# Patient Record
Sex: MALE | Employment: OTHER | URBAN - METROPOLITAN AREA
[De-identification: names, ages, dates, MRNs, and addresses within clinical notes are randomized per-mention and may not be internally consistent; named-entity substitution may affect disease eponyms.]

---

## 2017-01-19 ENCOUNTER — OFFICE VISIT (OUTPATIENT)
Dept: FAMILY MEDICINE | Facility: CLINIC | Age: 37
End: 2017-01-19
Payer: COMMERCIAL

## 2017-01-19 VITALS
SYSTOLIC BLOOD PRESSURE: 117 MMHG | HEIGHT: 70 IN | DIASTOLIC BLOOD PRESSURE: 72 MMHG | BODY MASS INDEX: 24.37 KG/M2 | HEART RATE: 69 BPM | WEIGHT: 170.2 LBS | TEMPERATURE: 98.3 F

## 2017-01-19 DIAGNOSIS — F19.20 CHEMICAL DEPENDENCY (H): ICD-10-CM

## 2017-01-19 DIAGNOSIS — S39.012A LOW BACK STRAIN, INITIAL ENCOUNTER: Primary | ICD-10-CM

## 2017-01-19 PROCEDURE — 99214 OFFICE O/P EST MOD 30 MIN: CPT | Performed by: FAMILY MEDICINE

## 2017-01-19 RX ORDER — ESCITALOPRAM OXALATE 20 MG/1
20 TABLET ORAL DAILY
COMMUNITY
Start: 2017-01-06 | End: 2017-09-18

## 2017-01-19 RX ORDER — METAXALONE 800 MG/1
800 TABLET ORAL 3 TIMES DAILY PRN
Qty: 45 TABLET | Refills: 1 | Status: SHIPPED | OUTPATIENT
Start: 2017-01-19 | End: 2017-02-20

## 2017-01-19 ASSESSMENT — ANXIETY QUESTIONNAIRES
3. WORRYING TOO MUCH ABOUT DIFFERENT THINGS: NOT AT ALL
6. BECOMING EASILY ANNOYED OR IRRITABLE: SEVERAL DAYS
7. FEELING AFRAID AS IF SOMETHING AWFUL MIGHT HAPPEN: NOT AT ALL
GAD7 TOTAL SCORE: 3
5. BEING SO RESTLESS THAT IT IS HARD TO SIT STILL: NOT AT ALL
2. NOT BEING ABLE TO STOP OR CONTROL WORRYING: NOT AT ALL
IF YOU CHECKED OFF ANY PROBLEMS ON THIS QUESTIONNAIRE, HOW DIFFICULT HAVE THESE PROBLEMS MADE IT FOR YOU TO DO YOUR WORK, TAKE CARE OF THINGS AT HOME, OR GET ALONG WITH OTHER PEOPLE: NOT DIFFICULT AT ALL
1. FEELING NERVOUS, ANXIOUS, OR ON EDGE: SEVERAL DAYS

## 2017-01-19 ASSESSMENT — PAIN SCALES - GENERAL: PAINLEVEL: MODERATE PAIN (4)

## 2017-01-19 ASSESSMENT — PATIENT HEALTH QUESTIONNAIRE - PHQ9: 5. POOR APPETITE OR OVEREATING: SEVERAL DAYS

## 2017-01-19 NOTE — PATIENT INSTRUCTIONS
Thank you for choosing [facility].  You may be receiving a survey in the mail from [facility] regarding your visit today.  Please take a few minutes to complete and return the survey to let us know how we are doing.      Our Clinic hours are:  Mondays    7:20 am - 7 pm      Tues -  Fri  7:20 am - 5 pm    Clinic Phone: [phone]    The clinic lab opens at 7:30 am Mon - Fri and appointments are required.    [facility]. [phone]  Monday-Thursday 8 am - 7pm  Tues/Wed/Fri 8 am - 5:30 pm

## 2017-01-19 NOTE — PROGRESS NOTES
SUBJECTIVE:                                                    Timmy Fitzpatrick is a 36 year old male who presents to clinic today for the following health issues:      Joint Pain     Onset: 1/13/17     Description:   Location: low back pain3  Character: Sharp and Dull ache    Intensity: moderate    Progression of Symptoms: worse, same    Accompanying Signs & Symptoms:  Other symptoms: none   History:   Previous similar pain: YES-       Precipitating factors:   Trauma or overuse: no     Alleviating factors:  Improved by: heat, acetaminophen and ibuprofen, Aleve       Therapies Tried and outcome: ibuprofen, acetaminophen, Aleve    ADDITIONAL HPI: 36 year old male here for above issue.  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    ROS: 10 point review of systems negative except as per HPI.    PAST MEDICAL HISTORY:  History reviewed. No pertinent past medical history.     ACTIVE MEDICAL PROBLEMS:  Patient Active Problem List   Diagnosis     Cervicalgia     Nonallopathic lesion of thoracic region     Nonallopathic lesion of lumbar region     CARDIOVASCULAR SCREENING; LDL GOAL LESS THAN 160     Major depression in partial remission (H)     Migraine headache     Anxiety attack     Alcoholic, sober since 2006     Chemical dependency (H)        FAMILY HISTORY:  Family History   Problem Relation Age of Onset     CANCER Paternal Grandfather      Depression Mother      Anxiety Disorder Mother      Depression Father      Dementia Maternal Grandfather      Substance Abuse Paternal Grandmother        SOCIAL HISTORY:  Social History     Social History     Marital Status:      Spouse Name: N/A     Number of Children: N/A     Years of Education: N/A     Occupational History     Not on file.     Social History Main Topics     Smoking status: Former Smoker     Types: Dip, chew, snus or snuff     Smokeless tobacco: Former User     Types: Chew     Quit date: 11/16/2014     Alcohol Use: No      Comment: sober since April 2016  "    Drug Use: Yes      Comment: sober since December 2015 marijuana     Sexual Activity:     Partners: Female     Birth Control/ Protection: Pill     Other Topics Concern     Parent/Sibling W/ Cabg, Mi Or Angioplasty Before 65f 55m? No     Social History Narrative    Dairy/d 1-2 servings/d.     Caffeine 3-4 servings/d    Exercise 4-6 x week    Sunscreen used - Yes    Seatbelts used - Yes    Working smoke/CO detectors in the home - Yes    Guns stored in the home - No    Self Breast Exams - NOT APPLICABLE    Self Testicular Exam - No    Eye Exam up to date - No    Dental Exam up to date - Yes    Pap Smear up to date - NOT APPLICABLE    Mammogram up to date - NOT APPLICABLE    PSA up to date - NOT APPLICABLE    Dexa Scan up to date - NOT APPLICABLE    Flex Sig / Colonoscopy up to date - NOT APPLICABLE    Immunizations up to date - Yes    Abuse: Current or Past(Physical, Sexual or Emotional)- No    Do you feel safe in your environment - Yes               MEDICATIONS:  Current Outpatient Prescriptions   Medication Sig Dispense Refill     CLONIDINE HCL PO Take 0.1 mg by mouth At Bedtime       gabapentin (NEURONTIN) 300 MG capsule Take 1 capsule (300 mg) by mouth 3 times daily 90 capsule 2     Multiple Vitamins-Minerals (MENS MULTIPLE VITAMIN/LYCOPENE PO) Take 1 tablet by mouth daily.       BuPROPion HCl (WELLBUTRIN PO) Take 150 mg by mouth daily        GLUCOSAMINE HCL None Entered       escitalopram (LEXAPRO) 20 MG tablet Take 20 mg by mouth daily       LEXAPRO 10 MG PO TABS ONE DAILY 30 Tab 1       ALLERGIES:     Allergies   Allergen Reactions     Nkda [No Known Drug Allergies]        Problem list, Medication list, Allergies, and Medical/Social/Surgical histories reviewed in [facility] and updated as appropriate.    OBJECTIVE:                                                    VITALS: /72 mmHg  Pulse 69  Temp(Src) 98.3  F (36.8  C) (Tympanic)  Ht 5' 10\" (1.778 m)  Wt 170 lb 3.2 oz (77.202 kg)  BMI 24.42 kg/m2 Body " mass index is 24.42 kg/(m^2).  GENERAL: Pleasant, well appearing male.  MUSCULOSKELETAL:  No midline vertebral tenderness to palpation. Has bilateral paravertebral tenderness and tightness. Straight leg raise is negative for radicular symptoms. Strength is 5/5 and DTR 2+ and symmetric throughout lower extremities.     ASSESSMENT/PLAN:                                                    1. Low back strain, initial encounter  Discussed likely muscular in etiology. Add skelaxin, heat and PHYSICAL THERAPY. Avoid narcotics due to not indicated and he has a history of chemical dependency.  - metaxalone (SKELAXIN) 800 MG tablet; Take 1 tablet (800 mg) by mouth 3 times daily as needed for moderate pain  Dispense: 45 tablet; Refill: 1  - PHYSICAL THERAPY REFERRAL    2. Chemical dependency (H)  Has been sober since last treatment program. Wants to avoid narcotics or other habit forming medications.

## 2017-01-19 NOTE — NURSING NOTE
"Chief Complaint   Patient presents with     Back Pain     low back pain.  no known injury       Initial /72 mmHg  Pulse 69  Temp(Src) 98.3  F (36.8  C) (Tympanic)  Ht 5' 10\" (1.778 m)  Wt 170 lb 3.2 oz (77.202 kg)  BMI 24.42 kg/m2 Estimated body mass index is 24.42 kg/(m^2) as calculated from the following:    Height as of this encounter: 5' 10\" (1.778 m).    Weight as of this encounter: 170 lb 3.2 oz (77.202 kg).  BP completed using cuff size: regular    "

## 2017-01-19 NOTE — MR AVS SNAPSHOT
After Visit Summary   1/19/2017    Timmy Fitzpatrick    MRN: [id number]           Patient Information     Date Of Birth          1980        Visit Information        Provider Department      1/19/2017 12:40 PM Shira Thomas MD [facility]        Today's Diagnoses     Low back strain, initial encounter    -  1       Care Instructions          Thank you for choosing [facility].  You may be receiving a survey in the mail from [facility] regarding your visit today.  Please take a few minutes to complete and return the survey to let us know how we are doing.      Our Clinic hours are:  Mondays    7:20 am - 7 pm      Tues -  Fri  7:20 am - 5 pm    Clinic Phone: [phone]    The clinic lab opens at 7:30 am Mon - Fri and appointments are required.    [facility]  Ph. [phone]  Monday-Thursday 8 am - 7pm  Tues/Wed/Fri 8 am - 5:30 pm               Follow-ups after your visit        Additional Services     PHYSICAL THERAPY REFERRAL       *This therapy referral will be filtered to a centralized scheduling office at [facility] and the patient will receive a call to schedule an appointment at a [facility] location most convenient for them. *     [facility] provides Physical Therapy evaluation and treatment and many specialty services across the [facility] system.  If requesting a specialty program, please choose from the list below.    If you have not heard from the scheduling office within 2 business days, please call [phone] for all locations, with the exception of [place], please call [phone].  Treatment: Evaluation & Treatment  Special Instructions/Modalities:   Special Programs: None    Please be aware that coverage of these services is subject to the terms and limitations of your health insurance plan.  Call member services at your health plan with any benefit or coverage questions.      **Note  "to Provider:  If you are referring outside of [facility] for the therapy appointment, please list the name of the location in the  special instructions  above, print the referral and give to the patient to schedule the appointment.                  Who to contact     If you have questions or need follow up information about today's clinic visit or your schedule please contact [facility] directly at [phone].  Normal or non-critical lab and imaging results will be communicated to you by [employer], letter or phone within 4 business days after the clinic has received the results. If you do not hear from us within 7 days, please contact the clinic through [employer] or phone. If you have a critical or abnormal lab result, we will notify you by phone as soon as possible.  Submit refill requests through [employer] or call your pharmacy and they will forward the refill request to us. Please allow 3 business days for your refill to be completed.          Additional Information About Your Visit        [employer] Information     [employer] gives you secure access to your electronic health record. If you see a primary care provider, you can also send messages to your care team and make appointments. If you have questions, please call your primary care clinic.  If you do not have a primary care provider, please call [phone] and they will assist you.        Care EveryWhere ID     This is your Care EveryWhere ID. This could be used by other organizations to access your [facility] medical records  [id number]        Your Vitals Were     Pulse Temperature Height BMI (Body Mass Index)          69 98.3  F (36.8  C) (Tympanic) 5' 10\" (1.778 m) 24.42 kg/m2         Blood Pressure from Last 3 Encounters:   01/19/17 117/72   09/12/16 122/70   08/18/16 118/80    Weight from Last 3 Encounters:   01/19/17 170 lb 3.2 oz (77.202 kg)   09/29/16 166 lb (75.297 kg)   09/12/16 170 lb 11.2 oz (77.429 kg)              We Performed the " Following     PHYSICAL THERAPY REFERRAL          Today's Medication Changes          These changes are accurate as of: 1/19/17  1:21 PM.  If you have any questions, ask your nurse or doctor.               Start taking these medicines.        Dose/Directions    metaxalone 800 MG tablet   Commonly known as:  SKELAXIN   Used for:  Low back strain, initial encounter   Started by:  Shira Thomas MD        Dose:  800 mg   Take 1 tablet (800 mg) by mouth 3 times daily as needed for moderate pain   Quantity:  45 tablet   Refills:  1            Where to get your medicines      These medications were sent to [facility] [address]  [address] [place] [id number]     Phone:  [phone]    - metaxalone 800 MG tablet             Primary Care Provider Office Phone # Fax #    R Refugio Loza [phone]       [facility] [address] [place] [id number]        Thank you!     Thank you for choosing [facility]  for your care. Our goal is always to provide you with excellent care. Hearing back from our patients is one way we can continue to improve our services. Please take a few minutes to complete the written survey that you may receive in the mail after your visit with us. Thank you!             Your Updated Medication List - Protect others around you: Learn how to safely use, store and throw away your medicines at www.disposemymeds.org.          This list is accurate as of: 1/19/17  1:21 PM.  Always use your most recent med list.                   Brand Name Dispense Instructions for use    CLONIDINE HCL PO      Take 0.1 mg by mouth At Bedtime       gabapentin 300 MG capsule    NEURONTIN    90 capsule    Take 1 capsule (300 mg) by mouth 3 times daily       GLUCOSAMINE HCL      None Entered       * LEXAPRO 10 MG tablet   Generic drug:  escitalopram     30 Tab    ONE DAILY       * escitalopram  20 MG tablet    LEXAPRO     Take 20 mg by mouth daily       MENS MULTIPLE VITAMIN/LYCOPENE PO      Take 1 tablet by mouth daily.       metaxalone 800 MG tablet    SKELAXIN    45 tablet    Take 1 tablet (800 mg) by mouth 3 times daily as needed for moderate pain       WELLBUTRIN PO      Take 150 mg by mouth daily       * Notice:  This list has 2 medication(s) that are the same as other medications prescribed for you. Read the directions carefully, and ask your doctor or other care provider to review them with you.

## 2017-01-20 ASSESSMENT — PATIENT HEALTH QUESTIONNAIRE - PHQ9: SUM OF ALL RESPONSES TO PHQ QUESTIONS 1-9: 5

## 2017-01-20 ASSESSMENT — ANXIETY QUESTIONNAIRES: GAD7 TOTAL SCORE: 3

## 2017-09-18 ENCOUNTER — OFFICE VISIT (OUTPATIENT)
Dept: FAMILY MEDICINE | Facility: CLINIC | Age: 37
End: 2017-09-18
Payer: COMMERCIAL

## 2017-09-18 VITALS
HEART RATE: 78 BPM | TEMPERATURE: 98.3 F | WEIGHT: 173 LBS | BODY MASS INDEX: 24.82 KG/M2 | SYSTOLIC BLOOD PRESSURE: 127 MMHG | DIASTOLIC BLOOD PRESSURE: 85 MMHG | OXYGEN SATURATION: 97 %

## 2017-09-18 DIAGNOSIS — F32.4 MAJOR DEPRESSIVE DISORDER WITH SINGLE EPISODE, IN PARTIAL REMISSION (H): ICD-10-CM

## 2017-09-18 DIAGNOSIS — G25.81 RESTLESS LEGS SYNDROME (RLS): ICD-10-CM

## 2017-09-18 DIAGNOSIS — F41.9 ANXIETY: ICD-10-CM

## 2017-09-18 DIAGNOSIS — J01.90 ACUTE SINUSITIS WITH SYMPTOMS GREATER THAN 10 DAYS: Primary | ICD-10-CM

## 2017-09-18 PROCEDURE — 99214 OFFICE O/P EST MOD 30 MIN: CPT | Performed by: FAMILY MEDICINE

## 2017-09-18 RX ORDER — GABAPENTIN 300 MG/1
CAPSULE ORAL
Qty: 270 CAPSULE | Refills: 3 | Status: SHIPPED | OUTPATIENT
Start: 2017-09-18 | End: 2017-11-28

## 2017-09-18 RX ORDER — ESCITALOPRAM OXALATE 20 MG/1
20 TABLET ORAL DAILY
Qty: 90 TABLET | Refills: 3 | Status: SHIPPED | OUTPATIENT
Start: 2017-09-18 | End: 2018-11-02

## 2017-09-18 ASSESSMENT — PAIN SCALES - GENERAL: PAINLEVEL: MODERATE PAIN (4)

## 2017-09-18 NOTE — MR AVS SNAPSHOT
After Visit Summary   9/18/2017    Timmy Fitzpatrick    MRN: [id number]           Patient Information     Date Of Birth          1980        Visit Information        Provider Department      9/18/2017 4:00 PM CLAUDIO Loza MD [facility]        Today's Diagnoses     Acute sinusitis with symptoms greater than 10 days    -  1    Major depressive disorder with single episode, in partial remission (H)        Anxiety        Restless legs syndrome (RLS)           Follow-ups after your visit        Who to contact     If you have questions or need follow up information about today's clinic visit or your schedule please contact [facility] directly at [phone].  Normal or non-critical lab and imaging results will be communicated to you by MyChart, letter or phone within 4 business days after the clinic has received the results. If you do not hear from us within 7 days, please contact the clinic through [employer] or phone. If you have a critical or abnormal lab result, we will notify you by phone as soon as possible.  Submit refill requests through [employer] or call your pharmacy and they will forward the refill request to us. Please allow 3 business days for your refill to be completed.          Additional Information About Your Visit        MyChart Information     [employer] gives you secure access to your electronic health record. If you see a primary care provider, you can also send messages to your care team and make appointments. If you have questions, please call your primary care clinic.  If you do not have a primary care provider, please call [phone] and they will assist you.        Care EveryWhere ID     This is your Care EveryWhere ID. This could be used by other organizations to access your [facility] medical records  [id number]        Your Vitals Were     Pulse Temperature Pulse Oximetry BMI (Body Mass Index)          78 98.3  F (36.8  C) (Tympanic) 97%  24.82 kg/m2         Blood Pressure from Last 3 Encounters:   09/18/17 127/85   01/19/17 117/72   09/12/16 122/70    Weight from Last 3 Encounters:   09/18/17 173 lb (78.5 kg)   01/19/17 170 lb 3.2 oz (77.2 kg)   09/29/16 166 lb (75.3 kg)              Today, you had the following     No orders found for display         Today's Medication Changes          These changes are accurate as of: 9/18/17  4:27 PM.  If you have any questions, ask your nurse or doctor.               Start taking these medicines.        Dose/Directions    amoxicillin-clavulanate 875-125 MG per tablet   Commonly known as:  AUGMENTIN   Used for:  Acute sinusitis with symptoms greater than 10 days   Started by:  CLAUDIO Loza MD        Dose:  1 tablet   Take 1 tablet by mouth 2 times daily   Quantity:  20 tablet   Refills:  0         These medicines have changed or have updated prescriptions.        Dose/Directions    gabapentin 300 MG capsule   Commonly known as:  NEURONTIN   This may have changed:    - how much to take  - how to take this  - when to take this  - additional instructions   Used for:  Restless legs syndrome (RLS), Anxiety   Changed by:  CLAUDIO Loza MD        One in the late afternoon and 2 at bedtime   Quantity:  270 capsule   Refills:  3            Where to get your medicines      [place] [facility] [address]  [address] [place] [id number]     Phone:  [phone]     amoxicillin-clavulanate 875-125 MG per tablet    escitalopram 20 MG tablet    gabapentin 300 MG capsule                Primary Care Provider Office Phone # Fax #    CLAUDIO Loza [phone] [address] [facility] [address]        Equal Access to Services     ANGIE RIOS AH: Kiara Johnson, mindy mejia, sherita kawilliam murphy, alex mayer. Gabby [facility] [phone].    ATENCIÓN: Priscilla alas  español, tiene a waters disposición servicios gratuitos de asistencia lingüística. Alexia cash [phone].    We comply with applicable federal civil rights laws and [place] laws. We do not discriminate on the basis of race, color, national origin, age, disability sex, sexual orientation or gender identity.            Thank you!     Thank you for choosing [facility]  for your care. Our goal is always to provide you with excellent care. Hearing back from our patients is one way we can continue to improve our services. Please take a few minutes to complete the written survey that you may receive in the mail after your visit with us. Thank you!             Your Updated Medication List - Protect others around you: Learn how to safely use, store and throw away your medicines at www.disposemymeds.org.          This list is accurate as of: 9/18/17  4:27 PM.  Always use your most recent med list.                   Brand Name Dispense Instructions for use Diagnosis    amoxicillin-clavulanate 875-125 MG per tablet    AUGMENTIN    20 tablet    Take 1 tablet by mouth 2 times daily    Acute sinusitis with symptoms greater than 10 days       escitalopram 20 MG tablet    LEXAPRO    90 tablet    Take 1 tablet (20 mg) by mouth daily    Major depressive disorder with single episode, in partial remission (H), Anxiety       gabapentin 300 MG capsule    NEURONTIN    270 capsule    One in the late afternoon and 2 at bedtime    Restless legs syndrome (RLS), Anxiety       GLUCOSAMINE HCL      None Entered        MENS MULTIPLE VITAMIN/LYCOPENE PO      Take 1 tablet by mouth daily.        metaxalone 800 MG tablet    SKELAXIN    45 tablet    Take 1 tablet (800 mg) by mouth 3 times daily as needed for moderate pain    Low back strain, initial encounter

## 2017-09-18 NOTE — PROGRESS NOTES
SUBJECTIVE:   Timmy Fitzpatrick is a 37 year old male who presents to clinic today for the following health issues:      Acute Illness   Acute illness concerns: Sinus pain and pressure, headache, drainage  Onset: 2 weeks    Fever: no    Chills/Sweats: YES    Headache (location?): YES    Sinus Pressure:YES    Conjunctivitis:  no    Ear Pain: no    Rhinorrhea: YES    Congestion: YES    Sore Throat: no     Cough: YES-productive of green sputum    Wheeze: no    Decreased Appetite: YES    Nausea: no    Vomiting: no    Diarrhea:  no    Dysuria/Freq.: no    Fatigue/Achiness: YES    Sick/Strep Exposure: no     Therapies Tried and outcome:  Dayquil, ny-quil, sudafed        PROBLEMS TO ADD ON...  Depression and Anxiety Follow-Up    He has been followed by a psychiatrist in the [facility] for a number of years, but she moved [place] a new clinic [place] [facility]. He has been on the same dose of his medication for several years and has been stable, and was wondering if I would be willing to write the refills for him to save him having to drive into the [facility]  PHQ-9 SCORE 8/18/2016 9/12/2016 1/19/2017   Total Score - - -   Total Score 8 10 5     JUAREZ-7 SCORE 8/18/2016 9/12/2016 1/19/2017   Total Score - - -   Total Score 10 8 3       PHQ-9  English  PHQ-9   Any Language  GAD7        Problem list and histories reviewed & adjusted, as indicated.  Additional history: none        Reviewed and updated as needed this visit by clinical staffTobacco  Allergies  Med Hx  Surg Hx  Fam Hx  Soc Hx      Reviewed and updated as needed this visit by Provider               ROS:  Constitutional, HEENT, cardiovascular, pulmonary, gi and gu systems are negative, except as otherwise noted.          OBJECTIVE:                                                    /85 (BP Location: Right arm, Patient Position: Chair, Cuff Size: Adult Regular)  Pulse 78  Temp 98.3  F (36.8  C) (Tympanic)  Wt 173 lb (78.5 kg)  SpO2 97%  BMI 24.82  kg/m2    GENERAL: healthy, alert and no distress  EYES: Eyes grossly normal to inspection, extraocular movements - intact, and PERRL  HENT: ear canals- normal; TMs- normal; Nose- normal; Mouth- no ulcers, no lesions; tender over the maxillary sinuses bilaterally  NECK: no tenderness, no adenopathy, no asymmetry, no masses, no stiffness; thyroid- normal to palpation  RESP: lungs clear to auscultation - no rales, no rhonchi, no wheezes  CV: regular rates and rhythm, normal S1 S2, no S3 or S4 and no murmur, no click or rub -  MS: extremities- no gross deformities noted, no edema  PSYCH: Alert and oriented times 3; coherent speech, normal rate and volume, able to articulate logical thoughts, able to abstract reason, no tangential thoughts, no hallucinations or delusions. Affect is normal.         ASSESSMENT/PLAN:                                                    ASSESSMENT:  1. Acute sinusitis with symptoms greater than 10 days    2. Major depressive disorder with single episode, in partial remission (H)    3. Anxiety    4. Restless legs syndrome (RLS)        PLAN:  Orders Placed This Encounter     amoxicillin-clavulanate (AUGMENTIN) 875-125 MG per tablet; Discussed how to take the medication(s), expected outcomes, potential side effects.      escitalopram (LEXAPRO) 20 MG tablet     gabapentin (NEURONTIN) 300 MG capsule     Push fluids and apply hot packs to the facial area. I told him I would be willing to prescribe his psychotropic meds as long as things are stable. If he needs complicated adjustments will need to refer him back to a psychiatrist      CLAUDIO Loza  [facility]

## 2017-09-18 NOTE — NURSING NOTE
"Chief Complaint   Patient presents with     Sinus Problem     sinus drainage, pressure and pain, congestion x 2 weeks       Initial /85 (BP Location: Right arm, Patient Position: Chair, Cuff Size: Adult Regular)  Pulse 78  Temp 98.3  F (36.8  C) (Tympanic)  Wt 173 lb (78.5 kg)  SpO2 97%  BMI 24.82 kg/m2 Estimated body mass index is 24.82 kg/(m^2) as calculated from the following:    Height as of 1/19/17: 5' 10\" (1.778 m).    Weight as of this encounter: 173 lb (78.5 kg).  Medication Reconciliation: complete   Rhona Krishna CMA       "

## 2017-09-26 ENCOUNTER — OFFICE VISIT (OUTPATIENT)
Dept: FAMILY MEDICINE | Facility: CLINIC | Age: 37
End: 2017-09-26
Payer: COMMERCIAL

## 2017-09-26 VITALS
BODY MASS INDEX: 23.96 KG/M2 | SYSTOLIC BLOOD PRESSURE: 125 MMHG | WEIGHT: 167 LBS | HEART RATE: 70 BPM | DIASTOLIC BLOOD PRESSURE: 78 MMHG | TEMPERATURE: 98.4 F

## 2017-09-26 DIAGNOSIS — M79.10 MYALGIA: Primary | ICD-10-CM

## 2017-09-26 DIAGNOSIS — N52.9 ERECTILE DYSFUNCTION, UNSPECIFIED ERECTILE DYSFUNCTION TYPE: ICD-10-CM

## 2017-09-26 DIAGNOSIS — S46.001A ROTATOR CUFF INJURY, RIGHT, INITIAL ENCOUNTER: ICD-10-CM

## 2017-09-26 DIAGNOSIS — R68.89 TEMPERATURE INTOLERANCE: ICD-10-CM

## 2017-09-26 PROCEDURE — 84443 ASSAY THYROID STIM HORMONE: CPT | Performed by: FAMILY MEDICINE

## 2017-09-26 PROCEDURE — 99214 OFFICE O/P EST MOD 30 MIN: CPT | Performed by: FAMILY MEDICINE

## 2017-09-26 PROCEDURE — 36415 COLL VENOUS BLD VENIPUNCTURE: CPT | Performed by: FAMILY MEDICINE

## 2017-09-26 RX ORDER — SILDENAFIL 100 MG/1
100 TABLET, FILM COATED ORAL DAILY PRN
Qty: 12 TABLET | Refills: 11 | Status: SHIPPED | OUTPATIENT
Start: 2017-09-26 | End: 2018-12-07

## 2017-09-26 ASSESSMENT — PAIN SCALES - GENERAL: PAINLEVEL: NO PAIN (0)

## 2017-09-26 NOTE — PROGRESS NOTES
SUBJECTIVE:   Timmy Fitzpatrick is a 37 year old male who presents to clinic today for the following health issues:      Pt would like to have thyroid checked, pts having excessive sweating,having on and off tense muscles, pain in right shoulder and wrist.        Problem list and histories reviewed & adjusted, as indicated.  Additional history:         Reviewed and updated as needed this visit by clinical staff  Tobacco  Allergies  Med Hx  Surg Hx  Fam Hx  Soc Hx      Reviewed and updated as needed this visit by Provider      SUBJECTIVE:  Timmy Fitzpatrick is a 37 year old male who scheduled an appointment to discuss the following issues:     Myalgia  Temperature intolerance  Rotator cuff injury, right, initial encounter  Erectile dysfunction, unspecified erectile dysfunction type    Medical, social, surgical, and family histories reviewed.    ROS:  General: No change in weight, sleep or appetite.  Normal energy.  No fever or chills, no excessive fatigue or daytime drowsiness  Eyes: Negative for vision changes or eye problems  ENT: No problems with ears, nose or throat.  No difficulty swallowing.  Resp: No coughing, wheezing or shortness of breath, denies apnea  CV: No chest pains or palpitations  GI: No nausea, vomiting,  heartburn, abdominal pain, diarrhea, constipation or change in bowel habits  : No urinary frequency or dysuria, bladder or kidney problems  Musculoskeletal: POSITIVE  for:, Right shoulder pain with certain motions but no loss of motion.  Neurologic: No headaches, numbness, tingling, weakness, problems with balance or coordination  Psychiatric: No problems with anxiety, depression or mental health    OBJECTIVE:  /78 (BP Location: Right arm, Patient Position: Left side, Cuff Size: Adult Large)  Pulse 70  Temp 98.4  F (36.9  C) (Tympanic)  Wt 167 lb (75.8 kg)  BMI 23.96 kg/m2  EXAM:  GENERAL APPEARANCE: healthy, alert and no distress  EYES: PERRLA, EOMI, sclerae clear, fundi  benign with sharp discs  ENT: TMs clear and nose and throat clear  NECK: supple without adenopathy and thyroid normal to palpation  RESP: lungs clear to auscultation - no rales, rhonchi or wheezes  ABDOMEN: bowel sounds positive, soft, non tender, no masses or hepatosplenomegaly  EXT: Right shoulder is normal as far as range of motion but there is reproduction of his pain with resisted external rotation.    ASSESSMENT/PLAN:  (M79.1) Myalgia  (primary encounter diagnosis)  Comment:   Plan: TSH with free T4 reflex            (R68.89) Temperature intolerance  Comment:   Plan: TSH with free T4 reflex            (S46.001A) Rotator cuff injury, right, initial encounter  Comment: Probable strain  Plan: Avoid further strain and monitor for signs of decreased range of motion for which he should return    (N52.9) Erectile dysfunction, unspecified erectile dysfunction type  Comment: He would like to try treatment though he doesn't have severe dysfunction.  Plan: sildenafil (VIAGRA) 100 MG tablet

## 2017-09-26 NOTE — NURSING NOTE
"Chief Complaint   Patient presents with     RECHECK     pt would like to have thyroid checked, pts having excessive sweating, tense muscles, pain in right shoulder and wrist       Initial /78 (BP Location: Right arm, Patient Position: Left side, Cuff Size: Adult Large)  Pulse 70  Temp 98.4  F (36.9  C) (Tympanic)  Wt 167 lb (75.8 kg)  BMI 23.96 kg/m2 Estimated body mass index is 23.96 kg/(m^2) as calculated from the following:    Height as of 1/19/17: 5' 10\" (1.778 m).    Weight as of this encounter: 167 lb (75.8 kg).  Medication Reconciliation: complete   Rhona Krishna CMA       "

## 2017-09-26 NOTE — MR AVS SNAPSHOT
After Visit Summary   9/26/2017    Timmy Fitzpatrick    MRN: [id number]           Patient Information     Date Of Birth          1980        Visit Information        Provider Department      9/26/2017 4:20 PM Nathan Julien MD [facility]        Today's Diagnoses     Myalgia    -  1    Temperature intolerance        Rotator cuff injury, right, initial encounter        Erectile dysfunction, unspecified erectile dysfunction type           Follow-ups after your visit        Who to contact     If you have questions or need follow up information about today's clinic visit or your schedule please contact [facility] directly at [phone].  Normal or non-critical lab and imaging results will be communicated to you by [employer], letter or phone within 4 business days after the clinic has received the results. If you do not hear from us within 7 days, please contact the clinic through [employer] or phone. If you have a critical or abnormal lab result, we will notify you by phone as soon as possible.  Submit refill requests through [employer] or call your pharmacy and they will forward the refill request to us. Please allow 3 business days for your refill to be completed.          Additional Information About Your Visit        MyChart Information     [employer] gives you secure access to your electronic health record. If you see a primary care provider, you can also send messages to your care team and make appointments. If you have questions, please call your primary care clinic.  If you do not have a primary care provider, please call [phone] and they will assist you.        Care EveryWhere ID     This is your Care EveryWhere ID. This could be used by other organizations to access your [place] medical records  [id number]        Your Vitals Were     Pulse Temperature BMI (Body Mass Index)             70 98.4  F (36.9  C) (Tympanic) 23.96 kg/m2          Blood Pressure  from Last 3 Encounters:   09/26/17 125/78   09/18/17 127/85   01/19/17 117/72    Weight from Last 3 Encounters:   09/26/17 167 lb (75.8 kg)   09/18/17 173 lb (78.5 kg)   01/19/17 170 lb 3.2 oz (77.2 kg)              We Performed the Following     TSH with free T4 reflex          Today's Medication Changes          These changes are accurate as of: 9/26/17  4:47 PM.  If you have any questions, ask your nurse or doctor.               Start taking these medicines.        Dose/Directions    sildenafil 100 MG tablet   Commonly known as:  VIAGRA   Used for:  Erectile dysfunction, unspecified erectile dysfunction type   Started by:  Nathan Julien MD        Dose:  100 mg   Take 1 tablet (100 mg) by mouth daily as needed 30 min to 4 hrs before sex. Do not use with nitroglycerin, terazosin or doxazosin.   Quantity:  12 tablet   Refills:  11            Where to get your medicines      These medications were sent to [facility] [address]  [address] [place] [address]     Phone:  [phone]     sildenafil 100 MG tablet                Primary Care Provider Office Phone # Fax #    R Refugio Loza [phone] [address] [facility] [address]        Equal Access to Services     ANGIE RIOS AH: Hadlor cabrera hadasho Soshiv, waaxda luqadaha, qaybta kaalmada katherine, alex mayer. So [facility] [phone].    ATENCIÓN: Si habla español, tiene a waters disposición servicios gratuitos de asistencia lingüística. Alexia al [phone].    We comply with applicable federal civil rights laws and [place] laws. We do not discriminate on the basis of race, color, national origin, age, disability sex, sexual orientation or gender identity.            Thank you!     Thank you for choosing [facility]  for your care. Our goal is always to provide you with excellent care. Hearing back from our  patients is one way we can continue to improve our services. Please take a few minutes to complete the written survey that you may receive in the mail after your visit with us. Thank you!             Your Updated Medication List - Protect others around you: Learn how to safely use, store and throw away your medicines at www.disposemymeds.org.          This list is accurate as of: 9/26/17  4:47 PM.  Always use your most recent med list.                   Brand Name Dispense Instructions for use Diagnosis    amoxicillin-clavulanate 875-125 MG per tablet    AUGMENTIN    20 tablet    Take 1 tablet by mouth 2 times daily    Acute sinusitis with symptoms greater than 10 days       escitalopram 20 MG tablet    LEXAPRO    90 tablet    Take 1 tablet (20 mg) by mouth daily    Major depressive disorder with single episode, in partial remission (H), Anxiety       gabapentin 300 MG capsule    NEURONTIN    270 capsule    One in the late afternoon and 2 at bedtime    Restless legs syndrome (RLS), Anxiety       GLUCOSAMINE HCL      None Entered        MENS MULTIPLE VITAMIN/LYCOPENE PO      Take 1 tablet by mouth daily.        metaxalone 800 MG tablet    SKELAXIN    45 tablet    Take 1 tablet (800 mg) by mouth 3 times daily as needed for moderate pain    Low back strain, initial encounter       sildenafil 100 MG tablet    VIAGRA    12 tablet    Take 1 tablet (100 mg) by mouth daily as needed 30 min to 4 hrs before sex. Do not use with nitroglycerin, terazosin or doxazosin.    Erectile dysfunction, unspecified erectile dysfunction type

## 2017-09-27 LAB — TSH SERPL DL<=0.005 MIU/L-ACNC: 1.78 MU/L (ref 0.4–4)

## 2017-11-28 ENCOUNTER — OFFICE VISIT (OUTPATIENT)
Dept: FAMILY MEDICINE | Facility: CLINIC | Age: 37
End: 2017-11-28
Payer: COMMERCIAL

## 2017-11-28 VITALS
BODY MASS INDEX: 24.05 KG/M2 | HEART RATE: 80 BPM | SYSTOLIC BLOOD PRESSURE: 130 MMHG | WEIGHT: 168 LBS | HEIGHT: 70 IN | DIASTOLIC BLOOD PRESSURE: 78 MMHG

## 2017-11-28 DIAGNOSIS — G25.81 RESTLESS LEGS SYNDROME (RLS): ICD-10-CM

## 2017-11-28 DIAGNOSIS — F40.240 CLAUSTROPHOBIA: ICD-10-CM

## 2017-11-28 DIAGNOSIS — F41.9 ANXIETY: ICD-10-CM

## 2017-11-28 DIAGNOSIS — M25.511 ACUTE PAIN OF RIGHT SHOULDER: Primary | ICD-10-CM

## 2017-11-28 PROCEDURE — 99214 OFFICE O/P EST MOD 30 MIN: CPT | Performed by: FAMILY MEDICINE

## 2017-11-28 RX ORDER — LORAZEPAM 1 MG/1
TABLET ORAL
Qty: 1 TABLET | Refills: 0 | Status: SHIPPED | OUTPATIENT
Start: 2017-11-28 | End: 2018-04-06

## 2017-11-28 RX ORDER — GABAPENTIN 300 MG/1
CAPSULE ORAL
Qty: 360 CAPSULE | Refills: 3 | Status: SHIPPED | OUTPATIENT
Start: 2017-11-28 | End: 2018-08-13

## 2017-11-28 ASSESSMENT — PATIENT HEALTH QUESTIONNAIRE - PHQ9
SUM OF ALL RESPONSES TO PHQ QUESTIONS 1-9: 13
5. POOR APPETITE OR OVEREATING: NEARLY EVERY DAY

## 2017-11-28 ASSESSMENT — ANXIETY QUESTIONNAIRES
1. FEELING NERVOUS, ANXIOUS, OR ON EDGE: MORE THAN HALF THE DAYS
5. BEING SO RESTLESS THAT IT IS HARD TO SIT STILL: NOT AT ALL
2. NOT BEING ABLE TO STOP OR CONTROL WORRYING: MORE THAN HALF THE DAYS
GAD7 TOTAL SCORE: 10
3. WORRYING TOO MUCH ABOUT DIFFERENT THINGS: MORE THAN HALF THE DAYS
7. FEELING AFRAID AS IF SOMETHING AWFUL MIGHT HAPPEN: NOT AT ALL
IF YOU CHECKED OFF ANY PROBLEMS ON THIS QUESTIONNAIRE, HOW DIFFICULT HAVE THESE PROBLEMS MADE IT FOR YOU TO DO YOUR WORK, TAKE CARE OF THINGS AT HOME, OR GET ALONG WITH OTHER PEOPLE: SOMEWHAT DIFFICULT
6. BECOMING EASILY ANNOYED OR IRRITABLE: SEVERAL DAYS

## 2017-11-28 NOTE — MR AVS SNAPSHOT
After Visit Summary   11/28/2017    Timmy Fitzpatrick    MRN: [id number]           Patient Information     Date Of Birth          1980        Visit Information        Provider Department      11/28/2017 4:20 PM CLAUDIO Loza MD [facility]        Today's Diagnoses     Acute pain of right shoulder    -  1    Claustrophobia        Restless legs syndrome (RLS)        Anxiety           Follow-ups after your visit        Future tests that were ordered for you today     Open Future Orders        Priority Expected Expires Ordered    MR Shoulder Right w/o Contrast Routine  11/28/2018 11/28/2017            Who to contact     If you have questions or need follow up information about today's clinic visit or your schedule please contact [facility] directly at [phone].  Normal or non-critical lab and imaging results will be communicated to you by MyChart, letter or phone within 4 business days after the clinic has received the results. If you do not hear from us within 7 days, please contact the clinic through [employer] or phone. If you have a critical or abnormal lab result, we will notify you by phone as soon as possible.  Submit refill requests through [employer] or call your pharmacy and they will forward the refill request to us. Please allow 3 business days for your refill to be completed.          Additional Information About Your Visit        MyChart Information     [employer] gives you secure access to your electronic health record. If you see a primary care provider, you can also send messages to your care team and make appointments. If you have questions, please call your primary care clinic.  If you do not have a primary care provider, please call [phone] and they will assist you.        Care EveryWhere ID     This is your Care EveryWhere ID. This could be used by other organizations to access your [place] medical records  [id number]        Your Vitals  "Were     Pulse Height BMI (Body Mass Index)             80 5' 10\" (1.778 m) 24.11 kg/m2          Blood Pressure from Last 3 Encounters:   11/28/17 130/78   09/26/17 125/78   09/18/17 127/85    Weight from Last 3 Encounters:   11/28/17 168 lb (76.2 kg)   09/26/17 167 lb (75.8 kg)   09/18/17 173 lb (78.5 kg)                 Today's Medication Changes          These changes are accurate as of: 11/28/17  6:01 PM.  If you have any questions, ask your nurse or doctor.               Start taking these medicines.        Dose/Directions    LORazepam 1 MG tablet   Commonly known as:  ATIVAN   Used for:  Claustrophobia   Started by:  CLAUDIO Loza MD        Take 30 minutes prior toMRI.  Do not operate a vehicle after taking this medication   Quantity:  1 tablet   Refills:  0         These medicines have changed or have updated prescriptions.        Dose/Directions    gabapentin 300 MG capsule   Commonly known as:  NEURONTIN   This may have changed:  additional instructions   Used for:  Restless legs syndrome (RLS), Anxiety   Changed by:  CLAUDIO Loza MD        One capsule 4 times a day   Quantity:  360 capsule   Refills:  3            Where to get your medicines      These medications [place] [facility] [address] [address] [address]     Phone:  [phone]     gabapentin 300 MG capsule         Some of these will need a paper prescription and others can be bought over the counter.  Ask your nurse if you have questions.     Bring a paper prescription for each of these medications     LORazepam 1 MG tablet                Primary Care Provider Office Phone # Fax #    CLAUDIO Loza [phone] [address] [facility] [address]        Equal Access to Services     ANGIE RIOS AH: Kiara Johnson, wajuan cda luqadaha, qaybta kaalmada katherine, alex mayer. So [facility] " [phone].    ATENCIÓN: Si evette timmons, tiene a waters disposición servicios gratuitos de asistencia lingüística. Alexia cash [phone].    We comply with applicable federal civil rights laws and [place] laws. We do not discriminate on the basis of race, color, national origin, age, disability, sex, sexual orientation, or gender identity.            Thank you!     Thank you for choosing [facility]  for your care. Our goal is always to provide you with excellent care. Hearing back from our patients is one way we can continue to improve our services. Please take a few minutes to complete the written survey that you may receive in the mail after your visit with us. Thank you!             Your Updated Medication List - Protect others around you: Learn how to safely use, store and throw away your medicines at www.disposemymeds.org.          This list is accurate as of: 11/28/17  6:01 PM.  Always use your most recent med list.                   Brand Name Dispense Instructions for use Diagnosis    amoxicillin-clavulanate 875-125 MG per tablet    AUGMENTIN    20 tablet    Take 1 tablet by mouth 2 times daily    Acute sinusitis with symptoms greater than 10 days       escitalopram 20 MG tablet    LEXAPRO    90 tablet    Take 1 tablet (20 mg) by mouth daily    Major depressive disorder with single episode, in partial remission (H), Anxiety       gabapentin 300 MG capsule    NEURONTIN    360 capsule    One capsule 4 times a day    Restless legs syndrome (RLS), Anxiety       GLUCOSAMINE HCL      None Entered        LORazepam 1 MG tablet    ATIVAN    1 tablet    Take 30 minutes prior toMRI.  Do not operate a vehicle after taking this medication    Claustrophobia       MENS MULTIPLE VITAMIN/LYCOPENE PO      Take 1 tablet by mouth daily.        metaxalone 800 MG tablet    SKELAXIN    45 tablet    Take 1 tablet (800 mg) by mouth 3 times daily as needed for moderate pain    Low back strain, initial encounter        sildenafil 100 MG tablet    VIAGRA    12 tablet    Take 1 tablet (100 mg) by mouth daily as needed 30 min to 4 hrs before sex. Do not use with nitroglycerin, terazosin or doxazosin.    Erectile dysfunction, unspecified erectile dysfunction type

## 2017-11-28 NOTE — NURSING NOTE
"Chief Complaint   Patient presents with     Shoulder Pain     right shoulder pain X ongoing pt. was seen in clinic 9/26/2017 with Dr. Julien     Patient Request     a lot of joint pain X 3-4 weeks comes and goes. no known injury.      Depression     talk about medication     Anxiety       Initial /78 (BP Location: Right arm, Patient Position: Chair, Cuff Size: Adult Regular)  Pulse 80  Ht 5' 10\" (1.778 m)  Wt 168 lb (76.2 kg)  BMI 24.11 kg/m2 Estimated body mass index is 24.11 kg/(m^2) as calculated from the following:    Height as of this encounter: 5' 10\" (1.778 m).    Weight as of this encounter: 168 lb (76.2 kg).  Medication Reconciliation: complete     Mayra Han, CMA      "

## 2017-11-28 NOTE — PROGRESS NOTES
Subjective:  Timmy Fitzpatrick is a 37 year old male   Chief Complaint   Patient presents with     Shoulder Pain     right shoulder pain X ongoing pt. was seen in clinic 9/26/2017 with Dr. Julien     Patient Request     a lot of joint pain X 3-4 weeks comes and goes. no known injury.      Depression     talk about medication     Anxiety     He has been dealing with some right shoulder pain for about 2 months and it does not seem to be getting better. His job as a  at times is very physical with heavy lifting and chopping wood and carrying heavy objects. For the most part he can do these activities but will occasionally get a sharp pain in the shoulder if it's in a certain position. He also notes difficulty if he is lying on his right side at night where he will get pain in that shoulder and need to shift to a different position. He has not noticed loss of strength in the shoulder.    He has taken glucosamine for a number of years for diffuse joint pain and recently realized that when he switched manufacturers he had cut his dose in half. The joint pain seemed to flareup and he is just wondering if he could go back to the previous dose of glucosamine to see if that would help.    He uses gabapentin which seems to help both with his depression and anxiety, and wondered if he could increase the dose slightly to 1200 mg per day. When this was first started by psychiatrist a few years ago he was up as high as 1800 mg a day, and then had slowly tapered down. Recently his symptoms seem a little worse and so he was wondering about the increase    PHQ-9 ([employer]) 1/19/2017   No Interest In Doing Things    Feeling Depressed    Trouble Sleeping    Tired / No Energy    No appetite or Over-Eating    Feeling Bad about Self    Trouble Concentrating    Moving Slow or Restless    Suicidal Thoughts    Total Score    1.  Little interest or pleasure in doing things 1   2.  Feeling down, depressed, or hopeless 1   3.  Trouble  falling or staying asleep, or sleeping too much 2   4.  Feeling tired or having little energy 0   5.  Poor appetite or overeating 1   6.  Feeling bad about yourself 0   7.  Trouble concentrating 0   8.  Moving slowly or restless 0   9.  Suicidal or self-harm thoughts 0   PHQ-9 Total Score 5   Difficulty at work, home, or with people Not difficult at all     PHQ-9 ([employer]) 11/28/2017   No Interest In Doing Things    Feeling Depressed    Trouble Sleeping    Tired / No Energy    No appetite or Over-Eating    Feeling Bad about Self    Trouble Concentrating    Moving Slow or Restless    Suicidal Thoughts    Total Score    1.  Little interest or pleasure in doing things 2   2.  Feeling down, depressed, or hopeless 2   3.  Trouble falling or staying asleep, or sleeping too much 3   4.  Feeling tired or having little energy 2   5.  Poor appetite or overeating 1   6.  Feeling bad about yourself 1   7.  Trouble concentrating 2   8.  Moving slowly or restless 0   9.  Suicidal or self-harm thoughts 0   PHQ-9 Total Score 13   Difficulty at work, home, or with people Somewhat difficult     JURAEZ-7   [employer], 2002; Used with Permission) 11/28/2017   Over the last 2 weeks, how often have you been bothered by feeling nervous, anxious or on edge?    Over the last 2 weeks, how often have you been bothered by not being able to stop or control worrying?    Over the last 2 weeks, how often have you been bothered by worrying too much about different things?    Over the last 2 weeks, how often have you been bothered by trouble relaxing?    Over the last 2 weeks, how often have you been bothered by being so restless that it is hard to sit still?    Over the last 2 weeks, how often have you been bothered by becoming easily annoyed or irritable?    Over the last 2 weeks, how often have you been bothered by feeling afraid as if something awful might happen?    JUAREZ-7 Total Score =     1. Feeling nervous, anxious, or on edge 2   2. Not being  able to stop or control worrying 2   3. Worrying too much about different things 2   4. Trouble relaxing 3   5. Being so restless that it is hard to sit still 0   6. Becoming easily annoyed or irritable 1   7. Feeling afraid, as if something awful might happen 0   JUAREZ-7 Total Score 10   If you checked any problems, how difficult have they made it for you to do your work, take care of things at home, or get along with other people? Somewhat difficult       Encounter Diagnoses   Name Primary?     Acute pain of right shoulder Yes     Claustrophobia      Restless legs syndrome (RLS)      Anxiety        ROS:other than noted above, general, HEENT, respiratory, cardiac, gastrointestinal systems are negative    Medical, surgical, social, and family histories, medications and allergies reviewed and updated.    Objective:  Exam:    GENERAL APPEARANCE ADULT: Alert, no acute distress  EYES: PERRL, EOM normal, conjunctiva and lids normal  MS: shoulder exam: appearance normal, no localized tenderness; pain with extreme external rotation; pain with extreme flexion, and a mild pain with abduction at 100  but then he is able to continue on up to a full 180 degrees without pain;    Examination of his joints shows no inflammation swelling or synovial thickening  Neuro: Alert, cranial nerves II through XII intact, motor strength and tone normal in all extremities  PSYCH: mentation appears normal., affect and mood normal      ASSESSMENT:  1. Acute pain of right shoulder    2. Claustrophobia    3. Restless legs syndrome (RLS)    4. Anxiety      His shoulder exam does not fit into any clear-cut clinical diagnosis. Because it has persisted for 2 months I think it would be reasonable to do an MRI and find out if there is a structural lesion    PLAN:  Orders Placed This Encounter     MR Shoulder Right w/o Contrast     LORazepam (ATIVAN) 1 MG tablet to take prior to the MRI for claustrophobia      gabapentin (NEURONTIN) 300 MG capsule      We'll go ahead and increase the dose of the gabapentin to 300 mg 4 times a day    We'll contact you with the results of the MRI.

## 2017-11-29 ASSESSMENT — ANXIETY QUESTIONNAIRES: GAD7 TOTAL SCORE: 10

## 2017-12-19 ENCOUNTER — MYC MEDICAL ADVICE (OUTPATIENT)
Dept: FAMILY MEDICINE | Facility: CLINIC | Age: 37
End: 2017-12-19

## 2017-12-19 DIAGNOSIS — M79.641 PAIN OF RIGHT HAND: Primary | ICD-10-CM

## 2017-12-20 NOTE — TELEPHONE ENCOUNTER
Patient was seen 11-28-17 for Rt shoulder pain.  MRI was ordered.  He is requesting his Rt hand be added as well as it is bothering him - please see mychart.    MRI Rt hand w/o contrast ready.    Routing to provider.  Su CARRIZALES RN

## 2018-01-15 ENCOUNTER — HOSPITAL ENCOUNTER (OUTPATIENT)
Dept: MRI IMAGING | Facility: CLINIC | Age: 38
End: 2018-01-15
Attending: FAMILY MEDICINE
Payer: COMMERCIAL

## 2018-01-15 ENCOUNTER — TELEPHONE (OUTPATIENT)
Dept: FAMILY MEDICINE | Facility: CLINIC | Age: 38
End: 2018-01-15

## 2018-01-15 ENCOUNTER — HOSPITAL ENCOUNTER (OUTPATIENT)
Dept: MRI IMAGING | Facility: CLINIC | Age: 38
Discharge: HOME | End: 2018-01-15
Attending: FAMILY MEDICINE | Admitting: FAMILY MEDICINE
Payer: COMMERCIAL

## 2018-01-15 DIAGNOSIS — M79.641 PAIN OF RIGHT HAND: ICD-10-CM

## 2018-01-15 DIAGNOSIS — M25.511 ACUTE PAIN OF RIGHT SHOULDER: ICD-10-CM

## 2018-01-15 PROCEDURE — 73221 MRI JOINT UPR EXTREM W/O DYE: CPT | Mod: XS,RT

## 2018-01-15 PROCEDURE — 73221 MRI JOINT UPR EXTREM W/O DYE: CPT | Mod: RT

## 2018-01-17 DIAGNOSIS — M25.511 RIGHT SHOULDER PAIN: Primary | ICD-10-CM

## 2018-01-17 DIAGNOSIS — M25.531 RIGHT WRIST PAIN: ICD-10-CM

## 2018-02-05 DIAGNOSIS — S39.012A LOW BACK STRAIN, INITIAL ENCOUNTER: ICD-10-CM

## 2018-02-05 NOTE — TELEPHONE ENCOUNTER
Requested Prescriptions   Pending Prescriptions Disp Refills     metaxalone (SKELAXIN) 800 MG tablet [Pharmacy Med Name: METAXALONE  Last Written Prescription Date:  02/20/17  Last Fill Quantity: 45,  # refills: 1   Last Office Visit with [facility] provider:  11/28/17   Future Office Visit:      800MG TAB] 45 tablet 1     Sig: TAKE ONE TABLET (800 MG) BY MOUTH THREE TIMES DAILY AS NEEDED FOR MODERATE PAIN    There is no refill protocol information for this order

## 2018-02-06 RX ORDER — METAXALONE 800 MG/1
TABLET ORAL
Qty: 45 TABLET | Refills: 1 | Status: SHIPPED | OUTPATIENT
Start: 2018-02-06 | End: 2018-07-13

## 2018-02-06 NOTE — TELEPHONE ENCOUNTER
Routing refill request to provider for review/approval because:  Drug not on the [facility] refill protocol     Vida Pineda RN

## 2018-04-06 ENCOUNTER — OFFICE VISIT (OUTPATIENT)
Dept: FAMILY MEDICINE | Facility: CLINIC | Age: 38
End: 2018-04-06
Payer: COMMERCIAL

## 2018-04-06 VITALS
WEIGHT: 157.2 LBS | HEIGHT: 70 IN | HEART RATE: 80 BPM | DIASTOLIC BLOOD PRESSURE: 72 MMHG | TEMPERATURE: 98.2 F | BODY MASS INDEX: 22.5 KG/M2 | RESPIRATION RATE: 16 BRPM | SYSTOLIC BLOOD PRESSURE: 115 MMHG

## 2018-04-06 DIAGNOSIS — G47.00 PERSISTENT INSOMNIA: Primary | ICD-10-CM

## 2018-04-06 DIAGNOSIS — F32.4 MAJOR DEPRESSIVE DISORDER WITH SINGLE EPISODE, IN PARTIAL REMISSION (H): ICD-10-CM

## 2018-04-06 PROCEDURE — 99214 OFFICE O/P EST MOD 30 MIN: CPT | Performed by: FAMILY MEDICINE

## 2018-04-06 RX ORDER — MIRTAZAPINE 30 MG/1
30 TABLET, FILM COATED ORAL AT BEDTIME
Qty: 30 TABLET | Refills: 3 | Status: SHIPPED | OUTPATIENT
Start: 2018-04-06 | End: 2018-10-24

## 2018-04-06 ASSESSMENT — ANXIETY QUESTIONNAIRES
IF YOU CHECKED OFF ANY PROBLEMS ON THIS QUESTIONNAIRE, HOW DIFFICULT HAVE THESE PROBLEMS MADE IT FOR YOU TO DO YOUR WORK, TAKE CARE OF THINGS AT HOME, OR GET ALONG WITH OTHER PEOPLE: SOMEWHAT DIFFICULT
5. BEING SO RESTLESS THAT IT IS HARD TO SIT STILL: SEVERAL DAYS
6. BECOMING EASILY ANNOYED OR IRRITABLE: NEARLY EVERY DAY
2. NOT BEING ABLE TO STOP OR CONTROL WORRYING: MORE THAN HALF THE DAYS
1. FEELING NERVOUS, ANXIOUS, OR ON EDGE: MORE THAN HALF THE DAYS
7. FEELING AFRAID AS IF SOMETHING AWFUL MIGHT HAPPEN: NOT AT ALL
3. WORRYING TOO MUCH ABOUT DIFFERENT THINGS: MORE THAN HALF THE DAYS
GAD7 TOTAL SCORE: 13

## 2018-04-06 ASSESSMENT — PATIENT HEALTH QUESTIONNAIRE - PHQ9: 5. POOR APPETITE OR OVEREATING: NEARLY EVERY DAY

## 2018-04-06 NOTE — NURSING NOTE
"Chief Complaint   Patient presents with     Sleep Problem     sleep concerns, left thigh numbness X 3 months. no known injury.      Depression     Anxiety       Initial /72  Pulse 80  Temp 98.2  F (36.8  C) (Tympanic)  Resp 16  Ht 5' 10\" (1.778 m)  Wt 157 lb 3.2 oz (71.3 kg)  BMI 22.56 kg/m2 Estimated body mass index is 22.56 kg/(m^2) as calculated from the following:    Height as of this encounter: 5' 10\" (1.778 m).    Weight as of this encounter: 157 lb 3.2 oz (71.3 kg).  Medication Reconciliation: complete     Mayra Han, CMA      "

## 2018-04-06 NOTE — PATIENT INSTRUCTIONS
Continue the gabapentin at same dose.We are adding Mirtazapine 30 mg to sleep at night.    Check with your wife about apneic episodes.     Call [facility] and ask about the cost of overnight oximetry

## 2018-04-06 NOTE — MR AVS SNAPSHOT
After Visit Summary   4/6/2018    Timmy Fitzpatrick    MRN: [id number]           Patient Information     Date Of Birth          1980        Visit Information        Provider Department      4/6/2018 10:40 AM CLAUDIO Loza MD [facility]        Today's Diagnoses     Persistent insomnia    -  1      Care Instructions    Continue the gabapentin at same dose.We are adding Mirtazapine 30 mg to sleep at night.    Check with your wife about apneic episodes.     Call [facility] and ask about the cost of overnight oximetry          Follow-ups after your visit        Who to contact     If you have questions or need follow up information about today's clinic visit or your schedule please contact [facility] directly at [phone].  Normal or non-critical lab and imaging results will be communicated to you by SumUphart, letter or phone within 4 business days after the clinic has received the results. If you do not hear from us within 7 days, please contact the clinic through Liquiverset or phone. If you have a critical or abnormal lab result, we will notify you by phone as soon as possible.  Submit refill requests through Bug Music or call your pharmacy and they will forward the refill request to us. Please allow 3 business days for your refill to be completed.          Additional Information About Your Visit        MyChart Information     [employer] gives you secure access to your electronic health record. If you see a primary care provider, you can also send messages to your care team and make appointments. If you have questions, please call your primary care clinic.  If you do not have a primary care provider, please call [phone] and they will assist you.        Care EveryWhere ID     This is your Care EveryWhere ID. This could be used by other organizations to access your [place] medical records  [id number]        Your Vitals Were     Pulse Temperature Respirations Height  "BMI (Body Mass Index)       80 98.2  F (36.8  C) (Tympanic) 16 5' 10\" (1.778 m) 22.56 kg/m2        Blood Pressure from Last 3 Encounters:   04/06/18 115/72   11/28/17 130/78   09/26/17 125/78    Weight from Last 3 Encounters:   04/06/18 157 lb 3.2 oz (71.3 kg)   11/28/17 168 lb (76.2 kg)   09/26/17 167 lb (75.8 kg)              Today, you had the following     No orders found for display         Today's Medication Changes          These changes are accurate as of 4/6/18 11:19 AM.  If you have any questions, ask your nurse or doctor.               Start taking these medicines.        Dose/Directions    mirtazapine 30 MG tablet   Commonly known as:  REMERON   Used for:  Persistent insomnia   Started by:  CLAUDIO Loza MD        Dose:  30 mg   Take 1 tablet (30 mg) by mouth At Bedtime   Quantity:  30 tablet   Refills:  3            Where to get your medicines      These medications were sent to [facility] [address]  [id number] [place] [id number]     Phone:  [phone]     mirtazapine 30 MG tablet                Primary Care Provider Office Phone # Fax #    CLAUDIO Loza [phone] [address] [facility] [address]        Equal Access to Services     [facility]: Hadii margarita cabrera hadjonio Soshiv, waaxda luqadaha, qaybta kaalmada adeegyada, alex mayer. So [facility] [phone].    ATENCIÓN: Si habla español, tiene a waters disposición servicios gratuitos de asistencia lingüística. Llame al [phone].    We comply with applicable federal civil rights laws and [place] laws. We do not discriminate on the basis of race, color, national origin, age, disability, sex, sexual orientation, or gender identity.            Thank you!     Thank you for choosing [facility]  for your care. Our goal is always to provide you with excellent care. Hearing back from our patients is one " way we can continue to improve our services. Please take a few minutes to complete the written survey that you may receive in the mail after your visit with us. Thank you!             Your Updated Medication List - Protect others around you: Learn how to safely use, store and throw away your medicines at www.disposemymeds.org.          This list is accurate as of 4/6/18 11:19 AM.  Always use your most recent med list.                   Brand Name Dispense Instructions for use Diagnosis    escitalopram 20 MG tablet    LEXAPRO    90 tablet    Take 1 tablet (20 mg) by mouth daily    Major depressive disorder with single episode, in partial remission (H), Anxiety       gabapentin 300 MG capsule    NEURONTIN    360 capsule    One capsule 4 times a day    Restless legs syndrome (RLS), Anxiety       GLUCOSAMINE HCL      None Entered        MENS MULTIPLE VITAMIN/LYCOPENE PO      Take 1 tablet by mouth daily.        metaxalone 800 MG tablet    SKELAXIN    45 tablet    TAKE ONE TABLET (800 MG) BY MOUTH THREE TIMES DAILY AS NEEDED FOR MODERATE PAIN    Low back strain, initial encounter       mirtazapine 30 MG tablet    REMERON    30 tablet    Take 1 tablet (30 mg) by mouth At Bedtime    Persistent insomnia       sildenafil 100 MG tablet    VIAGRA    12 tablet    Take 1 tablet (100 mg) by mouth daily as needed 30 min to 4 hrs before sex. Do not use with nitroglycerin, terazosin or doxazosin.    Erectile dysfunction, unspecified erectile dysfunction type

## 2018-04-06 NOTE — PROGRESS NOTES
Subjective:  Timmy Fitzpatrick is a 37 year old male   Chief Complaint   Patient presents with     Sleep Problem     sleep concerns, left thigh numbness X 3 months. no known injury.      Depression     Anxiety     For some time he has struggled with both falling asleep and staying asleep.  It seems that this is gotten worse in the last 3 months.  In the past he has used trazodone for insomnia but always had trouble with daytime sedation even with very small doses.  His wife also has recommended that he see a sleep specialist because his frequent wakening would disturb her sleep.  He does snore significantly but when I asked if she has actually observed him having apneic spells he was not so sure.  He is concerned about pursuing a full sleep evaluation because the insurance has a high deductible and they would have to pay for it out of pocket at this time of year.  Current symptoms include:  PHQ-9 ([employer]) 11/28/2017   1.  Little interest or pleasure in doing things 2   2.  Feeling down, depressed, or hopeless 2   3.  Trouble falling or staying asleep, or sleeping too much 3   4.  Feeling tired or having little energy 2   5.  Poor appetite or overeating 1   6.  Feeling bad about yourself 1   7.  Trouble concentrating 2   8.  Moving slowly or restless 0   9.  Suicidal or self-harm thoughts 0   PHQ-9 Total Score 13   Difficulty at work, home, or with people Somewhat difficult     PHQ-9 ([employer]) 4/6/2018   1.  Little interest or pleasure in doing things 2   2.  Feeling down, depressed, or hopeless 2   3.  Trouble falling or staying asleep, or sleeping too much 3   4.  Feeling tired or having little energy 1   5.  Poor appetite or overeating 3   6.  Feeling bad about yourself 1   7.  Trouble concentrating 3   8.  Moving slowly or restless 1   9.  Suicidal or self-harm thoughts 1   PHQ-9 Total Score 17   Difficulty at work, home, or with people Somewhat difficult     He has also noticed some numbness in his left  anterior thigh.  It does not really cause any pain or loss of strength but was somewhat concerned about it because it persists      Encounter Diagnoses   Name Primary?     Persistent insomnia Yes     Major depressive disorder with single episode, in partial remission (H)        ROS:other than noted above, general, HEENT, respiratory, cardiac, gastrointestinal systems are negative    Medical, surgical, social, and family histories, medications and allergies reviewed and updated.    Objective:  Exam:    GENERAL APPEARANCE ADULT: Alert, no acute distress  EYES: PERRL, EOM normal, conjunctiva and lids normal  MS: Right leg appears normal with no atrophy  SKIN: no suspicious lesions or rashes  NEURO: Subjective numbness in the anterior left thigh but no loss of strength  PSYCH: mentation appears normal., anxious, moderately flat affect      ASSESSMENT:  1. Persistent insomnia    2. Major depressive disorder with single episode, in partial remission (H)        PLAN:  Orders Placed This Encounter     mirtazapine (REMERON) 30 MG tablet   Discussed how to take the medication(s), expected outcomes, potential side effects.  We discussed that one option for evaluating the possible sleep apnea is to do an overnight oximetry test in the home.  He will double check with his wife and look into the cost of the accident    Patient Instructions   Continue the gabapentin at same dose.We are adding Mirtazapine 30 mg to sleep at night.    Check with your wife about apneic episodes.     Call [facility] and ask about the cost of overnight oximetry

## 2018-04-07 ASSESSMENT — ANXIETY QUESTIONNAIRES: GAD7 TOTAL SCORE: 13

## 2018-04-07 ASSESSMENT — PATIENT HEALTH QUESTIONNAIRE - PHQ9: SUM OF ALL RESPONSES TO PHQ QUESTIONS 1-9: 17

## 2018-06-04 ENCOUNTER — OFFICE VISIT (OUTPATIENT)
Dept: FAMILY MEDICINE | Facility: CLINIC | Age: 38
End: 2018-06-04
Payer: COMMERCIAL

## 2018-06-04 VITALS
DIASTOLIC BLOOD PRESSURE: 74 MMHG | HEIGHT: 70 IN | HEART RATE: 88 BPM | TEMPERATURE: 98.8 F | BODY MASS INDEX: 23.13 KG/M2 | SYSTOLIC BLOOD PRESSURE: 122 MMHG | WEIGHT: 161.6 LBS

## 2018-06-04 DIAGNOSIS — J06.9 VIRAL URI: ICD-10-CM

## 2018-06-04 DIAGNOSIS — R07.0 THROAT PAIN: ICD-10-CM

## 2018-06-04 DIAGNOSIS — S39.012A LUMBOSACRAL STRAIN, INITIAL ENCOUNTER: Primary | ICD-10-CM

## 2018-06-04 LAB
DEPRECATED S PYO AG THROAT QL EIA: NORMAL
SPECIMEN SOURCE: NORMAL

## 2018-06-04 PROCEDURE — 87081 CULTURE SCREEN ONLY: CPT | Performed by: FAMILY MEDICINE

## 2018-06-04 PROCEDURE — 99214 OFFICE O/P EST MOD 30 MIN: CPT | Performed by: FAMILY MEDICINE

## 2018-06-04 PROCEDURE — 87880 STREP A ASSAY W/OPTIC: CPT | Performed by: FAMILY MEDICINE

## 2018-06-04 NOTE — PROGRESS NOTES
"  SUBJECTIVE:   Timmy Fitzpatrick is a 37 year old male who presents to clinic today for the following health issues:      Back Pain       Duration: 2 weeks         Specific cause: none    Description:   Location of pain: low back middle , just above the sacrum  Character of pain: dull ache  Pain radiation:none  New numbness or weakness in legs, not attributed to pain:  no     Intensity: moderate     History:   Pain interferes with job: No  History of back problems: yes   Any previous MRI or X-rays: None  Sees a specialist for back pain:  No  Therapies tried without relief: ibuprofen, aleve, wears a back brace           ENT Symptoms             Symptoms: cc Present Absent Comment   Fever/Chills  x  Chills    Fatigue  x     Muscle Aches   x    Eye Irritation   x    Sneezing   x    Nasal Duncan/Drg  x  Drainage    Sinus Pressure/Pain  x     Loss of smell   x    Dental pain   x    Sore Throat x x   voice is hoarse   Swollen Glands   x    Ear Pain/Fullness  x  Both ears    Cough  x     Wheeze   x    Chest Pain   x    Shortness of breath   x    Rash   x    Other         Symptom duration: One week    Symptom severity:  mod   Treatments tried:  none   Contacts:  wife and kid has strep 2 weeks ago          Problem list and histories reviewed & adjusted, as indicated.  Additional history: none        Reviewed and updated as needed this visit by clinical staff  Allergies       Reviewed and updated as needed this visit by Provider                 ROS:  Constitutional, HEENT, cardiovascular, pulmonary, gi and gu systems are negative, except as otherwise noted.        OBJECTIVE:                                                    /74  Pulse 88  Temp 98.8  F (37.1  C) (Tympanic)  Ht 5' 10\" (1.778 m)  Wt 161 lb 9.6 oz (73.3 kg)  BMI 23.19 kg/m2    GENERAL: healthy, alert and no distress  EYES: Eyes grossly normal to inspection, extraocular movements - intact, and PERRL  HENT: ear canals- normal; TMs- normal; Nose- " normal; Mouth- no ulcers, no lesions  NECK: no tenderness, no adenopathy, no asymmetry, no masses, no stiffness; thyroid- normal to palpation  RESP: lungs clear to auscultation - no rales, no rhonchi, no wheezes  CV: regular rates and rhythm, normal S1 S2, no S3 or S4 and no murmur, no click or rub -  ABDOMEN: soft, no tenderness, no  hepatosplenomegaly, no masses, normal bowel sounds  MS: Back exam: Localized tenderness in the midline of the lumbar spine just above the sacrum; no SI joint tenderness and no sciatic notch tenderness; range of motion 75% of normal in all directions; straight leg raising is negative bilaterally  NEURO: strength and tone- normal, sensory exam- grossly normal, mentation- intact, speech- normal, reflexes- symmetric    Diagnostic test results:  Strep screen - Negative     ASSESSMENT/PLAN:                                                    ASSESSMENT:  1. Lumbosacral strain, initial encounter    2. Viral URI    3. Throat pain        PLAN:  Continue symptomatic treatment for the URI and it should resolve spontaneously.      Patient Instructions   Alternate ice and heat, avoid heavy lifting, especially if you are bending or twisting.           Low Back Pain        What is low back pain?   Low back pain is pain and stiffness in the lower back. It is one of the most common reasons people miss work.   How does it occur?   Your lower back is called your lumbar spine. It is made up of 5 bones called lumbar vertebrae. In between the vertebrae are shock absorbers called disks. Back pain can occur from an injury to the vertebrae or when a disk bulges or herniates.   Low back pain is usually caused when a ligament or muscle holding a vertebra in its proper position is strained. Vertebrae are bones that make up the spinal column through which the spinal cord passes. When these muscles or ligaments become weak or strained, the spine loses its stability, resulting in pain.   Low back pain can occur if  your job involves lifting and carrying heavy objects, or if you spend a lot of time sitting or standing in one position or bending over. It can be caused by a fall or by unusually strenuous exercise. It can be brought on by the tension and stress that cause headaches in some people. It can even be brought on by violent sneezing or coughing.   People who are overweight may have low back pain because of the added stress on their back.   Back pain may occur when the muscles, joints, bones, and connective tissues of the back become inflamed as a result of an infection or an immune system problem. Arthritic disorders as well as some congenital and degenerative conditions may cause back pain.   Back pain accompanied by loss of bladder or bowel control, trouble moving your legs, or numbness or tingling in your arms or legs requires immediate medical treatment.   What are the symptoms?   Symptoms include:   pain in the back or legs   stiffness, spasm, or limited motion   The pain may be constant or may happen only in certain positions. It may get worse when you cough, sneeze, bend, twist, or strain during a bowel movement. The pain may be in only one spot or may spread to other areas, most commonly down the buttocks and into the back of the thigh.   A low back strain typically does not produce pain past the knee into the calf or foot. Tingling or numbness in the calf or foot may indicate a herniated disk or pinched nerve.   Be sure to see your healthcare provider if:   You have weakness in your leg, especially if you cannot lift your foot, because this may be a sign of nerve damage.   You have new bowel or bladder problems as well as back pain, which may be a sign of severe injury to your spinal cord.   You have pain that gets worse despite treatment.   How is it diagnosed?   Your healthcare provider will review your medical history and examine you. You may have X-rays, an MRI, CT scan, or a bone scan.   How is it treated?    To treat this condition:   Put an ice pack, gel pack, or package of frozen vegetables, wrapped in a cloth on the area every 3 to 4 hours, for up to 20 minutes at a time for the first 2 or 3 days.   Use a heating pad or hot water bottle. Don't let the heating pad get too hot, and don't fall asleep with it. You could get a burn.   Rest in bed on a firm mattress. Often it helps to lie on your back with your knees raised on a pillow. However, some people prefer to lie on their side with their knees bent. It's best to try to stay active, so try not to rest in bed longer than 1 to 2 days.   Take muscle relaxants as recommended by your healthcare provider.   Take an anti-inflammatory such as ibuprofen, or other medicine as directed by your provider. Nonsteroidal anti-inflammatory medicines (NSAIDs) may cause stomach bleeding and other problems. These risks increase with age. Read the label and take as directed. Unless recommended by your healthcare provider, do not take for more than 10 days.   Get a back massage by a trained person.   Wear a belt or corset to support your back.   Do the exercises recommended by your provider. Your provider may also prescribe physical therapy.   Talk with a counselor, if your back pain is related to tension caused by emotional problems.   When the pain is gone, ask your healthcare provider about starting an exercise program such as the following:   Exercise moderately every day, using stretching and warm-up exercises suggested by your provider or physical therapist.   Exercise vigorously for about 30 minutes 3 times a week by walking, swimming, using a stationary bicycle, or doing low-impact aerobics.   Exercising regularly will not only help your back, it will also help keep you healthier overall.   How long will the effects last?   The effects of back pain last as long as the cause exists or until your body recovers from the strain, usually a day or two but sometimes weeks.   How can I  take care of myself?   In addition to the treatment described above, keep in mind these suggestions:   Practice good posture. Stand with your head up, shoulders straight, chest forward, weight balanced evenly on both feet, and pelvis tucked in.   Lose weight if you are overweight   Keep your core muscles strong. These are your abdominal and back muscles.   Sleep without a pillow under your head.   Pain is the best way to  the pace you should set in increasing your activity and exercise. Minor discomfort, stiffness, soreness, and mild aches need not interfere with activity. However, limit your activities temporarily if:   Your symptoms return.   The pain increases when you are more active.   The pain increases within 24 hours after a new or higher level of activity.   When can I return to my normal activities?   Everyone recovers from an injury at a different rate. Return to your activities depends on how soon your back recovers, not by how many days or weeks it has been since your injury has occurred. In general, the longer you have symptoms before you start treatment, the longer it will take to get better. The goal is to return to your normal activities as soon as is safely possible. If you return too soon you may worsen your injury.   It is important that you have fully recovered from your low back pain before you return to any strenuous activity. You must be able to have the same range of motion that you had before your injury. You must be able to walk and twist without pain.   What can I do to help prevent low back pain?   You can reduce the strain on your back by doing the following:   Don't push with your arms when you move a heavy object. Turn around and push backwards so the strain is taken by your legs.   Whenever you sit, sit in a straight-backed chair and hold your spine against the back of the chair.   Bend your knees and hips and keep your back straight when you lift a heavy object.   Avoid lifting  heavy objects higher than your waist.   Hold packages you carry close to your body, with your arms bent.   Use a footrest for one foot when you stand or sit in one spot for a long time. This keeps your back straight.   Bend your knees when you bend over.   Sit close to the pedals when you drive and use your seat belt and a hard backrest or pillow.   Lie on your side with your knees bent when you sleep or rest. It may help to put a pillow between your knees.   Put a pillow under your knees when you sleep on your back.   Raise the foot of the bed 8 inches to discourage sleeping on your stomach unless you have other problems that require that you keep your head elevated.   To rest your back, hold each of these positions for 5?minutes or longer:   Lie on your back, bend your knees, and put pillows under your knees.   Lie on your back on the floor with a pillow under your neck. Bend your knees to a 90-degree angle, and put your lower legs and feet on a chair.   Lie on your back, bend your knees, and bring one knee up to your chest and hold it there. Repeat with the other knee, then bring both knees to your chest. When holding your knee to your chest, grab your thigh rather than your lower leg to avoid over flexing your knee.     Published by [employer].  This content is reviewed periodically and is subject to change as new health information becomes available. The information is intended to inform and educate and is not a replacement for medical evaluation, advice, diagnosis or treatment by a healthcare professional.   Developed by Tabby Nath RN, [place], and [facility].   ? 2010 [facility] and/or its affiliates. All Rights Reserved.           Low Back Pain Exercise      Standing hamstring stretch: Put the heel of one leg on a stool about 15 inches high. Keep your leg straight. Lean forward, bending at the hips until you feel a mild stretch in the back of your thigh. Make sure you do not roll your shoulders or bend  at the waist when doing this. You want to stretch your leg, not your lower back. Hold the stretch for 15 to 30 seconds. Repeat with each leg 3 times.   Cat and camel: Get down on your hands and knees. Let your stomach sag, allowing your back to curve downward. Hold this position for 5 seconds. Then arch your back and hold for 5 seconds. Do 3 sets of 10.   Quadruped arm and leg raise: Get down on your hands and knees. Pull in your belly button and tighten your abdominal muscles to stiffen your spine. While keeping your abdominals tight, raise one arm and the opposite leg away from you. Hold this position for 5 seconds. Lower your arm and leg slowly and change sides. Do this 10 times on each side.   Pelvic tilt: Lie on your back with your knees bent and your feet flat on the floor. Tighten your abdominal muscles and push your lower back into the floor. Hold this position for 5 seconds, then relax. Do 3 sets of 10.   Partial curl: Lie on your back with your knees bent and your feet flat on the floor. Tighten your stomach muscles. Tuck your chin to your chest. With your hands stretched out in front of you, curl your upper body forward until your shoulders clear the floor. Hold this position for 3 seconds. Don't hold your breath. It helps to breathe out as you lift your shoulders up. Relax back to the floor. Repeat 10 times. Build to 3 sets of 10. To challenge yourself, clasp your hands behind your head and keep your elbows out to the side.   Gluteal stretch: Lie on your back with both knees bent. Rest the ankle of one leg over the knee of your other leg. Grasp the thigh of the bottom leg and pull toward your chest. You will feel a stretch along the buttocks and possibly along the outside of your hip. Hold the stretch for 15 to 30 seconds. Repeat 3 times with each leg.   Extension exercise:   0. Lie face down on the floor for 5 minutes. If this hurts too much, lie face down with a pillow under your stomach. This should  relieve your leg or back pain. When you can lie on your stomach for 5 minutes without a pillow, you can continue with Part B of this exercise.   0. After lying on your stomach for 5 minutes, prop yourself up on your elbows for another 5 minutes. If you can do this without having more leg or buttock pain, you can start doing part C of this exercise.   0. Lie on your stomach with your hands under your shoulders. Then press down on your hands and extend your elbows while keeping your hips flat on the floor. Hold for 1 second and lower yourself to the floor. Do 3 to 5 sets of 10 repetitions. Rest for 1 minute between sets. You should have no pain in your legs when you do this, but it is normal to feel some pain in your lower back.   Do this exercise several times a day.   Side plank: Lie on your side with your legs, hips, and shoulders in a straight line. Prop yourself up onto your forearm so your elbow is directly under your shoulder. Lift your hips off the floor and balance on your forearm and the outside of your foot. Try to hold this position for 15 seconds, then slowly lower your hip to the ground. Switch sides and repeat. Work up to holding for 1 minute or longer. This exercise can be made easier by starting with your knees and hips flexed toward your chest.   Published by [employer].  This content is reviewed periodically and is subject to change as new health information becomes available. The information is intended to inform and educate and is not a replacement for medical evaluation, advice, diagnosis or treatment by a healthcare professional.   Written by Piedad Luque, MS, PT, and Tabby Mercedes PT, [facility], [facility], for [facility]   ? 2010 [facility] and/or its affiliates. All Rights Reserved.         Copyright   [employer] 2011          e4    R Refugio Post  [facility] [place]

## 2018-06-04 NOTE — MR AVS SNAPSHOT
After Visit Summary   6/4/2018    Timmy Fitzpatrick    MRN: [id number]           Patient Information     Date Of Birth          1980        Visit Information        Provider Department      6/4/2018 1:40 PM CLAUDIO Loza MD [facility]        Today's Diagnoses     Throat pain    -  1    Lumbosacral strain, initial encounter          Care Instructions    Alternate ice and heat, avoid heavy lifting, especially if you are bending or twisting.           Low Back Pain        What is low back pain?   Low back pain is pain and stiffness in the lower back. It is one of the most common reasons people miss work.   How does it occur?   Your lower back is called your lumbar spine. It is made up of 5 bones called lumbar vertebrae. In between the vertebrae are shock absorbers called disks. Back pain can occur from an injury to the vertebrae or when a disk bulges or herniates.   Low back pain is usually caused when a ligament or muscle holding a vertebra in its proper position is strained. Vertebrae are bones that make up the spinal column through which the spinal cord passes. When these muscles or ligaments become weak or strained, the spine loses its stability, resulting in pain.   Low back pain can occur if your job involves lifting and carrying heavy objects, or if you spend a lot of time sitting or standing in one position or bending over. It can be caused by a fall or by unusually strenuous exercise. It can be brought on by the tension and stress that cause headaches in some people. It can even be brought on by violent sneezing or coughing.   People who are overweight may have low back pain because of the added stress on their back.   Back pain may occur when the muscles, joints, bones, and connective tissues of the back become inflamed as a result of an infection or an immune system problem. Arthritic disorders as well as some congenital and degenerative conditions may cause back pain.    Back pain accompanied by loss of bladder or bowel control, trouble moving your legs, or numbness or tingling in your arms or legs requires immediate medical treatment.   What are the symptoms?   Symptoms include:   pain in the back or legs   stiffness, spasm, or limited motion   The pain may be constant or may happen only in certain positions. It may get worse when you cough, sneeze, bend, twist, or strain during a bowel movement. The pain may be in only one spot or may spread to other areas, most commonly down the buttocks and into the back of the thigh.   A low back strain typically does not produce pain past the knee into the calf or foot. Tingling or numbness in the calf or foot may indicate a herniated disk or pinched nerve.   Be sure to see your healthcare provider if:   You have weakness in your leg, especially if you cannot lift your foot, because this may be a sign of nerve damage.   You have new bowel or bladder problems as well as back pain, which may be a sign of severe injury to your spinal cord.   You have pain that gets worse despite treatment.   How is it diagnosed?   Your healthcare provider will review your medical history and examine you. You may have X-rays, an MRI, CT scan, or a bone scan.   How is it treated?   To treat this condition:   Put an ice pack, gel pack, or package of frozen vegetables, wrapped in a cloth on the area every 3 to 4 hours, for up to 20 minutes at a time for the first 2 or 3 days.   Use a heating pad or hot water bottle. Don't let the heating pad get too hot, and don't fall asleep with it. You could get a burn.   Rest in bed on a firm mattress. Often it helps to lie on your back with your knees raised on a pillow. However, some people prefer to lie on their side with their knees bent. It's best to try to stay active, so try not to rest in bed longer than 1 to 2 days.   Take muscle relaxants as recommended by your healthcare provider.   Take an anti-inflammatory such as  ibuprofen, or other medicine as directed by your provider. Nonsteroidal anti-inflammatory medicines (NSAIDs) may cause stomach bleeding and other problems. These risks increase with age. Read the label and take as directed. Unless recommended by your healthcare provider, do not take for more than 10 days.   Get a back massage by a trained person.   Wear a belt or corset to support your back.   Do the exercises recommended by your provider. Your provider may also prescribe physical therapy.   Talk with a counselor, if your back pain is related to tension caused by emotional problems.   When the pain is gone, ask your healthcare provider about starting an exercise program such as the following:   Exercise moderately every day, using stretching and warm-up exercises suggested by your provider or physical therapist.   Exercise vigorously for about 30 minutes 3 times a week by walking, swimming, using a stationary bicycle, or doing low-impact aerobics.   Exercising regularly will not only help your back, it will also help keep you healthier overall.   How long will the effects last?   The effects of back pain last as long as the cause exists or until your body recovers from the strain, usually a day or two but sometimes weeks.   How can I take care of myself?   In addition to the treatment described above, keep in mind these suggestions:   Practice good posture. Stand with your head up, shoulders straight, chest forward, weight balanced evenly on both feet, and pelvis tucked in.   Lose weight if you are overweight   Keep your core muscles strong. These are your abdominal and back muscles.   Sleep without a pillow under your head.   Pain is the best way to  the pace you should set in increasing your activity and exercise. Minor discomfort, stiffness, soreness, and mild aches need not interfere with activity. However, limit your activities temporarily if:   Your symptoms return.   The pain increases when you are more  active.   The pain increases within 24 hours after a new or higher level of activity.   When can I return to my normal activities?   Everyone recovers from an injury at a different rate. Return to your activities depends on how soon your back recovers, not by how many days or weeks it has been since your injury has occurred. In general, the longer you have symptoms before you start treatment, the longer it will take to get better. The goal is to return to your normal activities as soon as is safely possible. If you return too soon you may worsen your injury.   It is important that you have fully recovered from your low back pain before you return to any strenuous activity. You must be able to have the same range of motion that you had before your injury. You must be able to walk and twist without pain.   What can I do to help prevent low back pain?   You can reduce the strain on your back by doing the following:   Don't push with your arms when you move a heavy object. Turn around and push backwards so the strain is taken by your legs.   Whenever you sit, sit in a straight-backed chair and hold your spine against the back of the chair.   Bend your knees and hips and keep your back straight when you lift a heavy object.   Avoid lifting heavy objects higher than your waist.   Hold packages you carry close to your body, with your arms bent.   Use a footrest for one foot when you stand or sit in one spot for a long time. This keeps your back straight.   Bend your knees when you bend over.   Sit close to the pedals when you drive and use your seat belt and a hard backrest or pillow.   Lie on your side with your knees bent when you sleep or rest. It may help to put a pillow between your knees.   Put a pillow under your knees when you sleep on your back.   Raise the foot of the bed 8 inches to discourage sleeping on your stomach unless you have other problems that require that you keep your head elevated.   To rest your  back, hold each of these positions for 5?minutes or longer:   Lie on your back, bend your knees, and put pillows under your knees.   Lie on your back on the floor with a pillow under your neck. Bend your knees to a 90-degree angle, and put your lower legs and feet on a chair.   Lie on your back, bend your knees, and bring one knee up to your chest and hold it there. Repeat with the other knee, then bring both knees to your chest. When holding your knee to your chest, grab your thigh rather than your lower leg to avoid over flexing your knee.     Published by [employer].  This content is reviewed periodically and is subject to change as new health information becomes available. The information is intended to inform and educate and is not a replacement for medical evaluation, advice, diagnosis or treatment by a healthcare professional.   Developed by Tabby Nath RN, [place], and [facility].   ? 2010 [facility] and/or its affiliates. All Rights Reserved.           Low Back Pain Exercise      Standing hamstring stretch: Put the heel of one leg on a stool about 15 inches high. Keep your leg straight. Lean forward, bending at the hips until you feel a mild stretch in the back of your thigh. Make sure you do not roll your shoulders or bend at the waist when doing this. You want to stretch your leg, not your lower back. Hold the stretch for 15 to 30 seconds. Repeat with each leg 3 times.   Cat and camel: Get down on your hands and knees. Let your stomach sag, allowing your back to curve downward. Hold this position for 5 seconds. Then arch your back and hold for 5 seconds. Do 3 sets of 10.   Quadruped arm and leg raise: Get down on your hands and knees. Pull in your belly button and tighten your abdominal muscles to stiffen your spine. While keeping your abdominals tight, raise one arm and the opposite leg away from you. Hold this position for 5 seconds. Lower your arm and leg slowly and change sides. Do this 10 times  on each side.   Pelvic tilt: Lie on your back with your knees bent and your feet flat on the floor. Tighten your abdominal muscles and push your lower back into the floor. Hold this position for 5 seconds, then relax. Do 3 sets of 10.   Partial curl: Lie on your back with your knees bent and your feet flat on the floor. Tighten your stomach muscles. Tuck your chin to your chest. With your hands stretched out in front of you, curl your upper body forward until your shoulders clear the floor. Hold this position for 3 seconds. Don't hold your breath. It helps to breathe out as you lift your shoulders up. Relax back to the floor. Repeat 10 times. Build to 3 sets of 10. To challenge yourself, clasp your hands behind your head and keep your elbows out to the side.   Gluteal stretch: Lie on your back with both knees bent. Rest the ankle of one leg over the knee of your other leg. Grasp the thigh of the bottom leg and pull toward your chest. You will feel a stretch along the buttocks and possibly along the outside of your hip. Hold the stretch for 15 to 30 seconds. Repeat 3 times with each leg.   Extension exercise:   0. Lie face down on the floor for 5 minutes. If this hurts too much, lie face down with a pillow under your stomach. This should relieve your leg or back pain. When you can lie on your stomach for 5 minutes without a pillow, you can continue with Part B of this exercise.   0. After lying on your stomach for 5 minutes, prop yourself up on your elbows for another 5 minutes. If you can do this without having more leg or buttock pain, you can start doing part C of this exercise.   0. Lie on your stomach with your hands under your shoulders. Then press down on your hands and extend your elbows while keeping your hips flat on the floor. Hold for 1 second and lower yourself to the floor. Do 3 to 5 sets of 10 repetitions. Rest for 1 minute between sets. You should have no pain in your legs when you do this, but it is  normal to feel some pain in your lower back.   Do this exercise several times a day.   Side plank: Lie on your side with your legs, hips, and shoulders in a straight line. Prop yourself up onto your forearm so your elbow is directly under your shoulder. Lift your hips off the floor and balance on your forearm and the outside of your foot. Try to hold this position for 15 seconds, then slowly lower your hip to the ground. Switch sides and repeat. Work up to holding for 1 minute or longer. This exercise can be made easier by starting with your knees and hips flexed toward your chest.   Published by [employer].  This content is reviewed periodically and is subject to change as new health information becomes available. The information is intended to inform and educate and is not a replacement for medical evaluation, advice, diagnosis or treatment by a healthcare professional.   Written by Piedad Luque MS, PT, and Tabby Mercedes PT, [facility], [facility], for [facility]   ? 2010 [facility] and/or its affiliates. All Rights Reserved.         Copyright   [employer] 2011                  Follow-ups after your visit        Who to contact     If you have questions or need follow up information about today's clinic visit or your schedule please contact [facility] directly at [phone].  Normal or non-critical lab and imaging results will be communicated to you by MyChart, letter or phone within 4 business days after the clinic has received the results. If you do not hear from us within 7 days, please contact the clinic through MyChart or phone. If you have a critical or abnormal lab result, we will notify you by phone as soon as possible.  Submit refill requests through [employer] or call your pharmacy and they will forward the refill request to us. Please allow 3 business days for your refill to be completed.          Additional Information About Your Visit        MyChart Information     [employer]  "gives you secure access to your electronic health record. If you see a primary care provider, you can also send messages to your care team and make appointments. If you have questions, please call your primary care clinic.  If you do not have a primary care provider, please call [phone] and they will assist you.        Care EveryWhere ID     This is your Care EveryWhere ID. This could be used by other organizations to access your [facility] medical records  [id number]        Your Vitals Were     Pulse Temperature Height BMI (Body Mass Index)          88 98.8  F (37.1  C) (Tympanic) 1.778 m (5' 10\") 23.19 kg/m2         Blood Pressure from Last 3 Encounters:   06/04/18 122/74   04/06/18 115/72   11/28/17 130/78    Weight from Last 3 Encounters:   06/04/18 73.3 kg (161 lb 9.6 oz)   04/06/18 71.3 kg (157 lb 3.2 oz)   11/28/17 76.2 kg (168 lb)              We Performed the Following     Strep, Rapid Screen        Primary Care Provider Office Phone # Fax #    R Refugio Loza [phone] [address] [facility] [address]        Equal Access to Services     ANGEI RIOS AH: Hadii margarita cabrera hadasho Soomaali, waaxda luqadaha, qaybta kaalmada adeegyada, alex mayer. So [facility] [phone].    ATENCIÓN: Si habla español, tiene a waters disposición servicios gratuitos de asistencia lingüística. Llame al [phone].    We comply with applicable federal civil rights laws and [place] laws. We do not discriminate on the basis of race, color, national origin, age, disability, sex, sexual orientation, or gender identity.            Thank you!     Thank you for choosing [facility]  for your care. Our goal is always to provide you with excellent care. Hearing back from our patients is one way we can continue to improve our services. Please take a few minutes to complete the written survey that you may receive in the mail after your visit with us. Thank you!   "           Your Updated Medication List - Protect others around you: Learn how to safely use, store and throw away your medicines at www.disposemymeds.org.          This list is accurate as of 6/4/18  2:19 PM.  Always use your most recent med list.                   Brand Name Dispense Instructions for use Diagnosis    escitalopram 20 MG tablet    LEXAPRO    90 tablet    Take 1 tablet (20 mg) by mouth daily    Major depressive disorder with single episode, in partial remission (H), Anxiety       gabapentin 300 MG capsule    NEURONTIN    360 capsule    One capsule 4 times a day    Restless legs syndrome (RLS), Anxiety       GLUCOSAMINE HCL      None Entered        MENS MULTIPLE VITAMIN/LYCOPENE PO      Take 1 tablet by mouth daily.        metaxalone 800 MG tablet    SKELAXIN    45 tablet    TAKE ONE TABLET (800 MG) BY MOUTH THREE TIMES DAILY AS NEEDED FOR MODERATE PAIN    Low back strain, initial encounter       mirtazapine 30 MG tablet    REMERON    30 tablet    Take 1 tablet (30 mg) by mouth At Bedtime    Persistent insomnia       sildenafil 100 MG tablet    VIAGRA    12 tablet    Take 1 tablet (100 mg) by mouth daily as needed 30 min to 4 hrs before sex. Do not use with nitroglycerin, terazosin or doxazosin.    Erectile dysfunction, unspecified erectile dysfunction type

## 2018-06-04 NOTE — PATIENT INSTRUCTIONS
Alternate ice and heat, avoid heavy lifting, especially if you are bending or twisting.           Low Back Pain        What is low back pain?   Low back pain is pain and stiffness in the lower back. It is one of the most common reasons people miss work.   How does it occur?   Your lower back is called your lumbar spine. It is made up of 5 bones called lumbar vertebrae. In between the vertebrae are shock absorbers called disks. Back pain can occur from an injury to the vertebrae or when a disk bulges or herniates.   Low back pain is usually caused when a ligament or muscle holding a vertebra in its proper position is strained. Vertebrae are bones that make up the spinal column through which the spinal cord passes. When these muscles or ligaments become weak or strained, the spine loses its stability, resulting in pain.   Low back pain can occur if your job involves lifting and carrying heavy objects, or if you spend a lot of time sitting or standing in one position or bending over. It can be caused by a fall or by unusually strenuous exercise. It can be brought on by the tension and stress that cause headaches in some people. It can even be brought on by violent sneezing or coughing.   People who are overweight may have low back pain because of the added stress on their back.   Back pain may occur when the muscles, joints, bones, and connective tissues of the back become inflamed as a result of an infection or an immune system problem. Arthritic disorders as well as some congenital and degenerative conditions may cause back pain.   Back pain accompanied by loss of bladder or bowel control, trouble moving your legs, or numbness or tingling in your arms or legs requires immediate medical treatment.   What are the symptoms?   Symptoms include:   pain in the back or legs   stiffness, spasm, or limited motion   The pain may be constant or may happen only in certain positions. It may get worse when you cough, sneeze, bend,  twist, or strain during a bowel movement. The pain may be in only one spot or may spread to other areas, most commonly down the buttocks and into the back of the thigh.   A low back strain typically does not produce pain past the knee into the calf or foot. Tingling or numbness in the calf or foot may indicate a herniated disk or pinched nerve.   Be sure to see your healthcare provider if:   You have weakness in your leg, especially if you cannot lift your foot, because this may be a sign of nerve damage.   You have new bowel or bladder problems as well as back pain, which may be a sign of severe injury to your spinal cord.   You have pain that gets worse despite treatment.   How is it diagnosed?   Your healthcare provider will review your medical history and examine you. You may have X-rays, an MRI, CT scan, or a bone scan.   How is it treated?   To treat this condition:   Put an ice pack, gel pack, or package of frozen vegetables, wrapped in a cloth on the area every 3 to 4 hours, for up to 20 minutes at a time for the first 2 or 3 days.   Use a heating pad or hot water bottle. Don't let the heating pad get too hot, and don't fall asleep with it. You could get a burn.   Rest in bed on a firm mattress. Often it helps to lie on your back with your knees raised on a pillow. However, some people prefer to lie on their side with their knees bent. It's best to try to stay active, so try not to rest in bed longer than 1 to 2 days.   Take muscle relaxants as recommended by your healthcare provider.   Take an anti-inflammatory such as ibuprofen, or other medicine as directed by your provider. Nonsteroidal anti-inflammatory medicines (NSAIDs) may cause stomach bleeding and other problems. These risks increase with age. Read the label and take as directed. Unless recommended by your healthcare provider, do not take for more than 10 days.   Get a back massage by a trained person.   Wear a belt or corset to support your back.    Do the exercises recommended by your provider. Your provider may also prescribe physical therapy.   Talk with a counselor, if your back pain is related to tension caused by emotional problems.   When the pain is gone, ask your healthcare provider about starting an exercise program such as the following:   Exercise moderately every day, using stretching and warm-up exercises suggested by your provider or physical therapist.   Exercise vigorously for about 30 minutes 3 times a week by walking, swimming, using a stationary bicycle, or doing low-impact aerobics.   Exercising regularly will not only help your back, it will also help keep you healthier overall.   How long will the effects last?   The effects of back pain last as long as the cause exists or until your body recovers from the strain, usually a day or two but sometimes weeks.   How can I take care of myself?   In addition to the treatment described above, keep in mind these suggestions:   Practice good posture. Stand with your head up, shoulders straight, chest forward, weight balanced evenly on both feet, and pelvis tucked in.   Lose weight if you are overweight   Keep your core muscles strong. These are your abdominal and back muscles.   Sleep without a pillow under your head.   Pain is the best way to  the pace you should set in increasing your activity and exercise. Minor discomfort, stiffness, soreness, and mild aches need not interfere with activity. However, limit your activities temporarily if:   Your symptoms return.   The pain increases when you are more active.   The pain increases within 24 hours after a new or higher level of activity.   When can I return to my normal activities?   Everyone recovers from an injury at a different rate. Return to your activities depends on how soon your back recovers, not by how many days or weeks it has been since your injury has occurred. In general, the longer you have symptoms before you start treatment,  the longer it will take to get better. The goal is to return to your normal activities as soon as is safely possible. If you return too soon you may worsen your injury.   It is important that you have fully recovered from your low back pain before you return to any strenuous activity. You must be able to have the same range of motion that you had before your injury. You must be able to walk and twist without pain.   What can I do to help prevent low back pain?   You can reduce the strain on your back by doing the following:   Don't push with your arms when you move a heavy object. Turn around and push backwards so the strain is taken by your legs.   Whenever you sit, sit in a straight-backed chair and hold your spine against the back of the chair.   Bend your knees and hips and keep your back straight when you lift a heavy object.   Avoid lifting heavy objects higher than your waist.   Hold packages you carry close to your body, with your arms bent.   Use a footrest for one foot when you stand or sit in one spot for a long time. This keeps your back straight.   Bend your knees when you bend over.   Sit close to the pedals when you drive and use your seat belt and a hard backrest or pillow.   Lie on your side with your knees bent when you sleep or rest. It may help to put a pillow between your knees.   Put a pillow under your knees when you sleep on your back.   Raise the foot of the bed 8 inches to discourage sleeping on your stomach unless you have other problems that require that you keep your head elevated.   To rest your back, hold each of these positions for 5?minutes or longer:   Lie on your back, bend your knees, and put pillows under your knees.   Lie on your back on the floor with a pillow under your neck. Bend your knees to a 90-degree angle, and put your lower legs and feet on a chair.   Lie on your back, bend your knees, and bring one knee up to your chest and hold it there. Repeat with the other knee,  then bring both knees to your chest. When holding your knee to your chest, grab your thigh rather than your lower leg to avoid over flexing your knee.     Published by [facility].  This content is reviewed periodically and is subject to change as new health information becomes available. The information is intended to inform and educate and is not a replacement for medical evaluation, advice, diagnosis or treatment by a healthcare professional.   Developed by Tabby Nath RN, [place], and [facility].   ? 2010 [facility] and/or its affiliates. All Rights Reserved.           Low Back Pain Exercise      Standing hamstring stretch: Put the heel of one leg on a stool about 15 inches high. Keep your leg straight. Lean forward, bending at the hips until you feel a mild stretch in the back of your thigh. Make sure you do not roll your shoulders or bend at the waist when doing this. You want to stretch your leg, not your lower back. Hold the stretch for 15 to 30 seconds. Repeat with each leg 3 times.   Cat and camel: Get down on your hands and knees. Let your stomach sag, allowing your back to curve downward. Hold this position for 5 seconds. Then arch your back and hold for 5 seconds. Do 3 sets of 10.   Quadruped arm and leg raise: Get down on your hands and knees. Pull in your belly button and tighten your abdominal muscles to stiffen your spine. While keeping your abdominals tight, raise one arm and the opposite leg away from you. Hold this position for 5 seconds. Lower your arm and leg slowly and change sides. Do this 10 times on each side.   Pelvic tilt: Lie on your back with your knees bent and your feet flat on the floor. Tighten your abdominal muscles and push your lower back into the floor. Hold this position for 5 seconds, then relax. Do 3 sets of 10.   Partial curl: Lie on your back with your knees bent and your feet flat on the floor. Tighten your stomach muscles. Tuck your chin to your chest. With your hands  stretched out in front of you, curl your upper body forward until your shoulders clear the floor. Hold this position for 3 seconds. Don't hold your breath. It helps to breathe out as you lift your shoulders up. Relax back to the floor. Repeat 10 times. Build to 3 sets of 10. To challenge yourself, clasp your hands behind your head and keep your elbows out to the side.   Gluteal stretch: Lie on your back with both knees bent. Rest the ankle of one leg over the knee of your other leg. Grasp the thigh of the bottom leg and pull toward your chest. You will feel a stretch along the buttocks and possibly along the outside of your hip. Hold the stretch for 15 to 30 seconds. Repeat 3 times with each leg.   Extension exercise:   0. Lie face down on the floor for 5 minutes. If this hurts too much, lie face down with a pillow under your stomach. This should relieve your leg or back pain. When you can lie on your stomach for 5 minutes without a pillow, you can continue with Part B of this exercise.   0. After lying on your stomach for 5 minutes, prop yourself up on your elbows for another 5 minutes. If you can do this without having more leg or buttock pain, you can start doing part C of this exercise.   0. Lie on your stomach with your hands under your shoulders. Then press down on your hands and extend your elbows while keeping your hips flat on the floor. Hold for 1 second and lower yourself to the floor. Do 3 to 5 sets of 10 repetitions. Rest for 1 minute between sets. You should have no pain in your legs when you do this, but it is normal to feel some pain in your lower back.   Do this exercise several times a day.   Side plank: Lie on your side with your legs, hips, and shoulders in a straight line. Prop yourself up onto your forearm so your elbow is directly under your shoulder. Lift your hips off the floor and balance on your forearm and the outside of your foot. Try to hold this position for 15 seconds, then slowly  lower your hip to the ground. Switch sides and repeat. Work up to holding for 1 minute or longer. This exercise can be made easier by starting with your knees and hips flexed toward your chest.   Published by [employer].  This content is reviewed periodically and is subject to change as new health information becomes available. The information is intended to inform and educate and is not a replacement for medical evaluation, advice, diagnosis or treatment by a healthcare professional.   Written by Piedad Luque, MS, PT, and Tabby Mercedes PT, [facility], hospitals, for [facility]   ? 2010 [facility] and/or its affiliates. All Rights Reserved.         Copyright   [employer] 2011

## 2018-06-04 NOTE — LETTER
June 5, 2018      Timmy Fitzpatrick  [address] [place] [address]        Dear Timmy,       The results of your 24 hour throat culture were negative. Please contact your clinic if you have any questions or concerns.      Sincerely,        CLAUDIO Loza MD

## 2018-06-05 LAB
BACTERIA SPEC CULT: NORMAL
SPECIMEN SOURCE: NORMAL

## 2018-06-18 ENCOUNTER — OFFICE VISIT (OUTPATIENT)
Dept: FAMILY MEDICINE | Facility: CLINIC | Age: 38
End: 2018-06-18
Payer: COMMERCIAL

## 2018-06-18 VITALS
WEIGHT: 154 LBS | SYSTOLIC BLOOD PRESSURE: 110 MMHG | HEART RATE: 73 BPM | DIASTOLIC BLOOD PRESSURE: 76 MMHG | RESPIRATION RATE: 18 BRPM | HEIGHT: 70 IN | BODY MASS INDEX: 22.05 KG/M2

## 2018-06-18 DIAGNOSIS — L81.9 HYPERPIGMENTATION: ICD-10-CM

## 2018-06-18 DIAGNOSIS — S39.012D BACK STRAIN, SUBSEQUENT ENCOUNTER: Primary | ICD-10-CM

## 2018-06-18 DIAGNOSIS — F32.4 MAJOR DEPRESSIVE DISORDER WITH SINGLE EPISODE, IN PARTIAL REMISSION (H): ICD-10-CM

## 2018-06-18 PROCEDURE — 99213 OFFICE O/P EST LOW 20 MIN: CPT | Performed by: FAMILY MEDICINE

## 2018-06-18 NOTE — MR AVS SNAPSHOT
After Visit Summary   6/18/2018    Timmy Fitzpatrick    MRN: [id number]           Patient Information     Date Of Birth          1980        Visit Information        Provider Department      6/18/2018 8:40 AM Nathan Julien MD [facility] [place]        Today's Diagnoses     Back strain, subsequent encounter    -  1    Major depressive disorder with single episode, in partial remission (H)        Hyperpigmentation          Care Instructions          Thank you for choosing [facility].  You may be receiving a survey in the mail from [facility] regarding your visit today.  Please take a few minutes to complete and return the survey to let us know how we are doing.      Our Clinic hours are:  Mondays    7:20 am - 7 pm  Tues -  Fri  7:20 am - 5 pm    Clinic Phone: [phone]    The clinic lab opens at 7:30 am Mon - Fri and appointments are required.    [facility]  Ph. [phone]  Monday  8 am - 7pm  Tues - Fri 8 am - 5:30 pm                         Follow-ups after your visit        Who to contact     If you have questions or need follow up information about today's clinic visit or your schedule please contact [facility] directly at [phone].  Normal or non-critical lab and imaging results will be communicated to you by MyChart, letter or phone within 4 business days after the clinic has received the results. If you do not hear from us within 7 days, please contact the clinic through [employer] or phone. If you have a critical or abnormal lab result, we will notify you by phone as soon as possible.  Submit refill requests through [employer] or call your pharmacy and they will forward the refill request to us. Please allow 3 business days for your refill to be completed.          Additional Information About Your Visit        [employer] Information     [employer] gives you secure access to your electronic health record. If you see a primary care  "provider, you can also send messages to your care team and make appointments. If you have questions, please call your primary care clinic.  If you do not have a primary care provider, please call [phone] and they will assist you.        Care EveryWhere ID     This is your Care EveryWhere ID. This could be used by other organizations to access your [facility] medical records  [id number]        Your Vitals Were     Pulse Respirations Height BMI (Body Mass Index)          73 18 5' 10\" (1.778 m) 22.1 kg/m2         Blood Pressure from Last 3 Encounters:   06/18/18 110/76   06/04/18 122/74   04/06/18 115/72    Weight from Last 3 Encounters:   06/18/18 154 lb (69.9 kg)   06/04/18 161 lb 9.6 oz (73.3 kg)   04/06/18 157 lb 3.2 oz (71.3 kg)              We Performed the Following     DEPRESSION ACTION PLAN (DAP)        Primary Care Provider Office Phone # Fax #    R Refugio Loza [phone] [address] [facility] [address]        Equal Access to Services     [facility]: Hadii margarita cabrera hadasho Soshiv, waaxda luqadaha, qaybta kaalmada ademarcialyajuan manuel, alex bird . So [facility] [phone].    ATENCIÓN: Si habla español, tiene a waters disposición servicios gratuitos de asistencia lingüística. [facility] [phone].    We comply with applicable federal civil rights laws and [place] laws. We do not discriminate on the basis of race, color, national origin, age, disability, sex, sexual orientation, or gender identity.            Thank you!     Thank you for choosing [facility]  for your care. Our goal is always to provide you with excellent care. Hearing back from our patients is one way we can continue to improve our services. Please take a few minutes to complete the written survey that you may receive in the mail after your visit with us. Thank you!             Your Updated Medication List - Protect others around you: Learn how to safely use, store and " throw away your medicines at www.disposemymeds.org.          This list is accurate as of 6/18/18 11:18 AM.  Always use your most recent med list.                   Brand Name Dispense Instructions for use Diagnosis    escitalopram 20 MG tablet    LEXAPRO    90 tablet    Take 1 tablet (20 mg) by mouth daily    Major depressive disorder with single episode, in partial remission (H), Anxiety       gabapentin 300 MG capsule    NEURONTIN    360 capsule    One capsule 4 times a day    Restless legs syndrome (RLS), Anxiety       GLUCOSAMINE HCL      None Entered        MENS MULTIPLE VITAMIN/LYCOPENE PO      Take 1 tablet by mouth daily.        metaxalone 800 MG tablet    SKELAXIN    45 tablet    TAKE ONE TABLET (800 MG) BY MOUTH THREE TIMES DAILY AS NEEDED FOR MODERATE PAIN    Low back strain, initial encounter       mirtazapine 30 MG tablet    REMERON    30 tablet    Take 1 tablet (30 mg) by mouth At Bedtime    Persistent insomnia       sildenafil 100 MG tablet    VIAGRA    12 tablet    Take 1 tablet (100 mg) by mouth daily as needed 30 min to 4 hrs before sex. Do not use with nitroglycerin, terazosin or doxazosin.    Erectile dysfunction, unspecified erectile dysfunction type

## 2018-06-18 NOTE — PATIENT INSTRUCTIONS
Thank you for choosing [facility].  You may be receiving a survey in the mail from [facility] regarding your visit today.  Please take a few minutes to complete and return the survey to let us know how we are doing.      Our Clinic hours are:  Mondays    7:20 am - 7 pm  Tues - Fri  7:20 am - 5 pm    Clinic Phone: [phone]    The clinic lab opens at 7:30 am Mon - Fri and appointments are required.    [facility]. [phone]  Monday  8 am - 7pm  Tues - Fri 8 am - 5:30 pm

## 2018-06-18 NOTE — PROGRESS NOTES
SUBJECTIVE:   Timmy Fitzpatrick is a 37 year old male who presents to clinic today for the following health issues:      Depression and Anxiety Follow-Up    Status since last visit: Worsened     Other associated symptoms:None    Complicating factors:     Significant life event: Yes- family     Current substance abuse: None    PHQ-9 11/28/2017 4/6/2018 6/18/2018   Total Score 13 17 15   Q9: Suicide Ideation Not at all Several days Several days   F/U: Thoughts of suicide or self-harm - - Yes   F/U: Self harm-plan - - No   F/U: Self-harm action - - No   F/U: Safety concerns - - No   Some encounter information is confidential and restricted. Go to Review Flowsheets activity to see all data.     JUAREZ-7 SCORE 1/19/2017 11/28/2017 4/6/2018   Total Score - - -   Total Score 3 10 13   Some encounter information is confidential and restricted. Go to Review Flowsheets activity to see all data.       PHQ-9  English  PHQ-9   Any Language  JUAREZ-7  Suicide Assessment Five-step Evaluation and Treatment (SAFE-T)    Amount of exercise or physical activity: 4-5 days/week for an average of 45-60 minutes    Problems taking medications regularly: missing a dose or 2 because he will forget     Medication side effects: none    Diet: regular (no restrictions)      Back Pain       Duration: on and off x 2-3 years worse x 2 weeks         Specific cause: none    Description:   Location of pain: low back right  Character of pain: dull ache  Pain radiation:none  New numbness or weakness in legs, not attributed to pain:  no     Intensity: mild    History:   Pain interferes with job: No  History of back problems: recurrent self limited episodes of low back pain in the past  Any previous MRI or X-rays: None  Sees a specialist for back pain:  No  Therapies tried without relief: acetaminophen (Tylenol), NSAIDs, rest and stretch    Alleviating factors:   Improved by:  Not much       Precipitating factors:  Worsened by: Nothing    Functional and  "Psychosocial Screen ([facility] ST[facility]T Back):      Not performed today              Problem list and histories reviewed & adjusted, as indicated.  Additional history:         Reviewed and updated as needed this visit by clinical staff  Allergies  Meds       Reviewed and updated as needed this visit by Provider      Further history obtained, clarified or corrected by physician:  He has had some low back pain that seems to be persisting despite intermittent use of ibuprofen.  In the last few days his wife noted what she thought was bruising on his back so he wants that checked out.  Also he is reporting that his depression persists though it waxes and wanes he thinks he is currently on the upswing.  He is seeing a therapist weekly.    OBJECTIVE:  /76  Pulse 73  Resp 18  Ht 5' 10\" (1.778 m)  Wt 154 lb (69.9 kg)  BMI 22.1 kg/m2  LUNGS: clear to auscultation, normal breath sounds  CV: RRR without murmur  ABD: BS+, soft, nontender, no masses, no hepatosplenomegaly  SKIN: There is a patch on the back that is on the right paraspinous musculature of the upper lumbar area where there is some hyperpigmentation in an irregular pattern extending in an area about 3\" x 2\".  There is no tenderness or swelling in this area.    ASSESSMENT:     Back strain, subsequent encounter  Major depressive disorder with single episode, in partial remission (H)  Hyperpigmentation    PLAN:  I recommend he use ibuprofen 600 mg 3 times daily for -5 days straight to see if he can relieve the back discomfort  I am not sure of the etiology of the hyperpigmentation so I am and have him watch that and if there is no change over the next month then consider dermatology or certainly if it appears to worsen then call and would set up dermatology appointment  We discussed depression and continuing treatment as he is.    Orders Placed This Encounter     DEPRESSION ACTION PLAN (DAP)         "

## 2018-06-18 NOTE — LETTER
My Depression Action Plan  Name: Timmy Fitzpatrick   Date of Birth 1980  Date: 6/18/2018    My doctor: CLAUDIO Loza   My clinic: [facility]  [id number]  [facility] [id number] [phone]          GREEN    ZONE   Good Control    What it looks like:     Things are going generally well. You have normal up s and down s. You may even feel depressed from time to time, but bad moods usually last less than a day.   What you need to do:  1. Continue to care for yourself (see self care plan)  2. Check your depression survival kit and update it as needed  3. Follow your physician s recommendations including any medication.  4. Do not stop taking medication unless you consult with your physician first.           YELLOW         ZONE Getting Worse    What it looks like:     Depression is starting to interfere with your life.     It may be hard to get out of bed; you may be starting to isolate yourself from others.    Symptoms of depression are starting to last most all day and this has happened for several days.     You may have suicidal thoughts but they are not constant.   What you need to do:     1. Call your care team, your response to treatment will improve if you keep your care team informed of your progress. Yellow periods are signs an adjustment may need to be made.     2. Continue your self-care, even if you have to fake it!    3. Talk to someone in your support network    4. Open up your depression survival kit           RED    ZONE Medical Alert - Get Help    What it looks like:     Depression is seriously interfering with your life.     You may experience these or other symptoms: You can t get out of bed most days, can t work or engage in other necessary activities, you have trouble taking care of basic hygiene, or basic responsibilities, thoughts of suicide or death that will not go away, self-injurious behavior.     What you need to do:  1. Call your care team and  request a same-day appointment. If they are not available (weekends or after hours) call your local crisis line, emergency room or 911.            Depression Self Care Plan / Survival Kit    Self-Care for Depression  Here s the deal. Your body and mind are really not as separate as most people think.  What you do and think affects how you feel and how you feel influences what you do and think. This means if you do things that people who feel good do, it will help you feel better.  Sometimes this is all it takes.  There is also a place for medication and therapy depending on how severe your depression is, so be sure to consult with your medical provider and/ or Behavioral Health Consultant if your symptoms are worsening or not improving.     In order to better manage my stress, I will:    Exercise  Get some form of exercise, every day. This will help reduce pain and release endorphins, the  feel good  chemicals in your brain. This is almost as good as taking antidepressants!  This is not the same as joining a gym and then never going! (they count on that by the way ) It can be as simple as just going for a walk or doing some gardening, anything that will get you moving.      Hygiene   Maintain good hygiene (Get out of bed in the morning, Make your bed, Brush your teeth, Take a shower, and Get dressed like you were going to work, even if you are unemployed).  If your clothes don't fit try to get ones that do.    Diet  I will strive to eat foods that are good for me, drink plenty of water, and avoid excessive sugar, caffeine, alcohol, and other mood-altering substances.  Some foods that are helpful in depression are: complex carbohydrates, B vitamins, flaxseed, fish or fish oil, fresh fruits and vegetables.    Psychotherapy  I agree to participate in Individual Therapy (if recommended).    Medication  If prescribed medications, I agree to take them.  Missing doses can result in serious side effects.  I understand that  drinking alcohol, or other illicit drug use, may cause potential side effects.  I will not stop my medication abruptly without first discussing it with my provider.    Staying Connected With Others  I will stay in touch with my friends, family members, and my primary care provider/team.    Use your imagination  Be creative.  We all have a creative side; it doesn t matter if it s oil painting, sand castles, or mud pies! This will also kick up the endorphins.    Witness Beauty  (AKA stop and smell the roses) Take a look outside, even in mid-winter. Notice colors, textures. Watch the squirrels and birds.     Service to others  Be of service to others.  There is always someone else in need.  By helping others we can  get out of ourselves  and remember the really important things.  This also provides opportunities for practicing all the other parts of the program.    Humor  Laugh and be silly!  Adjust your TV habits for less news and crime-drama and more comedy.    Control your stress  Try breathing deep, massage therapy, biofeedback, and meditation. Find time to relax each day.     My support system    Clinic Contact:  Phone number:    Contact 1:  Phone number:    Contact 2:  Phone number:    Pentecostal/:  Phone number:    Therapist:  Phone number:    Local crisis center:    Phone number:    Other community support:  Phone number:

## 2018-06-19 ASSESSMENT — PATIENT HEALTH QUESTIONNAIRE - PHQ9: SUM OF ALL RESPONSES TO PHQ QUESTIONS 1-9: 15

## 2018-07-13 ENCOUNTER — RADIANT APPOINTMENT (OUTPATIENT)
Dept: GENERAL RADIOLOGY | Facility: CLINIC | Age: 38
End: 2018-07-13
Attending: NURSE PRACTITIONER
Payer: COMMERCIAL

## 2018-07-13 ENCOUNTER — OFFICE VISIT (OUTPATIENT)
Dept: FAMILY MEDICINE | Facility: CLINIC | Age: 38
End: 2018-07-13
Payer: COMMERCIAL

## 2018-07-13 VITALS
WEIGHT: 159 LBS | RESPIRATION RATE: 16 BRPM | DIASTOLIC BLOOD PRESSURE: 72 MMHG | HEART RATE: 74 BPM | BODY MASS INDEX: 22.76 KG/M2 | OXYGEN SATURATION: 99 % | TEMPERATURE: 98.4 F | SYSTOLIC BLOOD PRESSURE: 130 MMHG | HEIGHT: 70 IN

## 2018-07-13 DIAGNOSIS — M54.5 CHRONIC LOW BACK PAIN, UNSPECIFIED BACK PAIN LATERALITY, WITH SCIATICA PRESENCE UNSPECIFIED: ICD-10-CM

## 2018-07-13 DIAGNOSIS — G89.29 CHRONIC LOW BACK PAIN, UNSPECIFIED BACK PAIN LATERALITY, WITH SCIATICA PRESENCE UNSPECIFIED: ICD-10-CM

## 2018-07-13 DIAGNOSIS — F33.1 MAJOR DEPRESSIVE DISORDER, RECURRENT EPISODE, MODERATE (H): Primary | ICD-10-CM

## 2018-07-13 DIAGNOSIS — G47.9 SLEEP DISTURBANCES: ICD-10-CM

## 2018-07-13 DIAGNOSIS — F41.9 ANXIETY: ICD-10-CM

## 2018-07-13 DIAGNOSIS — R61 NIGHT SWEATS: ICD-10-CM

## 2018-07-13 DIAGNOSIS — F19.20 CHEMICAL DEPENDENCY (H): ICD-10-CM

## 2018-07-13 DIAGNOSIS — R63.4 LOSS OF WEIGHT: ICD-10-CM

## 2018-07-13 LAB
ALBUMIN SERPL-MCNC: 4.4 G/DL (ref 3.4–5)
ALP SERPL-CCNC: 56 U/L (ref 40–150)
ALT SERPL W P-5'-P-CCNC: 32 U/L (ref 0–70)
ANION GAP SERPL CALCULATED.3IONS-SCNC: 3 MMOL/L (ref 3–14)
AST SERPL W P-5'-P-CCNC: 20 U/L (ref 0–45)
BASOPHILS # BLD AUTO: 0 10E9/L (ref 0–0.2)
BASOPHILS NFR BLD AUTO: 0.3 %
BILIRUB SERPL-MCNC: 0.3 MG/DL (ref 0.2–1.3)
BUN SERPL-MCNC: 10 MG/DL (ref 7–30)
CALCIUM SERPL-MCNC: 9.8 MG/DL (ref 8.5–10.1)
CHLORIDE SERPL-SCNC: 103 MMOL/L (ref 94–109)
CO2 SERPL-SCNC: 32 MMOL/L (ref 20–32)
CREAT SERPL-MCNC: 0.83 MG/DL (ref 0.66–1.25)
DIFFERENTIAL METHOD BLD: NORMAL
EOSINOPHIL # BLD AUTO: 0.2 10E9/L (ref 0–0.7)
EOSINOPHIL NFR BLD AUTO: 3.5 %
ERYTHROCYTE [DISTWIDTH] IN BLOOD BY AUTOMATED COUNT: 12.9 % (ref 10–15)
GFR SERPL CREATININE-BSD FRML MDRD: >90 ML/MIN/1.7M2
GLUCOSE SERPL-MCNC: 87 MG/DL (ref 70–99)
HCT VFR BLD AUTO: 41.6 % (ref 40–53)
HGB BLD-MCNC: 13.9 G/DL (ref 13.3–17.7)
LYMPHOCYTES # BLD AUTO: 2.3 10E9/L (ref 0.8–5.3)
LYMPHOCYTES NFR BLD AUTO: 35.1 %
MCH RBC QN AUTO: 28.7 PG (ref 26.5–33)
MCHC RBC AUTO-ENTMCNC: 33.4 G/DL (ref 31.5–36.5)
MCV RBC AUTO: 86 FL (ref 78–100)
MONOCYTES # BLD AUTO: 0.6 10E9/L (ref 0–1.3)
MONOCYTES NFR BLD AUTO: 9.5 %
NEUTROPHILS # BLD AUTO: 3.4 10E9/L (ref 1.6–8.3)
NEUTROPHILS NFR BLD AUTO: 51.6 %
PLATELET # BLD AUTO: 270 10E9/L (ref 150–450)
POTASSIUM SERPL-SCNC: 4.1 MMOL/L (ref 3.4–5.3)
PROT SERPL-MCNC: 7.8 G/DL (ref 6.8–8.8)
RBC # BLD AUTO: 4.84 10E12/L (ref 4.4–5.9)
SODIUM SERPL-SCNC: 138 MMOL/L (ref 133–144)
TSH SERPL DL<=0.005 MIU/L-ACNC: 1.38 MU/L (ref 0.4–4)
WBC # BLD AUTO: 6.6 10E9/L (ref 4–11)

## 2018-07-13 PROCEDURE — 72100 X-RAY EXAM L-S SPINE 2/3 VWS: CPT | Mod: FY

## 2018-07-13 PROCEDURE — 80053 COMPREHEN METABOLIC PANEL: CPT | Performed by: NURSE PRACTITIONER

## 2018-07-13 PROCEDURE — 36415 COLL VENOUS BLD VENIPUNCTURE: CPT | Performed by: NURSE PRACTITIONER

## 2018-07-13 PROCEDURE — 84443 ASSAY THYROID STIM HORMONE: CPT | Performed by: NURSE PRACTITIONER

## 2018-07-13 PROCEDURE — 86618 LYME DISEASE ANTIBODY: CPT | Performed by: NURSE PRACTITIONER

## 2018-07-13 PROCEDURE — 99214 OFFICE O/P EST MOD 30 MIN: CPT | Performed by: NURSE PRACTITIONER

## 2018-07-13 PROCEDURE — 87389 HIV-1 AG W/HIV-1&-2 AB AG IA: CPT | Performed by: NURSE PRACTITIONER

## 2018-07-13 PROCEDURE — 85025 COMPLETE CBC W/AUTO DIFF WBC: CPT | Performed by: NURSE PRACTITIONER

## 2018-07-13 ASSESSMENT — ANXIETY QUESTIONNAIRES
5. BEING SO RESTLESS THAT IT IS HARD TO SIT STILL: NOT AT ALL
7. FEELING AFRAID AS IF SOMETHING AWFUL MIGHT HAPPEN: NOT AT ALL
3. WORRYING TOO MUCH ABOUT DIFFERENT THINGS: MORE THAN HALF THE DAYS
1. FEELING NERVOUS, ANXIOUS, OR ON EDGE: MORE THAN HALF THE DAYS
GAD7 TOTAL SCORE: 10
6. BECOMING EASILY ANNOYED OR IRRITABLE: MORE THAN HALF THE DAYS
2. NOT BEING ABLE TO STOP OR CONTROL WORRYING: MORE THAN HALF THE DAYS

## 2018-07-13 ASSESSMENT — PATIENT HEALTH QUESTIONNAIRE - PHQ9: 5. POOR APPETITE OR OVEREATING: MORE THAN HALF THE DAYS

## 2018-07-13 NOTE — PROGRESS NOTES
".  SUBJECTIVE:   Timmy Fitzpatrick is a 37 year old male who presents to clinic today for the following health issues:      Depression and Anxiety Follow-Up    Status since last visit: Worsened wife is with and states he has been suicidal    Other associated symptoms:not sleeping and back pain    Complicating factors:     Significant life event: Yes-  Wife is in school and  and house work is falling on him since Sept      Current substance abuse: Marijuana     PHQ-9 4/6/2018 6/18/2018 7/13/2018   Total Score 17 15 13   Q9: Suicide Ideation Several days Several days Several days   F/U: Thoughts of suicide or self-harm - Yes -   F/U: Self harm-plan - No -   F/U: Self-harm action - No -   F/U: Safety concerns - No -   Some encounter information is confidential and restricted. Go to Review Flowsheets activity to see all data.     JUAREZ-7 SCORE 11/28/2017 4/6/2018 7/13/2018   Total Score - - -   Total Score 10 13 10   Some encounter information is confidential and restricted. Go to Review [employer] activity to see all data.     In the past two weeks have you had thoughts of suicide or self-harm?  No.  He is a bit vague about this and wife states she feels he has been suicidal lately. He states he doesn't want to die but doesn't necessarily want to go on living either. He does state he would use a firearm to make it \"quick and painless\" He denies any thoughts at present.  Do you have concerns about your personal safety or the safety of others?   No  PHQ-9  English  PHQ-9   Any Language  JUAREZ-7  Suicide Assessment Five-step Evaluation and Treatment (SAFE-T)    Amount of exercise or physical activity:  for his job    Problems taking medications regularly: self increasing doses.    Medication side effects: none    Diet: not eating,              Problem list and histories reviewed & adjusted, as indicated.  Additional history: wife is with throughout exam today. She voices a lot of concerns regarding her " . She has a list.  Main issue or concern is his worsening depression. He states he's had depression for years. He is presently seeing counseling at [facility] in [place] but over the past maybe six months his depression has greatly worsened. He cannot sleep. He states it's really getting him down. He denies suicidal thoughts but states he doesn't necessarily want to live anymore. He works as a  in [place]. He has children, his wife is pursuing a degree as CRNA. He has night sweats, losing some weight, doesn't feel like eating. He states he's talked about this before but doesn't feel people are listening.   He admits to drinking some alcohol, he had been completely sober in the past but cannot own up to that. He's been through treatment at [facility]. He admits to smoking marijuana nightly.  He's also had low back pain on and off for years. He has a history of cervical neck disc disease. He states his left leg can get numb at times. He states he couldn't do PT at this time due to time issues.    Patient Active Problem List   Diagnosis     Cervicalgia     Nonallopathic lesion of thoracic region     Nonallopathic lesion of lumbar region     CARDIOVASCULAR SCREENING; LDL GOAL LESS THAN 160     Major depression in partial remission (H)     Migraine headache     Alcoholic, sober since 2006     Chemical dependency (H)     Anxiety     Tear of medial meniscus of knee     Past Surgical History:   Procedure Laterality Date     HERNIA REPAIR       SURGICAL HISTORY OF -       deviated septum       Social History   Substance Use Topics     Smoking status: Former Smoker     Types: Dip, chew, snus or snuff     Smokeless tobacco: Current User     Types: Chew     Last attempt to quit: 11/16/2014      Comment: vaping as well     Alcohol use No      Comment: sober since April 2016     Family History   Problem Relation Age of Onset     Cancer Paternal Grandfather      Depression Mother      Anxiety Disorder Mother       "Depression Father      Dementia Maternal Grandfather      Substance Abuse Paternal Grandmother          Current Outpatient Prescriptions   Medication Sig Dispense Refill     escitalopram (LEXAPRO) 20 MG tablet Take 1 tablet (20 mg) by mouth daily (Patient taking differently: Take 30 mg by mouth daily ) 90 tablet 3     gabapentin (NEURONTIN) 300 MG capsule One capsule 4 times a day (Patient taking differently: 6-8 tabs daily) 360 capsule 3     GLUCOSAMINE HCL None Entered       Multiple Vitamins-Minerals (MENS MULTIPLE VITAMIN/LYCOPENE PO) Take 1 tablet by mouth daily.       sildenafil (VIAGRA) 100 MG tablet Take 1 tablet (100 mg) by mouth daily as needed 30 min to 4 hrs before sex. Do not use with nitroglycerin, terazosin or doxazosin. 12 tablet 11     mirtazapine (REMERON) 30 MG tablet Take 1 tablet (30 mg) by mouth At Bedtime (Patient not taking: Reported on 6/18/2018) 30 tablet 3     Allergies   Allergen Reactions     Nkda [No Known Drug Allergies]      Recent Labs   Lab Test  09/26/17   1642  08/15/10   1150   CR   --   0.90   GFRESTIMATED   --   >90   GFRESTBLACK   --   >90   POTASSIUM   --   4.3   TSH  1.78   --       BP Readings from Last 3 Encounters:   07/13/18 130/72   06/18/18 110/76   06/04/18 122/74    Wt Readings from Last 3 Encounters:   07/13/18 159 lb (72.1 kg)   06/18/18 154 lb (69.9 kg)   06/04/18 161 lb 9.6 oz (73.3 kg)                    Reviewed and updated as needed this visit by clinical staff  Tobacco  Allergies  Meds  Problems  Med Hx  Surg Hx  Fam Hx  Soc Hx        Reviewed and updated as needed this visit by Provider  Allergies  Meds  Problems          ROS: 10 point ROS neg other than the symptoms noted above in the HPI.    OBJECTIVE:     /72 (BP Location: Right arm, Patient Position: Chair, Cuff Size: Adult Regular)  Pulse 74  Temp 98.4  F (36.9  C) (Oral)  Resp 16  Ht 5' 10\" (1.778 m)  Wt 159 lb (72.1 kg)  SpO2 99%  BMI 22.81 kg/m2  Body mass index is 22.81 " kg/(m^2).  GENERAL: healthy, alert and no distress  HENT: ear canals and TM's normal, pharynx without erythema  NECK: no adenopathy, no asymmetry  RESP: lungs clear to auscultation - no rales, rhonchi or wheezes  CV: regular rate and rhythm, normal S1 S2, no S3 or S4, no murmur  ABDOMEN: soft, nontender, no hepatosplenomegaly, no masses and bowel sounds normal  MS: no gross musculoskeletal defects noted  PSYCH: very pleasant, good eye contact, open and discussing things appropriately, no fidgeting or other inappropriate thoughts or actions identified    Diagnostic Test Results:  Results for orders placed or performed in visit on 07/13/18 (from the past 24 hour(s))   CBC with platelets differential   Result Value Ref Range    WBC 6.6 4.0 - 11.0 10e9/L    RBC Count 4.84 4.4 - 5.9 10e12/L    Hemoglobin 13.9 13.3 - 17.7 g/dL    Hematocrit 41.6 40.0 - 53.0 %    MCV 86 78 - 100 fl    MCH 28.7 26.5 - 33.0 pg    MCHC 33.4 31.5 - 36.5 g/dL    RDW 12.9 10.0 - 15.0 %    Platelet Count 270 150 - 450 10e9/L    Diff Method Automated Method     % Neutrophils 51.6 %    % Lymphocytes 35.1 %    % Monocytes 9.5 %    % Eosinophils 3.5 %    % Basophils 0.3 %    Absolute Neutrophil 3.4 1.6 - 8.3 10e9/L    Absolute Lymphocytes 2.3 0.8 - 5.3 10e9/L    Absolute Monocytes 0.6 0.0 - 1.3 10e9/L    Absolute Eosinophils 0.2 0.0 - 0.7 10e9/L    Absolute Basophils 0.0 0.0 - 0.2 10e9/L       ASSESSMENT/PLAN:             1. Major depressive disorder, recurrent episode, moderate (H)    - TSH with free T4 reflex  He will continue with counseling and appointment was made for him to see Arianna. Reviewed 911 if suicidal thoughts or ideas.  Wife was with throughout and will continue to monitor closely. Will continue with lexapro at 30 mg until sleep study and labs are done.    2. Chemical dependency (H)    He appears to be under control at present.    3. Sleep disturbances    - SLEEP EVALUATION & MANAGEMENT REFERRAL - [facility] -  [facility]  [phone] (Age 2 and up); Future    4. Loss of weight    - CBC with platelets differential  - Lyme Disease Rebeca with reflex to WB Serum  - TSH with free T4 reflex  - Comprehensive metabolic panel (BMP + Alb, Alk Phos, ALT, AST, Total. Bili, TP)    5. Anxiety    - TSH with free T4 reflex  - Comprehensive metabolic panel (BMP + Alb, Alk Phos, ALT, AST, Total. Bili, TP)    6. Chronic low back pain, unspecified back pain laterality, with sciatica presence unspecified    - XR Lumbar Spine 2/3 Views; Future  Will start with x ray of lumbar spine and if evidence of multilevel DDD will send on to back specialist. He is declining PT at this time.    7. Night sweats    - CBC with platelets differential  - Lyme Disease Rebeca with reflex to WB Serum  - HIV Antigen Antibody Combo  - TSH with free T4 reflex  - Comprehensive metabolic panel (BMP + Alb, Alk Phos, ALT, AST, Total. Bili, TP)    See Patient Instructions  Patient Instructions   Will be notified of pending labs and x ray.  Call to schedule with sleep medicine.  Continue same medications for now.  Follow up with Arianna next week.        Thank you for choosing [facility].  You may be receiving a survey in the mail from [employer] regarding your visit today.  Please take a few minutes to complete and return the survey to let us know how we are doing.      Our Clinic hours are:  Mondays    7:20 am - 7 pm  Tues -  Fri  7:20 am - 5 pm    Clinic Phone: [phone]    The clinic lab opens at 7:30 am Mon - Fri and appointments are required.    [facility] [place]  Ph. [phone]  Monday  8 am - 7pm  Tues - Fri 8 am - 5:30 pm             URSULA No CNP  [facility]

## 2018-07-13 NOTE — PATIENT INSTRUCTIONS
Will be notified of pending labs and x ray.  Call to schedule with sleep medicine.  Continue same medications for now.  Follow up with Arianna next week.        Thank you for choosing [facility].  You may be receiving a survey in the mail from [employer] regarding your visit today.  Please take a few minutes to complete and return the survey to let us know how we are doing.      Our Clinic hours are:  Mondays    7:20 am - 7 pm  Tues -  Fri  7:20 am - 5 pm    Clinic Phone: [phone]    The clinic lab opens at 7:30 am Mon - Fri and appointments are required.    [facility] [place]  Ph. [phone]  Monday  8 am - 7pm  Tues - Fri 8 am - 5:30 pm

## 2018-07-13 NOTE — MR AVS SNAPSHOT
After Visit Summary   7/13/2018    Timmy Fitzpatrick    MRN: [id number]           Patient Information     Date Of Birth          1980        Visit Information        Provider Department      7/13/2018 1:00 PM Vida Miranda APRN CNP [facility]        Today's Diagnoses     Major depressive disorder, recurrent episode, moderate (H)    -  1    Chemical dependency (H)        Sleep disturbances        Loss of weight        Anxiety        Chronic low back pain, unspecified back pain laterality, with sciatica presence unspecified        Night sweats          Care Instructions    Will be notified of pending labs and x ray.  Call to schedule with sleep medicine.  Continue same medications for now.  Follow up with Arianna next week.        Thank you for choosing [facility]  You may be receiving a survey in the mail from [employer] regarding your visit today.  Please take a few minutes to complete and return the survey to let us know how we are doing.      Our Clinic hours are:  Mondays    7:20 am - 7 pm  Tues -  Fri  7:20 am - 5 pm    Clinic Phone: [phone]    The clinic lab opens at 7:30 am Mon - Fri and appointments are required.    [facility]. [phone]  Monday  8 am - 7pm  Tues - Fri 8 am - 5:30 pm                 Follow-ups after your visit        Additional Services     SLEEP EVALUATION & MANAGEMENT REFERRAL - ADULT -[facility] - [facility]  [phone] (Age 2 and up)       Please be aware that coverage of these services is subject to the terms and limitations of your health insurance plan.  Call member services at your health plan with any benefit or coverage questions.      Please bring the following to your appointment:    >>   List of current medications   >>   This referral request   >>   Any documents/labs given to you for this referral                      Follow-up notes from your care team     Return if symptoms worsen  or fail to improve.      Your next 10 appointments already scheduled     Jul 25, 2018  3:00 PM CDT   New Visit with Gloria Jensen LICSW [facility] ([facility])    [address] [facility] [id number] [phone]              Future tests that were ordered for you today     Open Future Orders        Priority Expected Expires Ordered    SLEEP EVALUATION & MANAGEMENT REFERRAL - ADULT -[facility] [facility]  [phone] (Age 2 and up) Routine  7/13/2019 7/13/2018            Who to contact     If you have questions or need follow up information about today's clinic visit or your schedule please contact [facility] directly at [phone].  Normal or non-critical lab and imaging results will be communicated to you by MyChart, letter or phone within 4 business days after the clinic has received the results. If you do not hear from us within 7 days, please contact the clinic through MyChart or phone. If you have a critical or abnormal lab result, we will notify you by phone as soon as possible.  Submit refill requests through [employer] or call your pharmacy and they will forward the refill request to us. Please allow 3 business days for your refill to be completed.          Additional Information About Your Visit        [facility] Information     [employer] gives you secure access to your electronic health record. If you see a primary care provider, you can also send messages to your care team and make appointments. If you have questions, please call your primary care clinic.  If you do not have a primary care provider, please call [phone] and they will assist you.        Care EveryWhere ID     This is your Care EveryWhere ID. This could be used by other organizations to access your [place] medical records  [id number]        Your Vitals Were     Pulse Temperature Respirations Height Pulse Oximetry BMI (Body Mass Index)    74 98.4  F  "(36.9  C) (Oral) 16 5' 10\" (1.778 m) 99% 22.81 kg/m2       Blood Pressure from Last 3 Encounters:   07/13/18 130/72   06/18/18 110/76   06/04/18 122/74    Weight from Last 3 Encounters:   07/13/18 159 lb (72.1 kg)   06/18/18 154 lb (69.9 kg)   06/04/18 161 lb 9.6 oz (73.3 kg)              We Performed the Following     CBC with platelets differential     Comprehensive metabolic panel (BMP + Alb, Alk Phos, ALT, AST, Total. Bili, TP)     HIV Antigen Antibody Combo     Lyme Disease Rebeca with reflex to WB Serum     TSH with free T4 reflex          Today's Medication Changes          These changes are accurate as of 7/13/18  2:13 PM.  If you have any questions, ask your nurse or doctor.               These medicines have changed or have updated prescriptions.        Dose/Directions    escitalopram 20 MG tablet   Commonly known as:  LEXAPRO   This may have changed:  how much to take   Used for:  Major depressive disorder with single episode, in partial remission (H), Anxiety        Dose:  20 mg   Take 1 tablet (20 mg) by mouth daily   Quantity:  90 tablet   Refills:  3       gabapentin 300 MG capsule   Commonly known as:  NEURONTIN   This may have changed:  additional instructions   Used for:  Restless legs syndrome (RLS), Anxiety        One capsule 4 times a day   Quantity:  360 capsule   Refills:  3                Primary Care Provider Office Phone # Fax #    R Refugio Loza [phone] [address] [address] [facility] [address]        Equal Access to Services     ANGIE RIOS AH: Hadii margarita cabrera hadasho Soomaali, waaxda luqadaha, qaybta kaalmada adeegyada, alex bird . So [facility] [phone].    ATENCIÓN: Si habla español, tiene a waters disposición servicios gratuitos de asistencia lingüística. Llame al [phone].    We comply with applicable federal civil rights laws and [place] laws. We do not discriminate on the basis of race, color, national origin, age, disability, sex, " sexual orientation, or gender identity.            Thank you!     Thank you for choosing [facility]  for your care. Our goal is always to provide you with excellent care. Hearing back from our patients is one way we can continue to improve our services. Please take a few minutes to complete the written survey that you may receive in the mail after your visit with us. Thank you!             Your Updated Medication List - Protect others around you: Learn how to safely use, store and throw away your medicines at www.disposemymeds.org.          This list is accurate as of 7/13/18  2:13 PM.  Always use your most recent med list.                   Brand Name Dispense Instructions for use Diagnosis    escitalopram 20 MG tablet    LEXAPRO    90 tablet    Take 1 tablet (20 mg) by mouth daily    Major depressive disorder with single episode, in partial remission (H), Anxiety       gabapentin 300 MG capsule    NEURONTIN    360 capsule    One capsule 4 times a day    Restless legs syndrome (RLS), Anxiety       GLUCOSAMINE HCL      None Entered        MENS MULTIPLE VITAMIN/LYCOPENE PO      Take 1 tablet by mouth daily.        mirtazapine 30 MG tablet    REMERON    30 tablet    Take 1 tablet (30 mg) by mouth At Bedtime    Persistent insomnia       sildenafil 100 MG tablet    VIAGRA    12 tablet    Take 1 tablet (100 mg) by mouth daily as needed 30 min to 4 hrs before sex. Do not use with nitroglycerin, terazosin or doxazosin.    Erectile dysfunction, unspecified erectile dysfunction type

## 2018-07-14 ASSESSMENT — ANXIETY QUESTIONNAIRES: GAD7 TOTAL SCORE: 10

## 2018-07-14 ASSESSMENT — PATIENT HEALTH QUESTIONNAIRE - PHQ9: SUM OF ALL RESPONSES TO PHQ QUESTIONS 1-9: 13

## 2018-07-16 ENCOUNTER — TELEPHONE (OUTPATIENT)
Dept: FAMILY MEDICINE | Facility: CLINIC | Age: 38
End: 2018-07-16

## 2018-07-16 DIAGNOSIS — M54.5 LOW BACK PAIN, UNSPECIFIED BACK PAIN LATERALITY, UNSPECIFIED CHRONICITY, WITH SCIATICA PRESENCE UNSPECIFIED: Primary | ICD-10-CM

## 2018-07-16 LAB — B BURGDOR IGG+IGM SER QL: 0.04 (ref 0–0.89)

## 2018-07-17 LAB — HIV 1+2 AB+HIV1 P24 AG SERPL QL IA: NONREACTIVE

## 2018-07-25 ENCOUNTER — OFFICE VISIT (OUTPATIENT)
Dept: BEHAVIORAL HEALTH | Facility: CLINIC | Age: 38
End: 2018-07-25
Payer: COMMERCIAL

## 2018-07-25 DIAGNOSIS — F33.1 MAJOR DEPRESSIVE DISORDER, RECURRENT EPISODE, MODERATE (H): Primary | ICD-10-CM

## 2018-07-25 ASSESSMENT — PATIENT HEALTH QUESTIONNAIRE - PHQ9: 5. POOR APPETITE OR OVEREATING: NEARLY EVERY DAY

## 2018-07-25 ASSESSMENT — ANXIETY QUESTIONNAIRES
2. NOT BEING ABLE TO STOP OR CONTROL WORRYING: SEVERAL DAYS
3. WORRYING TOO MUCH ABOUT DIFFERENT THINGS: SEVERAL DAYS
IF YOU CHECKED OFF ANY PROBLEMS ON THIS QUESTIONNAIRE, HOW DIFFICULT HAVE THESE PROBLEMS MADE IT FOR YOU TO DO YOUR WORK, TAKE CARE OF THINGS AT HOME, OR GET ALONG WITH OTHER PEOPLE: VERY DIFFICULT
5. BEING SO RESTLESS THAT IT IS HARD TO SIT STILL: MORE THAN HALF THE DAYS
GAD7 TOTAL SCORE: 13
1. FEELING NERVOUS, ANXIOUS, OR ON EDGE: NEARLY EVERY DAY
6. BECOMING EASILY ANNOYED OR IRRITABLE: NEARLY EVERY DAY
7. FEELING AFRAID AS IF SOMETHING AWFUL MIGHT HAPPEN: NOT AT ALL

## 2018-07-25 NOTE — PROGRESS NOTES
Patient had DA scheduled with this patient due to increased depression and suicidal ideation. He has a therapist he sees at [facility] in [place], [place]. He has seen her wkly since Dec/Jan 2018.  He will continue to see her.  Discussed seeing this writer as needed when therapist is unavailable. Patient was agreeable to this. He also reported suicidal ideation has decreased.  Will continue to assess as needed. Haresh)Camila,LILY,[facility]

## 2018-07-25 NOTE — MR AVS SNAPSHOT
After Visit Summary   7/25/2018    Timmy Fitzpatrick    MRN: [id number]           Patient Information     Date Of Birth          1980        Visit Information        Provider Department      7/25/2018 3:00 PM Gloria Jensen LICSW [facility]        Today's Diagnoses     Major depressive disorder, recurrent episode, moderate (H)    -  1       Follow-ups after your visit        Your next 10 appointments already scheduled     Jul 31, 2018  3:30 PM CDT   New Sleep Patient with DOMINIC Leija   [facility] [place] [place]    [address]  [facility] [address]   [phone]              Who to contact     If you have questions or need follow up information about today's clinic visit or your schedule please contact [facility] directly at [phone].  Normal or non-critical lab and imaging results will be communicated to you by MyChart, letter or phone within 4 business days after the clinic has received the results. If you do not hear from us within 7 days, please contact the clinic through Radhart or phone. If you have a critical or abnormal lab result, we will notify you by phone as soon as possible.  Submit refill requests through Plum or call your pharmacy and they will forward the refill request to us. Please allow 3 business days for your refill to be completed.          Additional Information About Your Visit        MyChart Information     Plum gives you secure access to your electronic health record. If you see a primary care provider, you can also send messages to your care team and make appointments. If you have questions, please call your primary care clinic.  If you do not have a primary care provider, please call [phone] and they will assist you.        Care EveryWhere ID     This is your Care EveryWhere ID. This could be used by other organizations to access your [facility]  records  [id number]         Blood Pressure from Last 3 Encounters:   07/13/18 130/72   06/18/18 110/76   06/04/18 122/74    Weight from Last 3 Encounters:   07/13/18 159 lb (72.1 kg)   06/18/18 154 lb (69.9 kg)   06/04/18 161 lb 9.6 oz (73.3 kg)              Today, you had the following     No orders found for display         Today's Medication Changes          These changes are accurate as of 7/25/18  3:33 PM.  If you have any questions, ask your nurse or doctor.               These medicines have changed or have updated prescriptions.        Dose/Directions    escitalopram 20 MG tablet   Commonly known as:  LEXAPRO   This may have changed:  how much to take   Used for:  Major depressive disorder with single episode, in partial remission (H), Anxiety        Dose:  20 mg   Take 1 tablet (20 mg) by mouth daily   Quantity:  90 tablet   Refills:  3       gabapentin 300 MG capsule   Commonly known as:  NEURONTIN   This may have changed:  additional instructions   Used for:  Restless legs syndrome (RLS), Anxiety        One capsule 4 times a day   Quantity:  360 capsule   Refills:  3                Primary Care Provider Office Phone # Fax #    R Refugio Loza [phone] [address] [address]        Equal Access to Services     ANGIE RIOS AH: Kiara brittono Soshiv, waaxda luqadaha, qaybta kaalmada adeegyada, alex mayer. So [facility] [phone].    ATENCIÓN: Si habla español, tiene a waters disposición servicios gratuitos de asistencia lingüística. Llame al [phone].    We comply with applicable federal civil rights laws and [place] laws. We do not discriminate on the basis of race, color, national origin, age, disability, sex, sexual orientation, or gender identity.            Thank you!     Thank you for choosing [facility]  for your care. Our goal is always to provide you with excellent care. Hearing back from our patients  is one way we can continue to improve our services. Please take a few minutes to complete the written survey that you may receive in the mail after your visit with us. Thank you!             Your Updated Medication List - Protect others around you: Learn how to safely use, store and throw away your medicines at www.disposemymeds.org.          This list is accurate as of 7/25/18  3:33 PM.  Always use your most recent med list.                   Brand Name Dispense Instructions for use Diagnosis    escitalopram 20 MG tablet    LEXAPRO    90 tablet    Take 1 tablet (20 mg) by mouth daily    Major depressive disorder with single episode, in partial remission (H), Anxiety       gabapentin 300 MG capsule    NEURONTIN    360 capsule    One capsule 4 times a day    Restless legs syndrome (RLS), Anxiety       GLUCOSAMINE HCL      None Entered        MENS MULTIPLE VITAMIN/LYCOPENE PO      Take 1 tablet by mouth daily.        mirtazapine 30 MG tablet    REMERON    30 tablet    Take 1 tablet (30 mg) by mouth At Bedtime    Persistent insomnia       sildenafil 100 MG tablet    VIAGRA    12 tablet    Take 1 tablet (100 mg) by mouth daily as needed 30 min to 4 hrs before sex. Do not use with nitroglycerin, terazosin or doxazosin.    Erectile dysfunction, unspecified erectile dysfunction type

## 2018-07-26 ASSESSMENT — PATIENT HEALTH QUESTIONNAIRE - PHQ9: SUM OF ALL RESPONSES TO PHQ QUESTIONS 1-9: 15

## 2018-07-26 ASSESSMENT — ANXIETY QUESTIONNAIRES: GAD7 TOTAL SCORE: 13

## 2018-07-30 PROBLEM — F41.9 ANXIETY: Chronic | Status: ACTIVE | Noted: 2017-09-18

## 2018-07-31 ENCOUNTER — OFFICE VISIT (OUTPATIENT)
Dept: SLEEP MEDICINE | Facility: CLINIC | Age: 38
End: 2018-07-31
Payer: COMMERCIAL

## 2018-07-31 VITALS
SYSTOLIC BLOOD PRESSURE: 116 MMHG | HEART RATE: 80 BPM | WEIGHT: 157.8 LBS | DIASTOLIC BLOOD PRESSURE: 72 MMHG | HEIGHT: 70 IN | RESPIRATION RATE: 16 BRPM | OXYGEN SATURATION: 98 % | BODY MASS INDEX: 22.59 KG/M2

## 2018-07-31 DIAGNOSIS — R06.89 DYSPNEA AND RESPIRATORY ABNORMALITY: Primary | ICD-10-CM

## 2018-07-31 DIAGNOSIS — Z72.820 LACK OF ADEQUATE SLEEP: ICD-10-CM

## 2018-07-31 DIAGNOSIS — G47.30 SLEEP APNEA, UNSPECIFIED TYPE: ICD-10-CM

## 2018-07-31 DIAGNOSIS — R53.83 MALAISE AND FATIGUE: ICD-10-CM

## 2018-07-31 DIAGNOSIS — G47.9 SLEEP DISTURBANCES: ICD-10-CM

## 2018-07-31 DIAGNOSIS — G47.00 INSOMNIA, UNSPECIFIED TYPE: ICD-10-CM

## 2018-07-31 DIAGNOSIS — R06.00 DYSPNEA AND RESPIRATORY ABNORMALITY: Primary | ICD-10-CM

## 2018-07-31 DIAGNOSIS — G25.81 RESTLESS LEGS SYNDROME (RLS): ICD-10-CM

## 2018-07-31 DIAGNOSIS — R53.81 MALAISE AND FATIGUE: ICD-10-CM

## 2018-07-31 LAB — FERRITIN SERPL-MCNC: 68 NG/ML (ref 26–388)

## 2018-07-31 PROCEDURE — 82728 ASSAY OF FERRITIN: CPT | Performed by: PHYSICIAN ASSISTANT

## 2018-07-31 PROCEDURE — 99204 OFFICE O/P NEW MOD 45 MIN: CPT | Performed by: PHYSICIAN ASSISTANT

## 2018-07-31 PROCEDURE — 36415 COLL VENOUS BLD VENIPUNCTURE: CPT | Performed by: PHYSICIAN ASSISTANT

## 2018-07-31 RX ORDER — ZOLPIDEM TARTRATE 5 MG/1
TABLET ORAL
Qty: 1 TABLET | Refills: 0 | Status: SHIPPED | OUTPATIENT
Start: 2018-07-31 | End: 2018-10-24

## 2018-07-31 NOTE — PATIENT INSTRUCTIONS

## 2018-07-31 NOTE — NURSING NOTE
"No chief complaint on file.      Initial /72  Pulse 80  Resp 16  Ht 1.778 m (5' 10\")  Wt 71.6 kg (157 lb 12.8 oz)  SpO2 98%  BMI 22.64 kg/m2 Estimated body mass index is 22.64 kg/(m^2) as calculated from the following:    Height as of this encounter: 1.778 m (5' 10\").    Weight as of this encounter: 71.6 kg (157 lb 12.8 oz).    Medication Reconciliation: complete    Neck circumference: 14.5 inches / 37 centimeters.    .Becca Moise MA    "

## 2018-07-31 NOTE — MR AVS SNAPSHOT
After Visit Summary   7/31/2018    Timmy Fitzpatrick    MRN: [id number]           Patient Information     Date Of Birth          1980        Visit Information        Provider Department      7/31/2018 3:30 PM Lindsay Jimenez PA [facility]        Today's Diagnoses     Dyspnea and respiratory abnormality    -  1    Sleep disturbances        Insomnia, unspecified type        Lack of adequate sleep        Restless legs syndrome (RLS)        Sleep apnea, unspecified type        Malaise and fatigue          Care Instructions      Your BMI is Body mass index is 22.64 kg/(m^2).  Weight management is a personal decision.  If you are interested in exploring weight loss strategies, the following discussion covers the approaches that may be successful. Body mass index (BMI) is one way to tell whether you are at a healthy weight, overweight, or obese. It measures your weight in relation to your height.  A BMI of 18.5 to 24.9 is in the healthy range. A person with a BMI of 25 to 29.9 is considered overweight, and someone with a BMI of 30 or greater is considered obese. More than two-thirds of American adults are considered overweight or obese.  Being overweight or obese increases the risk for further weight gain. Excess weight may lead to heart disease and diabetes.  Creating and following plans for healthy eating and physical activity may help you improve your health.  Weight control is part of healthy lifestyle and includes exercise, emotional health, and healthy eating habits. Careful eating habits lifelong are the mainstay of weight control. Though there are significant health benefits from weight loss, long-term weight loss with diet alone may be very difficult to achieve- studies show long-term success with dietary management in less than 10% of people. Attaining a healthy weight may be especially difficult to achieve in those with severe obesity. In some cases, medications, devices  and surgical management might be considered.  What can you do?  If you are overweight or obese and are interested in methods for weight loss, you should discuss this with your provider.     Consider reducing daily calorie intake by 500 calories.     Keep a food journal.     Avoiding skipping meals, consider cutting portions instead.    Diet combined with exercise helps maintain muscle while optimizing fat loss. Strength training is particularly important for building and maintaining muscle mass. Exercise helps reduce stress, increase energy, and improves fitness. Increasing exercise without diet control, however, may not burn enough calories to loose weight.       Start walking three days a week 10-20 minutes at a time    Work towards walking thirty minutes five days a week     Eventually, increase the speed of your walking for 1-2 minutes at time    In addition, we recommend that you review healthy lifestyles and methods for weight loss available through the National Institutes of Health patient information sites:  http://win.niddk.nih.gov/publications/index.htm    And look into health and wellness programs that may be available through your health insurance provider, employer, local community center, or darryl club.                  Follow-ups after your visit        Additional Services     SLEEP PSYCHOLOGY REFERRAL       Referral Urgency: Next available    Dr. Ian Foster is at the following clinics on the following days:  [facility] - [address] [place] [address]        Thursday and Friday (PLEASE CALL [phone] to schedule an appointment).  [facility] [address] [place]       Wednesdays   (PLEASE CALL [phone] to schedule an appointment).     Please be aware that coverage of these services is subject to the terms and limitations of your health insurance plan. Call member services at your health plan with any benefit or coverage questions.     Please bring the  "following to your appointment:  >> List of current medications   >> This referral request   >> Any documents/labs given to you for this referral                  Future tests that were ordered for you today     Open Future Orders        Priority Expected Expires Ordered    Comprehensive Sleep Study Routine  1/27/2019 7/31/2018            Who to contact     If you have questions or need follow up information about today's clinic visit or your schedule please contact [facility] directly at [phone].  Normal or non-critical lab and imaging results will be communicated to you by Betablehart, letter or phone within 4 business days after the clinic has received the results. If you do not hear from us within 7 days, please contact the clinic through 360Guanxit or phone. If you have a critical or abnormal lab result, we will notify you by phone as soon as possible.  Submit refill requests through [employer] BioCode or call your pharmacy and they will forward the refill request to us. Please allow 3 business days for your refill to be completed.          Additional Information About Your Visit        Applied BioCode Information     Applied BioCode gives you secure access to your electronic health record. If you see a primary care provider, you can also send messages to your care team and make appointments. If you have questions, please call your primary care clinic.  If you do not have a primary care provider, please call [phone] and they will assist you.        Care EveryWhere ID     This is your Care EveryWhere ID. This could be used by other organizations to access your [facility] medical records  [id number]        Your Vitals Were     Pulse Respirations Height Pulse Oximetry BMI (Body Mass Index)       80 16 1.778 m (5' 10\") 98% 22.64 kg/m2        Blood Pressure from Last 3 Encounters:   07/31/18 116/72   07/13/18 130/72   06/18/18 110/76    Weight from Last 3 Encounters:   07/31/18 71.6 kg (157 lb 12.8 oz)   07/13/18 72.1 kg " (159 lb)   06/18/18 69.9 kg (154 lb)              We Performed the Following     SLEEP EVALUATION & MANAGEMENT REFERRAL - [facility]  [phone] (Age 2 and up)     SLEEP PSYCHOLOGY REFERRAL          Today's Medication Changes          These changes are accurate as of 7/31/18  4:18 PM.  If you have any questions, ask your nurse or doctor.               Start taking these medicines.        Dose/Directions    zolpidem 5 MG tablet   Commonly known as:  AMBIEN   Started by:  Lindsay Jimenez PA        Take tablet by mouth 15 minutes prior to sleep, for Sleep Study   Quantity:  1 tablet   Refills:  0         These medicines have changed or have updated prescriptions.        Dose/Directions    escitalopram 20 MG tablet   Commonly known as:  LEXAPRO   This may have changed:  how much to take   Used for:  Major depressive disorder with single episode, in partial remission (H), Anxiety        Dose:  20 mg   Take 1 tablet (20 mg) by mouth daily   Quantity:  90 tablet   Refills:  3       gabapentin 300 MG capsule   Commonly known as:  NEURONTIN   This may have changed:  additional instructions   Used for:  Restless legs syndrome (RLS), Anxiety        One capsule 4 times a day   Quantity:  360 capsule   Refills:  3            Where to get your medicines      Some of these will need a paper prescription and others can be bought over the counter.  Ask your nurse if you have questions.     Bring a paper prescription for each of these medications     zolpidem 5 MG tablet                Primary Care Provider Office Phone # Fax #    R Refugio Loza [phone] [address] [facility] [address]        Equal Access to Services     ANGIE RIOS AH: Kiara rahman Soshiv, wajuan cda luqdyllan, qaybta kaalmada katherine, alex mayer. So [facility] [phone].    ATENCIÓN: Si habla español, tiene a waters disposición servicios gratuitos de asistencia  lingüística. Alexia al [phone].    We comply with applicable federal civil rights laws and [place] laws. We do not discriminate on the basis of race, color, national origin, age, disability, sex, sexual orientation, or gender identity.            Thank you!     Thank you for choosing [facility]  for your care. Our goal is always to provide you with excellent care. Hearing back from our patients is one way we can continue to improve our services. Please take a few minutes to complete the written survey that you may receive in the mail after your visit with us. Thank you!             Your Updated Medication List - Protect others around you: Learn how to safely use, store and throw away your medicines at www.disposemymeds.org.          This list is accurate as of 7/31/18  4:18 PM.  Always use your most recent med list.                   Brand Name Dispense Instructions for use Diagnosis    escitalopram 20 MG tablet    LEXAPRO    90 tablet    Take 1 tablet (20 mg) by mouth daily    Major depressive disorder with single episode, in partial remission (H), Anxiety       gabapentin 300 MG capsule    NEURONTIN    360 capsule    One capsule 4 times a day    Restless legs syndrome (RLS), Anxiety       GLUCOSAMINE HCL      None Entered        MENS MULTIPLE VITAMIN/LYCOPENE PO      Take 1 tablet by mouth daily.        mirtazapine 30 MG tablet    REMERON    30 tablet    Take 1 tablet (30 mg) by mouth At Bedtime    Persistent insomnia       sildenafil 100 MG tablet    VIAGRA    12 tablet    Take 1 tablet (100 mg) by mouth daily as needed 30 min to 4 hrs before sex. Do not use with nitroglycerin, terazosin or doxazosin.    Erectile dysfunction, unspecified erectile dysfunction type       zolpidem 5 MG tablet    AMBIEN    1 tablet    Take tablet by mouth 15 minutes prior to sleep, for Sleep Study

## 2018-07-31 NOTE — PROGRESS NOTES
"  Sleep Consultation:    Date on this visit: 7/31/2018    Timmy Fitzpatrick is sent by Vida Miranda for a sleep consultation.    Primary Physician: CLAUDIO Loza     Chief Complaint   Patient presents with     Consult     daytime fatigue, troubles falling and staying asleep.     HPI: Timmy Fitzpatrick is a 37 year old male with history of depression, anxiety, migraines and chemical dependency with nightly marijuana use. Timmy presents in clinic today with lifelong history of difficulty falling asleep and staying asleep stating \"I have never been a good sleeper\". Sleep initiation and maintenance difficulty has worsened in the last 8-9 months. Worsening of his sleep issues has coincided with changes is family life with wife in school and duties of caring for 2 children falling on him.  He has tried Remeron, Melatonin and Trazodone and all ineffective.       He also reports increased sleep latency due to \"tight, tense\" legs that he has to move to get comfortable. Movement of the legs gives him temporary relief.     Timmy goes to sleep between 10 and 11:30 PM. One night a week it takes him 5 minutes to fall asleep. 6/7 nights, it takes him 3-4 hours to fall asleep. He usually leaves the bedroom once he cannot fall asleep in 30 minutes and does chores, reads and watches television.  He wakes up 3 times a night for 5 minutes before falling back to sleep.  Timmy wakes up to uncertain reasons and external stimuli(movement of wife).  On weekends, Timmy goes to sleep between 10 and 11:30 PM.  He wakes up between 5:00 AM and 12:00 PM without an alarm. He falls asleep 3-4 hours.  Patient gets an average of \"3\" hours of sleep per night.     Patient does not use electronics in bed, watch TV in bed and read in bed.     Timmy does not do shift work.      Timmy does snore some nights and snoring is soft to loud. Patient does have a regular bed partner. There is report of snoring.  He does have occasional " witnessed apneas. They occasionally sleep separately.  Patient sleeps on his back and side. He denies no morning headaches and morning confusion. Timmy denies any bruxism, sleep walking, sleep talking, dream enactment, sleep paralysis, cataplexy and hypnogogic/hypnopompic hallucinations.    Timmy denies difficulty breathing through his nose, claustrophobia and reflux at night.      Timmy has gained 0-5 pounds in the last year.  Patient describes themself as a morning person.  He would prefer to go to sleep at 10:00 PM and wake up at 5:00 AM.  Patient's [place] Sleepiness score 2/24 inconsistent with excessive daytime sleepiness.      Timmy naps 1 times per month for 20 minutes, feels refreshed after naps. He takes no inadvertant naps.  He denies falling asleep while driving. Patient was counseled on the importance of driving while alert, to pull over if drowsy, or nap before getting into the vehicle if sleepy.  He uses 12 cups/day of coffee. Last caffeine intake is usually before 3 PM.    Allergies:    Allergies   Allergen Reactions     Nkda [No Known Drug Allergies]        Medications:    Current Outpatient Prescriptions   Medication Sig Dispense Refill     escitalopram (LEXAPRO) 20 MG tablet Take 1 tablet (20 mg) by mouth daily (Patient taking differently: Take 30 mg by mouth daily ) 90 tablet 3     gabapentin (NEURONTIN) 300 MG capsule One capsule 4 times a day (Patient taking differently: 6-8 tabs daily) 360 capsule 3     GLUCOSAMINE HCL None Entered       Multiple Vitamins-Minerals (MENS MULTIPLE VITAMIN/LYCOPENE PO) Take 1 tablet by mouth daily.       mirtazapine (REMERON) 30 MG tablet Take 1 tablet (30 mg) by mouth At Bedtime (Patient not taking: Reported on 6/18/2018) 30 tablet 3     sildenafil (VIAGRA) 100 MG tablet Take 1 tablet (100 mg) by mouth daily as needed 30 min to 4 hrs before sex. Do not use with nitroglycerin, terazosin or doxazosin. (Patient not taking: Reported on 7/31/2018) 12 tablet  11       Problem List:  Patient Active Problem List    Diagnosis Date Noted     Anxiety 09/18/2017     Priority: Medium     Tear of medial meniscus of knee 09/08/2016     Priority: Medium     Chemical dependency (H) 11/25/2015     Priority: Medium     Alcoholic, sober since 2006 09/13/2012     Priority: Medium     Major depression in partial remission (H) 06/30/2011     Priority: Medium     Migraine headache 06/30/2011     Priority: Medium     (Problem list name updated by automated process. Provider to review and confirm.)       CARDIOVASCULAR SCREENING; LDL GOAL LESS THAN 160 10/31/2010     Priority: Medium     Cervicalgia 03/24/2010     Priority: Medium     Nonallopathic lesion of thoracic region 03/24/2010     Priority: Medium     Problem list name updated by automated process. Provider to review       Nonallopathic lesion of lumbar region 03/24/2010     Priority: Medium     Problem list name updated by automated process. Provider to review          Past Medical/Surgical History:  Past Medical History:   Diagnosis Date     Anxiety      Cervicalgia      Chemical dependency (H)      Depression      Migraine headache      Past Surgical History:   Procedure Laterality Date     HERNIA REPAIR       SURGICAL HISTORY OF -       deviated septum       Social History:  Social History     Social History     Marital status:      Spouse name: N/A     Number of children: N/A     Years of education: N/A     Occupational History     Not on file.     Social History Main Topics     Smoking status: Former Smoker     Types: Dip, chew, snus or snuff     Smokeless tobacco: Current User     Types: Chew     Last attempt to quit: 11/16/2014      Comment: vaping as well     Alcohol use No      Comment: sober since April 2016     Drug use: Yes     Special: Marijuana     Sexual activity: Yes     Partners: Female     Birth control/ protection: Pill     Other Topics Concern     Parent/Sibling W/ Cabg, Mi Or Angioplasty Before 65f 55m?  No     Social History Narrative    Dairy/d 1-2 servings/d.     Caffeine 3-4 servings/d    Exercise 4-6 x week    Sunscreen used - Yes    Seatbelts used - Yes    Working smoke/CO detectors in the home - Yes    Guns stored in the home - No    Self Breast Exams - NOT APPLICABLE    Self Testicular Exam - No    Eye Exam up to date - No    Dental Exam up to date - Yes    Pap Smear up to date - NOT APPLICABLE    Mammogram up to date - NOT APPLICABLE    PSA up to date - NOT APPLICABLE    Dexa Scan up to date - NOT APPLICABLE    Flex Sig / Colonoscopy up to date - NOT APPLICABLE    Immunizations up to date - Yes    Abuse: Current or Past(Physical, Sexual or Emotional)- No    Do you feel safe in your environment - Yes               Family History:  Family History   Problem Relation Age of Onset     Cancer Paternal Grandfather      Depression Mother      Anxiety Disorder Mother      Depression Father      Dementia Maternal Grandfather      Substance Abuse Paternal Grandmother        Review of Systems:  CONSTITUTIONAL: NEGATIVE for weight gain/loss, fever, chills, sweats or night sweats, drug allergies.  EYES: NEGATIVE for changes in vision, blind spots, double vision.  ENT: NEGATIVE for ear pain, sore throat, sinus pain, post-nasal drip, runny nose, bloody nose  CARDIAC: NEGATIVE for fast heartbeats or fluttering in chest, chest pain or pressure, breathlessness when lying flat, swollen legs or swollen feet.  NEUROLOGIC: NEGATIVE headaches, weakness or numbness in the arms or legs.  DERMATOLOGIC: NEGATIVE for rashes, new moles or change in mole(s)  PULMONARY: NEGATIVE SOB at rest, SOB with activity, dry cough, productive cough, coughing up blood, wheezing or whistling when breathing.    GASTROINTESTINAL: NEGATIVE for nausea or vomitting, loose or watery stools, fat or grease in stools, constipation, abdominal pain, bowel movements black in color or blood noted.  GENITOURINARY: NEGATIVE for pain during urination, blood in urine,  "urinating more frequently than usual.  MUSCULOSKELETAL: NEGATIVE for muscle pain, bone or joint pain, swollen joints.  ENDOCRINE: NEGATIVE for increased thirst or urination, diabetes.  LYMPHATIC: NEGATIVE for swollen lymph nodes, lumps or bumps in the breasts or nipple discharge.    Physical Examination:  Vitals: /72  Pulse 80  Resp 16  Ht 1.778 m (5' 10\")  Wt 71.6 kg (157 lb 12.8 oz)  SpO2 98%  BMI 22.64 kg/m2  BMI= Body mass index is 22.64 kg/(m^2).    Neck Cir (cm): 37 cm    Hermon Total Score 7/31/2018   Total score - Hermon 2            GENERAL APPEARANCE: alert and no distress  EYES: Eyes grossly normal to inspection, PERRL and conjunctivae and sclerae normal  HENT: ear canals and TM's normal, nose and mouth without ulcers or lesions, oropharynx crowded and tongue base enlarged  NECK: no asymmetry, masses, or scars  RESP: lungs clear to auscultation - no rales, rhonchi or wheezes  CV: regular rates and rhythm and normal S1 S2, no S3 or S4  MS: extremities normal- no gross deformities noted  NEURO: Normal strength and tone, mentation intact and speech normal  PSYCH: mentation appears normal  Mallampati Class: III.  Tonsillar Stage:     Impression/Plan:  1. Insomnia, sleep onset and sleep maintenance, likely due to a variety of factors including anxiety, depression, inadequate sleep hygiene, RLS, marijuana, though untreated obstructive sleep apnea may be implicated in sleep maintenance difficulties. Can't rule out paradoxical insomnia with an average 3 hours of sleep per night and ESS of only 2/24. Today, we reviewed the pros and cons of pharmacological versus CBT-I for treatment of chronic insomnia. Patient is interested in CBT-I and referral placed. He will complete a sleep diary x2 weeks prior to his appointment.     2. Restless legs syndrome (RLS), we will check his ferritin and treat with supplemental iron if it is low. We may consider increasing his gabapentin(he is on for anxiety) dose at " bedtime or add dopamine agonist if ferritin is normal.     3.Patient has features and risk factors for possible obstructive sleep apnea including: snoring, witnessed apnea, non-refreshing sleep, daytime fatigue, difficulty maintaining sleep, crowded oropharynx and family history of CARMEN. The STOP-BANG score is 4/8.  Recommend polysomnogram (using 4% desaturation/Medicare/2012 AASM 1B scoring rules) to evaluate for obstructive sleep apnea. Ambien if needed. Patient is a poor candidate for Home Sleep Testing due to not high probability of CARMEN, possible PLMD and insomnia.    Literature provided regarding sleep apnea, restless leg syndrome, sleep hygiene and insomnia.      He will follow up with me in approximately two weeks after his sleep study has been competed to review the results and discuss plan of care.       Polysomnography reviewed.  Obstructive sleep apnea reviewed.  Complications of untreated sleep apnea were reviewed.    Lindsay Jimenez PA-C    CC: Vida Miranda

## 2018-08-01 NOTE — PROGRESS NOTES
Ferritin(how much iron your body is storing) is within normal limits. Will discuss treatment of RLS on your next visit.

## 2018-08-13 ENCOUNTER — OFFICE VISIT (OUTPATIENT)
Dept: FAMILY MEDICINE | Facility: CLINIC | Age: 38
End: 2018-08-13
Payer: COMMERCIAL

## 2018-08-13 ENCOUNTER — TELEPHONE (OUTPATIENT)
Dept: FAMILY MEDICINE | Facility: CLINIC | Age: 38
End: 2018-08-13

## 2018-08-13 VITALS
OXYGEN SATURATION: 99 % | BODY MASS INDEX: 22.48 KG/M2 | SYSTOLIC BLOOD PRESSURE: 118 MMHG | HEART RATE: 77 BPM | DIASTOLIC BLOOD PRESSURE: 82 MMHG | TEMPERATURE: 97.6 F | HEIGHT: 70 IN | RESPIRATION RATE: 16 BRPM | WEIGHT: 157 LBS

## 2018-08-13 DIAGNOSIS — G47.00 INSOMNIA, UNSPECIFIED TYPE: ICD-10-CM

## 2018-08-13 DIAGNOSIS — G25.81 RESTLESS LEGS SYNDROME (RLS): ICD-10-CM

## 2018-08-13 DIAGNOSIS — F33.9 EPISODE OF RECURRENT MAJOR DEPRESSIVE DISORDER, UNSPECIFIED DEPRESSION EPISODE SEVERITY (H): Primary | ICD-10-CM

## 2018-08-13 DIAGNOSIS — H61.21 IMPACTED CERUMEN OF RIGHT EAR: ICD-10-CM

## 2018-08-13 DIAGNOSIS — F41.9 ANXIETY: ICD-10-CM

## 2018-08-13 DIAGNOSIS — F10.20 ALCOHOLIC (H): ICD-10-CM

## 2018-08-13 PROCEDURE — 99214 OFFICE O/P EST MOD 30 MIN: CPT | Performed by: NURSE PRACTITIONER

## 2018-08-13 ASSESSMENT — ANXIETY QUESTIONNAIRES
2. NOT BEING ABLE TO STOP OR CONTROL WORRYING: SEVERAL DAYS
5. BEING SO RESTLESS THAT IT IS HARD TO SIT STILL: NEARLY EVERY DAY
6. BECOMING EASILY ANNOYED OR IRRITABLE: NEARLY EVERY DAY
3. WORRYING TOO MUCH ABOUT DIFFERENT THINGS: SEVERAL DAYS
1. FEELING NERVOUS, ANXIOUS, OR ON EDGE: NEARLY EVERY DAY
GAD7 TOTAL SCORE: 15
7. FEELING AFRAID AS IF SOMETHING AWFUL MIGHT HAPPEN: SEVERAL DAYS

## 2018-08-13 ASSESSMENT — PATIENT HEALTH QUESTIONNAIRE - PHQ9: 5. POOR APPETITE OR OVEREATING: NEARLY EVERY DAY

## 2018-08-13 NOTE — MR AVS SNAPSHOT
After Visit Summary   8/13/2018    Timmy Fitzpatrick    MRN: [id number]           Patient Information     Date Of Birth          1980        Visit Information        Provider Department      8/13/2018 3:40 PM Vida Miranda APRN CNP [facility]        Today's Diagnoses     Episode of recurrent major depressive disorder, unspecified depression episode severity (H)    -  1    Impacted cerumen of right ear        Alcoholic, sober since 2006        Insomnia, unspecified type          Care Instructions    Continue with counseling.  Referral placed for psychiatry. Continue with current medications for now.  Do sleep study and follow up with sleep medicine.  Ear wax removed from right ear.    Follow up if symptoms persist or worsen and as needed.        Thank you for choosing [facility].  You may be receiving a survey in the mail from [employer] regarding your visit today.  Please take a few minutes to complete and return the survey to let us know how we are doing.      Our Clinic hours are:  Mondays    7:20 am - 7 pm  Tues -  Fri  7:20 am - 5 pm    Clinic Phone: [phone]    The clinic lab opens at 7:30 am Mon - Fri and appointments are required.    [facility]  Ph. [phone]  Monday  8 am - 7pm  Tues - Fri 8 am - 5:30 pm                   Follow-ups after your visit        Additional Services     MENTAL HEALTH REFERRAL  - Adult; Psychiatry and Medication Management; Psychiatry; [facility]: Collaborative Care Psychiatry Service [phone].  Medication management & future refills will be returned to G PCP upon completion of evaluation; We john...       All scheduling is subject to the client's specific insurance plan & benefits, provider/location availability, and provider clinical specialities.  Please arrive 15 minutes early for your first appointment and bring your completed paperwork.    Please be aware that coverage of these services is subject  "to the terms and limitations of your health insurance plan.  Call member services at your health plan with any benefit or coverage questions.                            Your next 10 appointments already scheduled     Aug 15, 2018  8:00 PM CDT   PSG Split with SLEEP LAB, BED THREE   [facility] ([facility])    [id number]  [facility] [address]   [phone]            Aug 22, 2018  1:00 PM CDT   Return Sleep Patient with DOMINIC Leija   [facility] ([facility])    [id number]  [facility] [address]   [phone]              Who to contact     If you have questions or need follow up information about today's clinic visit or your schedule please contact [facility] directly at [phone].  Normal or non-critical lab and imaging results will be communicated to you by MyChart, letter or phone within 4 business days after the clinic has received the results. If you do not hear from us within 7 days, please contact the clinic through MyChart or phone. If you have a critical or abnormal lab result, we will notify you by phone as soon as possible.  Submit refill requests through [employer] or call your pharmacy and they will forward the refill request to us. Please allow 3 business days for your refill to be completed.          Additional Information About Your Visit        Care EveryWhere ID     This is your Care EveryWhere ID. This could be used by other organizations to access your [facility] medical records  [id number]        Your Vitals Were     Pulse Temperature Respirations Height Pulse Oximetry BMI (Body Mass Index)    77 97.6  F (36.4  C) (Oral) 16 5' 10\" (1.778 m) 99% 22.53 kg/m2       Blood Pressure from Last 3 Encounters:   08/13/18 118/82   07/31/18 116/72   07/13/18 130/72    Weight from Last 3 Encounters:   08/13/18 157 lb (71.2 kg)   07/31/18 157 lb 12.8 oz (71.6 kg)   07/13/18 159 lb " (72.1 kg)              We Performed the Following     MENTAL HEALTH REFERRAL  - Adult; Psychiatry and Medication Management; Psychiatry; [facility]: [facility] Psychiatry Service [phone].  Medication management & future refills will be returned to G PCP upon completion of evaluation; We john...     REMOVE IMPACTED FELIPA          Today's Medication Changes          These changes are accurate as of 8/13/18  4:14 PM.  If you have any questions, ask your nurse or doctor.               These medicines have changed or have updated prescriptions.        Dose/Directions    escitalopram 20 MG tablet   Commonly known as:  LEXAPRO   This may have changed:  how much to take   Used for:  Major depressive disorder with single episode, in partial remission (H), Anxiety        Dose:  20 mg   Take 1 tablet (20 mg) by mouth daily   Quantity:  90 tablet   Refills:  3       gabapentin 300 MG capsule   Commonly known as:  NEURONTIN   This may have changed:  additional instructions   Used for:  Restless legs syndrome (RLS), Anxiety        One capsule 4 times a day   Quantity:  360 capsule   Refills:  3                Primary Care Provider Office Phone # Fax #    R Refugio Loza [phone] [address] [id number]        Equal Access to Services     [facility] AH: Hadii margarita cabrera hadasho Soshiv, waaxda luqadaha, qaybta kaalmada adebette, alex bird . So [facility] [phone].    ATENCIÓN: Si habla español, tiene a waters disposición servicios gratuitos de asistencia lingüística. Llame al [phone].    We comply with applicable federal civil rights laws and [place] laws. We do not discriminate on the basis of race, color, national origin, age, disability, sex, sexual orientation, or gender identity.            Thank you!     Thank you for choosing [facility]  for your care. Our goal is always to provide you with excellent care. Hearing back  from our patients is one way we can continue to improve our services. Please take a few minutes to complete the written survey that you may receive in the mail after your visit with us. Thank you!             Your Updated Medication List - Protect others around you: Learn how to safely use, store and throw away your medicines at www.disposemymeds.org.          This list is accurate as of 8/13/18  4:14 PM.  Always use your most recent med list.                   Brand Name Dispense Instructions for use Diagnosis    escitalopram 20 MG tablet    LEXAPRO    90 tablet    Take 1 tablet (20 mg) by mouth daily    Major depressive disorder with single episode, in partial remission (H), Anxiety       gabapentin 300 MG capsule    NEURONTIN    360 capsule    One capsule 4 times a day    Restless legs syndrome (RLS), Anxiety       GLUCOSAMINE HCL      None Entered        MENS MULTIPLE VITAMIN/LYCOPENE PO      Take 1 tablet by mouth daily.        mirtazapine 30 MG tablet    REMERON    30 tablet    Take 1 tablet (30 mg) by mouth At Bedtime    Persistent insomnia       sildenafil 100 MG tablet    VIAGRA    12 tablet    Take 1 tablet (100 mg) by mouth daily as needed 30 min to 4 hrs before sex. Do not use with nitroglycerin, terazosin or doxazosin.    Erectile dysfunction, unspecified erectile dysfunction type       zolpidem 5 MG tablet    AMBIEN    1 tablet    Take tablet by mouth 15 minutes prior to sleep, for Sleep Study

## 2018-08-13 NOTE — PATIENT INSTRUCTIONS
Continue with counseling.  Referral placed for psychiatry. Continue with current medications for now.  Do sleep study and follow up with sleep medicine.  Ear wax removed from right ear.    Follow up if symptoms persist or worsen and as needed.        Thank you for choosing [facility].  You may be receiving a survey in the mail from [employer] regarding your visit today.  Please take a few minutes to complete and return the survey to let us know how we are doing.      Our Clinic hours are:  Mondays    7:20 am - 7 pm  Tues -  Fri  7:20 am - 5 pm    Clinic Phone: [phone]    The clinic lab opens at 7:30 am Mon - Fri and appointments are required.    [facility]. [phone]  Monday  8 am - 7pm  Tues - Fri 8 am - 5:30 pm

## 2018-08-13 NOTE — PROGRESS NOTES
"  SUBJECTIVE:   Timmy Fitzpatrick is a 37 year old male who presents to clinic today for the following health issues:      Depression Followup    Status since last visit: Worsened     See PHQ-9 for current symptoms.  Other associated symptoms: None    Complicating factors:   Significant life event:  Yes-  Same as before   Current substance abuse:  Marijuana   Anxiety or Panic symptoms:  Yes-      PHQ-9 7/13/2018 7/25/2018 8/13/2018   Total Score 13 15 17   Q9: Suicide Ideation Several days Several days Several days   F/U: Thoughts of suicide or self-harm - - -   F/U: Self harm-plan - - -   F/U: Self-harm action - - -   F/U: Safety concerns - - -   Some encounter information is confidential and restricted. Go to Review Flowsheets activity to see all data.     In the past two weeks have you had thoughts of suicide or self-harm?  No.    Do you have concerns about your personal safety or the safety of others?   No  PHQ-9  English  PHQ-9   Any Language  Suicide Assessment Five-step Evaluation and Treatment (SAFE-T)    Amount of exercise or physical activity:  for a park    Problems taking medications regularly: No    Medication side effects: none    Diet: not eating well        Needs his ears cleaned out.    Problem list and histories reviewed & adjusted, as indicated.  Additional history: in with continuing concerns regarding depression. He offers no improvement since starting Lexapro. He still struggles.  His wife is here and voices many concerns regarding his mental health. He continues with counseling at [facility] in [place]. He continues to state insomnia is a major concern with him. He has seen sleep medicine and is going to have sleep study this Wednesday night.  He has been on gabapentin and he reports he was prescribed that for his mood and sleep since he was in his 20's. He gets \"awful\" when trying to wean down on that.  Denies suicidal thoughts or plans today.      Patient Active Problem List "   Diagnosis     Cervicalgia     Nonallopathic lesion of thoracic region     Nonallopathic lesion of lumbar region     CARDIOVASCULAR SCREENING; LDL GOAL LESS THAN 160     Major depression in partial remission (H)     Migraine headache     Alcoholic, sober since 2006     Chemical dependency (H)     Anxiety     Tear of medial meniscus of knee     Past Surgical History:   Procedure Laterality Date     HERNIA REPAIR       SURGICAL HISTORY OF -       deviated septum       Social History   Substance Use Topics     Smoking status: Former Smoker     Types: Dip, chew, snus or snuff     Smokeless tobacco: Current User     Types: Chew     Last attempt to quit: 11/16/2014      Comment: vaping as well     Alcohol use No      Comment: sober since April 2016     Family History   Problem Relation Age of Onset     Cancer Paternal Grandfather      Depression Mother      Anxiety Disorder Mother      Depression Father      Dementia Maternal Grandfather      Substance Abuse Paternal Grandmother          Current Outpatient Prescriptions   Medication Sig Dispense Refill     escitalopram (LEXAPRO) 20 MG tablet Take 1 tablet (20 mg) by mouth daily (Patient taking differently: Take 30 mg by mouth daily ) 90 tablet 3     gabapentin (NEURONTIN) 300 MG capsule One capsule 4 times a day (Patient taking differently: 6-8 tabs daily  Average 600 per day in the last 2 weeks (8/13/2018)) 360 capsule 3     GLUCOSAMINE HCL None Entered       Multiple Vitamins-Minerals (MENS MULTIPLE VITAMIN/LYCOPENE PO) Take 1 tablet by mouth daily.       mirtazapine (REMERON) 30 MG tablet Take 1 tablet (30 mg) by mouth At Bedtime (Patient not taking: Reported on 6/18/2018) 30 tablet 3     sildenafil (VIAGRA) 100 MG tablet Take 1 tablet (100 mg) by mouth daily as needed 30 min to 4 hrs before sex. Do not use with nitroglycerin, terazosin or doxazosin. (Patient not taking: Reported on 7/31/2018) 12 tablet 11     zolpidem (AMBIEN) 5 MG tablet Take tablet by mouth 15  "minutes prior to sleep, for Sleep Study (Patient not taking: Reported on 8/13/2018) 1 tablet 0     Allergies   Allergen Reactions     Nkda [No Known Drug Allergies]      Recent Labs   Lab Test  07/13/18   1422  09/26/17   1642  08/15/10   1150   ALT  32   --    --    CR  0.83   --   0.90   GFRESTIMATED  >90   --   >90   GFRESTBLACK  >90   --   >90   POTASSIUM  4.1   --   4.3   TSH  1.38  1.78   --       BP Readings from Last 3 Encounters:   08/13/18 118/82   07/31/18 116/72   07/13/18 130/72    Wt Readings from Last 3 Encounters:   08/13/18 157 lb (71.2 kg)   07/31/18 157 lb 12.8 oz (71.6 kg)   07/13/18 159 lb (72.1 kg)                    Reviewed and updated as needed this visit by clinical staff  Tobacco  Allergies  Meds  Problems  Med Hx  Surg Hx  Fam Hx  Soc Hx        Reviewed and updated as needed this visit by Provider  Allergies  Meds  Problems          ROS: 10 point ROS neg other than the symptoms noted above in the HPI.    OBJECTIVE:     /82 (BP Location: Right arm, Patient Position: Chair, Cuff Size: Adult Regular)  Pulse 77  Temp 97.6  F (36.4  C) (Oral)  Resp 16  Ht 5' 10\" (1.778 m)  Wt 157 lb (71.2 kg)  SpO2 99%  BMI 22.53 kg/m2  Body mass index is 22.53 kg/(m^2).  GENERAL: healthy, alert and no distress  HENT: ear canals right with cerumen and successfully cleared with lavage only and TM's normal, pharynx without erythema  NECK: no adenopathy, no asymmetry  RESP: lungs clear to auscultation - no rales, rhonchi or wheezes  CV: regular rate and rhythm, normal S1 S2, no S3 or S4, no murmur  ABDOMEN: soft, nontender, no hepatosplenomegaly, no masses and bowel sounds normal  MS: no gross musculoskeletal defects noted  PSYCH: good eye contact, pleasant and talkative, flat affect    Diagnostic Test Results:  No results found for this or any previous visit (from the past 24 hour(s)).    ASSESSMENT/PLAN:             1. Episode of recurrent major depressive disorder, unspecified depression " episode severity (H)    - MENTAL HEALTH REFERRAL  - Adult; Psychiatry and Medication Management; Psychiatry; [facility]: [facility] [phone].  Medication management & future refills will be returned to [facility] PCP upon completion of evaluation; We john...  Continues to struggle, referral placed for psychiatry. He will continue same medications for now.    2. Impacted cerumen of right ear    - REMOVE IMPACTED CERUMEN    3. Alcoholic, sober since 2006    - MENTAL HEALTH REFERRAL  - Adult; Psychiatry and Medication Management; Psychiatry; [facility]: [facility] [phone].  Medication management & future refills will be returned to [facility] PCP upon completion of evaluation; We john...    4. Insomnia, unspecified type    - MENTAL HEALTH REFERRAL  - Adult; Psychiatry and Medication Management; Psychiatry; [facility]: [facility] [phone].  Medication management & future refills will be returned to [facility] PCP upon completion of evaluation; We john...    See Patient Instructions  Patient Instructions   Continue with counseling.  Referral placed for psychiatry. Continue with current medications for now.  Do sleep study and follow up with sleep medicine.  Ear wax removed from right ear.    Follow up if symptoms persist or worsen and as needed.        Thank you for choosing [facility].  You may be receiving a survey in the mail from [employer] regarding your visit today.  Please take a few minutes to complete and return the survey to let us know how we are doing.      Our Clinic hours are:  Mondays    7:20 am - 7 pm  Tues -  Fri  7:20 am - 5 pm    Clinic Phone: [phone]    The clinic lab opens at 7:30 am Mon - Fri and appointments are required.    [facility]  Ph. [phone]  Monday  8 am - 7pm  Tues - Fri 8 am - 5:30 pm               URSULA No CNP  [facility]

## 2018-08-14 RX ORDER — GABAPENTIN 300 MG/1
CAPSULE ORAL
Qty: 360 CAPSULE | Refills: 2 | Status: SHIPPED | OUTPATIENT
Start: 2018-08-14 | End: 2018-08-16

## 2018-08-14 ASSESSMENT — PATIENT HEALTH QUESTIONNAIRE - PHQ9: SUM OF ALL RESPONSES TO PHQ QUESTIONS 1-9: 17

## 2018-08-14 ASSESSMENT — ANXIETY QUESTIONNAIRES: GAD7 TOTAL SCORE: 15

## 2018-08-14 NOTE — TELEPHONE ENCOUNTER
Routing refill request to provider for review/approval because:  Drug not on the [facility] refill protocol     Thank you  Sabrina NICHOLE RN

## 2018-08-15 ENCOUNTER — THERAPY VISIT (OUTPATIENT)
Dept: SLEEP MEDICINE | Facility: CLINIC | Age: 38
End: 2018-08-15
Payer: COMMERCIAL

## 2018-08-15 DIAGNOSIS — R53.83 MALAISE AND FATIGUE: ICD-10-CM

## 2018-08-15 DIAGNOSIS — G47.9 SLEEP DISTURBANCES: ICD-10-CM

## 2018-08-15 DIAGNOSIS — G47.30 SLEEP APNEA, UNSPECIFIED TYPE: ICD-10-CM

## 2018-08-15 DIAGNOSIS — Z72.820 LACK OF ADEQUATE SLEEP: ICD-10-CM

## 2018-08-15 DIAGNOSIS — R06.00 DYSPNEA AND RESPIRATORY ABNORMALITY: ICD-10-CM

## 2018-08-15 DIAGNOSIS — G47.00 INSOMNIA, UNSPECIFIED TYPE: ICD-10-CM

## 2018-08-15 DIAGNOSIS — R06.89 DYSPNEA AND RESPIRATORY ABNORMALITY: ICD-10-CM

## 2018-08-15 DIAGNOSIS — R53.81 MALAISE AND FATIGUE: ICD-10-CM

## 2018-08-15 PROCEDURE — 95810 POLYSOM 6/> YRS 4/> PARAM: CPT | Performed by: FAMILY MEDICINE

## 2018-08-15 NOTE — MR AVS SNAPSHOT
After Visit Summary   8/15/2018    Timmy Fitzpatrick    MRN: [id number]           Patient Information     Date Of Birth          1980        Visit Information        Provider Department      8/15/2018 8:00 PM SLEEP LAB, BED THREE [facility]        Today's Diagnoses     Sleep disturbances        Insomnia, unspecified type        Lack of adequate sleep        Sleep apnea, unspecified type        Dyspnea and respiratory abnormality        Malaise and fatigue           Follow-ups after your visit        Your next 10 appointments already scheduled     Aug 22, 2018  1:00 PM CDT   Return Sleep Patient with DOMINIC Leija   [facility] ([facility])    [address]  [facility] [address]   [phone]              Who to contact     If you have questions or need follow up information about today's clinic visit or your schedule please contact [facility][note 3] directly at [phone].  Normal or non-critical lab and imaging results will be communicated to you by MyChart, letter or phone within 4 business days after the clinic has received the results. If you do not hear from us within 7 days, please contact the clinic through MyChart or phone. If you have a critical or abnormal lab result, we will notify you by phone as soon as possible.  Submit refill requests through [employer] or call your pharmacy and they will forward the refill request to us. Please allow 3 business days for your refill to be completed.          Additional Information About Your Visit        Care EveryWhere ID     This is your Care EveryWhere ID. This could be used by other organizations to access your [facility] medical records  [id number]         Blood Pressure from Last 3 Encounters:   08/13/18 118/82   07/31/18 116/72   07/13/18 130/72    Weight from Last 3 Encounters:   08/13/18 71.2 kg (157 lb)   07/31/18 71.6 kg (157 lb 12.8 oz)   07/13/18 72.1  kg (159 lb)              We Performed the Following     Comprehensive Sleep Study          Today's Medication Changes          These changes are accurate as of 8/15/18 11:59 PM.  If you have any questions, ask your nurse or doctor.               These medicines have changed or have updated prescriptions.        Dose/Directions    escitalopram 20 MG tablet   Commonly known as:  LEXAPRO   This may have changed:  how much to take   Used for:  Major depressive disorder with single episode, in partial remission (H), Anxiety        Dose:  20 mg   Take 1 tablet (20 mg) by mouth daily   Quantity:  90 tablet   Refills:  3                Primary Care Provider Office Phone # Fax #    R Refugio Loza [phone] [address] [facility] [address]        Equal Access to Services     ANGIE RIOS : Kiara Johnson, wausha mejia, qaybthony kaalmajuan manuel murphy, alex mayer. So [facility] [phone].    ATENCIÓN: Si habla español, tiene a waters disposición servicios gratuitos de asistencia lingüística. Llame al [phone].    We comply with applicable federal civil rights laws and [place] laws. We do not discriminate on the basis of race, color, national origin, age, disability, sex, sexual orientation, or gender identity.            Thank you!     Thank you for choosing [facility]  for your care. Our goal is always to provide you with excellent care. Hearing back from our patients is one way we can continue to improve our services. Please take a few minutes to complete the written survey that you may receive in the mail after your visit with us. Thank you!             Your Updated Medication List - Protect others around you: Learn how to safely use, store and throw away your medicines at www.disposemymeds.org.          This list is accurate as of 8/15/18 11:59 PM.  Always use your most recent med list.                   Brand Name Dispense  Instructions for use Diagnosis    escitalopram 20 MG tablet    LEXAPRO    90 tablet    Take 1 tablet (20 mg) by mouth daily    Major depressive disorder with single episode, in partial remission (H), Anxiety       gabapentin 300 MG capsule    NEURONTIN    360 capsule    TAKE ONE CAPSULE BY MOUTH 4 TIMES DAILY    Restless legs syndrome (RLS), Anxiety       GLUCOSAMINE HCL      None Entered        MENS MULTIPLE VITAMIN/LYCOPENE PO      Take 1 tablet by mouth daily.        mirtazapine 30 MG tablet    REMERON    30 tablet    Take 1 tablet (30 mg) by mouth At Bedtime    Persistent insomnia       sildenafil 100 MG tablet    VIAGRA    12 tablet    Take 1 tablet (100 mg) by mouth daily as needed 30 min to 4 hrs before sex. Do not use with nitroglycerin, terazosin or doxazosin.    Erectile dysfunction, unspecified erectile dysfunction type       zolpidem 5 MG tablet    AMBIEN    1 tablet    Take tablet by mouth 15 minutes prior to sleep, for Sleep Study

## 2018-08-16 ENCOUNTER — TELEPHONE (OUTPATIENT)
Dept: FAMILY MEDICINE | Facility: CLINIC | Age: 38
End: 2018-08-16

## 2018-08-16 RX ORDER — GABAPENTIN 300 MG/1
CAPSULE ORAL
Qty: 540 CAPSULE | Refills: 2 | Status: SHIPPED | OUTPATIENT
Start: 2018-08-16 | End: 2018-11-28 | Stop reason: ALTCHOICE

## 2018-08-16 NOTE — TELEPHONE ENCOUNTER
Called [facility] and spoke with Mena.  Pt had just filled the same directions (4x/day), same strength (300mg caps) on 6/26/18 for a three month supply, so he should have enough to last until the end of Sept.    This Rx with the same directions has been filled on 2/7/18,  4/17/18 and 6/26/18     Veronica TUTTLE RN

## 2018-08-16 NOTE — TELEPHONE ENCOUNTER
Patients wife is calling and stating [facility] has the script for the Gabapentin, but it is too soon to fill. Not able to fill, until Sept. Can we approve and early fill, or enough to get him thru.   Please call Ruby at [phone].  Luz Marina Russo  [facility] [place] [facility]

## 2018-08-16 NOTE — TELEPHONE ENCOUNTER
Pt notified of new prescription for gabapentin sent to [facility].  Wife reported to [facility]:  Issue is that pt has been taking 4-6 capsules daily, that is why pt has run out.  Spouse says she is aware that he takes too many (more than what Dr. Loza had instructed/ordered), and this is part of why he needs to get in to see psych.    Veronica TUTTLE RN

## 2018-08-20 LAB — SLPCOMP: NORMAL

## 2018-09-05 ENCOUNTER — OFFICE VISIT (OUTPATIENT)
Dept: SLEEP MEDICINE | Facility: CLINIC | Age: 38
End: 2018-09-05
Payer: COMMERCIAL

## 2018-09-05 VITALS
HEART RATE: 80 BPM | WEIGHT: 157 LBS | HEIGHT: 70 IN | DIASTOLIC BLOOD PRESSURE: 68 MMHG | OXYGEN SATURATION: 96 % | BODY MASS INDEX: 22.48 KG/M2 | SYSTOLIC BLOOD PRESSURE: 126 MMHG

## 2018-09-05 DIAGNOSIS — R06.83 SNORING: ICD-10-CM

## 2018-09-05 DIAGNOSIS — G47.00 INSOMNIA, UNSPECIFIED TYPE: Primary | ICD-10-CM

## 2018-09-05 PROCEDURE — 99214 OFFICE O/P EST MOD 30 MIN: CPT | Performed by: PHYSICIAN ASSISTANT

## 2018-09-05 NOTE — MR AVS SNAPSHOT
After Visit Summary   9/5/2018    Timmy Fitzpatrick    MRN: [id number]           Patient Information     Date Of Birth          1980        Visit Information        Provider Department      9/5/2018 2:30 PM Lindsay Jimenez PA [facility]        Today's Diagnoses     Insomnia, unspecified type    -  1    Snoring          Care Instructions      Your BMI is Body mass index is 22.53 kg/(m^2).  Weight management is a personal decision.  If you are interested in exploring weight loss strategies, the following discussion covers the approaches that may be successful. Body mass index (BMI) is one way to tell whether you are at a healthy weight, overweight, or obese. It measures your weight in relation to your height.  A BMI of 18.5 to 24.9 is in the healthy range. A person with a BMI of 25 to 29.9 is considered overweight, and someone with a BMI of 30 or greater is considered obese. More than two-thirds of American adults are considered overweight or obese.  Being overweight or obese increases the risk for further weight gain. Excess weight may lead to heart disease and diabetes.  Creating and following plans for healthy eating and physical activity may help you improve your health.  Weight control is part of healthy lifestyle and includes exercise, emotional health, and healthy eating habits. Careful eating habits lifelong are the mainstay of weight control. Though there are significant health benefits from weight loss, long-term weight loss with diet alone may be very difficult to achieve- studies show long-term success with dietary management in less than 10% of people. Attaining a healthy weight may be especially difficult to achieve in those with severe obesity. In some cases, medications, devices and surgical management might be considered.  What can you do?  If you are overweight or obese and are interested in methods for weight loss, you should discuss this with your provider.      Consider reducing daily calorie intake by 500 calories.     Keep a food journal.     Avoiding skipping meals, consider cutting portions instead.    Diet combined with exercise helps maintain muscle while optimizing fat loss. Strength training is particularly important for building and maintaining muscle mass. Exercise helps reduce stress, increase energy, and improves fitness. Increasing exercise without diet control, however, may not burn enough calories to loose weight.       Start walking three days a week 10-20 minutes at a time    Work towards walking thirty minutes five days a week     Eventually, increase the speed of your walking for 1-2 minutes at time    In addition, we recommend that you review healthy lifestyles and methods for weight loss available through the National Institutes of Health patient information sites:  http://win.niddk.nih.gov/publications/index.htm    And look into health and wellness programs that may be available through your health insurance provider, employer, local community center, or darryl club.             Your Body mass index is 22.53 kg/(m^2).  Weight management is a personal decision.  If you are interested in exploring weight loss strategies, the following discussion covers the approaches that may be successful. Body mass index (BMI) is one way to tell whether you are at a healthy weight, overweight, or obese. It measures your weight in relation to your height.  A BMI of 18.5 to 24.9 is in the healthy range. A person with a BMI of 25 to 29.9 is considered overweight, and someone with a BMI of 30 or greater is considered obese. More than two-thirds of American adults are considered overweight or obese.  Being overweight or obese increases the risk for further weight gain. Excess weight may lead to heart disease and diabetes.  Creating and following plans for healthy eating and physical activity may help you improve your health.  Weight control is part of healthy lifestyle  and includes exercise, emotional health, and healthy eating habits. Careful eating habits lifelong are the mainstay of weight control. Though there are significant health benefits from weight loss, long-term weight loss with diet alone may be very difficult to achieve- studies show long-term success with dietary management in less than 10% of people. Attaining a healthy weight may be especially difficult to achieve in those with severe obesity. In some cases, medications, devices and surgical management might be considered.  What can you do?  If you are overweight or obese and are interested in methods for weight loss, you should discuss this with your provider.     Consider reducing daily calorie intake by 500 calories.     Keep a food journal.     Avoiding skipping meals, consider cutting portions instead.    Diet combined with exercise helps maintain muscle while optimizing fat loss. Strength training is particularly important for building and maintaining muscle mass. Exercise helps reduce stress, increase energy, and improves fitness. Increasing exercise without diet control, however, may not burn enough calories to loose weight.       Start walking three days a week 10-20 minutes at a time    Work towards walking thirty minutes five days a week     Eventually, increase the speed of your walking for 1-2 minutes at time    In addition, we recommend that you review healthy lifestyles and methods for weight loss available through the National Institutes of Health patient information sites:  http://win.niddk.nih.gov/publications/index.htm    And look into health and wellness programs that may be available through your health insurance provider, employer, local community center, or darryl club.                  Follow-ups after your visit        Who to contact     If you have questions or need follow up information about today's clinic visit or your schedule please contact [facility] directly at  "[phone].  Normal or non-critical lab and imaging results will be communicated to you by MyChart, letter or phone within 4 business days after the clinic has received the results. If you do not hear from us within 7 days, please contact the clinic through MyChart or phone. If you have a critical or abnormal lab result, we will notify you by phone as soon as possible.  Submit refill requests through [employer] or call your pharmacy and they will forward the refill request to us. Please allow 3 business days for your refill to be completed.          Additional Information About Your Visit        Care EveryWhere ID     This is your Care EveryWhere ID. This could be used by other organizations to access your [place] medical records  [id number]        Your Vitals Were     Pulse Height Pulse Oximetry BMI (Body Mass Index)          80 1.778 m (5' 10\") 96% 22.53 kg/m2         Blood Pressure from Last 3 Encounters:   09/05/18 126/68   08/13/18 118/82   07/31/18 116/72    Weight from Last 3 Encounters:   09/05/18 71.2 kg (157 lb)   08/13/18 71.2 kg (157 lb)   07/31/18 71.6 kg (157 lb 12.8 oz)              Today, you had the following     No orders found for display         Today's Medication Changes          These changes are accurate as of 9/5/18 11:59 PM.  If you have any questions, ask your nurse or doctor.               These medicines have changed or have updated prescriptions.        Dose/Directions    escitalopram 20 MG tablet   Commonly known as:  LEXAPRO   This may have changed:  how much to take   Used for:  Major depressive disorder with single episode, in partial remission (H), Anxiety        Dose:  20 mg   Take 1 tablet (20 mg) by mouth daily   Quantity:  90 tablet   Refills:  3                Primary Care Provider Office Phone # Fax #    R Refugio Loza [phone] [address] [facility] [address]        Equal Access to Services     ANGIE RIOS AH: Kiara Johnson, " waaxda luqadaha, qaybta kaalmada katherine, alex joaazelda ah. So [facility] [phone].    ATENCIÓN: Si evette timmons, tiene a waters disposición servicios gratuitos de asistencia lingüística. Alexia al [phone].    We comply with applicable federal civil rights laws and [place] laws. We do not discriminate on the basis of race, color, national origin, age, disability, sex, sexual orientation, or gender identity.            Thank you!     Thank you for choosing [facility]  for your care. Our goal is always to provide you with excellent care. Hearing back from our patients is one way we can continue to improve our services. Please take a few minutes to complete the written survey that you may receive in the mail after your visit with us. Thank you!             Your Updated Medication List - Protect others around you: Learn how to safely use, store and throw away your medicines at www.disposemymeds.org.          This list is accurate as of 9/5/18 11:59 PM.  Always use your most recent med list.                   Brand Name Dispense Instructions for use Diagnosis    escitalopram 20 MG tablet    LEXAPRO    90 tablet    Take 1 tablet (20 mg) by mouth daily    Major depressive disorder with single episode, in partial remission (H), Anxiety       gabapentin 300 MG capsule    NEURONTIN    540 capsule    1- 2 capsules up to 4 times per day ( max 6 per day)    Restless legs syndrome (RLS), Anxiety       GLUCOSAMINE HCL      None Entered        MENS MULTIPLE VITAMIN/LYCOPENE PO      Take 1 tablet by mouth daily.        mirtazapine 30 MG tablet    REMERON    30 tablet    Take 1 tablet (30 mg) by mouth At Bedtime    Persistent insomnia       sildenafil 100 MG tablet    VIAGRA    12 tablet    Take 1 tablet (100 mg) by mouth daily as needed 30 min to 4 hrs before sex. Do not use with nitroglycerin, terazosin or doxazosin.    Erectile dysfunction, unspecified erectile dysfunction type        zolpidem 5 MG tablet    AMBIEN    1 tablet    Take tablet by mouth 15 minutes prior to sleep, for Sleep Study

## 2018-09-05 NOTE — PATIENT INSTRUCTIONS

## 2018-09-05 NOTE — PROGRESS NOTES
"    Chief Complaint   Patient presents with     Study Results       Timmy Fitzpatrick is a 38 year old male who returns to [facility] for review of sleep testing results. He presented with snoring, witnessed apnea, non-refreshing sleep, daytime fatigue, difficulty maintaining sleep, crowded oropharynx and family history of CARMEN.    Estimated body mass index is 22.53 kg/(m^2) as calculated from the following:    Height as of this encounter: 1.778 m (5' 10\").    Weight as of this encounter: 71.2 kg (157 lb).  Total score - [place]: 2 (2018  3:00 PM)  STOP-BAN/8    Polysomnography - Test date 8/15/2018  Architecture: Mildly decreased sleep latency but with very prolonged time to sustained sleep, quite delayed REM latency, total sleep time of 266 minutes with sleep efficiency of 61.1%. No supine REM observed.   The total recording time of the polysomnogram was 435.6 minutes. The total sleep time was 266.0 minutes. Sleep latency was mildly decreased at 10.9 minutes with the use of a sleep aid (Zolpidem 5mg). REM latency was 267.5 minutes. Arousal index was mildly increased at 21.9 arousals per hour. Sleep efficiency was decreased at 61.1%. Wake after sleep onset was 158.0 minutes. The patient spent 15.6% of total sleep time in Stage N1, 59.0% in Stage N2, 11.7% in Stage N3, and 13.7% in REM. Time in REM supine was - minutes.   Respiration: No significant sleep disordered breathing observed (AHI 1.4, RDI 2.9) without sleep-associated hypoxemia. Potential that severity under-reported given no supine REM observed.     Events ? The polysomnogram revealed a presence of - obstructive, - central, and 1 mixed apneas resulting in an apnea index of 0.2 events per hour. There were 5 obstructive hypopneas and - central hypopneas resulting in an obstructive hypopnea index of 1.1 and central hypopnea index of - events per hour. The combined apnea/hypopnea index was 1.4 events per hour (central " apnea/hypopnea index was - events per hour). The REM AHI was 1.6 events per hour. The supine AHI was 2.9 events per hour. The RERA index was 1.6 events per hour. The RDI was 2.9 events per hour.     Snoring - was reported as rare.     Respiratory rate and pattern - was notable for normal respiratory rate and pattern.     Sustained Sleep Associated Hypoventilation - Transcutaneous carbon dioxide monitoring was not used, however significant hypoventilation was not suggested by oximetry.     Sleep Associated Hypoxemia - (Greater than 5 minutes O2 sat at or below 88%) was not present. Baseline oxygen saturation was 95.7%. Lowest oxygen saturation was 87.5%. Time spent less than or equal to 88% was 0 minutes. Time spent less than or equal to 89% was 0 minutes.     Movement Activity: Nothing of note     Periodic Limb Activity - There were - PLMs during the entire study. The PLM index was - movements per hour. The PLM Arousal Index was - per hour.     REM EMG Activity - Excessive transient/sustained muscle activity was not present.     Nocturnal Behavior - Abnormal sleep related behaviors were not noted during/arising out of NREM / REM sleep.     Bruxism - None apparent.     Cardiac Summary: Appears NSR   The average pulse rate was 61.3 bpm. The minimum pulse rate was 45.9 bpm while the maximum pulse rate was 95.2 bpm. Arrhythmias were not noted.     Results were reviewed in detail today with Timmy and a copy given to him for his records.    Reviewed by team: Tobacco  Allergies       Reviewed by provider:          Problem List:  Patient Active Problem List    Diagnosis Date Noted     Anxiety 09/18/2017     Priority: Medium     Tear of medial meniscus of knee 09/08/2016     Priority: Medium     Chemical dependency (H) 11/25/2015     Priority: Medium     Alcoholic, sober since 2006 09/13/2012     Priority: Medium     Major depression in partial remission (H) 06/30/2011     Priority: Medium     Migraine headache  "06/30/2011     Priority: Medium     (Problem list name updated by automated process. Provider to review and confirm.)       CARDIOVASCULAR SCREENING; LDL GOAL LESS THAN 160 10/31/2010     Priority: Medium     Cervicalgia 03/24/2010     Priority: Medium     Nonallopathic lesion of thoracic region 03/24/2010     Priority: Medium     Problem list name updated by automated process. Provider to review       Nonallopathic lesion of lumbar region 03/24/2010     Priority: Medium     Problem list name updated by automated process. Provider to review          /68  Pulse 80  Ht 1.778 m (5' 10\")  Wt 71.2 kg (157 lb)  SpO2 96%  BMI 22.53 kg/m2    Impression/Plan:    The patient's sleep disordered breathing did not reach a level of clinical significance with AHI of 1.6 and RDI 2.9 events per hour. Supine REM sleep was not seen so disease burden might be underestimated. Otherwise, this was a good study that included time spent in REM sleep.     Chronic insomnia- last time when I saw the patient I recommended him to schedule an appointment with Dr. Foster for CBT-I, the patient will do so now.     Twenty-five minutes spent with patient, all of which were spent face-to-face counseling, consulting, coordinating plan of care regarding CARMEN and insomnia.      Lindsay Jimenez PA-C    CC:  CLAUDIO Loza,   "

## 2018-10-24 ENCOUNTER — OFFICE VISIT (OUTPATIENT)
Dept: PSYCHIATRY | Facility: CLINIC | Age: 38
End: 2018-10-24
Attending: NURSE PRACTITIONER
Payer: COMMERCIAL

## 2018-10-24 VITALS
HEART RATE: 71 BPM | WEIGHT: 168 LBS | DIASTOLIC BLOOD PRESSURE: 70 MMHG | RESPIRATION RATE: 16 BRPM | HEIGHT: 70 IN | OXYGEN SATURATION: 99 % | SYSTOLIC BLOOD PRESSURE: 110 MMHG | TEMPERATURE: 98.5 F | BODY MASS INDEX: 24.05 KG/M2

## 2018-10-24 DIAGNOSIS — G47.00 PERSISTENT INSOMNIA: Primary | ICD-10-CM

## 2018-10-24 DIAGNOSIS — F33.1 MODERATE EPISODE OF RECURRENT MAJOR DEPRESSIVE DISORDER (H): ICD-10-CM

## 2018-10-24 PROCEDURE — 90792 PSYCH DIAG EVAL W/MED SRVCS: CPT | Performed by: NURSE PRACTITIONER

## 2018-10-24 RX ORDER — ESZOPICLONE 2 MG/1
2 TABLET, FILM COATED ORAL
Qty: 30 TABLET | Refills: 0 | Status: SHIPPED | OUTPATIENT
Start: 2018-10-24 | End: 2019-02-06

## 2018-10-24 ASSESSMENT — ANXIETY QUESTIONNAIRES
GAD7 TOTAL SCORE: 15
7. FEELING AFRAID AS IF SOMETHING AWFUL MIGHT HAPPEN: NOT AT ALL
3. WORRYING TOO MUCH ABOUT DIFFERENT THINGS: SEVERAL DAYS
6. BECOMING EASILY ANNOYED OR IRRITABLE: NEARLY EVERY DAY
2. NOT BEING ABLE TO STOP OR CONTROL WORRYING: MORE THAN HALF THE DAYS
5. BEING SO RESTLESS THAT IT IS HARD TO SIT STILL: NEARLY EVERY DAY
1. FEELING NERVOUS, ANXIOUS, OR ON EDGE: NEARLY EVERY DAY
IF YOU CHECKED OFF ANY PROBLEMS ON THIS QUESTIONNAIRE, HOW DIFFICULT HAVE THESE PROBLEMS MADE IT FOR YOU TO DO YOUR WORK, TAKE CARE OF THINGS AT HOME, OR GET ALONG WITH OTHER PEOPLE: SOMEWHAT DIFFICULT

## 2018-10-24 ASSESSMENT — PATIENT HEALTH QUESTIONNAIRE - PHQ9: 5. POOR APPETITE OR OVEREATING: NEARLY EVERY DAY

## 2018-10-24 NOTE — MR AVS SNAPSHOT
"              After Visit Summary   10/24/2018    Timmy Fitzpatrick    MRN: [id number]           Patient Information     Date Of Birth          1980        Visit Information        Provider Department      10/24/2018 10:45 AM Yury Dueñas CNP [facility]        Today's Diagnoses     Persistent insomnia    -  1    Moderate episode of recurrent major depressive disorder (H)           Follow-ups after your visit        Your next 10 appointments already scheduled     Nov 28, 2018  2:15 PM CST   Return Visit with Yury Dueñas CNP   [facility]    [address] [place] [id number] [phone]              Who to contact     If you have questions or need follow up information about today's clinic visit or your schedule please contact [facility] directly at [phone].  Normal or non-critical lab and imaging results will be communicated to you by MyChart, letter or phone within 4 business days after the clinic has received the results. If you do not hear from us within 7 days, please contact the clinic through MyChart or phone. If you have a critical or abnormal lab result, we will notify you by phone as soon as possible.  Submit refill requests through [employer] or call your pharmacy and they will forward the refill request to us. Please allow 3 business days for your refill to be completed.          Additional Information About Your Visit        Care EveryWhere ID     This is your Care EveryWhere ID. This could be used by other organizations to access your [place] medical records  [id number]        Your Vitals Were     Pulse Temperature Respirations Height Pulse Oximetry BMI (Body Mass Index)    71 98.5  F (36.9  C) (Oral) 16 5' 10\" (1.778 m) 99% 24.11 kg/m2       Blood Pressure from Last 3 Encounters:   10/24/18 110/70   09/05/18 126/68   08/13/18 118/82    Weight from Last 3 " Encounters:   10/24/18 168 lb (76.2 kg)   09/05/18 157 lb (71.2 kg)   08/13/18 157 lb (71.2 kg)              Today, you had the following     No orders found for display         Today's Medication Changes          These changes are accurate as of 10/24/18  4:56 PM.  If you have any questions, ask your nurse or doctor.               Start taking these medicines.        Dose/Directions    eszopiclone 2 MG tablet   Commonly known as:  LUNESTA   Used for:  Persistent insomnia   Started by:  Yury Dueñas CNP        Dose:  2 mg   Take 1 tablet (2 mg) by mouth nightly as needed for sleep   Quantity:  30 tablet   Refills:  0         These medicines have changed or have updated prescriptions.        Dose/Directions    escitalopram 20 MG tablet   Commonly known as:  LEXAPRO   This may have changed:  how much to take   Used for:  Major depressive disorder with single episode, in partial remission (H), Anxiety        Dose:  20 mg   Take 1 tablet (20 mg) by mouth daily   Quantity:  90 tablet   Refills:  3            Where to get your medicines      Some of these will need a paper prescription and others can be bought over the counter.  Ask your nurse if you have questions.     Bring a paper prescription for each of these medications     eszopiclone 2 MG tablet                Primary Care Provider Office Phone # Fax #    R Refugio Loza [phone] [address] [place] [id number]        Equal Access to Services     JOVANA RIOS AH: Hadlor rahman Soshiv, waaxda luqadaha, qaybta kaalmada adebette, alex mayer. So [facility] [phone].    ATENCIÓN: Si habla español, tiene a waters disposición servicios gratuitos de asistencia lingüística. Alexia al [phone].    We comply with applicable federal civil rights laws and [place] laws. We do not discriminate on the basis of race, color, national origin, age, disability, sex, sexual orientation, or gender  identity.            Thank you!     Thank you for choosing [facility]  for your care. Our goal is always to provide you with excellent care. Hearing back from our patients is one way we can continue to improve our services. Please take a few minutes to complete the written survey that you may receive in the mail after your visit with us. Thank you!             Your Updated Medication List - Protect others around you: Learn how to safely use, store and throw away your medicines at www.disposemymeds.org.          This list is accurate as of 10/24/18  4:56 PM.  Always use your most recent med list.                   Brand Name Dispense Instructions for use Diagnosis    escitalopram 20 MG tablet    LEXAPRO    90 tablet    Take 1 tablet (20 mg) by mouth daily    Major depressive disorder with single episode, in partial remission (H), Anxiety       eszopiclone 2 MG tablet    LUNESTA    30 tablet    Take 1 tablet (2 mg) by mouth nightly as needed for sleep    Persistent insomnia       gabapentin 300 MG capsule    NEURONTIN    540 capsule    1- 2 capsules up to 4 times per day ( max 6 per day)    Restless legs syndrome (RLS), Anxiety       GLUCOSAMINE HCL      None Entered        MENS MULTIPLE VITAMIN/LYCOPENE PO      Take 1 tablet by mouth daily.        sildenafil 100 MG tablet    VIAGRA    12 tablet    Take 1 tablet (100 mg) by mouth daily as needed 30 min to 4 hrs before sex. Do not use with nitroglycerin, terazosin or doxazosin.    Erectile dysfunction, unspecified erectile dysfunction type

## 2018-10-24 NOTE — PROGRESS NOTES
"                                                         Outpatient Psychiatric Evaluation- Standard  Adult    Name:  Timmy Fitzpatrick  : 1980    Source of Referral:  Primary Care Provider: Vida Miranda   Last visit: 2018  Current Psychotherapist: Guera Carlos   Last visit: Mid- to late summer    Identifying Data:  Patient is a 38 year old,   White American male  who presents for initial visit with me.  Patient is currently employed full time. Patient attended the session alone. Consent to communicate signed for Cristina Fitzpatrick patient's Spouse/Partner. Consent for treatment signed and included in electronic medical record. Discussed limits of confidentiality today. My Practice Policy was reviewed and signed.     Patient prefers to be called: \"Ventura\"    Chief Complaint:    Patient reports: \"In the last year my mental health has deteriorated pretty badly.\"      HPI:    Patient endorsing long-standing depression and anxiety since mid to late ; when started dating current wife in late  and it was then he started seeking psychiatric treatment. Patient states he largely worked with same psychiatrist in [place] from then up until  when he and wife move up to [facility].     Patient states he first took Zoloft then Celexa in late s; couple months each; didn't seem to have an effect. Patient then switched to Lexapro, which he's been taking without interruption for past 10+ years; largely taking at 20 mg daily; states he's previously found it helpful for mood/anxiety but gradually proving inadequate in past 1-2 years; increased to 30 mg daily 3 months ago; no improvement; not notable side effects.    Patient additionally prescribed Wellbutrin from early  to Spring 2018; \"always took with Lexapro\"; didn't know what if any effect it had and self-discontinued as \"one less medication to take\".    Patient also taking Gabapentin since late s; historically finds it helps with " "anxiety, restlessness and sleep; is prescribed 300 mg caps; usually takes 600 mg BID, but sometimes an additional 300 mg in evening; tends to run out of caps about 2/3'rds the way through each month, as using more than prescribed, but denies any impairment/abuse.    Patient prescribed Remeron in Spring 2018; tapered to 30 mg nightly and stopped after 2 months as didn't seem to help with sleep.    Patient recalls past Buspar use at some point; nothing remarkable.    Per sleep aids, patient has used Trazodone at \"all dosages\" and \"just made me groggy\". Has used melatonin with no remarkable effect. Patient was given Ambien from mother a few times, and by sleep medicine specialist for sleep study and \"slept great\". Patient adds that he's taken an Ativan once for a MRI.    Patient states depression and anxiety worse past 1-2 years; last felt euthymic and sleeping normally 2 years ago before he moved from [place]. Patient endorsing low mood, dismotivation, low energy, irritability, guilt, difficulty concentrating; also endorsing low appetite (1 meal a day, no weight loss); endorsing recurrent passive suicidal ideation that he \"would be better off dead\"; denies any intent, plan, or preparation but \"know how I'd do it\" \"with a gun\"; states he owns a firearm; denies feeling unsafe with it; is locked up; cites children as main protective factor. Patient with infrequent engagement in superficial cutting to relief stress; was more prominent in youth. Patient denies any past suicide attempts.    As per the chief complaint of poor sleep, \"always an issue\", but has felt to worsen in past 1 year. Patient reports it taking 20-60 minutes to fall asleep; will get up after 20 minutes if can't fall asleep; when does fall asleep will wake up in 2-4 hours in sweat-soaked sheets; will go to bathroom then return to bed and usually fall right asleep for additional couple of hours; will rise at 4:30 am consistently for children/work. Best " "case scenario, will get 6 hours sleep total, though average is 4 hours. Patient can't recall last recalled dream. Patient states he's fatigued, \"super irritable\" through day; near never napping.    Patient had sleep study at [place] in 09/2018; no apnea or other disturbances observed; sleep efficiency at 61%. It was recommended he pursue CBT-I, but hasn't scheduled as not sure how to factor in time.    Patient with 7-8 chemical dependency treatments, all primarily for alcohol, the first being inpatient in [place] in early 20's. Patient had abused amphetamines in early 20's, but quit use after friend's overdose. Patient last in treatment at [facility] in 04/2016; at time was drinking 6-8 beers nightly; \"would typically just fall asleep\" which was partly his intent. Patient states last drink 1 year ago (\"wife remembers the date\"). Patient not in AA or other relapse prevention or support group. Patient states he uses cannabis nightly; \"toying with edibles\"; feels this may be improving his sleep past couple of weeks. Denies other substance use.      Psychiatric Review of Symptoms:  Depression: Interest: Decrease  Depressed Mood  Sleep: Decrease   Energy: Decrease  Appetite: Decrease   Guilt: Increase  Concentration: Decrease  Suicide: Yes  Worthless: Increase   Irritability: Increase   Ruminations: Increase    PHQ-9 scores:   PHQ-9 SCORE 7/13/2018 7/25/2018 8/13/2018   Total Score - - -   Total Score 13 15 17   Some encounter information is confidential and restricted. Go to Review Flowsheets activity to see all data.     Ree:  No symptoms   MDQ Score: Negative Screen  Anxiety: Feeling nervous, anxious, or on edge  Trouble relaxing  Restlessness  Easily annoyed or irritable    JUAREZ-7 scores:    JUAREZ-7 SCORE 7/13/2018 7/25/2018 8/13/2018   Total Score - - -   Total Score 10 13 15   Some encounter information is confidential and restricted. Go to Review Flowsheets activity to see all data.     Panic:  No symptoms "   Agoraphobia:  No   PTSD:  No symptoms   OCD:  No symptoms   Psychosis: No symptoms   ADD / ADHD: No symptoms  Gambling or shoplifting: No   Eating Disorder:  No symptoms  Sleep:   Trouble falling asleep  Trouble staying asleep     Psychiatric History:   Hospitalizations: None  History of Commitment? No   Past Treatment: medication(s) from physician / PCP and psychiatry  Suicide Attempts: No   Self-injurious Behavior: Cutting or Carving of Skin  Electroconvulsive Therapy (ECT) or Transcranial Magnetic Stimulation (TMS): No   Genetic Testing: No     Substance Use History:  See above  Tobacco use: Yes Chewing Tobacco and E-cigarettes  Ready to quit?  No  Nicotine Replacement Therapy tried: uknown     Past Medical History:  Past Medical History:   Diagnosis Date     Anxiety      Cervicalgia      Chemical dependency (H)      Depression      Migraine headache       Surgery:   Past Surgical History:   Procedure Laterality Date     HERNIA REPAIR       SURGICAL HISTORY OF -       deviated septum     Allergies:     Allergies   Allergen Reactions     Nkda [No Known Drug Allergies]      Primary Care Provider: CLAUDIO Loza MD  Seizures or Head Injury: No    Current Medications:    Current Outpatient Prescriptions:      escitalopram (LEXAPRO) 20 MG tablet, Take 1 tablet (20 mg) by mouth daily (Patient taking differently: Take 30 mg by mouth daily ), Disp: 90 tablet, Rfl: 3     gabapentin (NEURONTIN) 300 MG capsule, 1- 2 capsules up to 4 times per day ( max 6 per day), Disp: 540 capsule, Rfl: 2     GLUCOSAMINE HCL, None Entered, Disp: , Rfl:      mirtazapine (REMERON) 30 MG tablet, Take 1 tablet (30 mg) by mouth At Bedtime (Patient not taking: Reported on 6/18/2018), Disp: 30 tablet, Rfl: 3     Multiple Vitamins-Minerals (MENS MULTIPLE VITAMIN/LYCOPENE PO), Take 1 tablet by mouth daily., Disp: , Rfl:      sildenafil (VIAGRA) 100 MG tablet, Take 1 tablet (100 mg) by mouth daily as needed 30 min to 4 hrs before sex. Do not use  with nitroglycerin, terazosin or doxazosin. (Patient not taking: Reported on 7/31/2018), Disp: 12 tablet, Rfl: 11     zolpidem (AMBIEN) 5 MG tablet, Take tablet by mouth 15 minutes prior to sleep, for Sleep Study (Patient not taking: Reported on 8/13/2018), Disp: 1 tablet, Rfl: 0    The [place] Prescription Monitoring Program has been reviewed and there are no concerns about diversionary activity for controlled substances at this time.    Vital Signs:  Vitals: There were no vitals taken for this visit.    Labs:  Most recent laboratory results reviewed and the pertinent results include: normal CMP, CBC and TSH of 07/2018    Review of Systems:  10 systems (general, cardiovascular, respiratory, eyes, ENT, endocrine, GI, , M/S, neurological) were reviewed. Most pertinent finding(s) is/are fatigue, chronic lower back pain . The remaining systems are all unremarkable.    Family History:   Patient reported family history includes:   Family History   Problem Relation Age of Onset     Cancer Paternal Grandfather      Depression Mother      Anxiety Disorder Mother      Depression Father      Dementia Maternal Grandfather      Substance Abuse Paternal Grandmother      Chem dependency on parternal line  No known bipolar disorder  No death by suicidide    Social History:   [place] [address] [place] [address] [facility]  Works as ; started 2012; is head  for [employer]; likes work; maybe in past 2 months not as much passion/interest    Son is 6 years old; healthy  3 year old daughter; healthy   in 2007; wife is going to  [employer]  Usually get along well  With wife in school, he's had to step up with parenting duties.    Legal History:  No: Patient denies any legal history      Mental Status Examination:     Appearance:  awake, alert and adequately groomed  Attitude:  cooperative   Eye Contact:  good  Gait and Station: Normal  Psychomotor Behavior:  intact station, gait and muscle tone  Oriented  to:  time, person, and place  Attention Span and Concentration:  Normal  Speech:  clear, coherent  Mood:  depressed  Affect:  appropriate and in normal range  Associations:  no loose associations  Thought Process:  logical, linear and goal oriented  Thought Content:  Appropriate to Interview  Recent and Remote Memory:  intact Not formally assessed. No amnesia.  Fund of Knowledge: appropriate  Insight:  good  Judgment:  intact  Impulse Control:  intact    Suicide Risk Assessment:  Today Timmy Ftizpatrick reports recurrent passive suicidal ideation. In addition, there are notable risk factors for self-harm, including access to firearm, anxiety, suicidal ideation and withdrawing. However, risk is mitigated by commitment to family, sobriety, absence of past attempts, ability to volunteer a safety plan, history of seeking help when needed and future oriented. Therefore, based on all available evidence including the factors cited above, Timmy Fitzpatrick does not appear to be at imminent risk for self-harm, does not meet criteria for a 72-hr hold, and therefore remains appropriate for ongoing outpatient level of care.  A thorough assessment of risk factors related to suicide and self-harm have been reviewed and are noted above. The patient convincingly denies acute suicidality on several occasions. Local community safety resources reviewed and printed for patient to use if needed. There was no deceit detected, and the patient presented in a manner that was believable.     DSM5  Diagnosis:  296.32 (F33.1) Major Depressive Disorder, Recurrent Episode, Moderate _  300.00 (F41.9) Unspecified Anxiety Disorder  780.52 (G47.00) Insomnia Disorder   With non-sleep disorder mental comorbidity  Persistent      Medical Comorbidities Include:   Patient Active Problem List    Diagnosis Date Noted     Anxiety 09/18/2017     Priority: Medium     Tear of medial meniscus of knee 09/08/2016     Priority: Medium     Chemical dependency  (H) 11/25/2015     Priority: Medium     Alcoholic, sober since 2006 09/13/2012     Priority: Medium     Major depression in partial remission (H) 06/30/2011     Priority: Medium     Migraine headache 06/30/2011     Priority: Medium     (Problem list name updated by automated process. Provider to review and confirm.)       CARDIOVASCULAR SCREENING; LDL GOAL LESS THAN 160 10/31/2010     Priority: Medium     Cervicalgia 03/24/2010     Priority: Medium     Nonallopathic lesion of thoracic region 03/24/2010     Priority: Medium     Problem list name updated by automated process. Provider to review       Nonallopathic lesion of lumbar region 03/24/2010     Priority: Medium     Problem list name updated by automated process. Provider to review         A 12-item WHODAS 2.0 assessment was completed by the patient today and recorded in EPIC.    WHODAS 2.0 Total Score 7/25/2018   Total Score 23       The Patient Activation Measure (KLAUDIA) score was completed and recorded in [facility]. This assesses patient knowledge, skill, and confidence for self-management.   KLAUDIA Score (Last Two) 7/1/2011 7/25/2018   KLAUDIA Raw Score 47 27   Activation Score 77.5 47.4   KLAUDIA Level 4 2          Impression:  Timmy Fitzpatrick is a 37 y/o male with a long-standing history of depression, anxiety and chemical dependence. By his report he was previously doing well on Lexapro, so writer does not see reason for immediate change to his primary psychiatric medication. We will firstly address intractable insomnia with short-term course of hypnotic and monitor for changes to mood. Should significant depression persist, we discussed possible switch to Cymbalta. It was advised the patient maintain some consistency to Gabapentin dosing, though writer does not see particular pattern of misuse or abuse at this time. CBT or Insomnia strong encouraged, and given psychoeducation on treatment principles and time commitment.    Medication side effects and alternatives  reviewed. Health promotion activities recommended and reviewed today. All questions addressed. Education and counseling completed regarding risks and benefits of medications and psychotherapy options. Collaborative Care Psychiatry Service model reviewed today. Recommend therapy for additional support.     Treatment Plan:    Continue Lexapro 30 mg daily    Written for Lunesta 2 mg nightly PRN sleep    My continue Gabapentin up to 1500 mg total daily dosage, in split doses    Continue other medical directions per primary care provider.    Follow-up on CBT-Insomnia referral    Continue all other medications as reviewed per electronic medical record today.     Safety plan reviewed. To the Emergency Department as needed or call after hours crisis line at [phone] or [phone]. [place] Crisis Text Line: Text [place] to 590615  or  Suicide LifeLine Chat: [employer]Part[employer].org/chat/    Schedule an appointment with me in 5 weeks or sooner as needed.  Call [facility] at [phone] to schedule.    Follow up with primary care provider as planned or for acute medical concerns.    Call the psychiatric nurse line with medication questions or concerns at [phone].    MyColorScreen may be used to communicate with your provider, but this is not intended to be used for emergencies.    Community Resources:    National Suicide Prevention Lifeline: 958.739.5106 (TTY: 288.477.9785). Call anytime for help.  (www.suicidepreventionlifeline.org)  National [place] on Mental Illness (www.audra.org): 132.502.3174 or 002-763-8613.   Mental Health Association (www.mentalhealth.org): 438.892.5797 or 774-767-1693.  [place] Crisis Text Line: Text [place] to 344748  Suicide LifeLine Chat: Cloud[facility]ner.org/chat    Administrative Billing:   Time spent with patient was 60 minutes and greater than 50% of time or 40 minutes was spent in counseling and coordination of care regarding above diagnoses and treatment  plan.    Patient Status:  Patient will continue to be seen for ongoing consultation and stabilization.    Signed:   Yury Dueñas CNP  Psychiatry

## 2018-10-25 ASSESSMENT — ANXIETY QUESTIONNAIRES: GAD7 TOTAL SCORE: 15

## 2018-10-25 ASSESSMENT — PATIENT HEALTH QUESTIONNAIRE - PHQ9: SUM OF ALL RESPONSES TO PHQ QUESTIONS 1-9: 21

## 2018-11-02 ENCOUNTER — TRANSFERRED RECORDS (OUTPATIENT)
Dept: HEALTH INFORMATION MANAGEMENT | Facility: CLINIC | Age: 38
End: 2018-11-02

## 2018-11-02 DIAGNOSIS — F32.4 MAJOR DEPRESSIVE DISORDER WITH SINGLE EPISODE, IN PARTIAL REMISSION (H): ICD-10-CM

## 2018-11-02 DIAGNOSIS — F41.9 ANXIETY: ICD-10-CM

## 2018-11-02 RX ORDER — ESCITALOPRAM OXALATE 20 MG/1
30 TABLET ORAL DAILY
Qty: 45 TABLET | Refills: 5 | Status: SHIPPED | OUTPATIENT
Start: 2018-11-02 | End: 2019-03-12 | Stop reason: DRUGHIGH

## 2018-11-02 NOTE — TELEPHONE ENCOUNTER
"Requested Prescriptions   Pending Prescriptions Disp Refills     escitalopram (LEXAPRO) 20 MG tablet  Last Written Prescription Date:  9/18/2017  Last Fill Quantity: 90,  # refills: 3   Last office visit: 8/13/2018 with provider:  Ruth   Future Office Visit:   Next 5 appointments (look out 90 days)     Nov 28, 2018  2:15 PM CST   [facility] C[facility]    [address] [place] [address]   [phone]                  90 tablet 3     Sig: Take 1 tablet (20 mg) by mouth daily    SSRIs Protocol Failed    11/2/2018  1:13 PM  JUAREZ-7 SCORE 7/25/2018 8/13/2018 10/24/2018   Total Score - - -   Total Score 13 15 15   Some encounter information is confidential and restricted. Go to Review Flowsheets activity to see all data.            Failed - PHQ-9 score less than 5 in past 6 months    Please review last PHQ-9 score.   PHQ-9 SCORE 7/25/2018 8/13/2018 10/24/2018   Total Score - - -   Total Score 15 17 21   Some encounter information is confidential and restricted. Go to Review Flowsheets activity to see all data.            Passed - Patient is age 18 or older       Passed - Recent (6 mo) or future (30 days) visit within the authorizing provider's specialty    Patient had office visit in the last 6 months or has a visit in the next 30 days with authorizing provider or within the authorizing provider's specialty.  See \"Patient Info\" tab in inbasket, or \"Choose Columns\" in Meds & Orders section of the refill encounter.              "

## 2018-11-02 NOTE — TELEPHONE ENCOUNTER
Routing refill request to provider for review/approval because:  PHQ >5. This RN cannot fill per [facility]  RN protocol.     Patient is being seen by Psychiatry as well.      Era DÍAZ RN

## 2018-11-09 ENCOUNTER — HOSPITAL ENCOUNTER (OUTPATIENT)
Dept: MRI IMAGING | Facility: CLINIC | Age: 38
Discharge: HOME | End: 2018-11-09
Attending: NURSE PRACTITIONER | Admitting: NURSE PRACTITIONER
Payer: COMMERCIAL

## 2018-11-09 DIAGNOSIS — M54.10 RADICULOPATHY: ICD-10-CM

## 2018-11-09 DIAGNOSIS — M54.50 ACUTE LOW BACK PAIN: ICD-10-CM

## 2018-11-09 PROCEDURE — 72148 MRI LUMBAR SPINE W/O DYE: CPT

## 2018-11-12 ENCOUNTER — TRANSFERRED RECORDS (OUTPATIENT)
Dept: HEALTH INFORMATION MANAGEMENT | Facility: CLINIC | Age: 38
End: 2018-11-12

## 2018-11-12 ENCOUNTER — TELEPHONE (OUTPATIENT)
Dept: PALLIATIVE MEDICINE | Facility: CLINIC | Age: 38
End: 2018-11-12

## 2018-11-12 NOTE — TELEPHONE ENCOUNTER
Received 11- at 4:13 PM    Incoming fax from Kala [facility] L4-5 interlaminar LIDIA for acute low back pain and radiculopathy.  Routing to scheduling coordinators.    Glenys Hatfield  Patient Representative  [facility] [place]

## 2018-11-13 NOTE — TELEPHONE ENCOUNTER
Pre-screening Questions for Radiology Injections:    Injection to be done at which interventional clinic site? [facility]    Instruct patient to arrive as directed prior to the scheduled appointment time:    [place] AND [place]: 30 minutes before      Procedure ordered by Kala Suresh - TCO    Procedure ordered? Lumbar Epidural Steroid Injection    What insurance would patient like us to bill for this procedure? [employer]      Worker's comp or MVA (motor vehicle accident) -Any injection DO NOT SCHEDULE and route to Tammi Kyree.      [employer] - For SI joint injections, DO NOT SCHEDULE and route Tammi Adorno. [employer] NO PA REQUIRED EFFECTIVE 11/1/2017      [employer]- MBB's must be scheduled at LEAST two weeks apart      [employer] - Any injection besides hip/shoulder/knee joint DO NOT SCHEDULE and route to Tammi Kyree. She will obtain PA and call pt back to schedule procedure or notify pt of denial.        [employer]-Route to Tammi for review    Any chance of pregnancy? NO   If YES, do NOT schedule and route to RN pool    Is an  needed? No     Patient has a drive home? (mandatory) YES: INFORMED    Is patient taking any blood thinners (plavix, coumadin, jantoven, warfarin, heparin, pradaxa or dabigatran )? No   If hold needed, do NOT schedule, route to RN pool     Is patient taking any aspirin products (includes Excedrin and Fiorinal)? No     If more than 325mg/day do NOT schedule; route to RN pool     For CERVICAL procedures, hold all aspirin products for 6 days.     Tell pt that if aspirin product is not held for 6 days, the procedure WILL BE cancelled.      Does the patient have a bleeding or clotting disorder? No     If YES, okay to schedule AND route to RN nurse pool    For any patients with platelet count <100, must be forwarded to provider    Is patient diabetic?  No  If YES, have them bring their glucometer.    Does patient have an active infection  or treated for one within the past week? No     Is patient currently taking any antibiotics?  No     For patients on chronic, preventative, or prophylactic antibiotics, procedures may be scheduled.     For patients on antibiotics for active or recent infection:    Marisa Fernández Burton, Snitzer-antibiotic course must have been completed for 4 days    Is patient currently taking any steroid medications? (i.e. Prednisone, Medrol)  No     For patients on steroid medications:    Marisa Fernández Burton, Snitzer-steroid course must have been completed for 4 days    Reviewed with patient:  If you are started on any steroids or antibiotics between now and your appointment, you must contact us because the procedure may need to be cancelled.  Yes    Is patient actively being treated for cancer or immunocompromised? No  If YES, do NOT schedule and route to RN pool     Are you able to get on and off an exam table with minimal or no assistance? Yes  If NO, do NOT schedule and route to RN pool    Are you able to roll over and lay on your stomach with minimal or no assistance? Yes  If NO, do NOT schedule and route to RN pool     Any allergies to contrast dye, iodine, shellfish, or numbing and steroid medications? No  If YES, route to RN pool AND add allergy information to appointment notes    Allergies: Cats; Nkda [no known drug allergies]; and Seasonal allergies      Has the patient had a flu shot or any other vaccinations within 7 days before or after the procedure.  No     Does patient have an MRI/CT?  YES: MRI  (SI joint, hip injections, lumbar sympathetic blocks, and stellate ganglion blocks do not require an MRI)    Was the MRI done w/in the last 3 years?  Yes    Was MRI done at [place]? Yes      If not, where was it done? N/A       If MRI was not done at [place], [facility] or [facility] do NOT schedule and route to nursing.  If pt has an imaging disc, the injection may be scheduled but pt has to bring  disc to appt. If they show up w/out disc the injection cannot be done    Reminders (please tell patient if applicable):       Instructed pt to arrive 30 minutes early for IV start if this is for a cervical procedure, ALL sympathetic (stellate ganglion, hypogastric, or lumbar sympathetic block) and all sedation procedures (RFA, spinal cord stimulation trials).  Not Applicable   -IVs are not routinely placed for Dr. Craig cervical cases   -Dr. Soria: IVs for cervical ESIs and cervical TBDs (not CMBBs/facet inj)      If NPO for sedation, informed patient that it is okay to take medications with sips of water (except if they are to hold blood thinners).  Not Applicable   *DO take blood pressure medication if it is prescribed*      If this is for a cervical LIDIA, informed patient that aspirin needs to be held for 6 days.   Not Applicable      For all patients not having spinal cord stimulator (SCS) trials or radiofrequency ablations (RFAs), informed patient:    IV sedation is not provided for this procedure.  If you feel that an oral anti-anxiety medication is needed, you can discuss this further with your referring provider or primary care provider.  The Pain Clinic provider will discuss specifics of what the procedure includes at your appointment.  Most procedures last 10-20 minutes.  We use numbing medications to help with any discomfort during the procedure.  Not Applicable      Do not schedule procedures requiring IV placement in the first appointment of the day or first appointment after lunch. Do NOT schedule at 0745, 0815 or 1245. N/A      For patients 85 or older we recommend having an adult stay w/ them for the remainder of the day.   N/A    Does the patient have any questions?  NO  Lena Macias  [facility]

## 2018-11-13 NOTE — TELEPHONE ENCOUNTER
Received 11- at 11:09 AM    [facility] faxed the same order. Lumber LIDIA has already been scheduled for 12-7-2018 at 9:45 at [facility] with Dr. Alisia Harley.    Glenys Hadley  Patient Representative  [facility]

## 2018-11-24 ENCOUNTER — HOSPITAL ENCOUNTER (EMERGENCY)
Facility: CLINIC | Age: 38
Discharge: HOME | End: 2018-11-24
Attending: PHYSICIAN ASSISTANT | Admitting: PHYSICIAN ASSISTANT
Payer: COMMERCIAL

## 2018-11-24 VITALS — HEART RATE: 86 BPM | RESPIRATION RATE: 20 BRPM | OXYGEN SATURATION: 97 % | TEMPERATURE: 99.1 F

## 2018-11-24 DIAGNOSIS — L03.113 CELLULITIS OF RIGHT UPPER EXTREMITY: ICD-10-CM

## 2018-11-24 DIAGNOSIS — L02.413 ABSCESS OF RIGHT ARM: ICD-10-CM

## 2018-11-24 PROCEDURE — 99213 OFFICE O/P EST LOW 20 MIN: CPT | Mod: 25 | Performed by: PHYSICIAN ASSISTANT

## 2018-11-24 PROCEDURE — G0463 HOSPITAL OUTPT CLINIC VISIT: HCPCS | Mod: 25 | Performed by: PHYSICIAN ASSISTANT

## 2018-11-24 PROCEDURE — 10060 I&D ABSCESS SIMPLE/SINGLE: CPT | Mod: Z6 | Performed by: PHYSICIAN ASSISTANT

## 2018-11-24 PROCEDURE — 10060 I&D ABSCESS SIMPLE/SINGLE: CPT | Performed by: PHYSICIAN ASSISTANT

## 2018-11-24 RX ORDER — SULFAMETHOXAZOLE/TRIMETHOPRIM 800-160 MG
1 TABLET ORAL 2 TIMES DAILY
Qty: 14 TABLET | Refills: 0 | Status: SHIPPED | OUTPATIENT
Start: 2018-11-24 | End: 2019-02-06

## 2018-11-24 RX ORDER — LIDOCAINE HYDROCHLORIDE AND EPINEPHRINE 10; 10 MG/ML; UG/ML
1 INJECTION, SOLUTION INFILTRATION; PERINEURAL ONCE
Status: DISCONTINUED | OUTPATIENT
Start: 2018-11-24 | End: 2018-11-24 | Stop reason: HOSPADM

## 2018-11-24 NOTE — ED AVS SNAPSHOT
[facility] Emergency Department    [address] [address]    Phone:  [phone]    Fax:  [phone]                                       Timmy Fitzpatrick   MRN: [id number]    Department:  [facility] Emergency Department   Date of Visit:  11/24/2018           After Visit Summary Signature Page     I have received my discharge instructions, and my questions have been answered. I have discussed any challenges I see with this plan with the nurse or doctor.    ..........................................................................................................................................  Patient/Patient Representative Signature      ..........................................................................................................................................  Patient Representative Print Name and Relationship to Patient    ..................................................               ................................................  Date                                   Time    ..........................................................................................................................................  Reviewed by Signature/Title    ...................................................              ..............................................  Date                                               Time          22EPIC Rev 08/18

## 2018-11-24 NOTE — ED PROVIDER NOTES
History     Chief Complaint   Patient presents with     Insect Bite     HPI  Timmy Fitzpatrick is a 38 year old male who presents for concerns of a possible insect bite. Patient reports about 3 days ago he noticed a little boil on his right dorsal forearm.  Since then it has progressively gotten more swollen and now it is tender surrounding the boil and his skin looks red.  He has a history of infections requiring incision and drainage in the past.  He has not been running fevers and denies any other symptoms.  Can move the wrist and hand fine.    Problem List:    Patient Active Problem List    Diagnosis Date Noted     Anxiety 09/18/2017     Priority: Medium     Tear of medial meniscus of knee 09/08/2016     Priority: Medium     Chemical dependency (H) 11/25/2015     Priority: Medium     Alcoholic, sober since 2006 09/13/2012     Priority: Medium     Major depression in partial remission (H) 06/30/2011     Priority: Medium     Migraine headache 06/30/2011     Priority: Medium     (Problem list name updated by automated process. Provider to review and confirm.)       CARDIOVASCULAR SCREENING; LDL GOAL LESS THAN 160 10/31/2010     Priority: Medium     Cervicalgia 03/24/2010     Priority: Medium     Nonallopathic lesion of thoracic region 03/24/2010     Priority: Medium     Problem list name updated by automated process. Provider to review       Nonallopathic lesion of lumbar region 03/24/2010     Priority: Medium     Problem list name updated by automated process. Provider to review          Past Medical History:    Past Medical History:   Diagnosis Date     Anxiety      Cervicalgia      Chemical dependency (H)      Depression      Migraine headache        Past Surgical History:    Past Surgical History:   Procedure Laterality Date     HERNIA REPAIR       SURGICAL HISTORY OF -       deviated septum       Family History:    Family History   Problem Relation Age of Onset     Cancer Paternal Grandfather       Depression Mother      Anxiety Disorder Mother      Depression Father      Dementia Maternal Grandfather      Substance Abuse Paternal Grandmother        Social History:  Marital Status:   [2]  Social History   Substance Use Topics     Smoking status: Former Smoker     Types: Dip, chew, snus or snuff     Smokeless tobacco: Current User     Types: Chew     Last attempt to quit: 11/16/2014      Comment: vaping as well     Alcohol use No      Comment: sober since April 2016        Medications:      sulfamethoxazole-trimethoprim (BACTRIM DS) 800-160 MG per tablet   escitalopram (LEXAPRO) 20 MG tablet   eszopiclone (LUNESTA) 2 MG tablet   gabapentin (NEURONTIN) 300 MG capsule   GLUCOSAMINE HCL   Multiple Vitamins-Minerals (MENS MULTIPLE VITAMIN/LYCOPENE PO)   sildenafil (VIAGRA) 100 MG tablet         Review of Systems   All other systems reviewed and are negative.      Physical Exam   Pulse: 86  Temp: 99.1  F (37.3  C)  Resp: 20  SpO2: 97 %      Physical Exam   Constitutional: He is oriented to person, place, and time. He appears well-developed and well-nourished. No distress.   HENT:   Head: Normocephalic and atraumatic.   Eyes: Conjunctivae are normal. No scleral icterus.   Neck: Neck supple.   Cardiovascular: Intact distal pulses.    Pulmonary/Chest: Effort normal. No respiratory distress.   Musculoskeletal:   Right forearm: 1.5 cm area of swelling, fluctuance, with central green colored scab overlying.  No drainage noted however.  Surrounding erythema and warmth with mild induration across dorsal aspect of forearm, not circumferential.  Normal range of motion of wrist and hand.  Normal radial pulse.   Neurological: He is alert and oriented to person, place, and time.   Skin: Skin is warm and dry. He is not diaphoretic.   Psychiatric: He has a normal mood and affect.   Nursing note and vitals reviewed.      ED Course     ED Course     Incision + drainage  Date/Time: 11/24/2018 3:21 PM  Performed by: SOBIA  ROXANNE COURTNEY  Authorized by: ROXANNE RAMSAY     Consent:     Consent obtained:  Verbal    Consent given by:  Patient  Location:     Type:  Abscess    Location:  Upper extremity    Upper extremity location:  Arm    Arm location:  R lower arm  Anesthesia (see MAR for exact dosages):     Anesthesia method:  Local infiltration    Local anesthetic:  Lidocaine 1% w/o epi  Procedure type:     Complexity:  Simple  Procedure details:     Incision types:  Single straight    Incision depth:  Dermal    Scalpel blade:  11    Wound management:  Probed and deloculated    Drainage:  Purulent and bloody    Drainage amount:  Moderate    Wound treatment:  Wound left open    Packing materials:  1/4 in gauze  Post-procedure details:     Patient tolerance of procedure:  Tolerated well, no immediate complications        No results found for this or any previous visit (from the past 24 hour(s)).    Medications   lidocaine 1% with EPINEPHrine 1:100,000 injection 1 mL (not administered)       Assessments & Plan (with Medical Decision Making)  Timmy Fitzpatrick is a 38 year old male who presents to the ED for concerns of swelling and redness to the left forearm.  Denies any other symptoms, denies injury.  History of infections requiring I&D.  Patient afebrile on arrival.  Noted to have 1.5 area of erythema with fluctuance to left dorsal forearm with surrounding erythema, warmth, induration, and tenderness consistent with abscess and cellulitis.  No evidence of neurovascular complications or tendon involvement. Abscess was incised and drained as detailed above, patient tolerated this well.  Prescribed Bactrim for his concurrent cellulitic infection.  I discussed wound cares and indications of when to follow-up or return to the emergency department.  Patient and wife expressed understanding and he was discharged home in suitable condition.     I have reviewed the nursing notes.    I have reviewed the findings, diagnosis, plan and need  for follow up with the patient.    New Prescriptions    SULFAMETHOXAZOLE-TRIMETHOPRIM (BACTRIM DS) 800-160 MG PER TABLET    Take 1 tablet by mouth 2 times daily for 7 days       Final diagnoses:   Abscess of right arm   Cellulitis of right upper extremity     Note: Chart documentation done in part with Dragon Voice Recognition software. Although reviewed after completion, some word and grammatical errors may remain.      11/24/2018   [facility] EMERGENCY DEPARTMENT     Christie Benz PA-C  11/24/18 1535

## 2018-11-24 NOTE — ED TRIAGE NOTES
Patient presents today with insect bite right hand . Symptoms started three days ago . Arrived to urgent care ambulatory.

## 2018-11-24 NOTE — ED AVS SNAPSHOT
[facility] Emergency Department    [address] [address]    Phone:  [phone]    Fax:  [phone]                                       Timmy Fitzpatrick   MRN: [id number]    Department:  [facility] Emergency Department   Date of Visit:  11/24/2018           Patient Information     Date Of Birth          1980        Your diagnoses for this visit were:     Abscess of right arm     Cellulitis of right upper extremity        You were seen by Christie Benz PA-C.      Follow-up Information     Follow up with [facility] Emergency Department.    Specialty:  EMERGENCY MEDICINE    Why:  If symptoms worsen    Contact information:    [address] [place] [address] [phone]    Additional information:    The medical center is located at   [address] [place] [address]. (between [address]   [address] in [place], [place] miles north   of [place]).        Follow up with CLAUDIO Loza MD.    Specialty:  Family Practice    Why:  As needed for persistent symptoms    Contact information:    [address] [place] [address]  [phone]          Discharge Instructions         Abscess (Incision & Drainage)  An abscess is sometimes called a boil. It happens when bacteria get trapped under the skin and start to grow. Pus forms inside the abscess as the body responds to the bacteria. An abscess can happen with an insect bite, ingrown hair, blocked oil gland, pimple, cyst, or puncture wound.  Your healthcare provider has drained the pus from your abscess. If the abscess pocket was large, your healthcare provider may have put in gauze packing. Your provider will need to remove it on your next visit. He or she may also replace it at that time. You may not need antibiotics to treat a simple abscess, unless the infection is spreading into the skin around the wound (cellulitis).  The wound will take about 1 to 2 weeks to heal, depending on the size of the abscess.  Healthy tissue will grow from the bottom and sides of the opening until it seals over.  Home care  These tips can help your wound heal:    The wound may drain for the first 2 days. Cover the wound with a clean dry dressing. Change the dressing if it becomes soaked with blood or pus.    If a gauze packing was placed inside the abscess pocket, you may be told to remove it yourself. You may do this in the shower. Once the packing is removed, you should wash the area in the shower, or clean the area as directed by your provider. Continue to do this until the skin opening has closed. Make sure you wash your hands after changing the packing or cleaning the wound.    If you were prescribed antibiotics, take them as directed until they are all gone.    You may use acetaminophen or ibuprofen to control pain, unless another pain medicine was prescribed. If you have liver disease or ever had a stomach ulcer, talk with your doctor before using these medicines.  Follow-up care  Follow up with your healthcare provider, or as advised. If a gauze packing was put in your wound, it should be removed in 1 to 2 days. Check your wound every day for any signs that the infection is getting worse. The signs are listed below.  When to seek medical advice  Call your healthcare provider right away if any of these occur:    Increasing redness or swelling    Red streaks in the skin leading away from the wound    Increasing local pain or swelling    Continued pus draining from the wound 2 days after treatment    Fever of 100.4 F (38 C) or higher, or as directed by your healthcare provider    Boil returns when you are at home  Date Last Reviewed: 9/1/2016 2000-2018 [employer]. [address] [place] [address]. All rights reserved. This information is not intended as a substitute for professional medical care. Always follow your healthcare professional's instructions.          Discharge Instructions for Cellulitis  You  have been diagnosed with cellulitis. This is an infection in the deepest layer of the skin. In some cases, the infection also affects the muscle. Cellulitis is caused by bacteria. The bacteria can enter the body through broken skin. This can happen with a cut, scratch, animal bite, or an insect bite that has been scratched. You may have been treated in the hospital with antibiotics and fluids. You will likely be given a prescription for antibiotics to take at home. This sheet will help you take care of yourself at home.  Home care  When you are home:    Take the prescribed antibiotic medicine you are given as directed until it is gone. Take it even if you feel better. It treats the infection and stops it from returning. Not taking all the medicine can make future infections hard to treat.    Keep the infected area clean.    When possible, raise the infected area above the level of your heart. This helps keep swelling down.    Talk with your healthcare provider if you are in pain. Ask what kind of over-the-counter medicine you can take for pain.    Apply clean bandages as advised.    Take your temperature once a day for a week.    Wash your hands often to prevent spreading the infection.  In the future, wash your hands before and after you touch cuts, scratches, or bandages. This will help prevent infection.   When to call your healthcare provider  Call your healthcare provider immediately if you have any of the following:    Difficulty or pain when moving the joints above or below the infected area    Discharge or pus draining from the area    Fever of 100.4 F (38 C) or higher, or as directed by your healthcare provider    Pain that gets worse in or around the infected     Redness that gets worse in or around the infected area, particularly if the area of redness expands to a wider area    Shaking chills    Swelling of the infected area    Vomiting   Date Last Reviewed: 8/1/2016 2000-2018 [employer],  [employer]. [address] [place] [address]. All rights reserved. This information is not intended as a substitute for professional medical care. Always follow your healthcare professional's instructions.          Your next 10 appointments already scheduled     Nov 28, 2018  2:15 PM CST   Return Visit with Yury Dueñas CNP   [facility] ([facility]    [address]  [address]  [place] [address]   [phone]            Dec 07, 2018  9:45 AM CST   Radiology Injections with Aquiles Harley MD   [facility] [place] ([facility] [place])    [address]  [address]  [facility] [address] [phone]              24 Hour Appointment Hotline       To make an appointment at any [facility], call [phone]). If you don't have a family doctor or clinic, we will help you find one. [facility] are conveniently located to serve the needs of you and your family.             Review of your medicines      START taking        Dose / Directions Last dose taken    sulfamethoxazole-trimethoprim 800-160 MG per tablet   Commonly known as:  BACTRIM DS   Dose:  1 tablet   Quantity:  14 tablet        Take 1 tablet by mouth 2 times daily for 7 days   Refills:  0          Our records show that you are taking the medicines listed below. If these are incorrect, please call your family doctor or clinic.        Dose / Directions Last dose taken    escitalopram 20 MG tablet   Commonly known as:  LEXAPRO   Dose:  30 mg   Quantity:  45 tablet        Take 1.5 tablets (30 mg) by mouth daily   Refills:  5        eszopiclone 2 MG tablet   Commonly known as:  LUNESTA   Dose:  2 mg   Quantity:  30 tablet        Take 1 tablet (2 mg) by mouth nightly as needed for sleep   Refills:  0        gabapentin 300 MG capsule   Commonly known as:  NEURONTIN   Quantity:  540 capsule        1- 2 capsules up to 4 times  per day ( max 6 per day)   Refills:  2        GLUCOSAMINE HCL        None Entered   Refills:  0        MENS MULTIPLE VITAMIN/LYCOPENE PO   Dose:  1 tablet        Take 1 tablet by mouth daily.   Refills:  0        sildenafil 100 MG tablet   Commonly known as:  VIAGRA   Dose:  100 mg   Quantity:  12 tablet        Take 1 tablet (100 mg) by mouth daily as needed 30 min to 4 hrs before sex. Do not use with nitroglycerin, terazosin or doxazosin.   Refills:  11                Prescriptions were sent or printed at these locations (1 Prescription)                   [facility] [place], [place] - [address]   [address] [address]    Telephone:  [phone]   Fax:  [phone]   Hours:                  E-Prescribed (1 of 1)         sulfamethoxazole-trimethoprim (BACTRIM DS) 800-160 MG per tablet                Orders Needing Specimen Collection     None      Pending Results     No orders found from 11/22/2018 to 11/25/2018.            Pending Culture Results     No orders found from 11/22/2018 to 11/25/2018.            Pending Results Instructions     If you had any lab results that were not finalized at the time of your Discharge, you can call the ED Lab Result RN at [phone]. You will be contacted by this team for any positive Lab results or changes in treatment. The nurses are available 7 days a week from 10A to 6:30P.  You can leave a message 24 hours per day and they will return your call.        Test Results From Your Hospital Stay               Thank you for choosing [facility]       Thank you for choosing [facility] for your care. Our goal is always to provide you with excellent care. Hearing back from our patients is one way we can continue to improve our services. Please take a few minutes to complete the written survey that you may receive in the mail after you visit with us. Thank you!        [employer] Information     [employer] gives you secure access to your electronic health  record. If you see a primary care provider, you can also send messages to your care team and make appointments. If you have questions, please call your primary care clinic.  If you do not have a primary care provider, please call [phone] and they will assist you.        Care EveryWhere ID     This is your Care EveryWhere ID. This could be used by other organizations to access your [place] medical records  [id number]        Equal Access to Services     ANGIE RIOS : Kiara Johnson, mindy mejia, sherita murphy, alex bird . So [facility] [phone].    ATENCIÓN: Si habla español, tiene a waters disposición servicios gratuitos de asistencia lingüística. Llame al [phone].    We comply with applicable federal civil rights laws and [place] laws. We do not discriminate on the basis of race, color, national origin, age, disability, sex, sexual orientation, or gender identity.            After Visit Summary       This is your record. Keep this with you and show to your community pharmacist(s) and doctor(s) at your next visit.

## 2018-11-24 NOTE — DISCHARGE INSTRUCTIONS
Abscess (Incision & Drainage)  An abscess is sometimes called a boil. It happens when bacteria get trapped under the skin and start to grow. Pus forms inside the abscess as the body responds to the bacteria. An abscess can happen with an insect bite, ingrown hair, blocked oil gland, pimple, cyst, or puncture wound.  Your healthcare provider has drained the pus from your abscess. If the abscess pocket was large, your healthcare provider may have put in gauze packing. Your provider will need to remove it on your next visit. He or she may also replace it at that time. You may not need antibiotics to treat a simple abscess, unless the infection is spreading into the skin around the wound (cellulitis).  The wound will take about 1 to 2 weeks to heal, depending on the size of the abscess. Healthy tissue will grow from the bottom and sides of the opening until it seals over.  Home care  These tips can help your wound heal:    The wound may drain for the first 2 days. Cover the wound with a clean dry dressing. Change the dressing if it becomes soaked with blood or pus.    If a gauze packing was placed inside the abscess pocket, you may be told to remove it yourself. You may do this in the shower. Once the packing is removed, you should wash the area in the shower, or clean the area as directed by your provider. Continue to do this until the skin opening has closed. Make sure you wash your hands after changing the packing or cleaning the wound.    If you were prescribed antibiotics, take them as directed until they are all gone.    You may use acetaminophen or ibuprofen to control pain, unless another pain medicine was prescribed. If you have liver disease or ever had a stomach ulcer, talk with your doctor before using these medicines.  Follow-up care  Follow up with your healthcare provider, or as advised. If a gauze packing was put in your wound, it should be removed in 1 to 2 days. Check your wound every day for any  signs that the infection is getting worse. The signs are listed below.  When to seek medical advice  Call your healthcare provider right away if any of these occur:    Increasing redness or swelling    Red streaks in the skin leading away from the wound    Increasing local pain or swelling    Continued pus draining from the wound 2 days after treatment    Fever of 100.4 F (38 C) or higher, or as directed by your healthcare provider    Boil returns when you are at home  Date Last Reviewed: 9/1/2016 2000-2018 [employer]. [address] [place] [address]. All rights reserved. This information is not intended as a substitute for professional medical care. Always follow your healthcare professional's instructions.          Discharge Instructions for Cellulitis  You have been diagnosed with cellulitis. This is an infection in the deepest layer of the skin. In some cases, the infection also affects the muscle. Cellulitis is caused by bacteria. The bacteria can enter the body through broken skin. This can happen with a cut, scratch, animal bite, or an insect bite that has been scratched. You may have been treated in the hospital with antibiotics and fluids. You will likely be given a prescription for antibiotics to take at home. This sheet will help you take care of yourself at home.  Home care  When you are home:    Take the prescribed antibiotic medicine you are given as directed until it is gone. Take it even if you feel better. It treats the infection and stops it from returning. Not taking all the medicine can make future infections hard to treat.    Keep the infected area clean.    When possible, raise the infected area above the level of your heart. This helps keep swelling down.    Talk with your healthcare provider if you are in pain. Ask what kind of over-the-counter medicine you can take for pain.    Apply clean bandages as advised.    Take your temperature once a day for a  week.    Wash your hands often to prevent spreading the infection.  In the future, wash your hands before and after you touch cuts, scratches, or bandages. This will help prevent infection.   When to call your healthcare provider  Call your healthcare provider immediately if you have any of the following:    Difficulty or pain when moving the joints above or below the infected area    Discharge or pus draining from the area    Fever of 100.4 F (38 C) or higher, or as directed by your healthcare provider    Pain that gets worse in or around the infected     Redness that gets worse in or around the infected area, particularly if the area of redness expands to a wider area    Shaking chills    Swelling of the infected area    Vomiting   Date Last Reviewed: 8/1/2016 2000-2018 [employer]. [address] [place] [address], [place] [address]. All rights reserved. This information is not intended as a substitute for professional medical care. Always follow your healthcare professional's instructions.

## 2018-11-28 ENCOUNTER — OFFICE VISIT (OUTPATIENT)
Dept: PSYCHIATRY | Facility: CLINIC | Age: 38
End: 2018-11-28
Payer: COMMERCIAL

## 2018-11-28 VITALS
OXYGEN SATURATION: 96 % | TEMPERATURE: 98.1 F | BODY MASS INDEX: 24.2 KG/M2 | HEIGHT: 70 IN | SYSTOLIC BLOOD PRESSURE: 120 MMHG | WEIGHT: 169 LBS | DIASTOLIC BLOOD PRESSURE: 70 MMHG | RESPIRATION RATE: 18 BRPM | HEART RATE: 62 BPM

## 2018-11-28 DIAGNOSIS — F31.81 BIPOLAR II DISORDER (H): Primary | ICD-10-CM

## 2018-11-28 PROCEDURE — 99214 OFFICE O/P EST MOD 30 MIN: CPT | Performed by: NURSE PRACTITIONER

## 2018-11-28 RX ORDER — LAMOTRIGINE 25 MG/1
TABLET ORAL
Qty: 60 TABLET | Refills: 0 | Status: SHIPPED | OUTPATIENT
Start: 2018-11-28 | End: 2018-12-26

## 2018-11-28 ASSESSMENT — ANXIETY QUESTIONNAIRES
5. BEING SO RESTLESS THAT IT IS HARD TO SIT STILL: MORE THAN HALF THE DAYS
GAD7 TOTAL SCORE: 13
GAD7 TOTAL SCORE: 13
2. NOT BEING ABLE TO STOP OR CONTROL WORRYING: SEVERAL DAYS
6. BECOMING EASILY ANNOYED OR IRRITABLE: NEARLY EVERY DAY
7. FEELING AFRAID AS IF SOMETHING AWFUL MIGHT HAPPEN: NOT AT ALL
7. FEELING AFRAID AS IF SOMETHING AWFUL MIGHT HAPPEN: NOT AT ALL
1. FEELING NERVOUS, ANXIOUS, OR ON EDGE: NEARLY EVERY DAY
GAD7 TOTAL SCORE: 13
4. TROUBLE RELAXING: NEARLY EVERY DAY
3. WORRYING TOO MUCH ABOUT DIFFERENT THINGS: SEVERAL DAYS

## 2018-11-28 ASSESSMENT — PATIENT HEALTH QUESTIONNAIRE - PHQ9
SUM OF ALL RESPONSES TO PHQ QUESTIONS 1-9: 19
SUM OF ALL RESPONSES TO PHQ QUESTIONS 1-9: 19
10. IF YOU CHECKED OFF ANY PROBLEMS, HOW DIFFICULT HAVE THESE PROBLEMS MADE IT FOR YOU TO DO YOUR WORK, TAKE CARE OF THINGS AT HOME, OR GET ALONG WITH OTHER PEOPLE: VERY DIFFICULT

## 2018-11-28 NOTE — MR AVS SNAPSHOT
After Visit Summary   11/28/2018    Timmy Fitzpatrick    MRN: [id number]           Patient Information     Date Of Birth          1980        Visit Information        Provider Department      11/28/2018 2:15 PM Yury Dueñas [facility]        Care Instructions    Treatment Plan:    Decrease Gabapentin before starting Lamictal: 1600 mg on day 1, then 900 mg on day 2, then 600 mg on day 3 then stop. Let 2 days go by before starting Lamictal    Start Lamicta: 25 mg (1 pill) daily for 2 weeks, then increase to 50 mg (2 pills) daily thereafter    Continue Lexapro 30 mg daily    May continue Lunesta 2 mg nightly PRN sleep    Continue other medical directions per primary care provider.    As agreed, take self to [facility] or local police station if feeling unself      Continue all other medications as reviewed per electronic medical record today.     Safety plan reviewed. To the Emergency Department as needed or call after hours crisis line at [phone] or [phone]. [place] Crisis Text [place]: Text [place] to 832854  or  Suicide LifeLine Chat: suicidepreventionlifeline.org/chat/    Schedule an appointment with me in 4 weeks or sooner as needed.     Follow up with primary care provider as planned or for acute medical concerns.    Call the psychiatric nurse line with medication questions or concerns at [phone].    [employer]art may be used to communicate with your provider, but this is not intended to be used for emergencies.     Community Resources:    National Suicide Prevention Lifeline: 939.176.3396 (TTY: 747.751.9329). Call anytime for help.  (www.suicidepreventionlifeline.org)  National [place] on Mental Illness (www.audra.org): 405.512.2801 or 197-011-2206.   Mental Health Association (www.mentalhealth.org): 803.388.7978 or 826-798-3468.  [place] Crisis [place]: Text [place] to 930020          Follow-ups after your visit        Your next 10  "appointments already scheduled     Dec 07, 2018  9:45 AM CST   Radiology Injections with Aquiles Harley MD   [facility] ([facility])    [address]  [address]  [facility] [address] [phone]              Who to contact     If you have questions or need follow up information about today's clinic visit or your schedule please contact [facility] directly at [phone].  Normal or non-critical lab and imaging results will be communicated to you by Diet TVhart, letter or phone within 4 business days after the clinic has received the results. If you do not hear from us within 7 days, please contact the clinic through Diet TVhart or phone. If you have a critical or abnormal lab result, we will notify you by phone as soon as possible.  Submit refill requests through [employer] or call your pharmacy and they will forward the refill request to us. Please allow 3 business days for your refill to be completed.          Additional Information About Your Visit        Diet TVhart Information     [employer] gives you secure access to your electronic health record. If you see a primary care provider, you can also send messages to your care team and make appointments. If you have questions, please call your primary care clinic.  If you do not have a primary care provider, please call [phone] and they will assist you.        Care EveryWhere ID     This is your Care EveryWhere ID. This could be used by other organizations to access your [facility] medical records  [id number]        Your Vitals Were     Pulse Temperature Respirations Height Pulse Oximetry BMI (Body Mass Index)    62 98.1  F (36.7  C) (Oral) 18 5' 10\" (1.778 m) 96% 24.25 kg/m2       Blood Pressure from Last 3 Encounters:   11/28/18 120/70   10/24/18 110/70   09/05/18 126/68    Weight from Last 3 Encounters:   11/28/18 169 lb (76.7 kg)   10/24/18 168 lb (76.2 kg)   09/05/18 157 lb " (71.2 kg)              Today, you had the following     No orders found for display       Primary Care Provider Office Phone # Fax #    R Refugio Loza [phone] [address] [facility] [address]        Equal Access to Services     MESHAJOVANA BLANCA : Hadii margarita ku hadjonio Soomaali, waaxda luqadaha, qaybta kaalmada adeegyada, waxishaan idiin hayleonn ademarcial rick marge mayer. So [facility] [phone].    ATENCIÓN: Si habla español, tiene a waters disposición servicios gratuitos de asistencia lingüística. Llame al [phone].    We comply with applicable federal civil rights laws and [place] laws. We do not discriminate on the basis of race, color, national origin, age, disability, sex, sexual orientation, or gender identity.            Thank you!     Thank you for choosing [facility]  for your care. Our goal is always to provide you with excellent care. Hearing back from our patients is one way we can continue to improve our services. Please take a few minutes to complete the written survey that you may receive in the mail after your visit with us. Thank you!             Your Updated Medication List - Protect others around you: Learn how to safely use, store and throw away your medicines at www.disposemymeds.org.          This list is accurate as of 11/28/18  3:05 PM.  Always use your most recent med list.                   Brand Name Dispense Instructions for use Diagnosis    escitalopram 20 MG tablet    LEXAPRO    45 tablet    Take 1.5 tablets (30 mg) by mouth daily    Major depressive disorder with single episode, in partial remission (H), Anxiety       eszopiclone 2 MG tablet    LUNESTA    30 tablet    Take 1 tablet (2 mg) by mouth nightly as needed for sleep    Persistent insomnia       gabapentin 300 MG capsule    NEURONTIN    540 capsule    1- 2 capsules up to 4 times per day ( max 6 per day)    Restless legs syndrome (RLS), Anxiety       GLUCOSAMINE HCL      None Entered         MENS MULTIPLE VITAMIN/LYCOPENE PO      Take 1 tablet by mouth daily.        sildenafil 100 MG tablet    VIAGRA    12 tablet    Take 1 tablet (100 mg) by mouth daily as needed 30 min to 4 hrs before sex. Do not use with nitroglycerin, terazosin or doxazosin.    Erectile dysfunction, unspecified erectile dysfunction type       sulfamethoxazole-trimethoprim 800-160 MG tablet    BACTRIM DS    14 tablet    Take 1 tablet by mouth 2 times daily for 7 days

## 2018-11-28 NOTE — PATIENT INSTRUCTIONS
Treatment Plan:    Decrease Gabapentin before starting Lamictal: 1600 mg on day 1, then 900 mg on day 2, then 600 mg on day 3 then stop. Let 2 days go by before starting Lamictal    Start Lamicta: 25 mg (1 pill) daily for 2 weeks, then increase to 50 mg (2 pills) daily thereafter    Continue Lexapro 30 mg daily    May continue Lunesta 2 mg nightly PRN sleep    Continue other medical directions per primary care provider.    As agreed, take self to [facility] or local police station if feeling unself      Continue all other medications as reviewed per electronic medical record today.     Safety plan reviewed. To the Emergency Department as needed or call after hours crisis line at [phone] or [phone]. [place] Crisis Text [place]: Text [place] to 238999  or  Suicide LifeLine Chat: suicidepreventionlifeline.org/chat/    Schedule an appointment with me in 4 weeks or sooner as needed.     Follow up with primary care provider as planned or for acute medical concerns.    Call the psychiatric nurse line with medication questions or concerns at [phone].    [employer] may be used to communicate with your provider, but this is not intended to be used for emergencies.     Community Resources:    National Suicide Prevention Lifeline: 338.414.1029 (TTY: 567.936.9029). Call anytime for help.  (www.suicidepreventionlifeline.org)  National [place] on Mental Illness (www.audra.org): 368.329.4925 or 369-991-8197.   Mental Health Association (www.mentalhealth.org): 162.566.8143 or 204-834-9855.  [place] Crisis Text [place]: Text [place] to 062571

## 2018-11-28 NOTE — PROGRESS NOTES
"    Outpatient Psychiatric Progress Note    Name: Timmy Fitzpatrick   : 1980                    Primary Care Provider: CLAUDIO Loza MD - last visit 2018  Therapist: None       Taking Medication as prescribed: NO    Side Effects:  Maybe a little bit of extra sleepiness from the [place]    Medication Helping Symptoms:  Don't know    Answers for HPI/ROS submitted by the patient on 2018   If you checked off any problems, how difficult have these problems made it for you to do your work, take care of things at home, or get along with other people?: Very difficult  PHQ9 TOTAL SCORE: 19  JUAREZ 7 TOTAL SCORE: 13  PHQ-9 scores:  PHQ-9 SCORE 2018 2018 10/24/2018   PHQ-9 Total Score - - -   PHQ-9 Total Score 15 17 21   Some encounter information is confidential and restricted. Go to Review Flowsheets activity to see all data.       JUAREZ-7 scores:  JUAREZ-7 SCORE 2018 2018 10/24/2018   Total Score - - -   Total Score 13 15 15   Some encounter information is confidential and restricted. Go to Review Flowsheets activity to see all data.       Patient Identification:  Patient is a 38 year old year old,   White American male  who presents for return visit with me.  Patient is currently employed full time. Patient attended the session alone. Patient prefers to be called: \"Ventura\".    Interim History:  I last saw Timmy Fitzpatrick for outpatient psychiatry Consultation on 10/24/18.     During that appointment, we agreed to keep Lexapro status quo and focus on addressing sleep with Lunesta, to see if mood should improve with course of better rest. He was also permitted to use slightly more Gabapentin to attend to anxiety/irritability.    Current medications include:   Current Outpatient Prescriptions   Medication Sig     escitalopram (LEXAPRO) 20 MG tablet Take 1.5 tablets (30 mg) by mouth daily     eszopiclone (LUNESTA) 2 MG tablet Take 1 tablet (2 mg) by mouth nightly as needed for sleep     " "gabapentin (NEURONTIN) 300 MG capsule 1- 2 capsules up to 4 times per day ( max 6 per day)     GLUCOSAMINE HCL None Entered     Multiple Vitamins-Minerals (MENS MULTIPLE VITAMIN/LYCOPENE PO) Take 1 tablet by mouth daily.     sildenafil (VIAGRA) 100 MG tablet Take 1 tablet (100 mg) by mouth daily as needed 30 min to 4 hrs before sex. Do not use with nitroglycerin, terazosin or doxazosin.     sulfamethoxazole-trimethoprim (BACTRIM DS) 800-160 MG per tablet Take 1 tablet by mouth 2 times daily for 7 days     No current facility-administered medications for this visit.        The [place] Prescription Monitoring Program has been reviewed and there are no concerns about diversionary activity for controlled substances at this time.      I was able to review most recent Primary Care Provider, specialty provider, and therapy visit notes that I have access to.     Today, patient reports same if not worsening mood and functioning. He has his own notes as well as those prepared by wife. States his mood \"much more up and down\". When not irritatble/depressed, will feel \"extra exuberant\" at home; doesn't feel natural to him. Will otherwise have easy irritability and impulses to self-harm (though has so far been able to resist); still eating minimally one meal a day; feels \"no emotion\" when depressed and tends to isolate. He's having continue suicidal ideation; having fantasies about shooting self or suspension going out on car causing him to crash when driving to work. Guns continue locked up at home, but receptive to talking to wife to have them secured outside home with family friends.    Patient states he's still sleeping poorly, in part from poor habits. When kids are in bed, will stay up late to midnight \"just to have some peace and quiet\", but then has to get up 5 am to get kids ready for day. He's taken Lunesta 2 mg a dozen nights when he makes effort to get to bed early; states it's improved sleep quality on those " nights, but doesn't necessary put mood in better place next day.    Patient states he and wife have had conversation about possible bipolarity.    Patient continues with Lexapro 30 mg daily. He states he's taken further liberties with Gabapentin dosing, up to 2400 mg daily. Patient agreeable to trial of Lamictal.      Past Medical History:   Diagnosis Date     Anxiety      Cervicalgia      Chemical dependency (H)      Depression      Migraine headache       has a past medical history of Anxiety; Cervicalgia; Chemical dependency (H); Depression; and Migraine headache.    Social history updates:  Has missed a few days of work secondary to mood.    Substance use updates:  Nicotine vaping  No ETOH  Has cannabis edible cookies most nights of week      Vital Signs:   There were no vitals taken for this visit.    Labs:  No new labs.    Review of Systems:  10 systems (general, cardiovascular, respiratory, eyes, ENT, endocrine, GI, , M/S, neurological) were reviewed. Most pertinent finding(s) is/are: infected boil/bite on right arm. The remaining systems are all unremarkable.    Mental Status Examination:     Appearance:  awake, alert and adequately groomed  Attitude:  cooperative   Eye Contact:  good  Gait and Station: Normal  Psychomotor Behavior:  intact station, gait and muscle tone  Oriented to:  time, person, and place  Attention Span and Concentration:  Normal  Speech:  clear, coherent  Mood:  depressed  Affect:  appropriate and in normal range  Associations:  no loose associations  Thought Process:  logical, linear and goal oriented  Thought Content:  Appropriate to Interview  Recent and Remote Memory:  intact Not formally assessed. No amnesia.  Fund of Knowledge: appropriate  Insight:  good  Judgment:  intact  Impulse Control:  intact     Suicide Risk Assessment:  Today Timmy PADGETT Joeemma reports continued recurrent suicidal ideation, with associated fantasies of shooting self with gun or car failing him. In  addition, there are notable risk factors for self-harm, including access to firearm, anxiety, suicidal ideation and withdrawing. However, risk is mitigated by commitment to family, sobriety, absence of past attempts, ability to volunteer a safety plan, history of seeking help when needed and future oriented. Therefore, based on all available evidence including the factors cited above, Timmy Ftizpatrick does not appear to be at imminent risk for self-harm, does not meet criteria for a 72-hr hold, and therefore remains appropriate for ongoing outpatient level of care.  A thorough assessment of risk factors related to suicide and self-harm have been reviewed and are noted above. The patient convincingly denies acute suicidality on several occasions. Local community safety resources reviewed and printed for patient to use if needed. There was no deceit detected, and the patient presented in a manner that was believable.     DSM5 Diagnosis:  296.89 Bipolar II Disorder With mixed features  300.00 (F41.9) Unspecified Anxiety Disorder    Medical comorbidities include:   Patient Active Problem List    Diagnosis Date Noted     Anxiety 09/18/2017     Priority: Medium     Tear of medial meniscus of knee 09/08/2016     Priority: Medium     Chemical dependency (H) 11/25/2015     Priority: Medium     Alcoholic, sober since 2006 09/13/2012     Priority: Medium     Major depression in partial remission (H) 06/30/2011     Priority: Medium     Migraine headache 06/30/2011     Priority: Medium     (Problem list name updated by automated process. Provider to review and confirm.)       CARDIOVASCULAR SCREENING; LDL GOAL LESS THAN 160 10/31/2010     Priority: Medium     Cervicalgia 03/24/2010     Priority: Medium     Nonallopathic lesion of thoracic region 03/24/2010     Priority: Medium     Problem list name updated by automated process. Provider to review       Nonallopathic lesion of lumbar region 03/24/2010     Priority: Medium      Problem list name updated by automated process. Provider to review           Assessment:  Timmy Fitzpatrick reports quality of agitated depression that leads writer to believe his diagnosis better characterized within bipolar spectrum. We discuss this by speakerphone with his wife, Cristnia, who is in agreement. We together also discuss voluntary hospitalization (agreed as option, but not needed to pursue just yet) and safety planning (patient to report to wife, local  or [facility] if feeling unsafe; and preventively removing firearms from home). We agree to trial of Lamictal, though he'll need to gradually reduce Gabapentin to mitigate adverse/allergic reaction. He may otherwise continue Lexapro 30 mg daily. He may use Lunesta 2 mg as needed for sleep, and otherwise prioritize his sleep. He continues to defer on counseling/therapy at this time.    Medication side effects and alternatives were reviewed. Health promotion activities recommended and reviewed today. All questions addressed. Education and counseling completed regarding risks and benefits of medications and psychotherapy options.    Treatment Plan:    Decrease Gabapentin before starting Lamictal: 1600 mg on day 1, then 900 mg on day 2, then 600 mg on day 3 then stop. Let 2 days go by before starting Lamictal    Start Lamicta: 25 mg (1 pill) daily for 2 weeks, then increase to 50 mg (2 pills) daily thereafter    Continue Lexapro 30 mg daily    May continue Lunesta 2 mg nightly PRN sleep    Continue other medical directions per primary care provider.    As agreed, take self to [facility] or local police station if feeling unself      Continue all other medications as reviewed per electronic medical record today.     Safety plan reviewed. To the Emergency Department as needed or call after hours crisis line at [phone] or [phone]. [place] Crisis Text [place]: Text [place] to 814782  or  Suicide LifeLine Chat:  suicidepreventionlifeline.org/chat/    Schedule an appointment with me in 4 weeks or sooner as needed.     Follow up with primary care provider as planned or for acute medical concerns.    Call the psychiatric nurse line with medication questions or concerns at [phone].    Comp[employer] Technologieshart may be used to communicate with your provider, but this is not intended to be used for emergencies.     Community Resources:    National Suicide Prevention Lifeline: 268.997.8994 (TTY: 416.748.5841). Call anytime for help.  (www.suicidepreventionlifeline.org)  National [place] on Mental Illness (www.audra.org): 918.235.3352 or 396-518-1695.   Mental Health Association (www.mentalhealth.org): 882.802.4165 or 689-006-8404.  [place] Crisis Text [place]: Text [place] to 664900    Administrative Billing:   Time spent with patient was 30 minutes and greater than 50% of time or 20 minutes was spent in counseling and coordination of care regarding above diagnoses and treatment plan.    Patient Status:  Patient will continue to be seen for ongoing consultation and stabilization.    Signed:   Yury Dueñas, JUANCHO   Psychiatry

## 2018-11-29 ASSESSMENT — PATIENT HEALTH QUESTIONNAIRE - PHQ9: SUM OF ALL RESPONSES TO PHQ QUESTIONS 1-9: 19

## 2018-11-29 ASSESSMENT — ANXIETY QUESTIONNAIRES: GAD7 TOTAL SCORE: 13

## 2018-12-07 ENCOUNTER — MYC MEDICAL ADVICE (OUTPATIENT)
Dept: PSYCHIATRY | Facility: CLINIC | Age: 38
End: 2018-12-07

## 2018-12-07 ENCOUNTER — OFFICE VISIT (OUTPATIENT)
Dept: FAMILY MEDICINE | Facility: CLINIC | Age: 38
End: 2018-12-07
Payer: COMMERCIAL

## 2018-12-07 VITALS
RESPIRATION RATE: 12 BRPM | DIASTOLIC BLOOD PRESSURE: 80 MMHG | HEIGHT: 70 IN | HEART RATE: 68 BPM | BODY MASS INDEX: 23.62 KG/M2 | TEMPERATURE: 98.1 F | OXYGEN SATURATION: 100 % | SYSTOLIC BLOOD PRESSURE: 135 MMHG | WEIGHT: 165 LBS

## 2018-12-07 DIAGNOSIS — J01.10 ACUTE NON-RECURRENT FRONTAL SINUSITIS: Primary | ICD-10-CM

## 2018-12-07 DIAGNOSIS — N52.9 ERECTILE DYSFUNCTION, UNSPECIFIED ERECTILE DYSFUNCTION TYPE: ICD-10-CM

## 2018-12-07 DIAGNOSIS — R07.0 THROAT PAIN: ICD-10-CM

## 2018-12-07 LAB
DEPRECATED S PYO AG THROAT QL EIA: NORMAL
SPECIMEN SOURCE: NORMAL

## 2018-12-07 PROCEDURE — 87081 CULTURE SCREEN ONLY: CPT | Performed by: NURSE PRACTITIONER

## 2018-12-07 PROCEDURE — 99214 OFFICE O/P EST MOD 30 MIN: CPT | Performed by: NURSE PRACTITIONER

## 2018-12-07 PROCEDURE — 87880 STREP A ASSAY W/OPTIC: CPT | Performed by: NURSE PRACTITIONER

## 2018-12-07 RX ORDER — SILDENAFIL CITRATE 20 MG/1
TABLET ORAL
Qty: 60 TABLET | Refills: 5 | Status: SHIPPED | OUTPATIENT
Start: 2018-12-07 | End: 2018-12-07

## 2018-12-07 RX ORDER — SILDENAFIL CITRATE 20 MG/1
TABLET ORAL
Qty: 60 TABLET | Refills: 5 | Status: SHIPPED | OUTPATIENT
Start: 2018-12-07 | End: 2020-03-24

## 2018-12-07 ASSESSMENT — PAIN SCALES - GENERAL: PAINLEVEL: MODERATE PAIN (5)

## 2018-12-07 NOTE — PATIENT INSTRUCTIONS
Complete full course of antibiotics even if you start to feel better        Acute Bacterial Rhinosinusitis (ABRS)    Acute bacterial rhinosinusitis (ABRS) is an infection of your nasal cavity and sinuses. It s caused by bacteria. Acute means that you ve had symptoms for less than 4 weeks, but possibly up to 12 weeks.  Understanding your sinuses  The nasal cavity is the large air-filled space behind your nose. The sinuses are a group of spaces formed by the bones of your face. They connect with your nasal cavity. ABRS causes the tissue lining these spaces to become inflamed. Mucus may not drain normally. This leads to facial pain and other symptoms.  What causes ABRS?  ABRS most often follows an upper respiratory infection caused by a virus. Bacteria then infect the lining of your nasal cavity and sinuses. But you can also get ABRS if you have:    Nasal allergies    Long-term nasal swelling and congestion not caused by allergies    Blockage in the nose  Symptoms of ABRS  The symptoms of ABRS may be different for each person and include:    Nasal congestion or blockage    Pain or pressure in the face    Thick, colored drainage from the nose  Other symptoms may include:    Runny nose    Fluid draining from the nose down the throat (postnasal drip)    Headache    Cough    Pain    Fever  Diagnosing ABRS  ABRS may be diagnosed if you ve had an upper respiratory infection like a cold and cough for 10 or more days without improvement or with worsening symptoms. Your healthcare provider will ask about your symptoms and your medical history. The provider will check your vital signs, including your temperature. You ll have a physical exam. The healthcare provider will check your ears, nose, and throat. You likely won t need any tests. If ABRS comes back, you may have a culture or other tests.  Treatment for ABRS  Treatment may include:    Antibiotic medicine. This is for symptoms that last for at least 10 to 14 days.    Nasal  corticosteroid medicine. Drops or spray used in the nose can lessen swelling and congestion.    Over-the-counter pain medicine. This is to lessen sinus pain and pressure.    Nasal decongestant medicine. Spray or drops may help to lessen congestion. Do not use them for more than a few days.    Salt wash (saline irrigation). This can help to loosen mucus.  Possible complications of ABRS  ABRS may come back or become long-term (chronic). In rare cases, ABRS may cause complications such as:     Inflamed tissue around the brain and spinal cord (meningitis)    Inflamed tissue around the eyes (orbital cellulitis)    Inflamed bones around the sinuses (osteitis)  These problems may need to be treated in a hospital with intravenous (IV) antibiotic medicine or surgery.  When to call the healthcare provider  Call your healthcare provider if you have any of the following:    Symptoms that don t get better, or get worse    Symptoms that don t get better after 3 to 5 days on antibiotics    Trouble seeing    Swelling around your eyes    Confusion or trouble staying awake   Date Last Reviewed: 5/1/2017 2000-2018 [employer]. [address] [place] [address], [place] [address]. All rights reserved. This information is not intended as a substitute for professional medical care. Always follow your healthcare professional's instructions.

## 2018-12-07 NOTE — TELEPHONE ENCOUNTER
"Requested Prescriptions   Pending Prescriptions Disp Refills     sildenafil (REVATIO) 20 MG tablet [Pharmacy Med Name: SILDENAFIL CITRATE 20MG TABS] 60 tablet 11     Sig: TAKE 5 TABLETS BY MOUTH DAILY AS NEEDED, 30 MINUTES TO 4 HOURS BEFORE SEX. (DO NOT USE WITH NITROGLYCERIN, TERAZOSIN OR DOXAZOSIN    Erectile Dysfuction Protocol Passed    12/7/2018 10:53 AM       Passed - Absence of nitrates on medication list       Passed - Absence of Alpha Blockers on Med list       Passed - Recent (12 mo) or future (30 days) visit within the authorizing provider's specialty    Patient had office visit in the last 12 months or has a visit in the next 30 days with authorizing provider or within the authorizing provider's specialty.  See \"Patient Info\" tab in inbasket, or \"Choose Columns\" in Meds & Orders section of the refill encounter.             Passed - Patient is age 18 or older          "

## 2018-12-07 NOTE — PROGRESS NOTES
SUBJECTIVE:   Timmy Fitzpatrick is a 38 year old male who presents to clinic today for the following health issues:      Medication Followup of Sildenafil    Taking Medication as prescribed: yes    Side Effects:  None    Medication Helping Symptoms:  yes     Acute Illness   Acute illness concerns: pain pressure, right neck ear pain, tongue pain  Onset: 2 weeks    Fever: no    Chills/Sweats: YES- sweats    Headache (location?): YES    Sinus Pressure:YES    Conjunctivitis:  no    Ear Pain: YES: right    Rhinorrhea: YES    Congestion: YES    Sore Throat: no     Cough: YES-productive of green sputum    Wheeze: no    Decreased Appetite: YES    Nausea: yes    Vomiting: no    Diarrhea:  YES    Dysuria/Freq.: no    Fatigue/Achiness: YES    Sick/Strep Exposure: YES- daughter has strep, pink eye, earinfection     Therapies Tried and outcome: none        Problem list and histories reviewed & adjusted, as indicated.  Further history obtained, clarified or corrected by provider:  Right side sinus tenderness, ear.  Thick discolored nasal discharge for over a week.  Head congestion for 2-3 weeks. Daughter has strep see above.  Green sputum for about a month        Patient Active Problem List   Diagnosis     Cervicalgia     Nonallopathic lesion of thoracic region     Nonallopathic lesion of lumbar region     CARDIOVASCULAR SCREENING; LDL GOAL LESS THAN 160     Major depression in partial remission (H)     Migraine headache     Alcoholic, sober since 2006     Chemical dependency (H)     Anxiety     Tear of medial meniscus of knee     Bipolar II disorder (H)     Past Surgical History:   Procedure Laterality Date     HERNIA REPAIR       SURGICAL HISTORY OF -       deviated septum       Social History     Tobacco Use     Smoking status: Former Smoker     Types: Dip, chew, snus or snuff     Smokeless tobacco: Current User     Types: Chew     Last attempt to quit: 11/16/2014     Tobacco comment: vaping as well   Substance Use Topics  "    Alcohol use: No     Comment: sober since April 2016     Family History   Problem Relation Age of Onset     Cancer Paternal Grandfather      Depression Mother      Anxiety Disorder Mother      Depression Father      Dementia Maternal Grandfather      Substance Abuse Paternal Grandmother            Reviewed and updated as needed this visit by clinical staff  Tobacco  Allergies  Meds  Problems  Med Hx  Surg Hx  Fam Hx  Soc Hx        Reviewed and updated as needed this visit by Provider  Tobacco  Allergies  Meds  Problems  Med Hx  Surg Hx  Fam Hx         ROS:  Constitutional, HEENT, cardiovascular, pulmonary, gi and gu systems are negative, except as otherwise noted.    OBJECTIVE:     /80 (BP Location: Right arm, Patient Position: Chair, Cuff Size: Adult Regular)   Pulse 68   Temp 98.1  F (36.7  C) (Tympanic)   Resp 12   Ht 1.778 m (5' 10\")   Wt 74.8 kg (165 lb)   SpO2 100%   BMI 23.68 kg/m    Body mass index is 23.68 kg/m .  GENERAL: healthy, alert and no distress  EYES: Eyes grossly normal to inspection and conjunctivae and sclerae normal  HENT: normal cephalic/atraumatic, ear canals and TM's normal, nose and mouth without ulcers or lesions, oropharynx erythematous oral mucous membranes moist, sinuses: frontal, ethmoid tenderness on bilaterally nECK: no adenopathy, no asymmetry, masses, or scars and thyroid normal to palpation  RESP: lungs clear to auscultation - no rales, rhonchi or wheezes  CV: regular rate and rhythm, normal S1 S2, no S3 or S4, no murmur, click or rub, no peripheral edema and peripheral pulses strong  ABDOMEN: soft, nontender, no hepatosplenomegaly, no masses and bowel sounds normal  MS: no gross musculoskeletal defects noted, no edema    Diagnostic Test Results:  Results for orders placed or performed in visit on 12/07/18   Strep, Rapid Screen   Result Value Ref Range    Specimen Description Throat     Rapid Strep A Screen       NEGATIVE: No Group A streptococcal " antigen detected by immunoassay, await culture report.   Beta strep group A culture   Result Value Ref Range    Specimen Description Throat     Culture Micro No beta hemolytic Streptococcus Group A isolated        ASSESSMENT/PLAN:     ASSESSMENT:  1. Acute non-recurrent frontal sinusitis.  Treat with Augmentin   2. Throat pain.  Strep test negative.   3. Erectile dysfunction, unspecified erectile dysfunction type.  Stable.  Refill sildenafil.       PLAN:  Orders Placed This Encounter     sildenafil (REVATIO) 20 MG tablet     amoxicillin-clavulanate (AUGMENTIN) 875-125 MG tablet       Patient Instructions     Complete full course of antibiotics even if you start to feel better        Acute Bacterial Rhinosinusitis (ABRS)    Acute bacterial rhinosinusitis (ABRS) is an infection of your nasal cavity and sinuses. It s caused by bacteria. Acute means that you ve had symptoms for less than 4 weeks, but possibly up to 12 weeks.  Understanding your sinuses  The nasal cavity is the large air-filled space behind your nose. The sinuses are a group of spaces formed by the bones of your face. They connect with your nasal cavity. ABRS causes the tissue lining these spaces to become inflamed. Mucus may not drain normally. This leads to facial pain and other symptoms.  What causes ABRS?  ABRS most often follows an upper respiratory infection caused by a virus. Bacteria then infect the lining of your nasal cavity and sinuses. But you can also get ABRS if you have:    Nasal allergies    Long-term nasal swelling and congestion not caused by allergies    Blockage in the nose  Symptoms of ABRS  The symptoms of ABRS may be different for each person and include:    Nasal congestion or blockage    Pain or pressure in the face    Thick, colored drainage from the nose  Other symptoms may include:    Runny nose    Fluid draining from the nose down the throat (postnasal drip)    Headache    Cough    Pain    Fever  Diagnosing ABRS  ABRS may be  diagnosed if you ve had an upper respiratory infection like a cold and cough for 10 or more days without improvement or with worsening symptoms. Your healthcare provider will ask about your symptoms and your medical history. The provider will check your vital signs, including your temperature. You ll have a physical exam. The healthcare provider will check your ears, nose, and throat. You likely won t need any tests. If ABRS comes back, you may have a culture or other tests.  Treatment for ABRS  Treatment may include:    Antibiotic medicine. This is for symptoms that last for at least 10 to 14 days.    Nasal corticosteroid medicine. Drops or spray used in the nose can lessen swelling and congestion.    Over-the-counter pain medicine. This is to lessen sinus pain and pressure.    Nasal decongestant medicine. Spray or drops may help to lessen congestion. Do not use them for more than a few days.    Salt wash (saline irrigation). This can help to loosen mucus.  Possible complications of ABRS  ABRS may come back or become long-term (chronic). In rare cases, ABRS may cause complications such as:     Inflamed tissue around the brain and spinal cord (meningitis)    Inflamed tissue around the eyes (orbital cellulitis)    Inflamed bones around the sinuses (osteitis)  These problems may need to be treated in a hospital with intravenous (IV) antibiotic medicine or surgery.  When to call the healthcare provider  Call your healthcare provider if you have any of the following:    Symptoms that don t get better, or get worse    Symptoms that don t get better after 3 to 5 days on antibiotics    Trouble seeing    Swelling around your eyes    Confusion or trouble staying awake   Date Last Reviewed: 5/1/2017 2000-2018 [employer]. [address] [place] [address], [place] [address]. All rights reserved. This information is not intended as a substitute for professional medical care. Always follow your healthcare professional's  instructions.        Patient agrees with plan of care as outlined. Call or return to the clinic with any worsening of symptoms or no resolution. Medication side effects reviewed.      Yesy Mitchell, SHADI, APRN [facility]

## 2018-12-07 NOTE — TELEPHONE ENCOUNTER
Prescription approved per [facility] Refill Protocol.  Carmelina STRANGE RN

## 2018-12-07 NOTE — MR AVS SNAPSHOT
After Visit Summary   12/7/2018    Timmy Fitzpatrick    MRN: [id number]           Patient Information     Date Of Birth          1980        Visit Information        Provider Department      12/7/2018 11:20 AM Yesy Mitchell APRN [facility]        Today's Diagnoses     Throat pain    -  1    Persistent insomnia        Erectile dysfunction, unspecified erectile dysfunction type        Acute non-recurrent frontal sinusitis          Care Instructions    Complete full course of antibiotics even if you start to feel better        Acute Bacterial Rhinosinusitis (ABRS)    Acute bacterial rhinosinusitis (ABRS) is an infection of your nasal cavity and sinuses. It s caused by bacteria. Acute means that you ve had symptoms for less than 4 weeks, but possibly up to 12 weeks.  Understanding your sinuses  The nasal cavity is the large air-filled space behind your nose. The sinuses are a group of spaces formed by the bones of your face. They connect with your nasal cavity. ABRS causes the tissue lining these spaces to become inflamed. Mucus may not drain normally. This leads to facial pain and other symptoms.  What causes ABRS?  ABRS most often follows an upper respiratory infection caused by a virus. Bacteria then infect the lining of your nasal cavity and sinuses. But you can also get ABRS if you have:    Nasal allergies    Long-term nasal swelling and congestion not caused by allergies    Blockage in the nose  Symptoms of ABRS  The symptoms of ABRS may be different for each person and include:    Nasal congestion or blockage    Pain or pressure in the face    Thick, colored drainage from the nose  Other symptoms may include:    Runny nose    Fluid draining from the nose down the throat (postnasal drip)    Headache    Cough    Pain    Fever  Diagnosing ABRS  ABRS may be diagnosed if you ve had an upper respiratory infection like a cold and cough for 10 or more days without  improvement or with worsening symptoms. Your healthcare provider will ask about your symptoms and your medical history. The provider will check your vital signs, including your temperature. You ll have a physical exam. The healthcare provider will check your ears, nose, and throat. You likely won t need any tests. If ABRS comes back, you may have a culture or other tests.  Treatment for ABRS  Treatment may include:    Antibiotic medicine. This is for symptoms that last for at least 10 to 14 days.    Nasal corticosteroid medicine. Drops or spray used in the nose can lessen swelling and congestion.    Over-the-counter pain medicine. This is to lessen sinus pain and pressure.    Nasal decongestant medicine. Spray or drops may help to lessen congestion. Do not use them for more than a few days.    Salt wash (saline irrigation). This can help to loosen mucus.  Possible complications of ABRS  ABRS may come back or become long-term (chronic). In rare cases, ABRS may cause complications such as:     Inflamed tissue around the brain and spinal cord (meningitis)    Inflamed tissue around the eyes (orbital cellulitis)    Inflamed bones around the sinuses (osteitis)  These problems may need to be treated in a hospital with intravenous (IV) antibiotic medicine or surgery.  When to call the healthcare provider  Call your healthcare provider if you have any of the following:    Symptoms that don t get better, or get worse    Symptoms that don t get better after 3 to 5 days on antibiotics    Trouble seeing    Swelling around your eyes    Confusion or trouble staying awake   Date Last Reviewed: 5/1/2017 2000-2018 [employer]. [address] [place] [address], [place] [address]. All rights reserved. This information is not intended as a substitute for professional medical care. Always follow your healthcare professional's instructions.                Follow-ups after your visit        Follow-up notes from your care team      Return in about 3 days (around 12/10/2018), or if symptoms worsen or fail to improve.      Your next 10 appointments already scheduled     Dec 26, 2018 12:45 PM CST   [facility] Yury [facility]as C[facility]    [address]  [address]  [place] [address]   [phone]            Jan 02, 2019  9:45 AM CST   Radiology Injections with Aquiles Harley MD   [facility] ([facility])    [address]  [address]  [facility] [address]   [phone]              Future tests that were ordered for you today     Open Future Orders        Priority Expected Expires Ordered    XR Translaminar Epidural Lumbar Inj Incl Imaging Routine 12/5/2018 12/5/2019 12/5/2018            Who to contact     If you have questions or need follow up information about today's clinic visit or your schedule please contact [facility] directly at [phone].  Normal or non-critical lab and imaging results will be communicated to you by MyChart, letter or phone within 4 business days after the clinic has received the results. If you do not hear from us within 7 days, please contact the clinic through [employer] or phone. If you have a critical or abnormal lab result, we will notify you by phone as soon as possible.  Submit refill requests through [employer] or call your pharmacy and they will forward the refill request to us. Please allow 3 business days for your refill to be completed.          Additional Information About Your Visit        [employer] Information     [employer] gives you secure access to your electronic health record. If you see a primary care provider, you can also send messages to your care team and make appointments. If you have questions, please call your primary care clinic.  If you do not have a primary care provider, please call [phone] and they will assist you.    "     Care EveryWhere ID     This is your Care EveryWhere ID. This could be used by other organizations to access your [place] medical records  [id number]        Your Vitals Were     Pulse Temperature Respirations Height Pulse Oximetry BMI (Body Mass Index)    68 98.1  F (36.7  C) (Tympanic) 12 5' 10\" (1.778 m) 100% 23.68 kg/m2       Blood Pressure from Last 3 Encounters:   12/07/18 135/80   11/28/18 120/70   10/24/18 110/70    Weight from Last 3 Encounters:   12/07/18 165 lb (74.8 kg)   11/28/18 169 lb (76.7 kg)   10/24/18 168 lb (76.2 kg)              We Performed the Following     Beta strep group A culture     Strep, Rapid Screen          Today's Medication Changes          These changes are accurate as of 12/7/18 12:15 PM.  If you have any questions, ask your nurse or doctor.               Start taking these medicines.        Dose/Directions    amoxicillin-clavulanate 875-125 MG tablet   Commonly known as:  AUGMENTIN   Used for:  Acute non-recurrent frontal sinusitis   Started by:  Yesy Mitchell APRN CNP        Dose:  1 tablet   Take 1 tablet by mouth 2 times daily   Quantity:  20 tablet   Refills:  0       sildenafil 20 MG tablet   Commonly known as:  REVATIO   Used for:  Erectile dysfunction, unspecified erectile dysfunction type   Started by:  Yesy Mitchell APRN CNP        TAKE 5 TABLETS BY MOUTH DAILY AS NEEDED, 30 MINUTES TO 4 HOURS BEFORE SEX. (DO NOT USE WITH NITROGLYCERIN, TERAZOSIN OR DOXAZOSIN   Quantity:  60 tablet   Refills:  5            Where to get your medicines      These medications were sent to [facility] [place], [place] - [address]  [address] [facility] [address]     Phone:  [phone]     amoxicillin-clavulanate 875-125 MG tablet         Call your pharmacy to confirm that your medication is ready for pickup. It may take up to 24 hours for them to receive the prescription. If the prescription is not ready within 3 " business days, please contact your clinic or your provider.     We will let you know when these medications are ready. If you don't hear back within 3 business days, please contact us.     sildenafil 20 MG tablet                Primary Care Provider Office Phone # Fax #    R Refugio Loza [phone] [address] [facility] [facility] [address]        Equal Access to Services     [facility] HospitalCLAUDIO : Hadii aad ku hadasho Soomaali, waaxda luqadaha, qaybta kaalmada adeegyada, waxay idiin hayaan adeeg kharash la'aan ah. So [place] [phone].    ATENCIÓN: Si habla español, tiene a waters disposición servicios gratuitos de asistencia lingüística. Llame al [phone].    We comply with applicable federal civil rights laws and [place] laws. We do not discriminate on the basis of race, color, national origin, age, disability, sex, sexual orientation, or gender identity.            Thank you!     Thank you for choosing [facility]  for your care. Our goal is always to provide you with excellent care. Hearing back from our patients is one way we can continue to improve our services. Please take a few minutes to complete the written survey that you may receive in the mail after your visit with us. Thank you!             Your Updated Medication List - Protect others around you: Learn how to safely use, store and throw away your medicines at www.disposemymeds.org.          This list is accurate as of 12/7/18 12:15 PM.  Always use your most recent med list.                   Brand Name Dispense Instructions for use Diagnosis    amoxicillin-clavulanate 875-125 MG tablet    AUGMENTIN    20 tablet    Take 1 tablet by mouth 2 times daily    Acute non-recurrent frontal sinusitis       escitalopram 20 MG tablet    LEXAPRO    45 tablet    Take 1.5 tablets (30 mg) by mouth daily    Major depressive disorder with single episode, in partial remission (H), Anxiety       eszopiclone 2 MG tablet    LUNESTA    30  tablet    Take 1 tablet (2 mg) by mouth nightly as needed for sleep    Persistent insomnia       GLUCOSAMINE HCL      None Entered        lamoTRIgine 25 MG tablet    LaMICtal    60 tablet    2 tabs daily    Bipolar II disorder (H)       MENS MULTIPLE VITAMIN/LYCOPENE PO      Take 1 tablet by mouth daily.        sildenafil 20 MG tablet    REVATIO    60 tablet    TAKE 5 TABLETS BY MOUTH DAILY AS NEEDED, 30 MINUTES TO 4 HOURS BEFORE SEX. (DO NOT USE WITH NITROGLYCERIN, TERAZOSIN OR DOXAZOSIN    Erectile dysfunction, unspecified erectile dysfunction type

## 2018-12-08 LAB
BACTERIA SPEC CULT: NORMAL
SPECIMEN SOURCE: NORMAL

## 2018-12-10 NOTE — TELEPHONE ENCOUNTER
RE: Updates about my health   Message [id number]   From  Ventura Fitzpatrick To  Yury Dueñas CNP Sent  12/10/2018 11:24 AM   OK, sounds good.  A new fissure appears to have formed yesterday (runs opposite the other few) and is still present.  I'll keep you posted and plan for our appt. the 26th unless I hear otherwise.  Thank you again.     Ventura

## 2018-12-10 NOTE — TELEPHONE ENCOUNTER
Updates about my health   Message [id number]   From  Ventura Fitzpatrick To  Yury Dueñas, CNP Sent  12/7/2018  7:21 PM   Best possible  subject  I suppose.  I was asked to keep a sharp eye out for a rash upon starting my new medication (Lamictal).    I have not developed a rash, rather within a week (I think a bit less but can t recall for sure) I developed what I believe to be Fissured Tounge.  My next follow up with Yury is 12/26, I m wondering if he d would like to see me to discuss it at all.  He did say if I develop a rash to discontinue the med, but this isn t.  The only reason I bring it up is because I came across some research that suggests a correlation between the two.  Thank you for your time.     Ventura Fitzpatrick

## 2018-12-26 ENCOUNTER — OFFICE VISIT (OUTPATIENT)
Dept: PSYCHIATRY | Facility: CLINIC | Age: 38
End: 2018-12-26
Payer: COMMERCIAL

## 2018-12-26 VITALS
BODY MASS INDEX: 25.05 KG/M2 | HEIGHT: 70 IN | SYSTOLIC BLOOD PRESSURE: 110 MMHG | DIASTOLIC BLOOD PRESSURE: 70 MMHG | RESPIRATION RATE: 16 BRPM | HEART RATE: 66 BPM | OXYGEN SATURATION: 99 % | TEMPERATURE: 98 F | WEIGHT: 175 LBS

## 2018-12-26 DIAGNOSIS — F31.81 BIPOLAR II DISORDER (H): Primary | ICD-10-CM

## 2018-12-26 PROCEDURE — 99214 OFFICE O/P EST MOD 30 MIN: CPT | Performed by: NURSE PRACTITIONER

## 2018-12-26 RX ORDER — GABAPENTIN 300 MG/1
300 CAPSULE ORAL
COMMUNITY
End: 2018-12-26

## 2018-12-26 RX ORDER — GABAPENTIN 300 MG/1
300 CAPSULE ORAL
Qty: 180 CAPSULE | Refills: 1 | Status: SHIPPED | OUTPATIENT
Start: 2018-12-26 | End: 2019-03-12

## 2018-12-26 RX ORDER — ARIPIPRAZOLE 2 MG/1
2 TABLET ORAL DAILY
Qty: 30 TABLET | Refills: 0 | Status: SHIPPED | OUTPATIENT
Start: 2018-12-26 | End: 2019-02-06

## 2018-12-26 ASSESSMENT — ANXIETY QUESTIONNAIRES
GAD7 TOTAL SCORE: 12
7. FEELING AFRAID AS IF SOMETHING AWFUL MIGHT HAPPEN: NOT AT ALL
3. WORRYING TOO MUCH ABOUT DIFFERENT THINGS: NOT AT ALL
2. NOT BEING ABLE TO STOP OR CONTROL WORRYING: NOT AT ALL
1. FEELING NERVOUS, ANXIOUS, OR ON EDGE: NEARLY EVERY DAY
5. BEING SO RESTLESS THAT IT IS HARD TO SIT STILL: NEARLY EVERY DAY
4. TROUBLE RELAXING: NEARLY EVERY DAY
7. FEELING AFRAID AS IF SOMETHING AWFUL MIGHT HAPPEN: NOT AT ALL
GAD7 TOTAL SCORE: 12
GAD7 TOTAL SCORE: 12
6. BECOMING EASILY ANNOYED OR IRRITABLE: NEARLY EVERY DAY

## 2018-12-26 ASSESSMENT — PATIENT HEALTH QUESTIONNAIRE - PHQ9
SUM OF ALL RESPONSES TO PHQ QUESTIONS 1-9: 17
SUM OF ALL RESPONSES TO PHQ QUESTIONS 1-9: 17
10. IF YOU CHECKED OFF ANY PROBLEMS, HOW DIFFICULT HAVE THESE PROBLEMS MADE IT FOR YOU TO DO YOUR WORK, TAKE CARE OF THINGS AT HOME, OR GET ALONG WITH OTHER PEOPLE: SOMEWHAT DIFFICULT

## 2018-12-26 ASSESSMENT — MIFFLIN-ST. JEOR: SCORE: 1715.04

## 2018-12-26 NOTE — PROGRESS NOTES
"    Outpatient Psychiatric Progress Note    Name: Timmy Fitzpatrick   : 1980                    Primary Care Provider: CLAUDIO Loza MD  Therapist: none    PHQ-9 scores:  PHQ-9 SCORE 2018 10/24/2018 2018   PHQ-9 Total Score - - -   PHQ-9 Total Score MyChart - - 19 (Moderately severe depression)   PHQ-9 Total Score 17 21 19   Some encounter information is confidential and restricted. Go to Review Flowsheets activity to see all data.       JUAREZ-7 scores:  JUAREZ-7 SCORE 2018 10/24/2018 2018   Total Score - - -   Total Score - - 13 (moderate anxiety)   Total Score 15 15 13   Some encounter information is confidential and restricted. Go to Review Flowsheets activity to see all data.     Answers for HPI/ROS submitted by the patient on 2018   If you checked off any problems, how difficult have these problems made it for you to do your work, take care of things at home, or get along with other people?: Somewhat difficult  PHQ9 TOTAL SCORE: 17  JUAREZ 7 TOTAL SCORE: 12      Taking Medication as prescribed: Stopped lamictal    Side Effects:  Tongue fissues, white, painful    Medication Helping Symptoms:  About the same.     Patient Identification:  Patient is a 38 year old year old,   White American male  who presents for return visit with me.  Patient is currently employed full time. Patient attended the session alone. Patient prefers to be called: \"Ventura\".    Interim History:  I last saw Timmy Fitzpatrick for outpatient psychiatry Return Visit on 18.     During that appointment, we discussed likely bipolar spectrum disorder and initiated a trial of Lamictal.    Current medications include:   Current Outpatient Medications   Medication Sig     amoxicillin-clavulanate (AUGMENTIN) 875-125 MG tablet Take 1 tablet by mouth 2 times daily     escitalopram (LEXAPRO) 20 MG tablet Take 1.5 tablets (30 mg) by mouth daily     eszopiclone (LUNESTA) 2 MG tablet Take 1 tablet (2 mg) by mouth " "nightly as needed for sleep (Patient not taking: Reported on 11/28/2018)     GLUCOSAMINE HCL None Entered     lamoTRIgine (LAMICTAL) 25 MG tablet 2 tabs daily     Multiple Vitamins-Minerals (MENS MULTIPLE VITAMIN/LYCOPENE PO) Take 1 tablet by mouth daily.     sildenafil (REVATIO) 20 MG tablet TAKE 5 TABLETS BY MOUTH DAILY AS NEEDED, 30 MINUTES TO 4 HOURS BEFORE SEX. (DO NOT USE WITH NITROGLYCERIN, TERAZOSIN OR DOXAZOSIN     No current facility-administered medications for this visit.        The [place] Prescription Monitoring Program has been reviewed and there are no concerns about diversionary activity for controlled substances at this time.      I was able to review most recent Primary Care Provider, specialty provider, and therapy visit notes that I have access to.     Today, patient reports mood and suicidal ideation \"no worse, no better\" since last visit. Had discontinued Lamictal 1 week into use after development of redness/fissures on tongue. Was seen by a colleague of PCP and prescribed Augmentin for presumed sinusitis. His fissures have improved somewhat, but has irritation and pronounced bumps on tongue; also has white plaque/grown on tongue; adds that his anus has been itching since this all began a month ago. He is wondering if he's developed thrush secondary to his malnutrition; plans to see PCP about this. He is less worried that he had an allergic reaction to Lamictal.    Patient states mood has been more consistently low; denies any particular hypomanic flare-ups; continues to isolate. Continues to have regular SI; states guns were successfully removed from home and given to a friend for safe keeping. He denies any intent or plans with recent SI; still feels capable of reaching out if in crisis or need; would be willing to bring self to hospital if needed.    His appetite continues to be low; one meal a day. Made another batch of cannabis cookies, which he uses regularly to help stoke more of an " "appetite. He continues with Gabapentin, dosing variably from 1800 to 2400 mg per day; feels it keeps his mood more even. He continues with Lexapro 30 mg daily. He is using Lunesta 2 mg once a week for sleep; \"I don't want to make it a habit\".    Patient states a interest in seeing a therapist at this time; particularly interested in DBT; states wife had DBT in past was helpful for here. States S DBT location in [place] would be ideal, as close to work.          Past Medical History:   Diagnosis Date     Anxiety      Cervicalgia      Chemical dependency (H)      Depression      Migraine headache       has a past medical history of Anxiety, Cervicalgia, Chemical dependency (H), Depression, and Migraine headache.    Social history updates:  No significant updates    Substance use updates:  Nicotine vaping  No ETOH  Has cannabis edible cookies most nights of week      Vital Signs:   /70 (BP Location: Left arm, Patient Position: Chair, Cuff Size: Adult Large)   Pulse 66   Temp 98  F (36.7  C) (Oral)   Resp 16   Ht 1.778 m (5' 10\")   Wt 79.4 kg (175 lb)   SpO2 99%   BMI 25.11 kg/m      Labs:  Most recent laboratory results reviewed and no new labs.    Review of Systems:  10 systems (general, cardiovascular, respiratory, eyes, ENT, endocrine, GI, , M/S, neurological) were reviewed. Most pertinent finding(s) is/are: throat irritation, underweight, low appetite. The remaining systems are all unremarkable.    Mental Status Examination:     Appearance:  awake, alert and adequately groomed  Attitude:  cooperative   Eye Contact:  good  Gait and Station: Normal  Psychomotor Behavior:  intact station, gait and muscle tone  Oriented to:  time, person, and place  Attention Span and Concentration:  Normal  Speech:  clear, coherent  Mood:  depressed  Affect:  appropriate and in normal range  Associations:  no loose associations  Thought Process:  logical, linear and goal oriented  Thought Content:  Appropriate to " Interview  Recent and Remote Memory:  intact Not formally assessed. No amnesia.  Fund of Knowledge: appropriate  Insight:  good  Judgment:  intact  Impulse Control:  intact     Suicide Risk Assessment:  Today Timmy Fitzpatrick reports continued recurrent suicidal ideation, presently passive in nature. In addition, there are notable risk factors for self-harm, including access to firearm, anxiety, suicidal ideation and withdrawing. However, risk is mitigated by commitment to family, sobriety, absence of past attempts, ability to volunteer a safety plan, history of seeking help when needed and future oriented. Therefore, based on all available evidence including the factors cited above, Timmy Fitzpatrick does not appear to be at imminent risk for self-harm, does not meet criteria for a 72-hr hold, and therefore remains appropriate for ongoing outpatient level of care.  A thorough assessment of risk factors related to suicide and self-harm have been reviewed and are noted above. The patient convincingly denies acute suicidality on several occasions. Local community safety resources reviewed and printed for patient to use if needed. There was no deceit detected, and the patient presented in a manner that was believable.      DSM5 Diagnosis:  296.89 Bipolar II Disorder With mixed features  300.00 (F41.9) Unspecified Anxiety Disorder      Medical comorbidities include:   Patient Active Problem List    Diagnosis Date Noted     Bipolar II disorder (H) 11/28/2018     Priority: Medium     Anxiety 09/18/2017     Priority: Medium     Tear of medial meniscus of knee 09/08/2016     Priority: Medium     Chemical dependency (H) 11/25/2015     Priority: Medium     Alcoholic, sober since 2006 09/13/2012     Priority: Medium     Major depression in partial remission (H) 06/30/2011     Priority: Medium     Migraine headache 06/30/2011     Priority: Medium     (Problem list name updated by automated process. Provider to review and  confirm.)       CARDIOVASCULAR SCREENING; LDL GOAL LESS THAN 160 10/31/2010     Priority: Medium     Cervicalgia 03/24/2010     Priority: Medium     Nonallopathic lesion of thoracic region 03/24/2010     Priority: Medium     Problem list name updated by automated process. Provider to review       Nonallopathic lesion of lumbar region 03/24/2010     Priority: Medium     Problem list name updated by automated process. Provider to review           Assessment:  Timmy Fitzpatrick discontinued Lamictal after a week's use after correlating onset of tissue irritation in mouth. While the mucous membranes are a area of heightened concern with regard to a allergic reaction to Lamictal, I'm not certain his physical symptoms are an allergic reaction. He's long ceased use of the Lamictial, and visually I'm seeing oral candidiasis. I asked that he see his PCP about this. In the meantime, while we could re-trial Lamictal, I am wondering if he'll see more prompt response from the addition of Abilify to his Lexapro. I applaud his interest in seeing a DBT therapist, and have forwarded him information on S DBT in [facility]. He may continue Gabapentin daily, as prescribed, though I will continue to monitor that he does not over-use it. He may also continue Lunesta 2 mg as needed for sleep -- I appreciate that he is conservative with this medication.    Medication side effects and alternatives were reviewed. Health promotion activities recommended and reviewed today. All questions addressed. Education and counseling completed regarding risks and benefits of medications and psychotherapy options.    Treatment Plan:    Continue Lexapro 30 mg daily    Start Abilify 2 mg nightly    May continue Gabapentin 600 mg TID    May use Lunesta 2 mg as needed for sleep    Contact S DBT for individual and group therapy    See your PCP's office about oral growth    Continue all other medical directions per primary care provider.     Continue all  other medications as reviewed per electronic medical record today.     Safety plan reviewed. To the Emergency Department as needed or call after hours crisis line at [phone] or [phone]. [place] Crisis Text Line. Text [place] to 873270 or Suicide LifeLine Chat: suicidepreventionlifeline.org/chat/    Schedule an appointment with me in 3 weeks or sooner as needed. Call [facility] at [phone] to schedule.    Follow up with primary care provider as planned or for acute medical concerns.    Call the psychiatric nurse line with medication questions or concerns at [phone].    [employer] may be used to communicate with your provider, but this is not intended to be used for emergencies.    Administrative Billing:   Time spent with patient was 30 minutes and greater than 50% of time or 20 minutes was spent in counseling and coordination of care regarding above diagnoses and treatment plan.    Patient Status:  Patient will continue to be seen for ongoing consultation and stabilization.    Signed:   Yury Dueñas CNP   Psychiatry

## 2018-12-27 ASSESSMENT — ANXIETY QUESTIONNAIRES: GAD7 TOTAL SCORE: 12

## 2018-12-27 ASSESSMENT — PATIENT HEALTH QUESTIONNAIRE - PHQ9: SUM OF ALL RESPONSES TO PHQ QUESTIONS 1-9: 17

## 2019-02-06 ENCOUNTER — OFFICE VISIT (OUTPATIENT)
Dept: PSYCHIATRY | Facility: CLINIC | Age: 39
End: 2019-02-06
Payer: COMMERCIAL

## 2019-02-06 VITALS
SYSTOLIC BLOOD PRESSURE: 130 MMHG | OXYGEN SATURATION: 97 % | TEMPERATURE: 98.9 F | RESPIRATION RATE: 16 BRPM | HEIGHT: 70 IN | HEART RATE: 65 BPM | DIASTOLIC BLOOD PRESSURE: 80 MMHG | WEIGHT: 175 LBS | BODY MASS INDEX: 25.05 KG/M2

## 2019-02-06 DIAGNOSIS — G47.00 PERSISTENT INSOMNIA: ICD-10-CM

## 2019-02-06 DIAGNOSIS — F31.81 BIPOLAR II DISORDER (H): Primary | ICD-10-CM

## 2019-02-06 PROCEDURE — 99214 OFFICE O/P EST MOD 30 MIN: CPT | Performed by: NURSE PRACTITIONER

## 2019-02-06 RX ORDER — LITHIUM CARBONATE 300 MG/1
CAPSULE ORAL
Qty: 60 CAPSULE | Refills: 0 | Status: SHIPPED | OUTPATIENT
Start: 2019-02-06 | End: 2019-03-12 | Stop reason: DRUGHIGH

## 2019-02-06 RX ORDER — ESZOPICLONE 2 MG/1
2 TABLET, FILM COATED ORAL
Qty: 15 TABLET | Refills: 0 | Status: SHIPPED | OUTPATIENT
Start: 2019-02-06 | End: 2019-06-12

## 2019-02-06 ASSESSMENT — PATIENT HEALTH QUESTIONNAIRE - PHQ9
SUM OF ALL RESPONSES TO PHQ QUESTIONS 1-9: 18
10. IF YOU CHECKED OFF ANY PROBLEMS, HOW DIFFICULT HAVE THESE PROBLEMS MADE IT FOR YOU TO DO YOUR WORK, TAKE CARE OF THINGS AT HOME, OR GET ALONG WITH OTHER PEOPLE: VERY DIFFICULT
SUM OF ALL RESPONSES TO PHQ QUESTIONS 1-9: 18

## 2019-02-06 ASSESSMENT — ANXIETY QUESTIONNAIRES
7. FEELING AFRAID AS IF SOMETHING AWFUL MIGHT HAPPEN: NOT AT ALL
6. BECOMING EASILY ANNOYED OR IRRITABLE: NEARLY EVERY DAY
1. FEELING NERVOUS, ANXIOUS, OR ON EDGE: NEARLY EVERY DAY
GAD7 TOTAL SCORE: 15
GAD7 TOTAL SCORE: 15
4. TROUBLE RELAXING: NEARLY EVERY DAY
7. FEELING AFRAID AS IF SOMETHING AWFUL MIGHT HAPPEN: NOT AT ALL
GAD7 TOTAL SCORE: 15
2. NOT BEING ABLE TO STOP OR CONTROL WORRYING: MORE THAN HALF THE DAYS
3. WORRYING TOO MUCH ABOUT DIFFERENT THINGS: SEVERAL DAYS
5. BEING SO RESTLESS THAT IT IS HARD TO SIT STILL: NEARLY EVERY DAY

## 2019-02-06 ASSESSMENT — MIFFLIN-ST. JEOR: SCORE: 1715.04

## 2019-02-06 NOTE — LETTER
[address]  [place] [address]  Phone: [phone]  Fax: [phone]    February 6, 2019        Timmy Fitzpatrick  [address] [place] [address]          To whom it may concern:    RE: Timmy Fitzpatrick    I am writing this letter at the request of Mr. Fitzpatrick to verify I am treating him for a psychiatric condition. I have seen Mr. Fitzpatrick for medical visits on 10/24/18, 11/28/18, 12/26/18 and 2/6/19. He will continue in my care for the forseeable future.      Respectfully,            Yury Dueñas, CNP

## 2019-02-06 NOTE — PROGRESS NOTES
"    Outpatient Psychiatric Progress Note    Name: Timmy Fitzpatrick   : 1980                    Primary Care Provider: CLAUDIO Loza MD Therapist: None     PHQ-9 scores:  PHQ-9 SCORE 2018   PHQ-9 Total Score - - -   PHQ-9 Total Score MyChart 19 (Moderately severe depression) 17 (Moderately severe depression) 18 (Moderately severe depression)   PHQ-9 Total Score 19 17 18   Some encounter information is confidential and restricted. Go to Review Flowsheets activity to see all data.       JUAREZ-7 scores:  JUAREZ-7 SCORE 2018   Total Score - - -   Total Score 13 (moderate anxiety) 12 (moderate anxiety) 15 (severe anxiety)   Total Score 13 12 15   Some encounter information is confidential and restricted. Go to Review Flowsheets activity to see all data.     Answers for HPI/ROS submitted by the patient on 2019   If you checked off any problems, how difficult have these problems made it for you to do your work, take care of things at home, or get along with other people?: Very difficult  PHQ9 TOTAL SCORE: 18  JUAREZ 7 TOTAL SCORE: 15      Patient Identification:  Patient is a 38 year old year old,   White American male  who presents for return visit with me.  Patient is currently employed full time. Patient attended the session alone. Patient prefers to be called: \"[facility]\".    Interim History:  I last saw Timmy Fitzpatrick for outpatient psychiatry Return Visit on 18.     During that appointment, we reviewed his potential allergic reaction to Lamictal, which he had already discontinued. We also confirmed firearms were removed from his household and given to a family friend for safe keeping. We agreed to a trial of Abilify, to augment his Lexapro and Gabapentin, and to pursue DBT at [facility] in [facility] near work.      Current medications include:   Current Outpatient Medications   Medication Sig     escitalopram (LEXAPRO) 20 MG tablet Take 1.5 tablets (30 " "mg) by mouth daily     gabapentin (NEURONTIN) 300 MG capsule Take 1 capsule (300 mg) by mouth 6 times daily     GLUCOSAMINE HCL None Entered     Multiple Vitamins-Minerals (MENS MULTIPLE VITAMIN/LYCOPENE PO) Take 1 tablet by mouth daily.     sildenafil (REVATIO) 20 MG tablet TAKE 5 TABLETS BY MOUTH DAILY AS NEEDED, 30 MINUTES TO 4 HOURS BEFORE SEX. (DO NOT USE WITH NITROGLYCERIN, TERAZOSIN OR DOXAZOSIN     ARIPiprazole (ABILIFY) 2 MG tablet Take 1 tablet (2 mg) by mouth daily (Patient not taking: Reported on 2/6/2019)     eszopiclone (LUNESTA) 2 MG tablet Take 1 tablet (2 mg) by mouth nightly as needed for sleep (Patient not taking: Reported on 2/6/2019)     No current facility-administered medications for this visit.        The [place] Prescription Monitoring Program has been reviewed and there are no concerns about diversionary activity for controlled substances at this time.      I was able to review most recent Primary Care Provider, specialty provider, and therapy visit notes that I have access to.     Today, patient reports he trialed Abilify 2 mg qAM for 3 weeks before self-discontinuing. States he felt persistently and intolerably \"groggy and blah\" and felt his mood was no more stable or improved. Felt better initially after stopping Abilify. Now mood \"back to square one.\" States he continues to feel depressed, dismotivated, coiled with tension, irritable and not sleeping well.    States he had intensive SI last week after wife had to leave town. States he envisioned himself shooting his head with a shotgun. Denies having shotgun at home. States he cried for a long period. Had thoughts to take self to local PD office, but instead snowblowed his yard and the SI passed.    He continues with Lexapro, though decreased dosage from 30 to 20 mg daily. Was worried he had serotonin syndrome as he has been experiencing light and noise sensitivity. Decreased Lexapro, but no change in these symptom complaints. He " "states he'd take Lunesta every night as it is helpful for sleep, but is worried about addictive potential -- stopped using 2 weeks ago.    He has since stopped using cannabis edibles 2 weeks ago.    States he has not yet contacted [facility]. Looked at website and has number, but feels he hasn't had the spark/motivation to contact, which is in line with his not paying bills, etc.    Patient has missed a few days work secondary to depression. Requesting a letter to verify that he is in treatment.        Past Medical History:   Diagnosis Date     Anxiety      Cervicalgia      Chemical dependency (H)      Depression      Migraine headache       has a past medical history of Anxiety, Cervicalgia, Chemical dependency (H), Depression, and Migraine headache.    Social history updates:  No significant updates     Substance use updates:  Nicotine vaping  No ETOH; sober since 2016  Stopped cannabis edible cookies couple of weeks ago      Vital Signs:   /80 (BP Location: Left arm, Patient Position: Chair, Cuff Size: Adult Large)   Pulse 65   Temp 98.9  F (37.2  C) (Oral)   Resp 16   Ht 1.778 m (5' 10\")   Wt 79.4 kg (175 lb)   SpO2 97%   BMI 25.11 kg/m      Labs:  Most recent laboratory results reviewed and the pertinent results include:  TSH   Date Value Ref Range Status   07/13/2018 1.38 0.40 - 4.00 mU/L Final   09/26/2017 1.78 0.40 - 4.00 mU/L Final   08/19/2009 1.87 0.4 - 5.0 mU/L Final   08/31/2006 1.21 0.4 - 5.0 mU/L Final     Last Comprehensive Metabolic Panel:  Sodium   Date Value Ref Range Status   07/13/2018 138 133 - 144 mmol/L Final     Potassium   Date Value Ref Range Status   07/13/2018 4.1 3.4 - 5.3 mmol/L Final     Chloride   Date Value Ref Range Status   07/13/2018 103 94 - 109 mmol/L Final     Carbon Dioxide   Date Value Ref Range Status   07/13/2018 32 20 - 32 mmol/L Final     Anion Gap   Date Value Ref Range Status   07/13/2018 3 3 - 14 mmol/L Final     Glucose   Date Value Ref Range Status "   07/13/2018 87 70 - 99 mg/dL Final     Comment:     Non Fasting     Urea Nitrogen   Date Value Ref Range Status   07/13/2018 10 7 - 30 mg/dL Final     Creatinine   Date Value Ref Range Status   07/13/2018 0.83 0.66 - 1.25 mg/dL Final     GFR Estimate   Date Value Ref Range Status   07/13/2018 >90 >60 mL/min/1.7m2 Final     Comment:     Non  GFR Calc     Calcium   Date Value Ref Range Status   07/13/2018 9.8 8.5 - 10.1 mg/dL Final     Bilirubin Total   Date Value Ref Range Status   07/13/2018 0.3 0.2 - 1.3 mg/dL Final     Alkaline Phosphatase   Date Value Ref Range Status   07/13/2018 56 40 - 150 U/L Final     ALT   Date Value Ref Range Status   07/13/2018 32 0 - 70 U/L Final     AST   Date Value Ref Range Status   07/13/2018 20 0 - 45 U/L Final       Review of Systems:  10 systems (general, cardiovascular, respiratory, eyes, ENT, endocrine, GI, , M/S, neurological) were reviewed. Most pertinent finding(s) is/are: low appetite. The remaining systems are all unremarkable.    Mental Status Examination:     Appearance:  awake, alert and adequately groomed  Attitude:  cooperative   Eye Contact:  good  Gait and Station: Normal  Psychomotor Behavior:  intact station, gait and muscle tone  Oriented to:  time, person, and place  Attention Span and Concentration:  Normal  Speech:  clear, coherent  Mood: depressed, tense  Affect:  appropriate and in normal range  Associations:  no loose associations  Thought Process:  logical, linear and goal oriented  Thought Content:  Appropriate to Interview  Recent and Remote Memory:  intact Not formally assessed. No amnesia.  Fund of Knowledge: appropriate  Insight:  good  Judgment:  intact  Impulse Control:  intact     Suicide Risk Assessment:  Today Timmy Fitzpatrick reports continued recurrent suicidal ideation, presently passive in nature, with intensive fantasy to shoot self 1 week ago. In addition, there are notable risk factors for self-harm, anxiety, suicidal  ideation and withdrawing. However, risk is mitigated by commitment to family, sobriety, removal of gun access, absence of past attempts, ability to volunteer a safety plan, history of seeking help when needed and future oriented. Therefore, based on all available evidence including the factors cited above, Timmy Fitzpatrick does not appear to be at imminent risk for self-harm, does not meet criteria for a 72-hr hold, and therefore remains appropriate for ongoing outpatient level of care.  A thorough assessment of risk factors related to suicide and self-harm have been reviewed and are noted above. The patient convincingly denies acute suicidality on several occasions. Local community safety resources reviewed and printed for patient to use if needed. There was no deceit detected, and the patient presented in a manner that was believable.      DSM5 Diagnosis:  296.89 Bipolar II Disorder With mixed features  300.00 (F41.9) Unspecified Anxiety Disorder      Medical comorbidities include:   Patient Active Problem List    Diagnosis Date Noted     Bipolar II disorder (H) 11/28/2018     Priority: Medium     Anxiety 09/18/2017     Priority: Medium     Tear of medial meniscus of knee 09/08/2016     Priority: Medium     Chemical dependency (H) 11/25/2015     Priority: Medium     Alcoholic, sober since 2006 09/13/2012     Priority: Medium     Major depression in partial remission (H) 06/30/2011     Priority: Medium     Migraine headache 06/30/2011     Priority: Medium     (Problem list name updated by automated process. Provider to review and confirm.)       CARDIOVASCULAR SCREENING; LDL GOAL LESS THAN 160 10/31/2010     Priority: Medium     Cervicalgia 03/24/2010     Priority: Medium     Nonallopathic lesion of thoracic region 03/24/2010     Priority: Medium     Problem list name updated by automated process. Provider to review       Nonallopathic lesion of lumbar region 03/24/2010     Priority: Medium     Problem list name  updated by automated process. Provider to review           Assessment:  Timmy Fitzpatrick reports finding Abilify to be of no promising therapeutic value. We discussed either a trial of Seroquel or Lithium. In consideration of his reaction to Abilify, and his intrusive suicidality, we agree Lithium might better target his current symptoms. He is certainly an adequately healthy candidate for lithium treatment, and I review common side effects, toxicity symptoms and risks and need for regular labwork -- he is provided with an informational packet to read. In the meantime, we can continue his Lexapro 20 mg daily and Gabapentin 600 mg TID (advised that he not over-use this, as prone to in past). I agree I would not want him getting psychologically dependent on Lunesta, but for the time being it has proven quite helpful in his getting a night's rest. I have renewed just #15 tablets and he states greater comfort with this quantity.    His firearms continue to be secured with a family friend. We reviewed use of the [facility] crisis line, and plans to bring himself to PD precinct if need be.    DBT would continue to have great utility, and and I encourage him again to reach out to [facility] in [facility].    Medication side effects and alternatives were reviewed. Health promotion activities recommended and reviewed today. All questions addressed. Education and counseling completed regarding risks and benefits of medications and psychotherapy options.    Treatment Plan:    Start Lithium 300 mg nightly for 1 week, then increase to 600 mg nightly    Serum lithium, TSH, CMP to be drawn in 2 weeks. Reviewed need to time labwork 12 hours after PM lithium dosing    Continue Lexapro 20 mg daily    May continue Gabapentin 600 mg TID    May use Lunesta 2 mg as needed for sleep    Contact [facility] for individual and group therapy    Continue all other medical directions per primary care provider.     Continue all other medications as  reviewed per electronic medical record today.     Safety plan reviewed. To the Emergency Department as needed or call after hours crisis line at [phone] or [phone]. [place] Crisis Text Line. Text [place] to 034881 or Suicide LifeLine Chat: suicidepreventionlifeline.org/chat/    Schedule an appointment with me in 4 weeks or sooner as needed. Call [facility] at [phone] to schedule.    Follow up with primary care provider as planned or for acute medical concerns.    Call the psychiatric nurse line with medication questions or concerns at [phone].    Spottlyt may be used to communicate with your provider, but this is not intended to be used for emergencies.    Administrative Billing:   Time spent with patient was 30 minutes and greater than 50% of time or 20 minutes was spent in counseling and coordination of care regarding above diagnoses and treatment plan.    Patient Status:  Patient will continue to be seen for ongoing consultation and stabilization.    Signed:   Yury Dueñas CNP   Psychiatry

## 2019-02-06 NOTE — PATIENT INSTRUCTIONS
-- Continue Lexapro 20 mg daily    -- May continue Gabapentin 600 mg up to three times daily    -- Start Lithium: 300 mg (1 cap) nightly for first week, then increase to 600 mg (2 caps) nightly    -- Get labwork done at your local clinic in 2 weeks (after starting lithium). Try to aim to get blood drawn 11 to 13 hours after your last lithium dosage    -- May use Lunesta 2 mg as needed for sleep.    -- Contact [facility] to start therapy. No time like the present!    -- If in crisis, call [facility] crisis line: [phone]

## 2019-02-08 ASSESSMENT — ANXIETY QUESTIONNAIRES: GAD7 TOTAL SCORE: 15

## 2019-02-08 ASSESSMENT — PATIENT HEALTH QUESTIONNAIRE - PHQ9: SUM OF ALL RESPONSES TO PHQ QUESTIONS 1-9: 18

## 2019-02-12 ENCOUNTER — OFFICE VISIT (OUTPATIENT)
Dept: FAMILY MEDICINE | Facility: CLINIC | Age: 39
End: 2019-02-12
Payer: COMMERCIAL

## 2019-02-12 VITALS
DIASTOLIC BLOOD PRESSURE: 74 MMHG | RESPIRATION RATE: 16 BRPM | HEART RATE: 64 BPM | HEIGHT: 70 IN | TEMPERATURE: 98.7 F | BODY MASS INDEX: 24.77 KG/M2 | WEIGHT: 173 LBS | OXYGEN SATURATION: 96 % | SYSTOLIC BLOOD PRESSURE: 118 MMHG

## 2019-02-12 DIAGNOSIS — H10.9 CONJUNCTIVITIS OF BOTH EYES, UNSPECIFIED CONJUNCTIVITIS TYPE: Primary | ICD-10-CM

## 2019-02-12 DIAGNOSIS — K14.1 GEOGRAPHIC TONGUE: ICD-10-CM

## 2019-02-12 PROCEDURE — 99213 OFFICE O/P EST LOW 20 MIN: CPT | Performed by: NURSE PRACTITIONER

## 2019-02-12 RX ORDER — POLYMYXIN B SULFATE AND TRIMETHOPRIM 1; 10000 MG/ML; [USP'U]/ML
1 SOLUTION OPHTHALMIC EVERY 4 HOURS
Qty: 3 ML | Refills: 0 | Status: SHIPPED | OUTPATIENT
Start: 2019-02-12 | End: 2019-04-16

## 2019-02-12 ASSESSMENT — MIFFLIN-ST. JEOR: SCORE: 1705.97

## 2019-02-12 NOTE — PATIENT INSTRUCTIONS
Patient Education     Conjunctivitis, Nonspecific    The membrane that covers the white part of your eye (the conjunctiva) is inflamed. Inflammation happens when your body responds to an injury, allergic reaction, infection, or illness. Symptoms of inflammation in the eye may include redness, irritation, itching, swelling, or burning. These symptoms should go away within the next 24 hours. Conjunctivitis may be related to a particle that was in your eye. If so, it may wash out with your tears or irrigation treatment. Being exposed to liquid chemicals or fumes may also cause this reaction.   Home care    Apply a cold pack over the eye for 20 minutes at a time. This will reduce pain. To make a cold pack, put ice cubes in a plastic bag that seals at the top. Wrap the bag in a clean, thin towel or cloth.    Artificial tears may be prescribed to reduce irritation or redness.  These should be used 3 to 4 times a day.    You may use acetaminophen or ibuprofen to control pain, unless another medicine was prescribed. (Note: If you have chronic liver or kidney disease, or if you have ever had a stomach ulcer or gastrointestinal bleeding, talk with your healthcare provider before using these medicines.)  Follow-up care  Follow up with your healthcare provider, or as advised.  When to seek medical advice  Call your healthcare provider right away if any of these occur:    Increased eyelid swelling    Increased eye pain    Increased redness or drainage from the eye    Increased blurry vision or increased sensitivity to light    Failure of normal vision to return within 24 to 48 hours  Date Last Reviewed: 7/1/2017 2000-2018 [employer] [address] [place] [address], [place] [address]. All rights reserved. This information is not intended as a substitute for professional medical care. Always follow your healthcare professional's instructions.

## 2019-02-12 NOTE — PROGRESS NOTES
SUBJECTIVE:   Timmy Fitzpatrick is a 38 year old male who presents to clinic today for the following health issues:      Eye(s) Problem  Onset: this morning     Description:   Location: bilateral  Pain: no   Redness: YES    Accompanying Signs & Symptoms:  Discharge/mattering: YES  Swelling: no   Visual changes: YES- seemed blurry when he woke up but believes this to be due to discharge   Fever: no   Nasal Congestion: YES, mild, denies sinus pressure, pain with EOMs, dental pain or purulent rhinorrhea.  Bothered by bright lights: YES    History:   Trauma: no   Foreign body exposure: no     Precipitating factors:   Wearing contacts: no     Alleviating factors:  Improved by: none     Therapies Tried and outcome: allergy eye drops-no relief.     Mouth problem      Duration: 2 months     Description (location/character/radiation): Patient states that tongue is cracked and discolored in the back-yellowish brown    Intensity:  mild    Accompanying signs and symptoms: intermittent mild burning. Denies GERD.    History (similar episodes/previous evaluation): brought it up at  in December but was not addressed     Precipitating or alleviating factors: None    Therapies tried and outcome: uses listerine occasionally        Problem list and histories reviewed & adjusted, as indicated.  Additional history: as documented    Patient Active Problem List   Diagnosis     Cervicalgia     Nonallopathic lesion of thoracic region     Nonallopathic lesion of lumbar region     CARDIOVASCULAR SCREENING; LDL GOAL LESS THAN 160     Major depression in partial remission (H)     Migraine headache     Alcoholic, sober since 2006     Chemical dependency (H)     Anxiety     Tear of medial meniscus of knee     Bipolar II disorder (H)     Past Surgical History:   Procedure Laterality Date     HERNIA REPAIR       SURGICAL HISTORY OF -       deviated septum       Social History     Tobacco Use     Smoking status: Former Smoker     Types: Dip,  "chew, snus or snuff     Smokeless tobacco: Current User     Types: Chew     Last attempt to quit: 11/16/2014     Tobacco comment: vaping as well   Substance Use Topics     Alcohol use: No     Comment: sober since April 2016     Family History   Problem Relation Age of Onset     Cancer Paternal Grandfather      Depression Mother      Anxiety Disorder Mother      Depression Father      Dementia Maternal Grandfather      Substance Abuse Paternal Grandmother            Reviewed and updated as needed this visit by clinical staff       Reviewed and updated as needed this visit by Provider         ROS:  Constitutional, HEENT, cardiovascular, pulmonary, gi and gu systems are negative, except as otherwise noted.    OBJECTIVE:     /74 (BP Location: Right arm, Patient Position: Sitting, Cuff Size: Adult Regular)   Pulse 64   Temp 98.7  F (37.1  C) (Tympanic)   Resp 16   Ht 1.778 m (5' 10\")   Wt 78.5 kg (173 lb)   SpO2 96%   BMI 24.82 kg/m    Body mass index is 24.82 kg/m .  GENERAL: healthy, alert and no distress  EYES: PERRL, EOMI, eyelids- WNL and conjunctiva/corneas- conjunctival injection OU and no colored discharge present   HENT: normal cephalic/atraumatic, ear canals and TM's normal, nose and mouth without ulcers or lesions, oropharynx clear, oral mucous membranes moist and scattered pink patches with loss of papillae to dorsum of tongue.  NECK: no adenopathy, no asymmetry, masses, or scars and thyroid normal to palpation  RESP: lungs clear to auscultation - no rales, rhonchi or wheezes  CV: regular rates and rhythm, normal S1 S2, no S3 or S4 and no murmur, click or rub  SKIN: no suspicious lesions or rashes  NEURO: Normal strength and tone, mentation intact and speech normal    Diagnostic Test Results:  none     ASSESSMENT/PLAN:       ICD-10-CM    1. Conjunctivitis of both eyes, unspecified conjunctivitis type H10.9 trimethoprim-polymyxin b (POLYTRIM) 51451-5.1 UNIT/ML-% ophthalmic solution   2. Geographic " tongue K14.1        FUTURE APPOINTMENTS:       - Follow up in 3 days for eye symptoms that are not improving, sooner for new or worsening sx. Discussed benign nature of geographic tongue.    Patient Instructions     Patient Education     Conjunctivitis, Nonspecific    The membrane that covers the white part of your eye (the conjunctiva) is inflamed. Inflammation happens when your body responds to an injury, allergic reaction, infection, or illness. Symptoms of inflammation in the eye may include redness, irritation, itching, swelling, or burning. These symptoms should go away within the next 24 hours. Conjunctivitis may be related to a particle that was in your eye. If so, it may wash out with your tears or irrigation treatment. Being exposed to liquid chemicals or fumes may also cause this reaction.   Home care    Apply a cold pack over the eye for 20 minutes at a time. This will reduce pain. To make a cold pack, put ice cubes in a plastic bag that seals at the top. Wrap the bag in a clean, thin towel or cloth.    Artificial tears may be prescribed to reduce irritation or redness.  These should be used 3 to 4 times a day.    You may use acetaminophen or ibuprofen to control pain, unless another medicine was prescribed. (Note: If you have chronic liver or kidney disease, or if you have ever had a stomach ulcer or gastrointestinal bleeding, talk with your healthcare provider before using these medicines.)  Follow-up care  Follow up with your healthcare provider, or as advised.  When to seek medical advice  Call your healthcare provider right away if any of these occur:    Increased eyelid swelling    Increased eye pain    Increased redness or drainage from the eye    Increased blurry vision or increased sensitivity to light    Failure of normal vision to return within 24 to 48 hours  Date Last Reviewed: 7/1/2017 2000-2018 [employer] [address] [place] [address]. All rights reserved.  This information is not intended as a substitute for professional medical care. Always follow your healthcare professional's instructions.               URSULA [facility]

## 2019-02-18 ENCOUNTER — TELEPHONE (OUTPATIENT)
Dept: PSYCHIATRY | Facility: CLINIC | Age: 39
End: 2019-02-18

## 2019-02-18 ENCOUNTER — HOSPITAL ENCOUNTER (EMERGENCY)
Facility: CLINIC | Age: 39
Discharge: HOME | End: 2019-02-18
Attending: FAMILY MEDICINE | Admitting: FAMILY MEDICINE
Payer: COMMERCIAL

## 2019-02-18 ENCOUNTER — TELEPHONE (OUTPATIENT)
Dept: BEHAVIORAL HEALTH | Facility: CLINIC | Age: 39
End: 2019-02-18

## 2019-02-18 VITALS
DIASTOLIC BLOOD PRESSURE: 93 MMHG | SYSTOLIC BLOOD PRESSURE: 130 MMHG | RESPIRATION RATE: 18 BRPM | HEART RATE: 65 BPM | TEMPERATURE: 98.5 F | OXYGEN SATURATION: 99 %

## 2019-02-18 DIAGNOSIS — Z72.89 SELF-INJURIOUS BEHAVIOR: ICD-10-CM

## 2019-02-18 DIAGNOSIS — F31.12 BIPOLAR 1 DISORDER, MANIC, MODERATE (H): ICD-10-CM

## 2019-02-18 LAB
AMPHETAMINES UR QL SCN: NEGATIVE
BARBITURATES UR QL: NEGATIVE
BENZODIAZ UR QL: NEGATIVE
CANNABINOIDS UR QL SCN: POSITIVE
COCAINE UR QL: NEGATIVE
ETHANOL UR QL SCN: NEGATIVE
OPIATES UR QL SCN: NEGATIVE

## 2019-02-18 PROCEDURE — 99285 EMERGENCY DEPT VISIT HI MDM: CPT | Mod: Z6 | Performed by: FAMILY MEDICINE

## 2019-02-18 PROCEDURE — 99285 EMERGENCY DEPT VISIT HI MDM: CPT | Mod: 25 | Performed by: FAMILY MEDICINE

## 2019-02-18 PROCEDURE — 80307 DRUG TEST PRSMV CHEM ANLYZR: CPT | Performed by: FAMILY MEDICINE

## 2019-02-18 PROCEDURE — 25000132 ZZH RX MED GY IP 250 OP 250 PS 637: Performed by: FAMILY MEDICINE

## 2019-02-18 PROCEDURE — 90791 PSYCH DIAGNOSTIC EVALUATION: CPT

## 2019-02-18 PROCEDURE — 25000132 ZZH RX MED GY IP 250 OP 250 PS 637: Performed by: EMERGENCY MEDICINE

## 2019-02-18 PROCEDURE — 80320 DRUG SCREEN QUANTALCOHOLS: CPT | Performed by: FAMILY MEDICINE

## 2019-02-18 RX ORDER — GABAPENTIN 600 MG/1
600 TABLET ORAL ONCE
Status: COMPLETED | OUTPATIENT
Start: 2019-02-18 | End: 2019-02-18

## 2019-02-18 RX ORDER — NICOTINE 21 MG/24HR
1 PATCH, TRANSDERMAL 24 HOURS TRANSDERMAL ONCE
Status: COMPLETED | OUTPATIENT
Start: 2019-02-18 | End: 2019-02-18

## 2019-02-18 RX ADMIN — NICOTINE 1 PATCH: 21 PATCH, EXTENDED RELEASE TRANSDERMAL at 16:50

## 2019-02-18 RX ADMIN — GABAPENTIN 600 MG: 600 TABLET, FILM COATED ORAL at 20:01

## 2019-02-18 ASSESSMENT — ENCOUNTER SYMPTOMS
DYSPHORIC MOOD: 1
ABDOMINAL PAIN: 0
NERVOUS/ANXIOUS: 1
SHORTNESS OF BREATH: 0
FEVER: 0

## 2019-02-18 NOTE — TELEPHONE ENCOUNTER
"Wife called to inform us that they are in the [place] ED waiting area. According to wife, patient is not happy about it and \"kind of flipped out in the car\" about coming here. Wife is hoping Yury might be able to call the ED to give collateral information and communicate that he supports an inpatient stay to the ED staff/assessors. Writer told her that I am here until 4:30 if Yury is not able to call, I would be able to speak to ED staff until that time.    If wife calls back and gives contact info for ED, the ED staff should know that per my conversation with Yury earlier in the day, he is in support of [facility] being admitted for inpatient treatment.     Currently waiting for staff contact names to give to Yury in case he is able to call to offer collateral information.  "

## 2019-02-18 NOTE — TELEPHONE ENCOUNTER
"Reason for call:  Other   Patient called regarding (reason for call): call back  Additional comments: Patient's wife called, with patient in the background, stating that his \"depression symptoms are more severe and there has been a change in his cutting since the last visit\".  Pt and wife endorsed safety at this time and writer reminded of ED option.  Pt wife declined scheduling a sooner visit at this time until discussion about options with RN team.    Phone number to reach patient:  Home number on file [phone] (home) This is the patient's phone    Can we leave a detailed message on this number?  YES    "

## 2019-02-18 NOTE — TELEPHONE ENCOUNTER
S: Pt is BIB wife to [facility] ED for an evaluation of SI    B: BIB wife ro [facility]. PT's wife describing behavioral disturbances of mood swings and cutting, which pt does have a history of but is more severe than usual. OP provider stated that pt endorsed 2 years of worsening depression and continued suicidal ideation. SI to to shoot self and owns firearms. Firearms removed from residence. Diagnosis of bipolar do, started on Lamictal and was having a reaction. Started on Lithium about 2 weeks ago. Yury Adame nurse practitioner [phone] who called and can provide any mor information if needed.    A: Pt to be evaluated in [facility] ED upon arrival    R:

## 2019-02-18 NOTE — ED TRIAGE NOTES
"Pt BIB private car via spouse. Reports recent dx of bipolar in last 2-3 months, worsening depression and \"angry episodes\" per significant other. In addition, patient reports SIB via razor blade with razor blade on chest and stomach within the last couple days. Furthermore, reports wanting to die on a daily basis, but denies wanting to kill himself. SO states this has been on a daily basis.  "

## 2019-02-18 NOTE — TELEPHONE ENCOUNTER
"Writer spoke to patient and his wife.    Per wife, Ruby (who is a RN), patient has been extremely labile. Sometimes he acts like her \"\" (i.e., agreeable, logical, etc.) but when he is \"triggered by unexpected events\" (e.g., an unexpected schedule change) he becomes angry and/or sad and she becomes afraid of him or what he might do. She stated that he has been cutting to \"cope\" since Yury's last visit(she stated it was not with intent to kill himself but was done to \"cope\"). She states they have been in touch with the appropriate providers to start DBT but based on his recent behavior, she is very afraid and think he needs help (like more intensive help, like inpatient treatment) sooner. She stated that she needed to either talk to Yury or a RN in order to help convince/explain to Ventura that because of his recent behavior (e.g., mood swings, cutting) it would be advisable to go to the ED to have an evaluation/assessment completed to make sure he is in a safe place mentally and can remain that way. Her voice was shaking through the conversation demonstrating a level of stress/fear that supported her objective portion of the report.      Ventura (patient) stated that \"he feels okay right now\" and he hasn't cut \"in 3 days\" and that \"maybe it's because the medications are kicking in\". Writer explained to Ventura that everyone's (his wife, mine, and Yury's) number one goal is to keep him safe and based on his recent cutting and other things described by his wife, having an assessment at the ED would be the best thing for him to do. Writer explained that since he feels better right now, he would be able to communicate more clearly to the  what;s been going on versus if he was in crisis. Patient stated he understood and agreed with my statements and agreed to go to the ED to have an assessment/evaluation completed at the ED.    Writer spoke with wife again after finishing speaking with patient. Writer " explained that patient did agree to go with her to the ED today for the evaluation. She stated although it is farther, they would likely go to [facility] because it is connected to inpatient psych and there are likely more experienced psych providers/evaluators at that ED versus a closer option.    Writer asked wife to keep us updated on the the outcome of the ED visit and reminded her that if she ever doesn't feel safe for any reason (for herself or for his sake) she should call 911.    Routing to provider as FYI.

## 2019-02-18 NOTE — TELEPHONE ENCOUNTER
I spoke with KENDRA Lopez RN regarding calls with patient and his wife. I called and spoke with behavioral health ED intake staffperson to provide collateral. At the time of call, patient was not yet fully checked in to ED. I provided a brief synopsis of my work with the patient inclusive of bipolar diagnosis, continued safety concerns/planning, recent initiation of Lithium, and issues of not being able to more fully engage in adequate outpatient treatment. Should it appear warranted by ED evaluators, I do believe a voluntary hospital stay would be beneficial towards his safety, stabilization and treatment planning.    I have provided my personal cell # should further collateral be needed.

## 2019-02-18 NOTE — ED AVS SNAPSHOT
[facility], [place], Emergency Department  [address]  [place] [facility] [place] [place] [address]  Phone:  [phone]  Fax:  [phone]                                    Timmy Fitzpatrick   MRN: [id number]    Department:  [facility], Emergency Department   Date of Visit:  2/18/2019           After Visit Summary Signature Page    I have received my discharge instructions, and my questions have been answered. I have discussed any challenges I see with this plan with the nurse or doctor.    ..........................................................................................................................................  Patient/Patient Representative Signature      ..........................................................................................................................................  Patient Representative Print Name and Relationship to Patient    ..................................................               ................................................  Date                                   Time    ..........................................................................................................................................  Reviewed by Signature/Title    ...................................................              ..............................................  Date                                               Time          22EPIC Rev 08/18

## 2019-02-19 NOTE — DISCHARGE INSTRUCTIONS
Discharged home with plans to follow-up with outpatient DBT return to the emergency room if any increased risk of harming self or others return to the emergency room immediately if any increased suicidal thoughts.  Follow-up with primary MD for lithium level recheck as previously scheduled

## 2019-02-19 NOTE — ED PROVIDER NOTES
"  History     Chief Complaint   Patient presents with     Mental Health Problem     Dx of bipolar in last 2-3 months, worsening depression and \"angry episodes\" per significant other. In addition, patient reports SIB via razor blade with razor blade on chest and stomach within the last couple days. Furthermore, reports wanting to die on a daily basis, but denies wanting to kill himself. SO states this has been on a daily basis.     HPI  Timmy Fitzpatrick is a 38 year old male who presents the emergency room with increased symptoms of hypomania as well as self-injurious behaviors.  Patient's significant other notes that he has had some volatile behaviors over the past several weeks he has had chronic suicidal statements but denies any intent.  Patient states that he has had these thoughts but would not act on them he has not ever had a suicide attempt.  Patient has had self-injurious behaviors with a history of increased self-injurious behaviors in his 20s he has not had this for at least 10 months and then several months ago started doing some cutting on his chest and abdomen.  Patient has had various medication adjustments over the past several weeks and has been started on lithium he feels as though this is improved and he is decreased to self-injurious behaviors with only cutting once in the last 6 days.  Patient denies any ongoing suicidal thoughts and states that he does not have any intent to kill himself.  Patient was brought here to the emergency room today by his significant other who wants an evaluation.  There is some concern about his volatility.  Patient has been able to maintain his job but has missed a few days secondary to not feeling \"enough energy to do his job.\"  Patient denies any significant medical concerns at this time.  In the past patient has had statements of suicidal thoughts and the guns were removed from the home and brought to friend's house and safe he does not have access to guns at " this time.    I have reviewed the Medications, Allergies, Past Medical and Surgical History, and Social History in the Epic system.      PERSONAL MEDICAL HISTORY  Past Medical History:   Diagnosis Date     Anxiety      Cervicalgia      Chemical dependency (H)      Depression      Migraine headache      PAST SURGICAL HISTORY  Past Surgical History:   Procedure Laterality Date     HERNIA REPAIR       SURGICAL HISTORY OF -       deviated septum     FAMILY HISTORY  Family History   Problem Relation Age of Onset     Cancer Paternal Grandfather      Depression Mother      Anxiety Disorder Mother      Depression Father      Dementia Maternal Grandfather      Substance Abuse Paternal Grandmother      SOCIAL HISTORY  Social History     Tobacco Use     Smoking status: Former Smoker     Types: Dip, chew, snus or snuff     Smokeless tobacco: Current User     Types: Chew     Last attempt to quit: 11/16/2014     Tobacco comment: vaping as well   Substance Use Topics     Alcohol use: No     Comment: sober since April 2016     MEDICATIONS  Current Facility-Administered Medications   Medication     gabapentin (NEURONTIN) tablet 600 mg     Current Outpatient Medications   Medication     escitalopram (LEXAPRO) 20 MG tablet     eszopiclone (LUNESTA) 2 MG tablet     gabapentin (NEURONTIN) 300 MG capsule     GLUCOSAMINE HCL     lithium 300 MG capsule     Multiple Vitamins-Minerals (MENS MULTIPLE VITAMIN/LYCOPENE PO)     sildenafil (REVATIO) 20 MG tablet     trimethoprim-polymyxin b (POLYTRIM) 95201-5.1 UNIT/ML-% ophthalmic solution     ALLERGIES  Allergies   Allergen Reactions     Cats      Nkda [No Known Drug Allergies]      Seasonal Allergies          Review of Systems   Constitutional: Negative for fever.   Respiratory: Negative for shortness of breath.    Cardiovascular: Negative for chest pain.   Gastrointestinal: Negative for abdominal pain.   Psychiatric/Behavioral: Positive for behavioral problems and dysphoric mood. The patient  is nervous/anxious.    All other systems reviewed and are negative.      Physical Exam   BP: 140/82  Pulse: 71  Temp: 98.5  F (36.9  C)  Resp: 16  SpO2: 99 %      Physical Exam   Constitutional: He is oriented to person, place, and time. No distress.   HENT:   Head: Atraumatic.   Mouth/Throat: Oropharynx is clear and moist.   Eyes: Pupils are equal, round, and reactive to light. No scleral icterus.   Cardiovascular: Normal heart sounds and intact distal pulses.   Pulmonary/Chest: Breath sounds normal. No respiratory distress.   Abdominal: Soft. Bowel sounds are normal. There is no tenderness.   Musculoskeletal: He exhibits no edema or tenderness.   Neurological: He is alert and oriented to person, place, and time. He displays normal reflexes. No cranial nerve deficit or sensory deficit. He exhibits normal muscle tone. Coordination normal.   Skin: Skin is warm. No rash noted. He is not diaphoretic.   Psychiatric: His mood appears anxious. He is agitated. He expresses no suicidal ideation.       ED Course        Procedures    Patient was seen and evaluated by the  please refer to documentation in the note section of the epic chart dated 2/18/2019    Critical Care time:  none             Labs Ordered and Resulted from Time of ED Arrival Up to the Time of Departure from the ED   DRUG ABUSE SCREEN 6 CHEM DEP URINE ([facility]) - Abnormal; Notable for the following components:       Result Value    Cannabinoids Qual Urine Positive (*)     All other components within normal limits            Assessments & Plan (with Medical Decision Making)       I have reviewed the nursing notes.    I have reviewed the findings, diagnosis, plan and need for follow up with the patient.    Patient was bipolar 1 disorder with underlying joann history of self-injurious behaviors at this time we discussed at length the possibility of an inpatient admission patient is not clearly holdable he denies any intent to harm himself denies any  suicidal ideation at this time.  However I do feel as though his somewhat volatile behaviors at home would benefit from inpatient evaluation and stabilization with possible medication adjustments.  Patient prefers to follow-up with a DBT group that he is arranged to start later the week he is once again refusing inpatient admission as recommended and is not holdable.  Patient will be discharged from the emergency room.    Final diagnoses:   Bipolar 1 disorder, manic, moderate (H)   Self-injurious behavior       2/18/2019   [facility], [place], EMERGENCY DEPARTMENT     Abebe Bustos MD  02/18/19 1955

## 2019-02-20 DIAGNOSIS — F31.81 BIPOLAR II DISORDER (H): ICD-10-CM

## 2019-02-20 LAB
ALBUMIN SERPL-MCNC: 3.6 G/DL (ref 3.4–5)
ALP SERPL-CCNC: 57 U/L (ref 40–150)
ALT SERPL W P-5'-P-CCNC: 32 U/L (ref 0–70)
ANION GAP SERPL CALCULATED.3IONS-SCNC: 1 MMOL/L (ref 3–14)
AST SERPL W P-5'-P-CCNC: 19 U/L (ref 0–45)
BILIRUB SERPL-MCNC: 0.4 MG/DL (ref 0.2–1.3)
BUN SERPL-MCNC: 9 MG/DL (ref 7–30)
CALCIUM SERPL-MCNC: 8.9 MG/DL (ref 8.5–10.1)
CHLORIDE SERPL-SCNC: 104 MMOL/L (ref 94–109)
CO2 SERPL-SCNC: 33 MMOL/L (ref 20–32)
CREAT SERPL-MCNC: 0.98 MG/DL (ref 0.66–1.25)
GFR SERPL CREATININE-BSD FRML MDRD: >90 ML/MIN/{1.73_M2}
GLUCOSE SERPL-MCNC: 102 MG/DL (ref 70–99)
LITHIUM SERPL-SCNC: 0.5 MMOL/L (ref 0.6–1.2)
POTASSIUM SERPL-SCNC: 4.1 MMOL/L (ref 3.4–5.3)
PROT SERPL-MCNC: 6.8 G/DL (ref 6.8–8.8)
SODIUM SERPL-SCNC: 138 MMOL/L (ref 133–144)
TSH SERPL DL<=0.005 MIU/L-ACNC: 1.76 MU/L (ref 0.4–4)

## 2019-02-20 PROCEDURE — 80053 COMPREHEN METABOLIC PANEL: CPT | Performed by: NURSE PRACTITIONER

## 2019-02-20 PROCEDURE — 80178 ASSAY OF LITHIUM: CPT | Performed by: NURSE PRACTITIONER

## 2019-02-20 PROCEDURE — 84443 ASSAY THYROID STIM HORMONE: CPT | Performed by: NURSE PRACTITIONER

## 2019-02-20 PROCEDURE — 36415 COLL VENOUS BLD VENIPUNCTURE: CPT | Performed by: NURSE PRACTITIONER

## 2019-03-04 ENCOUNTER — MYC MEDICAL ADVICE (OUTPATIENT)
Dept: PSYCHIATRY | Facility: CLINIC | Age: 39
End: 2019-03-04

## 2019-03-04 NOTE — TELEPHONE ENCOUNTER
"Pt sent [employer]. Informed to bring the FMLA to us so we can review.      \"Updates about my health   Message [id number]   From  Ventura Fitzpatrick To  Yury Dueñas CNP Sent  3/4/2019 11:31 AM   Good morning.  Over the last month I ve been missing at least 2 days a week of work because of  episodes  I ve been having.  I had a DBT intake last week (maybe the week before I can t recall) and they recommended 3 day a week therapy.  My boss brought up FMLA as a possible option.  I need to try that route if it will help me not lose my job.  There is a bit of paperwork with a few sections for you all to fill out.  I would like to get that going as soon as possible.  Please let me know what I need to do to make that happen.   I do have a few other concerns (exacerbated symptoms) about my current state of health to talk about with Yury.  I have an appt next week (12th) which Cristina (my wife) took of clinicals to come with.  I ll check into [employer] regularly today in hopes I hear from you (my  care team ) soon.  I ll get the form to you guys however I can when I hear.     Thank you     Ventura Fitzpatrick  \"    Cassidy Calvillo [facility]    "

## 2019-03-05 NOTE — TELEPHONE ENCOUNTER
"Received 4 pg forms from pt. Sent my chart questions to pt to help fill out forms:    \"1.)Are they're any schedule appointments we should include in the forms?   2.)If so, when and for how long. (Ex: Therapy once a week on Thursdays 1-2pm).   3.)Estimate your part-time or reduced work schedule- How many hours per day, how many days per week, from (start date) to (end date).   4.)Will the condition cause episodic flare- up periodically preventing you to be absent from work during the flare-ups?   5.)Estimate how many flare-up there may be: How many times per week or month? For how many hours or days?   6.) Will there be an end date to this condition or will it be indefinite? \"    Cassidy Calvillo, [facility]  "

## 2019-03-12 ENCOUNTER — OFFICE VISIT (OUTPATIENT)
Dept: PSYCHIATRY | Facility: CLINIC | Age: 39
End: 2019-03-12
Payer: COMMERCIAL

## 2019-03-12 VITALS
HEART RATE: 63 BPM | BODY MASS INDEX: 24.25 KG/M2 | TEMPERATURE: 98.2 F | SYSTOLIC BLOOD PRESSURE: 122 MMHG | OXYGEN SATURATION: 100 % | WEIGHT: 169 LBS | DIASTOLIC BLOOD PRESSURE: 76 MMHG | RESPIRATION RATE: 18 BRPM

## 2019-03-12 DIAGNOSIS — F31.81 BIPOLAR II DISORDER (H): Primary | ICD-10-CM

## 2019-03-12 PROCEDURE — 99214 OFFICE O/P EST MOD 30 MIN: CPT | Performed by: NURSE PRACTITIONER

## 2019-03-12 RX ORDER — QUETIAPINE FUMARATE 25 MG/1
TABLET, FILM COATED ORAL
Qty: 30 TABLET | Refills: 0 | Status: SHIPPED | OUTPATIENT
Start: 2019-03-12 | End: 2019-09-04

## 2019-03-12 RX ORDER — LITHIUM CARBONATE 300 MG/1
CAPSULE ORAL
Qty: 90 CAPSULE | Refills: 0 | Status: SHIPPED | OUTPATIENT
Start: 2019-03-12 | End: 2019-03-26 | Stop reason: DRUGHIGH

## 2019-03-12 RX ORDER — GABAPENTIN 300 MG/1
300 CAPSULE ORAL
Qty: 180 CAPSULE | Refills: 1 | Status: SHIPPED | OUTPATIENT
Start: 2019-03-12 | End: 2019-05-21

## 2019-03-12 RX ORDER — ESCITALOPRAM OXALATE 20 MG/1
20 TABLET ORAL DAILY
Qty: 30 TABLET | Refills: 1 | Status: SHIPPED | OUTPATIENT
Start: 2019-03-12 | End: 2019-03-26

## 2019-03-12 ASSESSMENT — ANXIETY QUESTIONNAIRES
1. FEELING NERVOUS, ANXIOUS, OR ON EDGE: NEARLY EVERY DAY
GAD7 TOTAL SCORE: 14
7. FEELING AFRAID AS IF SOMETHING AWFUL MIGHT HAPPEN: NOT AT ALL
4. TROUBLE RELAXING: NEARLY EVERY DAY
GAD7 TOTAL SCORE: 14
5. BEING SO RESTLESS THAT IT IS HARD TO SIT STILL: NEARLY EVERY DAY
3. WORRYING TOO MUCH ABOUT DIFFERENT THINGS: SEVERAL DAYS
7. FEELING AFRAID AS IF SOMETHING AWFUL MIGHT HAPPEN: NOT AT ALL
GAD7 TOTAL SCORE: 14
6. BECOMING EASILY ANNOYED OR IRRITABLE: NEARLY EVERY DAY
2. NOT BEING ABLE TO STOP OR CONTROL WORRYING: SEVERAL DAYS

## 2019-03-12 NOTE — PROGRESS NOTES
"    Outpatient Psychiatric Progress Note    Name: Timmy Fitzpatrick   : 1980                    Primary Care Provider: [facility], Refugio Loza MD  Therapist: pending, [facility]    PHQ-9 scores:  PHQ-9 SCORE 2018 2019 3/12/2019   PHQ-9 Total Score - - -   PHQ-9 Total Score MyChart 17 (Moderately severe depression) 18 (Moderately severe depression) 18 (Moderately severe depression)   PHQ-9 Total Score 17 18 18   Some encounter information is confidential and restricted. Go to Review Flowsheets activity to see all data.       JUAREZ-7 scores:  JUAREZ-7 SCORE 2018 2019 3/12/2019   Total Score - - -   Total Score 12 (moderate anxiety) 15 (severe anxiety) 14 (moderate anxiety)   Total Score 12 15 14   Some encounter information is confidential and restricted. Go to Review Flowsheets activity to see all data.       Patient Identification:  Patient is a 38 year old year old,   White American male  who presents for return visit with me.  Patient is currently employed full time. Patient attended the session with Cristina , who they agreed to have interview with. Patient prefers to be called: \"Ventura\".    Interim History:  I last saw Timmy Fitzpatrick for outpatient psychiatry Return Visit on 19.     During that appointment, we reviewed his discontinuation of Abilify after brief trial. We agreed to a trial of Lithium. Lexapro was maintained at 20 mg daily, Gabapentin at 600 mg TID, and he was given an limited quantity of Lunesta for sleep. He was again encouraged to outreach to [facility]. We did extensive safety planning given ongoing SI and mood instability.    On 19 he wife brought him to [facility] ED for evaluation. He was offered a voluntary admission for stabilization, but he had declined.    Current medications include:   Current Outpatient Medications   Medication Sig     escitalopram (LEXAPRO) 20 MG tablet Take 1.5 tablets (30 mg) by mouth daily     eszopiclone " "(LUNESTA) 2 MG tablet Take 1 tablet (2 mg) by mouth nightly as needed for sleep     gabapentin (NEURONTIN) 300 MG capsule Take 1 capsule (300 mg) by mouth 6 times daily     GLUCOSAMINE HCL None Entered     lithium 300 MG capsule 2 caps nightly     Multiple Vitamins-Minerals (MENS MULTIPLE VITAMIN/LYCOPENE PO) Take 1 tablet by mouth daily.     sildenafil (REVATIO) 20 MG tablet TAKE 5 TABLETS BY MOUTH DAILY AS NEEDED, 30 MINUTES TO 4 HOURS BEFORE SEX. (DO NOT USE WITH NITROGLYCERIN, TERAZOSIN OR DOXAZOSIN     No current facility-administered medications for this visit.        The [place] Prescription Monitoring Program has been reviewed and there are no concerns about diversionary activity for controlled substances at this time.      I was able to review most recent Primary Care Provider, specialty provider, and therapy visit notes that I have access to.     Today, patient reports he feels quite the same. Mood still unstable day to day, and his self-injurious behavior has increased significantly to every other day. Will have intrusive SI and feel \"internally hot\", and cope with superficial cutting of chest with an Xacto blade. He denies any intent or plans related to SI. He continues to decline any voluntary hospitalization.    He started Lithium 1 month ago; now at 600 mg nightly. States he's expericed some sedation, but this has actually aided his sleep quality overall. He denies any other side effect. Trough serum level was 0.50 on 2/20/19. Creatinine and TSH normal. He has otherwise continued Lexapro 20 mg daily and Gabapentin 300 mg TID. Has not had need to use Lunesta recently, as tending to get 5-6 quality hours sleep with above medication.    He has reduced his cannabis use, but has noted his appetite has further decreased as a result. States he and wife have considered CBD; not sure of what quality brands there are.    He did have an initial evaluation at [facility] in [facility]; was accepted to 3 day/week " program. He's not sure if this is inclusive of individual therapy and phone coaching; will be sure to ask. Is starting in 2 days and is looking forward to this. He has arranged a 1 month FMLA from work, with an gradual return to full-time employment. States writer's completed FMLA paperwork was not confirmed received by his boss; he provides an alternative HR contact.        Past Medical History:   Diagnosis Date     Anxiety      Cervicalgia      Chemical dependency (H)      Depression      Migraine headache       has a past medical history of Anxiety, Cervicalgia, Chemical dependency (H), Depression, and Migraine headache.    Social history updates:  See above    Substance use updates:  Nicotine vaping  No ETOH; sober since 2016  Minimal cannabis.      Vital Signs:   /76 (BP Location: Left arm, Patient Position: Sitting, Cuff Size: Adult Regular)   Pulse 63   Temp 98.2  F (36.8  C) (Oral)   Resp 18   Wt 76.7 kg (169 lb)   SpO2 100%   BMI 24.25 kg/m      Labs:  Most recent laboratory results reviewed and the pertinent results include:    Component Value Flag Ref Range Units Status Collected Lab   Lithium Level 0.50 Abnormally low   L  0.60 - 1.20 mmol/L Final 02/20/2019  7:30 AM 59     Last Comprehensive Metabolic Panel:  Sodium   Date Value Ref Range Status   02/20/2019 138 133 - 144 mmol/L Final     Potassium   Date Value Ref Range Status   02/20/2019 4.1 3.4 - 5.3 mmol/L Final     Chloride   Date Value Ref Range Status   02/20/2019 104 94 - 109 mmol/L Final     Carbon Dioxide   Date Value Ref Range Status   02/20/2019 33 (H) 20 - 32 mmol/L Final     Anion Gap   Date Value Ref Range Status   02/20/2019 1 (L) 3 - 14 mmol/L Final     Glucose   Date Value Ref Range Status   02/20/2019 102 (H) 70 - 99 mg/dL Final     Urea Nitrogen   Date Value Ref Range Status   02/20/2019 9 7 - 30 mg/dL Final     Creatinine   Date Value Ref Range Status   02/20/2019 0.98 0.66 - 1.25 mg/dL Final     GFR Estimate   Date  Value Ref Range Status   02/20/2019 >90 >60 mL/min/[1.73_m2] Final     Comment:     Non  GFR Calc  Starting 12/18/2018, serum creatinine based estimated GFR (eGFR) will be   calculated using the Chronic Kidney Disease Epidemiology Collaboration   (CKD-EPI) equation.       Calcium   Date Value Ref Range Status   02/20/2019 8.9 8.5 - 10.1 mg/dL Final     Bilirubin Total   Date Value Ref Range Status   02/20/2019 0.4 0.2 - 1.3 mg/dL Final     Alkaline Phosphatase   Date Value Ref Range Status   02/20/2019 57 40 - 150 U/L Final     ALT   Date Value Ref Range Status   02/20/2019 32 0 - 70 U/L Final     AST   Date Value Ref Range Status   02/20/2019 19 0 - 45 U/L Final     Lab Results   Component Value Date    TSH 1.76 02/20/2019         Review of Systems:  10 systems (general, cardiovascular, respiratory, eyes, ENT, endocrine, GI, , M/S, neurological) were reviewed. Most pertinent finding(s) is/are: low appetite, superficial cuts on chest. The remaining systems are all unremarkable.     Mental Status Examination:     Appearance:  awake, alert and adequately groomed  Attitude:  cooperative   Eye Contact:  good  Gait and Station: Normal  Psychomotor Behavior:  intact station, gait and muscle tone  Oriented to:  time, person, and place  Attention Span and Concentration:  Normal  Speech:  clear, coherent  Mood: depressed, anxious  Affect:  appropriate and in normal range  Associations:  no loose associations  Thought Process:  logical, linear and goal oriented  Thought Content:  Appropriate to Interview  Recent and Remote Memory:  intact Not formally assessed. No amnesia.  Fund of Knowledge: appropriate  Insight:  good  Judgment:  intact  Impulse Control:  intact     Suicide Risk Assessment:  Today Timmy Fitzpatrick reports continued recurrent suicidal ideation, presently passive in nature. In addition, there are notable risk factors for self-harm, anxiety, suicidal ideation and withdrawing. However, risk  is mitigated by commitment to family, sobriety, removal of gun access, absence of past attempts, ability to volunteer a safety plan, history of seeking help when needed and future oriented. Therefore, based on all available evidence including the factors cited above, Timmy Fitzpatrick does not appear to be at imminent risk for self-harm, does not meet criteria for a 72-hr hold, and therefore remains appropriate for ongoing outpatient level of care.  A thorough assessment of risk factors related to suicide and self-harm have been reviewed and are noted above. The patient convincingly denies acute suicidality on several occasions. Local community safety resources reviewed and printed for patient to use if needed. There was no deceit detected, and the patient presented in a manner that was believable.      DSM5 Diagnosis:  296.89 Bipolar II Disorder With mixed features  300.00 (F41.9) Unspecified Anxiety Disorder      Medical comorbidities include:   Patient Active Problem List    Diagnosis Date Noted     Bipolar II disorder (H) 11/28/2018     Priority: Medium     Anxiety 09/18/2017     Priority: Medium     Tear of medial meniscus of knee 09/08/2016     Priority: Medium     Chemical dependency (H) 11/25/2015     Priority: Medium     Alcoholic, sober since 2006 09/13/2012     Priority: Medium     Major depression in partial remission (H) 06/30/2011     Priority: Medium     Migraine headache 06/30/2011     Priority: Medium     (Problem list name updated by automated process. Provider to review and confirm.)       CARDIOVASCULAR SCREENING; LDL GOAL LESS THAN 160 10/31/2010     Priority: Medium     Cervicalgia 03/24/2010     Priority: Medium     Nonallopathic lesion of thoracic region 03/24/2010     Priority: Medium     Problem list name updated by automated process. Provider to review       Nonallopathic lesion of lumbar region 03/24/2010     Priority: Medium     Problem list name updated by automated process. Provider  to review           Assessment:  Timmy Fitzpatrick reports continued agitated depression, alongside recurrent SI and superficial cutting behavior. We discussed the merits of a voluntary hospitalization once again, and he once again declines this. He does not meet criteria for an involunary hold or hospittalization at this time. He and his wife remain committed, however, to use of the country crisis line if needed.    While we have not seen any symptoms relief in past month of Lithium use, he is on a low dosage and otherwise tolerating the medication quite well, so I do believe it is worth continued vetting at a higher dosage (900 mg nightly). I reviewed the use of an ice water hand/face plunge if feeling emotionally dysregulated, as an alternative to self-harm behavior. I have also written him for low-dose Seroquel, and recommended using 6.25 to 12.5 mg as needed. We will otherwise continue his Lexapro 20 mg daily and Gabapentin 600 mg TID (advised that he not over-use this, as prone to in past).    If it is a matter of harm reduction with regard to cannabis, I am OK with the patient utilizing a high-quality CBD product, and recommend he look into [employer].    I am otherwise very encouraged he'll be beginning an intensive outpatient DBT program at [facility] this week. I have offered to collaborate with this treatment team there. I will resubmit his FMLA forms to his workplace.    Medication side effects and alternatives were reviewed. Health promotion activities recommended and reviewed today. All questions addressed. Education and counseling completed regarding risks and benefits of medications and psychotherapy options.      Treatment Plan:    Increase Lithium to 900 mg nightly    Serum lithium to be drawn in 1 weeks. Reviewed need to time labwork 12 hours after PM lithium dosing    Continue Lexapro 20 mg daily    May continue Gabapentin 600 mg TID    May use Seroquel 6.25 (1/4 tab) to 12.5 mg (1/2 tab) daily  as needed for agitation    May use Lunesta 2 mg as needed for sleep    Start [facility] for individual and group therapy    Continue all other medical directions per primary care provider.     Continue all other medications as reviewed per electronic medical record today.     Safety plan reviewed. To the Emergency Department as needed or call after hours crisis line at [phone] or [phone]. [place] Crisis Text Line. Text [place] to 111711 or Suicide LifeLine Chat: suicidepreventionlifeline.org/chat/    Schedule an appointment with me in 4 weeks or sooner as needed. Call [facility] at [phone] to schedule.    Follow up with primary care provider as planned or for acute medical concerns.    Call the psychiatric nurse line with medication questions or concerns at [phone].    The Invisible Armort may be used to communicate with your provider, but this is not intended to be used for emergencies.    Administrative Billing:   Time spent with patient was 30 minutes and greater than 50% of time or 20 minutes was spent in counseling and coordination of care regarding above diagnoses and treatment plan.    Patient Status:  Patient will continue to be seen for ongoing consultation and stabilization.    Signed:   Yury Dueñas CNP   Psychiatry         Answers for HPI/ROS submitted by the patient on 3/12/2019   If you checked off any problems, how difficult have these problems made it for you to do your work, take care of things at home, or get along with other people?: Very difficult  PHQ9 TOTAL SCORE: 18  JUAREZ 7 TOTAL SCORE: 14

## 2019-03-13 ASSESSMENT — ANXIETY QUESTIONNAIRES: GAD7 TOTAL SCORE: 14

## 2019-03-13 ASSESSMENT — PATIENT HEALTH QUESTIONNAIRE - PHQ9: SUM OF ALL RESPONSES TO PHQ QUESTIONS 1-9: 18

## 2019-03-14 ENCOUNTER — MYC MEDICAL ADVICE (OUTPATIENT)
Dept: PSYCHIATRY | Facility: CLINIC | Age: 39
End: 2019-03-14

## 2019-03-14 DIAGNOSIS — F31.81 BIPOLAR II DISORDER (H): Primary | ICD-10-CM

## 2019-03-14 NOTE — TELEPHONE ENCOUNTER
Received an additional form from employer. Forms have been completed ,signed, faxed to [phone], and abstracted. Will Mychart patient letting him know.     Cassidy Calvillo, CMA

## 2019-03-15 RX ORDER — LITHIUM CARBONATE 450 MG
450 TABLET, EXTENDED RELEASE ORAL 2 TIMES DAILY
Qty: 60 TABLET | Refills: 0 | Status: SHIPPED | OUTPATIENT
Start: 2019-03-15 | End: 2019-03-26

## 2019-03-15 RX ORDER — OLANZAPINE 2.5 MG/1
2.5 TABLET, FILM COATED ORAL AT BEDTIME
Qty: 30 TABLET | Refills: 0 | Status: SHIPPED | OUTPATIENT
Start: 2019-03-15 | End: 2019-03-26

## 2019-03-15 NOTE — TELEPHONE ENCOUNTER
Patient concerns, FYI:        RE:FMLA forms   Message [phone]   From  Ventura Fitzpatrick To  MonCassidy baca MA Sent  3/15/2019  8:58 AM   Thank you Casisdy, you re a rockstar.  I did receive an email from  yesterday saying I have definitely been approved for FMLA, so I m sure she got it.  I ll follow up with her soon.   Cristina (my wife) thought I should relay some updates today so here goes.  I upped the lithium to 900 mg Tues and have taken 1/4 doses of the other one (seroquel maybe, I can t recall the name for sure) twice.  It helps calm me physically, but hasn t done much for my head (thoughts).  I m sure it ll take some time.  I ve been struggling to take my lexapro.  I usually take it in the morning, but I ve been vomiting/dry heaving whenever i try to take morning meds.  Eating is still an issue, I haven t had a meal since Sunday (3-10).  I m still not hungry and have no appetite.  Even foods I used to love.  I did start DBT yesterday, I anticipate it being good for me.  I guess there s no need for a response unless you all have any changes/ideas for me.  Thank you again for getting that fax sent, I really appreciate it.     Ventura Fitzpatrick

## 2019-03-26 ENCOUNTER — OFFICE VISIT (OUTPATIENT)
Dept: PSYCHIATRY | Facility: CLINIC | Age: 39
End: 2019-03-26
Payer: COMMERCIAL

## 2019-03-26 VITALS
WEIGHT: 169 LBS | DIASTOLIC BLOOD PRESSURE: 74 MMHG | SYSTOLIC BLOOD PRESSURE: 111 MMHG | TEMPERATURE: 98.5 F | BODY MASS INDEX: 24.25 KG/M2 | OXYGEN SATURATION: 99 % | RESPIRATION RATE: 18 BRPM | HEART RATE: 70 BPM

## 2019-03-26 DIAGNOSIS — F31.81 BIPOLAR II DISORDER (H): Primary | ICD-10-CM

## 2019-03-26 DIAGNOSIS — F41.1 GENERALIZED ANXIETY DISORDER: ICD-10-CM

## 2019-03-26 PROCEDURE — 99214 OFFICE O/P EST MOD 30 MIN: CPT | Performed by: NURSE PRACTITIONER

## 2019-03-26 RX ORDER — BUPROPION HYDROCHLORIDE 150 MG/1
150 TABLET ORAL EVERY MORNING
Qty: 30 TABLET | Refills: 0 | Status: SHIPPED | OUTPATIENT
Start: 2019-03-26 | End: 2019-04-16

## 2019-03-26 RX ORDER — LITHIUM CARBONATE 300 MG
TABLET ORAL
Qty: 60 TABLET | Refills: 1 | Status: SHIPPED | OUTPATIENT
Start: 2019-03-26 | End: 2019-05-21

## 2019-03-26 RX ORDER — ESCITALOPRAM OXALATE 20 MG/1
20 TABLET ORAL DAILY
Qty: 30 TABLET | Refills: 1 | Status: SHIPPED | OUTPATIENT
Start: 2019-03-26 | End: 2019-05-21

## 2019-03-26 ASSESSMENT — ANXIETY QUESTIONNAIRES
GAD7 TOTAL SCORE: 17
GAD7 TOTAL SCORE: 17
2. NOT BEING ABLE TO STOP OR CONTROL WORRYING: MORE THAN HALF THE DAYS
7. FEELING AFRAID AS IF SOMETHING AWFUL MIGHT HAPPEN: SEVERAL DAYS
4. TROUBLE RELAXING: NEARLY EVERY DAY
7. FEELING AFRAID AS IF SOMETHING AWFUL MIGHT HAPPEN: SEVERAL DAYS
GAD7 TOTAL SCORE: 17
3. WORRYING TOO MUCH ABOUT DIFFERENT THINGS: MORE THAN HALF THE DAYS
1. FEELING NERVOUS, ANXIOUS, OR ON EDGE: NEARLY EVERY DAY
6. BECOMING EASILY ANNOYED OR IRRITABLE: NEARLY EVERY DAY
5. BEING SO RESTLESS THAT IT IS HARD TO SIT STILL: NEARLY EVERY DAY

## 2019-03-26 ASSESSMENT — PATIENT HEALTH QUESTIONNAIRE - PHQ9
SUM OF ALL RESPONSES TO PHQ QUESTIONS 1-9: 21
10. IF YOU CHECKED OFF ANY PROBLEMS, HOW DIFFICULT HAVE THESE PROBLEMS MADE IT FOR YOU TO DO YOUR WORK, TAKE CARE OF THINGS AT HOME, OR GET ALONG WITH OTHER PEOPLE: VERY DIFFICULT
SUM OF ALL RESPONSES TO PHQ QUESTIONS 1-9: 21

## 2019-03-26 NOTE — PROGRESS NOTES
"    Outpatient Psychiatric Progress Note    Name: Timmy Fitzpatrick   : 1980                    Primary Care Provider: [facility] - last visit 2019  Therapist: DBT groups at [facility], not individual therapy yet, still on a waiting list at [facility]    PHQ-9 scores:  PHQ-9 SCORE 2019 3/12/2019 3/26/2019   PHQ-9 Total Score - - -   PHQ-9 Total Score MyChart 18 (Moderately severe depression) 18 (Moderately severe depression) 21 (Severe depression)   PHQ-9 Total Score 18 18 21   Some encounter information is confidential and restricted. Go to Review Flowsheets activity to see all data.       JUAREZ-7 scores:  JUAREZ-7 SCORE 2019 3/12/2019 3/26/2019   Total Score - - -   Total Score 15 (severe anxiety) 14 (moderate anxiety) 17 (severe anxiety)   Total Score 15 14 17   Some encounter information is confidential and restricted. Go to Review Flowsheets activity to see all data.       Patient Identification:  Patient is a 38 year old year old,   White American male  who presents for return visit with me.  Patient is currently unemployed. Patient attended the session alone. Patient prefers to be called: \"Ventura\".      Interim History:  I last saw Timmy Fitzpatrick for outpatient psychiatry Return Visit on 3/12/19.     During that appointment, we agreed to increase his Lithium from 600 mg nightly to 900 mg nightly. I also wrote him for Seroquel to use 6.25 mg to 12.5 mg PRN emotional dysregulation. And he was to otherwise continue Lexapro 20 mg daily and Gabapentin 600 mg TID. He was soon starting IOP at [facility] DBT, and to inquire about individual therapy services in the practice, beyond skills group.    Patient messaged me a couple of days after that visit to state that he was feeling sedated and hadn't eaten in 4 days due to lack of appetite. I recommended changing Lithium form to ER, and to replace Seroquel with nightly low dose Zyprexa to aid his appetite.      Current medications include: " "  Current Outpatient Medications   Medication Sig     escitalopram (LEXAPRO) 20 MG tablet Take 1 tablet (20 mg) by mouth daily     eszopiclone (LUNESTA) 2 MG tablet Take 1 tablet (2 mg) by mouth nightly as needed for sleep (Patient not taking: Reported on 3/12/2019)     gabapentin (NEURONTIN) 300 MG capsule Take 1 capsule (300 mg) by mouth 6 times daily     GLUCOSAMINE HCL None Entered     lithium (ESKALITH) 450 MG CR tablet Take 1 tablet (450 mg) by mouth 2 times daily     lithium 300 MG capsule 3 tabs nightly     Multiple Vitamins-Minerals (MENS MULTIPLE VITAMIN/LYCOPENE PO) Take 1 tablet by mouth daily.     OLANZapine (ZYPREXA) 2.5 MG tablet Take 1 tablet (2.5 mg) by mouth At Bedtime     QUEtiapine (SEROQUEL) 25 MG tablet 1 tab daily as needed for anxiety     sildenafil (REVATIO) 20 MG tablet TAKE 5 TABLETS BY MOUTH DAILY AS NEEDED, 30 MINUTES TO 4 HOURS BEFORE SEX. (DO NOT USE WITH NITROGLYCERIN, TERAZOSIN OR DOXAZOSIN     No current facility-administered medications for this visit.        The [place] Prescription Monitoring Program has been reviewed and there are no concerns about diversionary activity for controlled substances at this time.      I was able to review most recent Primary Care Provider, specialty provider, and therapy visit notes that I have access to.     Today, patient reports he found Zyprexa 2.5 mg mg \"way too much\"; very sedating and didn't seem it had worthwhile benefit. He did not try [facility], and as felt sedated on 900 mg, he reduced the dosage himself to 600 mg nightly. He's been missing half of his Lexapro dosages as it upsets his stomach when taken in AM; thinks that is contributing to his worsening mood of past couple of weeks. Wife says he has no energy and just sitting on couch when not at therapy or getting kids ready in AM.    He has started to eat dinner reliably again. Hasn't had SIB in 2 weeks. He's sleeping is more consistent (midnight to 6 am). Returned to using Seroquel " "6.25 mg to 12.5 mg for emotional dysregulation; seems to help some. He and wife think Lithium is leveling out his mood some.    Continues with IOP DBT group program at [facility]. \"Soaking in skills\" and finding this helpful. States he spoke with a peer in group about her borderline personality diagnosis; looked into it himself and said it strongly resonated with his life-long experience. His wife is in agreement. Asked pertinent questions about treatment. They states [facility] has individual therapy, but he is on an extensive waitlist. They are inquiring about other options, particularly closer to home in [place].          Past Medical History:   Diagnosis Date     Anxiety      Cervicalgia      Chemical dependency (H)      Depression      Migraine headache       has a past medical history of Anxiety, Cervicalgia, Chemical dependency (H), Depression, and Migraine headache.    Social history updates:  FMLoA approved through work; they have been supportive.    Substance use updates:  Nicotine vaping  No ETOH; sober since 2016  Back to use cannabis at night      Vital Signs:   /74 (BP Location: Right arm, Patient Position: Sitting, Cuff Size: Adult Regular)   Pulse 70   Temp 98.5  F (36.9  C) (Oral)   Resp 18   Wt 76.7 kg (169 lb)   SpO2 99%   BMI 24.25 kg/m      Labs:  Most recent laboratory results reviewed and no new labs.    Review of Systems:  10 systems (general, cardiovascular, respiratory, eyes, ENT, endocrine, GI, , M/S, neurological) were reviewed. Most pertinent finding(s) is/are: low appetite, fatigue. The remaining systems are all unremarkable.    Mental Status Examination:     Appearance:  awake, alert and adequately groomed  Attitude:  cooperative   Eye Contact:  good  Gait and Station: Normal  Psychomotor Behavior:  intact station, gait and muscle tone  Oriented to:  time, person, and place  Attention Span and Concentration:  Normal  Speech:  clear, coherent  Mood: depressed, anxious  Affect: "  appropriate and in normal range  Associations:  no loose associations  Thought Process:  logical, linear and goal oriented  Thought Content:  Appropriate to Interview  Recent and Remote Memory:  intact Not formally assessed. No amnesia.  Fund of Knowledge: appropriate  Insight:  good  Judgment:  intact  Impulse Control:  intact     Suicide Risk Assessment:  Today Timmy Fitzpatrick reports continued recurrent suicidal ideation, presently passive in nature. In addition, there are notable risk factors for self-harm, anxiety, suicidal ideation and withdrawing. However, risk is mitigated by commitment to family, sobriety, removal of gun access, absence of past attempts, ability to volunteer a safety plan, history of seeking help when needed and future oriented. Therefore, based on all available evidence including the factors cited above, Timmy Fitzpatrick does not appear to be at imminent risk for self-harm, does not meet criteria for a 72-hr hold, and therefore remains appropriate for ongoing outpatient level of care.  A thorough assessment of risk factors related to suicide and self-harm have been reviewed and are noted above. The patient convincingly denies acute suicidality on several occasions. Local community safety resources reviewed and printed for patient to use if needed. There was no deceit detected, and the patient presented in a manner that was believable.      DSM5 Diagnosis:  296.89 Bipolar II Disorder With mixed features  300.00 (F41.9) Unspecified Anxiety Disorder      Medical comorbidities include:   Patient Active Problem List    Diagnosis Date Noted     Bipolar II disorder (H) 11/28/2018     Priority: Medium     Anxiety 09/18/2017     Priority: Medium     Tear of medial meniscus of knee 09/08/2016     Priority: Medium     Chemical dependency (H) 11/25/2015     Priority: Medium     Alcoholic, sober since 2006 09/13/2012     Priority: Medium     Major depression in partial remission (H) 06/30/2011      Priority: Medium     Migraine headache 06/30/2011     Priority: Medium     (Problem list name updated by automated process. Provider to review and confirm.)       CARDIOVASCULAR SCREENING; LDL GOAL LESS THAN 160 10/31/2010     Priority: Medium     Cervicalgia 03/24/2010     Priority: Medium     Nonallopathic lesion of thoracic region 03/24/2010     Priority: Medium     Problem list name updated by automated process. Provider to review       Nonallopathic lesion of lumbar region 03/24/2010     Priority: Medium     Problem list name updated by automated process. Provider to review           Assessment:  Timmy Fitzpatrick has found borderline personality disorder information revalatory, and was quite emotional in relaying this. I must confess I did not perceive this through our visits, as he's affectively well regulated with me and did not appear to display obvious signs. I did educate the patient and his wife on the treatments of borderline personality disorder, and how they were not dissimilar to what we have been doing already. However, I agree that while he is getting good benefit from group DBT IOP, he really needs to work with an individual therapist. In order to expedite this, I have identified a practice in [place] (10 minutes from his town) that has DBT-trained psychotherapists in his network.    As it regards medication, I do believe we need to assure he is taking Lexapro, which has been a long-standing antidepressant for him. In order to improve adherence, we'll switch this to dinner-time dosing. He is seeing some benefit, however modest, towards his mood stability with Lithium, so we will maintain this at 600 mg nightly for now, as wasn't able to tolerate higher dosage. He may continue to use Seroquel 6.25 to 12.5 mg as needed for agitation/dysregulation. Will continue Gabapentin 600 mg TID (advised that he not over-use this, as prone to in past).    Given his recent persistent symptoms of  lethargy, dysnergy, and low mood, I recommend he reinitiate Wellbutrin, which he had used in the past to some benefit. States he was already thinking about this as an idea.    Plan:  Continue Lithium 600 at night  Lexapro 20 mg at night  Wellbutirn  mg in AM  S dbt  Individual therapy (DBT therapist)    Medication side effects and alternatives were reviewed. Health promotion activities recommended and reviewed today. All questions addressed. Education and counseling completed regarding risks and benefits of medications and psychotherapy options.    Treatment Plan:    Start Wellbutrin  mg qAM    Continue Lithium 600 mg nightly    Continue Lexapro 20 mg daily, move to dinnertime dosing    May continue Gabapentin 600 mg TID    May use Seroquel 6.25 (1/4 tab) to 12.5 mg (1/2 tab) daily as needed for agitation    May use Lunesta 2 mg as needed for sleep    Continue [facility] DBT for group therapy; remain on waitlist for individual therapy    May otherwise contact [facility] in [place] for individual therapy    Continue all other medical directions per primary care provider.     Continue all other medications as reviewed per electronic medical record today.     Safety plan reviewed. To the Emergency Department as needed or call after hours crisis line at [phone] or [phone]. [place] Crisis Text Line. Text [place] to 120444 or Suicide LifeLine Chat: suicidepreventionlifeline.org/chat/    Schedule an appointment with me in 2 weeks or sooner as needed. Call [facility] at [phone] to schedule.    Follow up with primary care provider as planned or for acute medical concerns.    Call the psychiatric nurse line with medication questions or concerns at [phone].    MyChart may be used to communicate with your provider, but this is not intended to be used for emergencies.    Administrative Billing:   Time spent with patient was 30 minutes and greater than 50% of time or  20 minutes was spent in counseling and coordination of care regarding above diagnoses and treatment plan.    Patient Status:  Patient will continue to be seen for ongoing consultation and stabilization.    Signed:   Yury Dueñas CNP   Psychiatry

## 2019-03-27 ASSESSMENT — ANXIETY QUESTIONNAIRES: GAD7 TOTAL SCORE: 17

## 2019-03-27 ASSESSMENT — PATIENT HEALTH QUESTIONNAIRE - PHQ9: SUM OF ALL RESPONSES TO PHQ QUESTIONS 1-9: 21

## 2019-04-09 ENCOUNTER — MYC MEDICAL ADVICE (OUTPATIENT)
Dept: PSYCHIATRY | Facility: CLINIC | Age: 39
End: 2019-04-09

## 2019-04-09 NOTE — TELEPHONE ENCOUNTER
Kojo pt to bring them in, fax, or email them. I informed pt that we wont be able to fill it out today or while he sits in the clinic waiting.     Cassidy Calvillo, MICHAEL

## 2019-04-10 NOTE — TELEPHONE ENCOUNTER
Recieved one page form from Pt. Pt states to send the finished form to Krista Diop at [phone]. Pt wanted to be able to work 2-8 hour days for 2 weeks then 3-8 hour days a week until the end of May. Giving the form to provider to fill out.     Cassidy Calvillo, CMA

## 2019-04-16 ENCOUNTER — OFFICE VISIT (OUTPATIENT)
Dept: PSYCHIATRY | Facility: CLINIC | Age: 39
End: 2019-04-16
Payer: COMMERCIAL

## 2019-04-16 VITALS
BODY MASS INDEX: 26.11 KG/M2 | DIASTOLIC BLOOD PRESSURE: 80 MMHG | WEIGHT: 182 LBS | OXYGEN SATURATION: 97 % | HEART RATE: 74 BPM | SYSTOLIC BLOOD PRESSURE: 126 MMHG | RESPIRATION RATE: 14 BRPM

## 2019-04-16 DIAGNOSIS — F41.1 GENERALIZED ANXIETY DISORDER: ICD-10-CM

## 2019-04-16 DIAGNOSIS — F32.4 MAJOR DEPRESSIVE DISORDER WITH SINGLE EPISODE, IN PARTIAL REMISSION (H): Primary | Chronic | ICD-10-CM

## 2019-04-16 DIAGNOSIS — F31.81 BIPOLAR II DISORDER (H): ICD-10-CM

## 2019-04-16 PROCEDURE — 99214 OFFICE O/P EST MOD 30 MIN: CPT | Performed by: NURSE PRACTITIONER

## 2019-04-16 RX ORDER — BUPROPION HYDROCHLORIDE 150 MG/1
150 TABLET ORAL EVERY MORNING
Qty: 30 TABLET | Refills: 0 | Status: SHIPPED | OUTPATIENT
Start: 2019-04-16 | End: 2019-05-21

## 2019-04-16 ASSESSMENT — ANXIETY QUESTIONNAIRES
GAD7 TOTAL SCORE: 10
2. NOT BEING ABLE TO STOP OR CONTROL WORRYING: SEVERAL DAYS
7. FEELING AFRAID AS IF SOMETHING AWFUL MIGHT HAPPEN: NOT AT ALL
1. FEELING NERVOUS, ANXIOUS, OR ON EDGE: SEVERAL DAYS
6. BECOMING EASILY ANNOYED OR IRRITABLE: SEVERAL DAYS
7. FEELING AFRAID AS IF SOMETHING AWFUL MIGHT HAPPEN: NOT AT ALL
4. TROUBLE RELAXING: NEARLY EVERY DAY
3. WORRYING TOO MUCH ABOUT DIFFERENT THINGS: SEVERAL DAYS
GAD7 TOTAL SCORE: 10
GAD7 TOTAL SCORE: 10
5. BEING SO RESTLESS THAT IT IS HARD TO SIT STILL: NEARLY EVERY DAY

## 2019-04-16 ASSESSMENT — PATIENT HEALTH QUESTIONNAIRE - PHQ9
SUM OF ALL RESPONSES TO PHQ QUESTIONS 1-9: 16
SUM OF ALL RESPONSES TO PHQ QUESTIONS 1-9: 16
10. IF YOU CHECKED OFF ANY PROBLEMS, HOW DIFFICULT HAVE THESE PROBLEMS MADE IT FOR YOU TO DO YOUR WORK, TAKE CARE OF THINGS AT HOME, OR GET ALONG WITH OTHER PEOPLE: VERY DIFFICULT

## 2019-04-16 NOTE — PROGRESS NOTES
"+    Outpatient Psychiatric Progress Note    Name: Timmy Fitzpatrick   : 1980                    Primary Care Provider: [facility]  Therapist: DBT - last visit yesturday    PHQ-9 scores:  PHQ-9 SCORE 3/12/2019 3/26/2019 2019   PHQ-9 Total Score - - -   PHQ-9 Total Score MyChart 18 (Moderately severe depression) 21 (Severe depression) 16 (Moderately severe depression)   PHQ-9 Total Score 18 21 16   Some encounter information is confidential and restricted. Go to Review Flowsheets activity to see all data.       JUAREZ-7 scores:  JUAREZ-7 SCORE 3/12/2019 3/26/2019 2019   Total Score - - -   Total Score 14 (moderate anxiety) 17 (severe anxiety) Incomplete   Total Score 14 17 -   Some encounter information is confidential and restricted. Go to Review Flowsheets activity to see all data.         Answers for HPI/ROS submitted by the patient on 2019   If you checked off any problems, how difficult have these problems made it for you to do your work, take care of things at home, or get along with other people?: Very difficult  PHQ9 TOTAL SCORE: 16  JUAREZ 7 TOTAL SCORE: 10    Patient Identification:  Patient is a 38 year old year old,   White American male  who presents for return visit with me.  Patient is currently on STD. Patient attended the session with Cristina, patients wife. Patient prefers to be called: \"Ventura\".    Interim History:  I last saw Timmy Fitzpatrick for outpatient psychiatry Return Visit on 3/26/19.     During that appointment, we talked about possible more accurate diagnosis of borderline personality disorder. He continued with DBT IOP at [facility], but was frustrated about long waitlist for individual therapist at [facility]. He had been on GUILHERME from work. We agreed to continue Lexapro 20 mg daily (to dose with dinner), Lithium 600 mg at bedtime, and to start Wellbutrin in AM (which he had used in past). He was permitted to continue Gabapentin 600 mg up to TID, Seroquel 6.25 to " 12.5 mg PRN agitation, and Lunesta 2 mg for sleep.    After our visit I had forwarded him a therapy practice in [place] that has individual DBT therapists.    Current medications include:   Current Outpatient Medications   Medication Sig     buPROPion (WELLBUTRIN XL) 150 MG 24 hr tablet Take 1 tablet (150 mg) by mouth every morning     escitalopram (LEXAPRO) 20 MG tablet Take 1 tablet (20 mg) by mouth daily     eszopiclone (LUNESTA) 2 MG tablet Take 1 tablet (2 mg) by mouth nightly as needed for sleep     gabapentin (NEURONTIN) 300 MG capsule Take 1 capsule (300 mg) by mouth 6 times daily     GLUCOSAMINE HCL None Entered     lithium (ESKALITH) 300 MG tablet 2 tabs nightly     Multiple Vitamins-Minerals (MENS MULTIPLE VITAMIN/LYCOPENE PO) Take 1 tablet by mouth daily.     QUEtiapine (SEROQUEL) 25 MG tablet 1 tab daily as needed for anxiety     sildenafil (REVATIO) 20 MG tablet TAKE 5 TABLETS BY MOUTH DAILY AS NEEDED, 30 MINUTES TO 4 HOURS BEFORE SEX. (DO NOT USE WITH NITROGLYCERIN, TERAZOSIN OR DOXAZOSIN     No current facility-administered medications for this visit.        The [place] Prescription Monitoring Program has been reviewed and there are no concerns about diversionary activity for controlled substances at this time.      I was able to review most recent Primary Care Provider, specialty provider, and therapy visit notes that I have access to.     Today, patient reports he's doing a measure better mood-wise. Started Wellbutrin  mg qAM 3 weeks ago; initially felt flat in first week, but now feels it has improved his mood; denies side effects. Appetite has encouragingly rebounded past few weeks and is eating more consistently. Has been dosing Lexapro in AM without issue, so has been consistent with 20 mg daily. He continues with Lithium 600 mg nightly; tolerating well. He denies SIB actions or any pronounced impulses, which is refreshing. Denies any recent SI. He hasn't had need to use  Seroquel; trying to avoid as makes him overly sedate. He's using Gabapentin mostly exclusively in the evening, 600 to 900 mg. He has been largely sleeping better past few weeks, though since returning to work 2 days ago he's been anxious/sleeping poorly. Anticipates he'll use a Lunesta tonight to get a reliable night's rest.    He continues with thrice-weekly DBT group at [facility]; continues to be helpful. Still strongly desires individual therapist. [facility] waitlist in 1 month. Contacted practice in [place] and their waitlist is evening longer. He believes he'll just await availability at [facility].          Past Medical History:   Diagnosis Date     Anxiety      Cervicalgia      Chemical dependency (H)      Depression      Migraine headache       has a past medical history of Anxiety, Cervicalgia, Chemical dependency (H), Depression, and Migraine headache.    Social history updates:  Returned to work.    Substance use updates:  Nicotine vaping  No ETOH; sober since 2016  Occasional cannabis at night        Vital Signs:   /80 (BP Location: Right arm, Patient Position: Chair, Cuff Size: Adult Large)   Pulse 74   Resp 14   Wt 82.6 kg (182 lb)   SpO2 97%   BMI 26.11 kg/m      Labs:  Most recent laboratory results reviewed and no new labs.    Review of Systems:  10 systems (general, cardiovascular, respiratory, eyes, ENT, endocrine, GI, , M/S, neurological) were reviewed. Most pertinent finding(s) is/are: all unremarkable.    Mental Status Examination:     Appearance:  awake, alert and adequately groomed  Attitude:  cooperative   Eye Contact:  good  Gait and Station: Normal  Psychomotor Behavior:  intact station, gait and muscle tone  Oriented to:  time, person, and place  Attention Span and Concentration:  Normal  Speech:  clear, coherent  Mood: better  Affect:  appropriate and in normal range  Associations:  no loose associations  Thought Process:  logical, linear and goal oriented  Thought Content:   Appropriate to Interview  Recent and Remote Memory:  intact Not formally assessed. No amnesia.  Fund of Knowledge: appropriate  Insight:  good  Judgment:  intact  Impulse Control:  intact    Suicide Risk Assessment:  Today Timmy Fitzpatrick denies any recent suicidal ideation. In addition, there are notable risk factors for self-harm, anxiety, suicidal ideation and withdrawing. However, risk is mitigated by commitment to family, sobriety, removal of gun access, absence of past attempts, ability to volunteer a safety plan, history of seeking help when needed and future oriented. Therefore, based on all available evidence including the factors cited above, Timmy Fitzpatrick does not appear to be at imminent risk for self-harm, does not meet criteria for a 72-hr hold, and therefore remains appropriate for ongoing outpatient level of care.  A thorough assessment of risk factors related to suicide and self-harm have been reviewed and are noted above. The patient convincingly denies acute suicidality on several occasions. Local community safety resources reviewed and printed for patient to use if needed. There was no deceit detected, and the patient presented in a manner that was believable.      DSM5 Diagnosis:  296.89 Bipolar II Disorder With mixed features  R/o Borderline Personality Disorder  300.00 (F41.9) Unspecified Anxiety Disorder      Medical comorbidities include:   Patient Active Problem List    Diagnosis Date Noted     Generalized anxiety disorder 03/26/2019     Priority: Medium     Bipolar II disorder (H) 11/28/2018     Priority: Medium     Anxiety 09/18/2017     Priority: Medium     Tear of medial meniscus of knee 09/08/2016     Priority: Medium     Chemical dependency (H) 11/25/2015     Priority: Medium     Alcoholic, sober since 2006 09/13/2012     Priority: Medium     Major depression in partial remission (H) 06/30/2011     Priority: Medium     Migraine headache 06/30/2011     Priority: Medium      (Problem list name updated by automated process. Provider to review and confirm.)       CARDIOVASCULAR SCREENING; LDL GOAL LESS THAN 160 10/31/2010     Priority: Medium     Cervicalgia 03/24/2010     Priority: Medium     Nonallopathic lesion of thoracic region 03/24/2010     Priority: Medium     Problem list name updated by automated process. Provider to review       Nonallopathic lesion of lumbar region 03/24/2010     Priority: Medium     Problem list name updated by automated process. Provider to review           Assessment:  Timmy Fitzpatrick reports a mild to moderate, though not insignificant, turn-around in his mood, emotional regulation and self-harm impulses. I trust his continued participation in DBT IOP has been major contribution to this. Engaging with an individual therapist will continue to be a priority -- given lack of other options, we will continue to sit on [facility] practice waitlist.    As it regards medication, we seem a bit more on course. We agree to maintain Lexapro 20 mg daily, Wellbutrin  mg daily, Lithium 600 mg nightly. He may utilize Gabapentin 300 to 900 mg daily for anxiety (advised that he not over-use this, as prone to in past)..  He may continue to use Seroquel 6.25 to 12.5 mg as needed for more acute agitation/dysregulation. And we agree that if he has two nights of insomnia, he'll take a Lunesta on the third night.    We did discuss possible addition of Latuda, but for now we'll defer on this.    Medication side effects and alternatives were reviewed. Health promotion activities recommended and reviewed today. All questions addressed. Education and counseling completed regarding risks and benefits of medications and psychotherapy options.    Treatment Plan:    Continue Wellbutrin  mg qAM    Continue Lithium 600 mg nightly    Continue Lexapro 20 mg qAM    May continue Gabapentin 300 to 900 mg per evening for anxiety    May use Seroquel 6.25 (1/4 tab) to 12.5 mg (1/2 tab)  daily as needed for agitation    May use Lunesta 2 mg as needed for sleep    Continue [facility] DBT for group therapy; remain on waitlist for individual therapy    Continue all other medical directions per primary care provider.     Continue all other medications as reviewed per electronic medical record today.     Safety plan reviewed. To the Emergency Department as needed or call after hours crisis line at [phone] or [phone]. [place] Crisis Text Line. Text [place] to 302934 or Suicide LifeLine Chat: [email or web address] [email or web address].org/chat/    Schedule an appointment with me in 4 weeks or sooner as needed. Call [facility] at [phone] to schedule.    Follow up with primary care provider as planned or for acute medical concerns.    Call the psychiatric nurse line with medication questions or concerns at [phone].    Pixafyhart may be used to communicate with your provider, but this is not intended to be used for emergencies.        Administrative Billing:   Time spent with patient was 30 minutes and greater than 50% of time or 20 minutes was spent in counseling and coordination of care regarding above diagnoses and treatment plan.    Patient Status:  Patient will continue to be seen for ongoing consultation and stabilization.    Signed:   Yury Dueñas CNP   Psychiatry

## 2019-04-17 ASSESSMENT — PATIENT HEALTH QUESTIONNAIRE - PHQ9: SUM OF ALL RESPONSES TO PHQ QUESTIONS 1-9: 16

## 2019-04-17 ASSESSMENT — ANXIETY QUESTIONNAIRES: GAD7 TOTAL SCORE: 10

## 2019-05-21 ENCOUNTER — OFFICE VISIT (OUTPATIENT)
Dept: PSYCHIATRY | Facility: CLINIC | Age: 39
End: 2019-05-21
Payer: COMMERCIAL

## 2019-05-21 VITALS
OXYGEN SATURATION: 99 % | SYSTOLIC BLOOD PRESSURE: 138 MMHG | HEART RATE: 63 BPM | WEIGHT: 176 LBS | RESPIRATION RATE: 14 BRPM | DIASTOLIC BLOOD PRESSURE: 84 MMHG | BODY MASS INDEX: 25.25 KG/M2

## 2019-05-21 DIAGNOSIS — F41.1 GENERALIZED ANXIETY DISORDER: ICD-10-CM

## 2019-05-21 DIAGNOSIS — F31.81 BIPOLAR II DISORDER (H): ICD-10-CM

## 2019-05-21 DIAGNOSIS — F32.4 MAJOR DEPRESSIVE DISORDER WITH SINGLE EPISODE, IN PARTIAL REMISSION (H): Primary | Chronic | ICD-10-CM

## 2019-05-21 PROCEDURE — 99213 OFFICE O/P EST LOW 20 MIN: CPT | Performed by: NURSE PRACTITIONER

## 2019-05-21 RX ORDER — BUPROPION HYDROCHLORIDE 150 MG/1
150 TABLET ORAL EVERY MORNING
Qty: 30 TABLET | Refills: 2 | Status: SHIPPED | OUTPATIENT
Start: 2019-05-21 | End: 2019-07-23

## 2019-05-21 RX ORDER — GABAPENTIN 300 MG/1
300 CAPSULE ORAL
Qty: 180 CAPSULE | Refills: 2 | Status: SHIPPED | OUTPATIENT
Start: 2019-05-21 | End: 2019-07-23

## 2019-05-21 RX ORDER — ESCITALOPRAM OXALATE 20 MG/1
20 TABLET ORAL DAILY
Qty: 30 TABLET | Refills: 2 | Status: SHIPPED | OUTPATIENT
Start: 2019-05-21 | End: 2019-07-23

## 2019-05-21 ASSESSMENT — ANXIETY QUESTIONNAIRES
GAD7 TOTAL SCORE: 9
7. FEELING AFRAID AS IF SOMETHING AWFUL MIGHT HAPPEN: NOT AT ALL
5. BEING SO RESTLESS THAT IT IS HARD TO SIT STILL: MORE THAN HALF THE DAYS
6. BECOMING EASILY ANNOYED OR IRRITABLE: MORE THAN HALF THE DAYS
1. FEELING NERVOUS, ANXIOUS, OR ON EDGE: MORE THAN HALF THE DAYS
2. NOT BEING ABLE TO STOP OR CONTROL WORRYING: NOT AT ALL
3. WORRYING TOO MUCH ABOUT DIFFERENT THINGS: NOT AT ALL

## 2019-05-21 ASSESSMENT — PATIENT HEALTH QUESTIONNAIRE - PHQ9
5. POOR APPETITE OR OVEREATING: NEARLY EVERY DAY
SUM OF ALL RESPONSES TO PHQ QUESTIONS 1-9: 13

## 2019-05-22 ASSESSMENT — ANXIETY QUESTIONNAIRES: GAD7 TOTAL SCORE: 9

## 2019-06-05 ENCOUNTER — MYC MEDICAL ADVICE (OUTPATIENT)
Dept: PSYCHIATRY | Facility: CLINIC | Age: 39
End: 2019-06-05

## 2019-07-23 ENCOUNTER — OFFICE VISIT (OUTPATIENT)
Dept: PSYCHIATRY | Facility: CLINIC | Age: 39
End: 2019-07-23
Payer: COMMERCIAL

## 2019-07-23 VITALS
WEIGHT: 178 LBS | SYSTOLIC BLOOD PRESSURE: 100 MMHG | RESPIRATION RATE: 10 BRPM | DIASTOLIC BLOOD PRESSURE: 66 MMHG | HEART RATE: 60 BPM | OXYGEN SATURATION: 97 % | BODY MASS INDEX: 25.54 KG/M2

## 2019-07-23 DIAGNOSIS — F31.81 BIPOLAR II DISORDER (H): ICD-10-CM

## 2019-07-23 PROCEDURE — 99214 OFFICE O/P EST MOD 30 MIN: CPT | Performed by: NURSE PRACTITIONER

## 2019-07-23 RX ORDER — ESCITALOPRAM OXALATE 20 MG/1
20 TABLET ORAL DAILY
Qty: 30 TABLET | Refills: 2 | Status: SHIPPED | OUTPATIENT
Start: 2019-07-23 | End: 2019-10-28

## 2019-07-23 RX ORDER — BUPROPION HYDROCHLORIDE 150 MG/1
150 TABLET ORAL EVERY MORNING
Qty: 30 TABLET | Refills: 2 | Status: SHIPPED | OUTPATIENT
Start: 2019-07-23 | End: 2019-09-26 | Stop reason: DRUGHIGH

## 2019-07-23 RX ORDER — LITHIUM CARBONATE 300 MG
TABLET ORAL
Refills: 1 | COMMUNITY
Start: 2019-03-27 | End: 2019-07-23

## 2019-07-23 RX ORDER — GABAPENTIN 300 MG/1
300 CAPSULE ORAL
Qty: 180 CAPSULE | Refills: 2 | Status: SHIPPED | OUTPATIENT
Start: 2019-07-23 | End: 2019-10-28

## 2019-07-23 ASSESSMENT — PATIENT HEALTH QUESTIONNAIRE - PHQ9
SUM OF ALL RESPONSES TO PHQ QUESTIONS 1-9: 17
10. IF YOU CHECKED OFF ANY PROBLEMS, HOW DIFFICULT HAVE THESE PROBLEMS MADE IT FOR YOU TO DO YOUR WORK, TAKE CARE OF THINGS AT HOME, OR GET ALONG WITH OTHER PEOPLE: SOMEWHAT DIFFICULT
SUM OF ALL RESPONSES TO PHQ QUESTIONS 1-9: 17

## 2019-07-23 ASSESSMENT — ANXIETY QUESTIONNAIRES
GAD7 TOTAL SCORE: 14
GAD7 TOTAL SCORE: 14
6. BECOMING EASILY ANNOYED OR IRRITABLE: NEARLY EVERY DAY
7. FEELING AFRAID AS IF SOMETHING AWFUL MIGHT HAPPEN: NOT AT ALL
1. FEELING NERVOUS, ANXIOUS, OR ON EDGE: NEARLY EVERY DAY
3. WORRYING TOO MUCH ABOUT DIFFERENT THINGS: SEVERAL DAYS
5. BEING SO RESTLESS THAT IT IS HARD TO SIT STILL: NEARLY EVERY DAY
4. TROUBLE RELAXING: NEARLY EVERY DAY
7. FEELING AFRAID AS IF SOMETHING AWFUL MIGHT HAPPEN: NOT AT ALL
2. NOT BEING ABLE TO STOP OR CONTROL WORRYING: SEVERAL DAYS
GAD7 TOTAL SCORE: 14

## 2019-07-23 NOTE — PROGRESS NOTES
"    Outpatient Psychiatric Progress Note    Name: Timmy Fitzpatrick   : 1980                    Primary Care Provider: [facility]   Therapist: WILL MOE     PHQ-9 scores:  PHQ-9 SCORE 2019   PHQ-9 Total Score - - -   PHQ-9 Total Score MyChart 16 (Moderately severe depression) - 17 (Moderately severe depression)   PHQ-9 Total Score 16 13 17   Some encounter information is confidential and restricted. Go to Review Flowsheets activity to see all data.       JUAREZ-7 scores:  JUAREZ-7 SCORE 2019   Total Score - - -   Total Score 10 (moderate anxiety) - 14 (moderate anxiety)   Total Score 10 9 14   Some encounter information is confidential and restricted. Go to Review Flowsheets activity to see all data.       Patient Identification:  Patient is a 38 year old year old,   White American male  who presents for return visit with me.  Patient is currently employed full time. Patient attended the session alone. Patient prefers to be called: \"Ventura\".    Interim History:  I last saw Timmy Fitzpatrick for outpatient psychiatry Return Visit on 19.     During that appointment, we reviewed his improved mood stability with continued use of Wellbutrin and continuation of individual and group DBT. We were going to maintain Wellbutrin, Lexapro, and Gabapentin daily; he had Lunesta and Seroquel to use PRN. We renewed his FMLoA so he could continue DBT.    Current medications include:   Current Outpatient Medications   Medication Sig     buPROPion (WELLBUTRIN XL) 150 MG 24 hr tablet Take 1 tablet (150 mg) by mouth every morning     escitalopram (LEXAPRO) 20 MG tablet Take 1 tablet (20 mg) by mouth daily     eszopiclone (LUNESTA) 2 MG tablet Take 1 tablet (2 mg) by mouth nightly as needed for sleep     gabapentin (NEURONTIN) 300 MG capsule Take 1 capsule (300 mg) by mouth 6 times daily     GLUCOSAMINE HCL None Entered     Multiple Vitamins-Minerals (MENS " "MULTIPLE VITAMIN/LYCOPENE PO) Take 1 tablet by mouth daily.     QUEtiapine (SEROQUEL) 25 MG tablet 1 tab daily as needed for anxiety     sildenafil (REVATIO) 20 MG tablet TAKE 5 TABLETS BY MOUTH DAILY AS NEEDED, 30 MINUTES TO 4 HOURS BEFORE SEX. (DO NOT USE WITH NITROGLYCERIN, TERAZOSIN OR DOXAZOSIN     No current facility-administered medications for this visit.        The [place] Prescription Monitoring Program has been reviewed and there are no concerns about diversionary activity for controlled substances at this time.      I was able to review most recent Primary Care Provider, specialty provider, and therapy visit notes that I have access to.     Today, patient reports his mood has been \"funky\" since soon after our last visit; energy low, mood down, though not significantly depressed. States he also stopped Wellbutrin soon after last visit. States he felt he was doing good, so maybe didn't need it. Also concerned it might have been contributing to recent weight gain. In response to down mood restarted Lithium, though at just 300 mg daily; has been dosing inconsistently, 75% of the time.    He continues with Lexapro 20 mg daily; consistently every morning. Using Gabapentin 900 mg BID, though sometimes can get away with less. Hasn't had need to use Seroquel. Using Lunesta 2 mg once a week at max; quite helpful for sleep when he needs it.    He continues in individual and group DBT with [facility]; goes to group twice a week. States SIB is absent, and his SI is quite infrequent and passive/transient.    States he's largely sleeping OK. Appetite still low; just one meal a day, and tends to be before his bedtime after he has put kids to bed. Feels he has to make more time for himself, but hard to do when parenting while wife is engaged with NP school full time. Not sure why he eats so little, but does agree there is some level of self punishment involved there. Both good items he'll be sure to address in therapy.    His " DUGLAS is exhausted at work. Needs to make work hours until his wife back in town 8/15/19 to help with parenting. He may  weekday evening shifts. He and family going on vacation to [facility] in 09/2019.          Past Medical History:   Diagnosis Date     Anxiety      Cervicalgia      Chemical dependency (H)      Depression      Migraine headache       has a past medical history of Anxiety, Cervicalgia, Chemical dependency (H), Depression, and Migraine headache.    Social history updates:  See above.    Substance use updates:  Nicotine vaping  No ETOH; sober since 2016  Occasional cannabis at night      Vital Signs:   /66 (BP Location: Left arm, Patient Position: Chair, Cuff Size: Adult Regular)   Pulse 60   Resp 10   Wt 80.7 kg (178 lb)   SpO2 97%   BMI 25.54 kg/m      Labs:  Most recent laboratory results reviewed and no new labs.    Review of Systems:  10 systems (general, cardiovascular, respiratory, eyes, ENT, endocrine, GI, , M/S, neurological) were reviewed. Most pertinent finding(s) is/are: low appetite, fatigue. The remaining systems are all unremarkable.    Mental Status Examination:     Appearance:  awake, alert and adequately groomed  Attitude:  cooperative   Eye Contact:  good  Gait and Station: Normal  Psychomotor Behavior:  intact station, gait and muscle tone  Oriented to:  time, person, and place  Attention Span and Concentration:  Normal  Speech:  clear, coherent  Mood: down  Affect:  appropriate and in normal range  Associations:  no loose associations  Thought Process:  logical, linear and goal oriented  Thought Content:  Appropriate to Interview  Recent and Remote Memory:  intact Not formally assessed. No amnesia.  Fund of Knowledge: appropriate  Insight:  good  Judgment:  intact  Impulse Control:  intact     Suicide Risk Assessment:  Today Timmy Fitzpatrick endorses infrequent, transient passive suicidal ideation, and denies any recent self-injury impulses.  In addition,  there are notable risk factors for self-harm, anxiety, suicidal ideation and withdrawing. However, risk is mitigated by commitment to family, sobriety, removal of gun access, absence of past attempts, ability to volunteer a safety plan, history of seeking help when needed and future oriented. Therefore, based on all available evidence including the factors cited above, Timmy Fitzpatrick does not appear to be at imminent risk for self-harm, does not meet criteria for a 72-hr hold, and therefore remains appropriate for ongoing outpatient level of care.  A thorough assessment of risk factors related to suicide and self-harm have been reviewed and are noted above. The patient convincingly denies acute suicidality on several occasions. Local community safety resources reviewed and printed for patient to use if needed. There was no deceit detected, and the patient presented in a manner that was believable.      DSM5 Diagnosis:  296.89 Bipolar II Disorder With mixed features  Borderline Personality Disorder traits  300.00 (F41.9) Unspecified Anxiety Disorder      Medical comorbidities include:   Patient Active Problem List    Diagnosis Date Noted     Generalized anxiety disorder 03/26/2019     Priority: Medium     Bipolar II disorder (H) 11/28/2018     Priority: Medium     Anxiety 09/18/2017     Priority: Medium     Tear of medial meniscus of knee 09/08/2016     Priority: Medium     Chemical dependency (H) 11/25/2015     Priority: Medium     Alcoholic, sober since 2006 09/13/2012     Priority: Medium     Major depression in partial remission (H) 06/30/2011     Priority: Medium     Migraine headache 06/30/2011     Priority: Medium     (Problem list name updated by automated process. Provider to review and confirm.)       CARDIOVASCULAR SCREENING; LDL GOAL LESS THAN 160 10/31/2010     Priority: Medium     Cervicalgia 03/24/2010     Priority: Medium     Nonallopathic lesion of thoracic region 03/24/2010     Priority: Medium      Problem list name updated by automated process. Provider to review       Nonallopathic lesion of lumbar region 03/24/2010     Priority: Medium     Problem list name updated by automated process. Provider to review           Assessment:  Timmy Fitzpatrick reports a worsened mood since last visit likely secondary to erroneously discontinuing Wellbutrin. I've assured him this was not the cause of his recent weight gain, and a more likely contributor has been his reduced daily physical activity since working part-time. This does make sense to him. We'll have him discontinue Lithium, given lack of treatment indication at this time, and have him restart Wellbutrin. We'll otherwise maintain Lexapro 20 mg daily. He may continue Gabapentin up to 1800 mg total daily -- I agree a goal of treatment would be for him to be able to reduce his daily use of this in concert with increased DBT mastery. He may continue to use Seroquel 6.25 to 12.5 mg as needed for more acute agitation/dysregulation. And we agree that if he has two nights of insomnia, he'll take a Lunesta on the third night -- he's been thus far using Lunesta appropriately and responsibly.    He is greatly commended for his continued commitment to the DBT IOP.    Medication side effects and alternatives were reviewed. Health promotion activities recommended and reviewed today. All questions addressed. Education and counseling completed regarding risks and benefits of medications and psychotherapy options.    Treatment Plan:    Restart Wellbutrin  mg qAM    Continue Lexapro 20 mg qAM    May continue Gabapentin up to 900 mg BID for anxiety    May use Seroquel 6.25 (1/4 tab) to 12.5 mg (1/2 tab) daily as needed for agitation    May use Lunesta 2 mg as needed for sleep    Continue S DBT for individual and group therapy    Continue all other medical directions per primary care provider.     Continue all other medications as reviewed per electronic medical  record today.     Safety plan reviewed. To the Emergency Department as needed or call after hours crisis line at [phone] or [phone]. [place] Crisis Text Line. Text [place] to 782062 or Suicide LifeLine Chat: suicideFactor 14.org/chat/    Schedule an appointment with me in 2 months or sooner as needed. Call [facility] at [phone] to schedule.    Follow up with primary care provider as planned or for acute medical concerns.    Call the psychiatric nurse line with medication questions or concerns at [phone].    The [employer] may be used to communicate with your provider, but this is not intended to be used for emergencies.      Crisis Resources:    National Suicide Prevention Lifeline: 292.383.2797 (TTY: 855.103.1230). Call anytime for help.  (www.suicidepreventionlifeline.org)  National Richards on Mental Illness (www.audra.org): 260.474.8842 or 150-879-6236.   Mental Health Association (www.mentalhealth.org): 621.681.4782 or 916-531-9466.  [place] Crisis Text [place]: Text [place] to 822683  Suicide LifeLine Chat: suicideFactor 14.org/chat      Administrative Billing:   Time spent with patient was 30 minutes and greater than 50% of time or 20 minutes was spent in counseling and coordination of care regarding above diagnoses and treatment plan.    Patient Status:  Patient will continue to be seen for ongoing consultation and stabilization.    Signed:   Yury Dueñas CNP   Psychiatry

## 2019-07-24 ASSESSMENT — ANXIETY QUESTIONNAIRES: GAD7 TOTAL SCORE: 14

## 2019-08-01 ASSESSMENT — PATIENT HEALTH QUESTIONNAIRE - PHQ9: SUM OF ALL RESPONSES TO PHQ QUESTIONS 1-9: 17

## 2019-09-04 ENCOUNTER — OFFICE VISIT (OUTPATIENT)
Dept: FAMILY MEDICINE | Facility: CLINIC | Age: 39
End: 2019-09-04
Payer: COMMERCIAL

## 2019-09-04 VITALS
SYSTOLIC BLOOD PRESSURE: 126 MMHG | WEIGHT: 181 LBS | DIASTOLIC BLOOD PRESSURE: 86 MMHG | RESPIRATION RATE: 16 BRPM | OXYGEN SATURATION: 98 % | TEMPERATURE: 98.9 F | HEIGHT: 70 IN | HEART RATE: 61 BPM | BODY MASS INDEX: 25.91 KG/M2

## 2019-09-04 DIAGNOSIS — J02.0 STREP THROAT: Primary | ICD-10-CM

## 2019-09-04 DIAGNOSIS — R07.0 THROAT PAIN: ICD-10-CM

## 2019-09-04 LAB
DEPRECATED S PYO AG THROAT QL EIA: ABNORMAL
SPECIMEN SOURCE: ABNORMAL

## 2019-09-04 PROCEDURE — 87880 STREP A ASSAY W/OPTIC: CPT | Performed by: NURSE PRACTITIONER

## 2019-09-04 PROCEDURE — 99213 OFFICE O/P EST LOW 20 MIN: CPT | Performed by: NURSE PRACTITIONER

## 2019-09-04 RX ORDER — PENICILLIN V POTASSIUM 500 MG/1
500 TABLET, FILM COATED ORAL 2 TIMES DAILY
Qty: 20 TABLET | Refills: 0 | Status: SHIPPED | OUTPATIENT
Start: 2019-09-04 | End: 2019-09-26

## 2019-09-04 ASSESSMENT — MIFFLIN-ST. JEOR: SCORE: 1737.26

## 2019-09-04 NOTE — PROGRESS NOTES
"Subjective     Timmy Fitzpatrick is a 39 year old male who presents to clinic today for the following health issues:    HPI   ENT Symptoms             Symptoms: cc Present Absent Comment   Fever/Chills   x    Fatigue       Muscle Aches x x     Eye Irritation   x    Sneezing  x     Nasal Duncan/Drg  x     Sinus Pressure/Pain  x     Loss of smell       Dental pain       Sore Throat x x     Swollen Glands       Ear Pain/Fullness   x    Cough  x     Wheeze   x    Chest Pain   x    Shortness of breath   x    Rash       Other x x  headaches     Symptom duration:  two weeks   Symptom severity:  mild to moderate   Treatments tried:  nyquil, sudafed, allergy pills   Contacts: none                 Reviewed and updated as needed this visit by Provider  Tobacco  Allergies  Meds  Problems  Med Hx  Surg Hx  Fam Hx         Review of Systems   ROS COMP: Constitutional, HEENT, cardiovascular, pulmonary, gi and gu systems are negative, except as otherwise noted.      Objective    /86 (BP Location: Right arm)   Pulse 61   Temp 98.9  F (37.2  C) (Tympanic)   Resp 16   Ht 1.778 m (5' 10\")   Wt 82.1 kg (181 lb)   SpO2 98%   BMI 25.97 kg/m    Body mass index is 25.97 kg/m .  Physical Exam   GENERAL: healthy, alert and no distress  HENT: ear canals and TM's normal, nose and mouth without ulcers or lesions  NECK: bilateral anterior cervical adenopathy  RESP: lungs clear to auscultation - no rales, rhonchi or wheezes  CV: regular rate and rhythm, normal S1 S2, no S3 or S4, no murmur, click or rub, no peripheral edema and peripheral pulses strong    Diagnostic Test Results:  Results for orders placed or performed in visit on 09/04/19 (from the past 24 hour(s))   Rapid strep screen   Result Value Ref Range    Specimen Description Throat     Rapid Strep A Screen (A)      POSITIVE: Group A Streptococcal antigen detected by immunoassay.           Assessment & Plan       ICD-10-CM    1. Strep throat J02.0 penicillin V (VEETID) " 500 MG tablet BID for 10 days.     2. Throat pain R07.0 Rapid strep screen        Patient Instructions         Thank you for choosing [facility].  You may be receiving an email and/or telephone survey request from [facility] Customer Experience regarding your visit today.  Please take a few minutes to respond to the survey to let us know how we are doing.      If you have questions or concerns, please contact us via [employer] or you can contact your care team at [phone].    Our Clinic hours are:  Monday 6:40 am  to 7:00 pm  Tuesday -Friday 6:40 am to 5:00 pm    The [place] outpatient lab hours are:  Monday - Friday 6:10 am to 4:45 pm  Saturdays 7:00 am to 11:00 am  Appointments are required, call [phone]    If you have clinical questions after hours or would like to schedule an appointment,  call the clinic at [phone].    Patient Education     Pharyngitis: Strep (Confirmed)    You have had a positive test for strep throat. Strep throat is a contagious illness. It is spread by coughing, kissing or by touching others after touching your mouth or nose. Symptoms include throat pain that is worse with swallowing, aching all over, headache, and fever. It is treated with antibiotic medicine. This should help you start to feel better in 1 to 2 days.  Home care    Rest at home. Drink plenty of fluids to you won't get dehydrated.    No work or school for the first 2 days of taking the antibiotics. After this time, you will not be contagious. You can then return to school or work if you are feeling better.     Take antibiotic medicine for the full 10 days, even if you feel better. This is very important to ensure the infection is treated. It is also important to prevent medicine-resistant germs from developing. If you were given an antibiotic shot, you don't need any more antibiotics.    You may use acetaminophen or ibuprofen to control pain or fever, unless another medicine was prescribed for this. Talk  with your healthcare provider before taking these medicines if you have chronic liver or kidney disease. Also talk with your healthcare provider if you have had a stomach ulcer or GI bleeding.    Throat lozenges or sprays help reduce pain. Gargling with warm saltwater will also reduce throat pain. Dissolve 1/2 teaspoon of salt in 1 glass of warm water. This may be useful just before meals.     Soft foods are OK. Don't eat salty or spicy foods.  Follow-up care  Follow up with your healthcare provider or our staff if you don't get better over the next week.  When to seek medical advice  Call your healthcare provider right away if any of these occur:    Fever of 100.4 F (38 C) or higher, or as directed by your healthcare provider    New or worsening ear pain, sinus pain, or headache    Painful lumps in the back of neck    Stiff neck    Lymph nodes getting larger or becoming soft in the middle    You can't swallow liquids or you can't open your mouth wide because of throat pain    Signs of dehydration. These include very dark urine or no urine, sunken eyes, and dizziness.    Trouble breathing or noisy breathing    Muffled voice    Rash  Prevention  Here are steps you can take to help prevent an infection:    Keep good hand washing habits.    Don t have close contact with people who have sore throats, colds, or other upper respiratory infections.    Don t smoke, and stay away from secondhand smoke.  Date Last Reviewed: 11/1/2017 2000-2018 [employer]. [address] [place] [address], [place] [id number]. All rights reserved. This information is not intended as a substitute for professional medical care. Always follow your healthcare professional's instructions.               Return in about 1 week (around 9/11/2019), or if symptoms worsen or fail to improve.    The risks, benefits and treatment options of prescribed medications or other treatments have been discussed with the patient. The patient verbalized their  understanding and should call or follow up if no improvement or if they develop further problems.    URSULA [facility]

## 2019-09-04 NOTE — PATIENT INSTRUCTIONS
Thank you for choosing [facility].  You may be receiving an email and/or telephone survey request from [facility] Customer Experience regarding your visit today.  Please take a few minutes to respond to the survey to let us know how we are doing.      If you have questions or concerns, please contact us via [employer] or you can contact your care team at [phone].    Our Clinic hours are:  Monday 6:40 am  to 7:00 pm  Tuesday -Friday 6:40 am to 5:00 pm    The [place] outpatient lab hours are:  Monday - Friday 6:10 am to 4:45 pm  Saturdays 7:00 am to 11:00 am  Appointments are required, call [phone]    If you have clinical questions after hours or would like to schedule an appointment,  call the clinic at [phone].    Patient Education     Pharyngitis: Strep (Confirmed)    You have had a positive test for strep throat. Strep throat is a contagious illness. It is spread by coughing, kissing or by touching others after touching your mouth or nose. Symptoms include throat pain that is worse with swallowing, aching all over, headache, and fever. It is treated with antibiotic medicine. This should help you start to feel better in 1 to 2 days.  Home care    Rest at home. Drink plenty of fluids to you won't get dehydrated.    No work or school for the first 2 days of taking the antibiotics. After this time, you will not be contagious. You can then return to school or work if you are feeling better.     Take antibiotic medicine for the full 10 days, even if you feel better. This is very important to ensure the infection is treated. It is also important to prevent medicine-resistant germs from developing. If you were given an antibiotic shot, you don't need any more antibiotics.    You may use acetaminophen or ibuprofen to control pain or fever, unless another medicine was prescribed for this. Talk with your healthcare provider before taking these medicines if you have chronic liver or kidney disease.  Also talk with your healthcare provider if you have had a stomach ulcer or GI bleeding.    Throat lozenges or sprays help reduce pain. Gargling with warm saltwater will also reduce throat pain. Dissolve 1/2 teaspoon of salt in 1 glass of warm water. This may be useful just before meals.     Soft foods are OK. Don't eat salty or spicy foods.  Follow-up care  Follow up with your healthcare provider or our staff if you don't get better over the next week.  When to seek medical advice  Call your healthcare provider right away if any of these occur:    Fever of 100.4 F (38 C) or higher, or as directed by your healthcare provider    New or worsening ear pain, sinus pain, or headache    Painful lumps in the back of neck    Stiff neck    Lymph nodes getting larger or becoming soft in the middle    You can't swallow liquids or you can't open your mouth wide because of throat pain    Signs of dehydration. These include very dark urine or no urine, sunken eyes, and dizziness.    Trouble breathing or noisy breathing    Muffled voice    Rash  Prevention  Here are steps you can take to help prevent an infection:    Keep good hand washing habits.    Don t have close contact with people who have sore throats, colds, or other upper respiratory infections.    Don t smoke, and stay away from secondhand smoke.  Date Last Reviewed: 11/1/2017 2000-2018 [employer]. [address] [place] [address], [place] [address]. All rights reserved. This information is not intended as a substitute for professional medical care. Always follow your healthcare professional's instructions.

## 2019-09-10 ENCOUNTER — MYC MEDICAL ADVICE (OUTPATIENT)
Dept: FAMILY MEDICINE | Facility: CLINIC | Age: 39
End: 2019-09-10

## 2019-09-10 NOTE — TELEPHONE ENCOUNTER
It's been over a year since I've seen him in clinic, he would need an appointment to discuss his concerns.  KRISHNA Mullins

## 2019-09-10 NOTE — TELEPHONE ENCOUNTER
Back pain.  Pt wanting to schedule injection.  Pain Management Referral last written 18.  ?  Last seen by Madyson on 18.  Last MRI lumbar spine done on 18.  Advise.  Mora

## 2019-09-12 ENCOUNTER — TELEPHONE (OUTPATIENT)
Dept: PALLIATIVE MEDICINE | Facility: CLINIC | Age: 39
End: 2019-09-12

## 2019-09-12 NOTE — TELEPHONE ENCOUNTER
Pre-screening Questions for Radiology Injections:    Injection to be done at which interventional clinic site? [facility]    Instruct patient to arrive as directed prior to the scheduled appointment time:    [place] minutes before      [place]: 30 minutes before; if IV needed 1 hour before     Procedure ordered by Dr. Suresh    Procedure ordered? L4-5 interlaminar LIDIA         Transforaminal Cervical LIDIA - Dr. Alisia Harley ONLY    What insurance would patient like us to bill for this procedure? Pref. One       Worker's comp or MVA (motor vehicle accident) -Any injection DO NOT SCHEDULE and route to Tammi Adorno.      [employer] insurance - For SI joint injections, DO NOT SCHEDULE and route Tammi Adorno.       [employer] - Any injection besides hip/shoulder/knee joint DO NOT SCHEDULE and route to Tammi Adorno. She will obtain PA and call pt back to schedule procedure or notify pt of denial.       [employer]-Route to Tammi for review      IF SCHEDULING IN [place] AND NEEDS A PA, IT IS OKAY TO SCHEDULE. [place] HANDLES THEIR OWN PA'S AFTER THE PATIENT IS SCHEDULED. PLEASE SCHEDULE AT LEAST 1 WEEK OUT SO A PA CAN BE OBTAINED.      Any chance of pregnancy? Not Applicable   If YES, do NOT schedule and route to RN pool    Is an  needed? No     Patient has a drive home? (mandatory) YES: informed     Is patient taking any blood thinners (plavix, coumadin, jantoven, warfarin, heparin, pradaxa or dabigatran )? No   If hold needed, do NOT schedule, route to RN pool     Is patient taking any aspirin products (includes Excedrin and Fiorinal)? No     If more than 325mg/day do NOT schedule; route to RN pool     For CERVICAL procedures, hold all aspirin products for 6 days.     Tell pt that if aspirin product is not held for 6 days, the procedure WILL BE cancelled.      Does the patient have a bleeding or clotting disorder? No     If YES, okay to schedule AND route to RN nurse pool    For any patients with  platelet count <100, must be forwarded to provider    Is patient diabetic?  No  If YES, have them bring their glucometer.    Does patient have an active infection or treated for one within the past week? Yes - Strep     Is patient currently taking any antibiotics?  Yes - Penicillin(09/14)     For patients on chronic, preventative, or prophylactic antibiotics, procedures may be scheduled.     For patients on antibiotics for active or recent infection:antibiotic course must have been completed for 4 days    Is patient currently taking any steroid medications? (i.e. Prednisone, Medrol)  No     For patients on steroid medications, course must have been completed for 4 days    Reviewed with patient:  If you are started on any steroids or antibiotics between now and your appointment, you must contact us because the procedure may need to be cancelled.  Yes    Is patient actively being treated for cancer or immunocompromised? No  If YES, do NOT schedule and route to RN pool     Are you able to get on and off an exam table with minimal or no assistance? Yes  If NO, do NOT schedule and route to RN pool    Are you able to roll over and lay on your stomach with minimal or no assistance? Yes  If NO, do NOT schedule and route to RN pool     Any allergies to contrast dye, iodine, shellfish, or numbing and steroid medications? No  If YES, route to RN pool AND add allergy information to appointment notes    Allergies: Cats; Nkda [no known drug allergies]; and Seasonal allergies      Has the patient had a flu shot or any other vaccinations within 7 days before or after the procedure.  No     Does patient have an MRI/CT?  YES: 2018  (SI joint, hip injections, lumbar sympathetic blocks, and stellate ganglion blocks do not require an MRI)    Was the MRI done w/in the last 3 years?  Yes    Was MRI done at [place]? Yes      If not, where was it done? N/A       If MRI was not done at [place], [facility] or [facility] do NOT schedule and  route to nursing.  If pt has an imaging disc, the injection may be scheduled but pt has to bring disc to appt. If they show up w/out disc the injection cannot be done    Reminders (please tell patient if applicable):       Instructed pt to arrive 30 minutes early for IV start if this is for a cervical procedure, ALL sympathetic (stellate ganglion, hypogastric, or lumbar sympathetic block) and all sedation procedures (RFA, spinal cord stimulation trials).  Not Applicable   -IVs are not routinely placed for Dr. Craig cervical cases   -Dr. Soria: IVs for cervical ESIs and cervical TBDs (not CMBBs/facet inj)      If NPO for sedation, informed patient that it is okay to take medications with sips of water (except if they are to hold blood thinners).  Not Applicable   *DO take blood pressure medication if it is prescribed*      If this is for a cervical LIDIA, informed patient that aspirin needs to be held for 6 days.   Not Applicable      For all patients not having spinal cord stimulator (SCS) trials or radiofrequency ablations (RFAs), informed patient:    IV sedation is not provided for this procedure.  If you feel that an oral anti-anxiety medication is needed, you can discuss this further with your referring provider or primary care provider.  The Pain Clinic provider will discuss specifics of what the procedure includes at your appointment.  Most procedures last 10-20 minutes.  We use numbing medications to help with any discomfort during the procedure.  Not Applicable      Do not schedule procedures requiring IV placement in the first appointment of the day or first appointment after lunch. Do NOT schedule at 0745, 0815 or 1245.       For patients 85 or older we recommend having an adult stay w/ them for the remainder of the day.       Does the patient have any questions?  NO  Ivette Salas  [facility]

## 2019-09-12 NOTE — TELEPHONE ENCOUNTER
Received 9-    [facility]Kala [facility]h [facility] for an L4-5 interlaminar LIDIA for acute low back pain, radiculopathy.    Routing to scheduling coordinators to schedule injection at [facility]. Please close encounter once injection has been scheduled.    G[place]  Patient Representative  [facility]

## 2019-09-13 ENCOUNTER — HOSPITAL ENCOUNTER (EMERGENCY)
Facility: CLINIC | Age: 39
Discharge: HOME | End: 2019-09-13
Attending: PHYSICIAN ASSISTANT | Admitting: PHYSICIAN ASSISTANT
Payer: COMMERCIAL

## 2019-09-13 VITALS
DIASTOLIC BLOOD PRESSURE: 82 MMHG | RESPIRATION RATE: 18 BRPM | HEART RATE: 63 BPM | TEMPERATURE: 99.1 F | OXYGEN SATURATION: 99 % | SYSTOLIC BLOOD PRESSURE: 120 MMHG

## 2019-09-13 DIAGNOSIS — M54.41 CHRONIC RIGHT-SIDED LOW BACK PAIN WITH RIGHT-SIDED SCIATICA: ICD-10-CM

## 2019-09-13 DIAGNOSIS — G89.29 CHRONIC RIGHT-SIDED LOW BACK PAIN WITH RIGHT-SIDED SCIATICA: ICD-10-CM

## 2019-09-13 PROCEDURE — G0463 HOSPITAL OUTPT CLINIC VISIT: HCPCS | Performed by: PHYSICIAN ASSISTANT

## 2019-09-13 PROCEDURE — 99214 OFFICE O/P EST MOD 30 MIN: CPT | Mod: Z6 | Performed by: PHYSICIAN ASSISTANT

## 2019-09-13 RX ORDER — NAPROXEN 500 MG/1
500 TABLET ORAL 2 TIMES DAILY WITH MEALS
Qty: 30 TABLET | Refills: 0 | Status: SHIPPED | OUTPATIENT
Start: 2019-09-13 | End: 2021-10-12

## 2019-09-13 RX ORDER — CYCLOBENZAPRINE HCL 10 MG
10 TABLET ORAL 3 TIMES DAILY PRN
Qty: 20 TABLET | Refills: 0 | Status: SHIPPED | OUTPATIENT
Start: 2019-09-13 | End: 2019-09-17

## 2019-09-13 RX ORDER — CYCLOBENZAPRINE HCL 10 MG
10 TABLET ORAL 3 TIMES DAILY PRN
Qty: 20 TABLET | Refills: 0 | Status: SHIPPED | OUTPATIENT
Start: 2019-09-13 | End: 2019-09-13

## 2019-09-13 NOTE — ED AVS SNAPSHOT
[facility]  [address] [address]  Phone:  [phone]  Fax:  [phone]                                    Timmy Fitzpatrick   MRN: [id number]    Department:  [facility]   Date of Visit:  9/13/2019           After Visit Summary Signature Page    I have received my discharge instructions, and my questions have been answered. I have discussed any challenges I see with this plan with the nurse or doctor.    ..........................................................................................................................................  Patient/Patient Representative Signature      ..........................................................................................................................................  Patient Representative Print Name and Relationship to Patient    ..................................................               ................................................  Date                                   Time    ..........................................................................................................................................  Reviewed by Signature/Title    ...................................................              ..............................................  Date                                               Time          22EPIC Rev 08/18

## 2019-09-13 NOTE — ED PROVIDER NOTES
History     Chief Complaint   Patient presents with     Back Pain     low back pain with right side radiation to thigh.  has chronic low back issues     HPI  Timmy Fitzpatrick is a 39 year old male who presents the urgent care with concern over right-sided low back pain is been present for at least the last week.  Patient with history of chronic low back pain, did have MRI in November of 2018 which showed IMPRESSION:  Degenerative changes as described most marked at L4-5 and lumbosacral levels. He did have referral to receive epidural steroid injection at that time however eventually he canceled it due to significant improvement of his symptoms.  The last week he has pain in the right side of his back consistent with what he was having at time of MRI.   It radiates to the right thigh.  It is somewhat exacerbated by changes in movement, position.   He has not had any recent fever, chills ,myaligas, nasal congestion, cough, dyspnea, wheezing, vomiting, diarrhea, abdominal pain, numbness or paresthesias in his saddle region, dysuria, hematuria, change in bowel or bladder control.  He has not attempted any OTC treatment consistently for his pain aside from heat.  He did schedule follow-up appointment with his primary care provider early next week and does have a steroid injection that is scheduled on 10/2/2019 however is requesting something to help for pain in the interim.     Allergies:  Allergies   Allergen Reactions     Cats      Nkda [No Known Drug Allergies]      Seasonal Allergies      Problem List:    Patient Active Problem List    Diagnosis Date Noted     Generalized anxiety disorder 03/26/2019     Priority: Medium     Bipolar II disorder (H) 11/28/2018     Priority: Medium     Anxiety 09/18/2017     Priority: Medium     Tear of medial meniscus of knee 09/08/2016     Priority: Medium     Chemical dependency (H) 11/25/2015     Priority: Medium     Alcoholic, sober since 2006 09/13/2012     Priority: Medium      Major depression in partial remission (H) 06/30/2011     Priority: Medium     Migraine headache 06/30/2011     Priority: Medium     (Problem list name updated by automated process. Provider to review and confirm.)       CARDIOVASCULAR SCREENING; LDL GOAL LESS THAN 160 10/31/2010     Priority: Medium     Cervicalgia 03/24/2010     Priority: Medium     Nonallopathic lesion of thoracic region 03/24/2010     Priority: Medium     Problem list name updated by automated process. Provider to review       Nonallopathic lesion of lumbar region 03/24/2010     Priority: Medium     Problem list name updated by automated process. Provider to review        Past Medical History:    Past Medical History:   Diagnosis Date     Anxiety      Cervicalgia      Chemical dependency (H)      Depression      Migraine headache      Past Surgical History:    Past Surgical History:   Procedure Laterality Date     HERNIA REPAIR       SURGICAL HISTORY OF -       deviated septum     Family History:    Family History   Problem Relation Age of Onset     Cancer Paternal Grandfather      Depression Mother      Anxiety Disorder Mother      Depression Father      Dementia Maternal Grandfather      Substance Abuse Paternal Grandmother      Social History:  Marital Status:   [2]  Social History     Tobacco Use     Smoking status: Former Smoker     Types: Dip, chew, snus or snuff     Smokeless tobacco: Current User     Types: Chew     Last attempt to quit: 11/16/2014     Tobacco comment: vaping as well   Substance Use Topics     Alcohol use: No     Comment: sober since April 2016     Drug use: Yes     Types: Marijuana     Comment: Daily      Medications:      cyclobenzaprine (FLEXERIL) 10 MG tablet   buPROPion (WELLBUTRIN XL) 150 MG 24 hr tablet   escitalopram (LEXAPRO) 20 MG tablet   eszopiclone (LUNESTA) 2 MG tablet   gabapentin (NEURONTIN) 300 MG capsule   GLUCOSAMINE HCL   Multiple Vitamins-Minerals (MENS MULTIPLE VITAMIN/LYCOPENE PO)    penicillin V (VEETID) 500 MG tablet   sildenafil (REVATIO) 20 MG tablet     Review of Systems   Constitutional: Negative for chills and fever.   Respiratory: Negative for cough and shortness of breath.    Cardiovascular: Negative for chest pain.   Gastrointestinal: Negative for abdominal pain, diarrhea, nausea and vomiting.   Genitourinary: Negative for dysuria, flank pain, frequency, hematuria and urgency.   Musculoskeletal: Positive for back pain. Negative for neck pain.   Skin: Negative for color change, rash and wound.   Neurological: Negative for speech difficulty, weakness and numbness.     Physical Exam   BP: 120/82  Pulse: 63  Temp: 99.1  F (37.3  C)  Resp: 18  SpO2: 99 %      Physical Exam   Constitutional: He appears well-developed and well-nourished. No distress.   HENT:   Head: Normocephalic and atraumatic.   Cardiovascular: Normal rate and regular rhythm. Exam reveals no friction rub.   No murmur heard.  Pulmonary/Chest: Effort normal and breath sounds normal.   Musculoskeletal:        Lumbar back: He exhibits decreased range of motion, tenderness and pain. He exhibits no swelling, no edema, no deformity and no laceration.        Back:    Neurological:   Reflex Scores:       Patellar reflexes are 2+ on the right side and 2+ on the left side.  Skin: Skin is warm and dry. Capillary refill takes less than 2 seconds. No abrasion, no ecchymosis and no rash noted. No erythema.       ED Course        Procedures        Critical Care time:  none        No results found for this or any previous visit (from the past 24 hour(s)).    Medications - No data to display    Assessments & Plan (with Medical Decision Making)     I have reviewed the nursing notes.    I have reviewed the findings, diagnosis, plan and need for follow up with the patient.       New Prescriptions    CYCLOBENZAPRINE (FLEXERIL) 10 MG TABLET    Take 1 tablet (10 mg) by mouth 3 times daily as needed for muscle spasms       Final diagnoses:    Acute on chronic right-sided low back pain with right-sided sciatica     39-year-old male presents to the urgent care with concern over right low back pain for the last week which is consistent with pain he has had previously.  He had stable vital signs upon arrival.  Physical exam findings as described above.  I did review results of MRI report from 2018.  Differential for his symptoms would include chronic degenerative changes, right-sided low back pain with right-sided sciatica, herniated disc, muscle strain, spasm.  I do not suspect cauda equina, epidural abscess or other emergent need for MRI.  I did discuss treatment options for pain including oral steroid however patient declined due to concerns that it could immediately exacerbate his anxiety.  He was discharged home stable with prescription for Flexeril.  Follow-up with primary care provider as scheduled later this week.  Worrisome reasons to return to the ER/UC sooner discussed.      Disclaimer: This note consists of symbols derived from keyboarding, dictation, and/or voice recognition software. As a result, there may be errors in the script that have gone undetected.  Please consider this when interpreting information found in the chart.      9/13/2019   [facility] EMERGENCY DEPARTMENT     Lisa Singleton PA-C  09/15/19 [id number]

## 2019-09-15 ASSESSMENT — ENCOUNTER SYMPTOMS
SHORTNESS OF BREATH: 0
FEVER: 0
BACK PAIN: 1
WEAKNESS: 0
VOMITING: 0
CHILLS: 0
ABDOMINAL PAIN: 0
NUMBNESS: 0
COUGH: 0
DYSURIA: 0
SPEECH DIFFICULTY: 0
FLANK PAIN: 0
NECK PAIN: 0
WOUND: 0
HEMATURIA: 0
FREQUENCY: 0
DIARRHEA: 0
NAUSEA: 0
COLOR CHANGE: 0

## 2019-09-17 ENCOUNTER — OFFICE VISIT (OUTPATIENT)
Dept: FAMILY MEDICINE | Facility: CLINIC | Age: 39
End: 2019-09-17
Payer: COMMERCIAL

## 2019-09-17 VITALS
DIASTOLIC BLOOD PRESSURE: 82 MMHG | SYSTOLIC BLOOD PRESSURE: 120 MMHG | BODY MASS INDEX: 26.05 KG/M2 | TEMPERATURE: 98.7 F | WEIGHT: 182 LBS | OXYGEN SATURATION: 97 % | RESPIRATION RATE: 16 BRPM | HEIGHT: 70 IN | HEART RATE: 86 BPM

## 2019-09-17 DIAGNOSIS — M25.511 RIGHT SHOULDER PAIN, UNSPECIFIED CHRONICITY: ICD-10-CM

## 2019-09-17 DIAGNOSIS — Z23 NEED FOR VACCINATION: ICD-10-CM

## 2019-09-17 DIAGNOSIS — Z23 NEED FOR PROPHYLACTIC VACCINATION AND INOCULATION AGAINST INFLUENZA: ICD-10-CM

## 2019-09-17 DIAGNOSIS — M54.50 BILATERAL LOW BACK PAIN WITHOUT SCIATICA, UNSPECIFIED CHRONICITY: Primary | ICD-10-CM

## 2019-09-17 DIAGNOSIS — F31.81 BIPOLAR II DISORDER (H): ICD-10-CM

## 2019-09-17 DIAGNOSIS — K58.0 IRRITABLE BOWEL SYNDROME WITH DIARRHEA: ICD-10-CM

## 2019-09-17 PROCEDURE — 90686 IIV4 VACC NO PRSV 0.5 ML IM: CPT | Performed by: NURSE PRACTITIONER

## 2019-09-17 PROCEDURE — 90471 IMMUNIZATION ADMIN: CPT | Performed by: NURSE PRACTITIONER

## 2019-09-17 PROCEDURE — 99214 OFFICE O/P EST MOD 30 MIN: CPT | Mod: 25 | Performed by: NURSE PRACTITIONER

## 2019-09-17 PROCEDURE — 90714 TD VACC NO PRESV 7 YRS+ IM: CPT | Performed by: NURSE PRACTITIONER

## 2019-09-17 PROCEDURE — 90472 IMMUNIZATION ADMIN EACH ADD: CPT | Performed by: NURSE PRACTITIONER

## 2019-09-17 RX ORDER — CYCLOBENZAPRINE HCL 10 MG
10 TABLET ORAL 3 TIMES DAILY PRN
Qty: 20 TABLET | Refills: 0 | Status: SHIPPED | OUTPATIENT
Start: 2019-09-17 | End: 2019-10-28

## 2019-09-17 ASSESSMENT — MIFFLIN-ST. JEOR: SCORE: 1741.8

## 2019-09-17 ASSESSMENT — PAIN SCALES - GENERAL: PAINLEVEL: MODERATE PAIN (4)

## 2019-09-17 NOTE — PATIENT INSTRUCTIONS
Flu and tetanus booster given.  Consider PT for low back and right shoulder pain.  Continue with rest, naproxen and muscle relaxant as needed.  Consider food journal to pinpoint foods that may be contributing to your diarrhea. Consider 2 weeks of lactose free diet and then gluten free to see if that helps as well as addressing stress.      Our Clinic hours are:  Mondays    7:20 am - 7 pm  Tues -  Fri  7:20 am - 5 pm    Clinic Phone: [phone]    The clinic lab opens at 7:30 am Mon - Fri and appointments are required.    [facility]  Ph. [phone]  Monday  8 am - 7pm  Tues - Fri 8 am - 5:30 pm

## 2019-09-17 NOTE — PROGRESS NOTES
Subjective     Timmy Fitzpatrick is a 39 year old male who presents to clinic today for the following health issues:    HPI   ED/UC Followup:    Facility:  [facility]  Date of visit: 9/13/2019  Reason for visit: Back Pain  Current Status: still having the pain.        Diarrhea      Duration: beginning of summer or late spring.    Description:       Consistency of stool: watery and runny       Blood in stool: no        Number of loose stools past 24 hours: 2 today    Intensity:  moderate    Accompanying signs and symptoms:       Fever: no        Nausea/vomitting: no        Abdominal pain: no        Weight loss: no     History (recent antibiotics or travel/ill contacts/med changes/testing done): antibiotic 2 weeks ago, weaning off Seroquel.    Precipitating or alleviating factors: he thinks maybe related to diet, only maybe eats one meal a day and drinks juice all day.    Therapies tried and outcome: none    Musculoskeletal problem/pain      Duration: since 2016 and 2-3 weeks ago re-injured,    Description  Location: right shoulder    Intensity:  moderate    Accompanying signs and symptoms: none    History  Previous similar problem: YES  Previous evaluation:  none    Precipitating or alleviating factors:  Trauma or overuse: YES- can't remember  Aggravating factors include: lifting, exercise and overuse    Therapies tried and outcome: rest/inactivity and NSAID - he takes for his back pain and Flexeril.        Patient Active Problem List   Diagnosis     Cervicalgia     Nonallopathic lesion of thoracic region     Nonallopathic lesion of lumbar region     CARDIOVASCULAR SCREENING; LDL GOAL LESS THAN 160     Major depression in partial remission (H)     Migraine headache     Alcoholic, sober since 2006     Chemical dependency (H)     Anxiety     Tear of medial meniscus of knee     Bipolar II disorder (H)     Generalized anxiety disorder     Past Surgical History:   Procedure Laterality Date     HERNIA REPAIR        SURGICAL HISTORY OF -       deviated septum       Social History     Tobacco Use     Smoking status: Former Smoker     Types: Dip, chew, snus or snuff     Smokeless tobacco: Current User     Types: Chew     Last attempt to quit: 11/16/2014   Substance Use Topics     Alcohol use: No     Comment: sober since April 2016     Family History   Problem Relation Age of Onset     Cancer Paternal Grandfather      Depression Mother      Anxiety Disorder Mother      Depression Father      Unknown/Adopted Father      Dementia Maternal Grandfather      Substance Abuse Paternal Grandmother          Current Outpatient Medications   Medication Sig Dispense Refill     buPROPion (WELLBUTRIN XL) 150 MG 24 hr tablet Take 1 tablet (150 mg) by mouth every morning 30 tablet 2     cyclobenzaprine (FLEXERIL) 10 MG tablet Take 1 tablet (10 mg) by mouth 3 times daily as needed for muscle spasms 20 tablet 0     escitalopram (LEXAPRO) 20 MG tablet Take 1 tablet (20 mg) by mouth daily 30 tablet 2     eszopiclone (LUNESTA) 2 MG tablet Take 1 tablet (2 mg) by mouth nightly as needed for sleep 15 tablet 0     gabapentin (NEURONTIN) 300 MG capsule Take 1 capsule (300 mg) by mouth 6 times daily 180 capsule 2     Multiple Vitamins-Minerals (MENS MULTIPLE VITAMIN/LYCOPENE PO) Take 1 tablet by mouth daily.       naproxen (NAPROSYN) 500 MG tablet Take 1 tablet (500 mg) by mouth 2 times daily (with meals) 30 tablet 0     sildenafil (REVATIO) 20 MG tablet TAKE 5 TABLETS BY MOUTH DAILY AS NEEDED, 30 MINUTES TO 4 HOURS BEFORE SEX. (DO NOT USE WITH NITROGLYCERIN, TERAZOSIN OR DOXAZOSIN 60 tablet 5     GLUCOSAMINE HCL None Entered       Allergies   Allergen Reactions     Cats      Nkda [No Known Drug Allergies]      Seasonal Allergies      Recent Labs   Lab Test 02/20/19  0730 07/13/18  1422   ALT 32 32   CR 0.98 0.83   GFRESTIMATED >90 >90   GFRESTBLACK >90 >90   POTASSIUM 4.1 4.1   TSH 1.76 1.38      BP Readings from Last 3 Encounters:   09/17/19 120/82  "  09/13/19 120/82   09/04/19 126/86    Wt Readings from Last 3 Encounters:   09/17/19 82.6 kg (182 lb)   09/04/19 82.1 kg (181 lb)   07/23/19 80.7 kg (178 lb)                      Reviewed and updated as needed this visit by Provider         Review of Systems   ROS COMP: Constitutional, HEENT, cardiovascular, pulmonary, gi and gu systems are negative, except as otherwise noted.      Objective    /82 (BP Location: Right arm, Patient Position: Chair, Cuff Size: Adult Large)   Pulse 86   Temp 98.7  F (37.1  C) (Tympanic)   Resp 16   Ht 1.778 m (5' 10\")   Wt 82.6 kg (182 lb)   SpO2 97%   BMI 26.11 kg/m    Body mass index is 26.11 kg/m .  Physical Exam   GENERAL: healthy, alert and no distress  HENT: ear canals and TM's normal, pharynx without erythema  NECK: no adenopathy, no asymmetry  RESP: lungs clear to auscultation - no rales, rhonchi or wheezes  CV: regular rate and rhythm, normal S1 S2, no S3 or S4, no murmur  ABDOMEN: soft, nontender, no hepatosplenomegaly, no masses and bowel sounds normal  MS: no gross musculoskeletal defects noted      Diagnostic Test Results:  Labs reviewed in Epic  No results found for this or any previous visit (from the past 24 hour(s)).        Assessment & Plan     1. Bilateral low back pain without sciatica, unspecified chronicity    - cyclobenzaprine (FLEXERIL) 10 MG tablet; Take 1 tablet (10 mg) by mouth 3 times daily as needed for muscle spasms  Dispense: 20 tablet; Refill: 0  REfills and conintue to monitor and follow up with pain clinic as scheduled.    2. Right shoulder pain, unspecified chronicity    REcommended PT, he wishes to hold off on that for now. WIll conitnue with conservative treatment, flexeril, naproxen, avoid sleeing on that side.      3. Irritable bowel syndrome with diarrhea    See below.    4. Bipolar II disorder (H)    Stable, will continue with [place] [facility].     BMI:   Estimated body mass index is 26.11 kg/m  as " "calculated from the following:    Height as of this encounter: 1.778 m (5' 10\").    Weight as of this encounter: 82.6 kg (182 lb).           See Patient Instructions  Patient Instructions   Flu and tetanus booster given.  Consider PT for low back and right shoulder pain.  Continue with rest, naproxen and muscle relaxant as needed.  Consider food journal to pinpoint foods that may be contributing to your diarrhea. Consider 2 weeks of lactose free diet and then gluten free to see if that helps as well as addressing stress.      Our Clinic hours are:  Mondays    7:20 am - 7 pm  Tues -  Fri  7:20 am - 5 pm    Clinic Phone: [phone]    The clinic lab opens at 7:30 am Mon - Fri and appointments are required.    [facility]  Ph. [phone]  Monday  8 am - 7pm  Tues - Fri 8 am - 5:30 pm             Return in about 6 months (around 3/17/2020) for or sooner if symptoms persist or worsen, Physical Exam, Lab Work.    URSULA No CNP  [facility]      "

## 2019-09-26 ENCOUNTER — OFFICE VISIT (OUTPATIENT)
Dept: PSYCHIATRY | Facility: CLINIC | Age: 39
End: 2019-09-26
Payer: COMMERCIAL

## 2019-09-26 VITALS
RESPIRATION RATE: 18 BRPM | WEIGHT: 187 LBS | DIASTOLIC BLOOD PRESSURE: 82 MMHG | BODY MASS INDEX: 26.77 KG/M2 | OXYGEN SATURATION: 99 % | HEIGHT: 70 IN | HEART RATE: 85 BPM | TEMPERATURE: 97.9 F | SYSTOLIC BLOOD PRESSURE: 122 MMHG

## 2019-09-26 DIAGNOSIS — F33.0 MILD RECURRENT MAJOR DEPRESSION (H): Primary | ICD-10-CM

## 2019-09-26 PROCEDURE — 99214 OFFICE O/P EST MOD 30 MIN: CPT | Performed by: NURSE PRACTITIONER

## 2019-09-26 RX ORDER — BUPROPION HYDROCHLORIDE 100 MG/1
TABLET, EXTENDED RELEASE ORAL
Qty: 30 TABLET | Refills: 0 | Status: SHIPPED | OUTPATIENT
Start: 2019-09-26 | End: 2019-10-28

## 2019-09-26 ASSESSMENT — PATIENT HEALTH QUESTIONNAIRE - PHQ9
SUM OF ALL RESPONSES TO PHQ QUESTIONS 1-9: 14
5. POOR APPETITE OR OVEREATING: NEARLY EVERY DAY

## 2019-09-26 ASSESSMENT — ANXIETY QUESTIONNAIRES
7. FEELING AFRAID AS IF SOMETHING AWFUL MIGHT HAPPEN: NOT AT ALL
3. WORRYING TOO MUCH ABOUT DIFFERENT THINGS: SEVERAL DAYS
GAD7 TOTAL SCORE: 12
1. FEELING NERVOUS, ANXIOUS, OR ON EDGE: MORE THAN HALF THE DAYS
6. BECOMING EASILY ANNOYED OR IRRITABLE: MORE THAN HALF THE DAYS
2. NOT BEING ABLE TO STOP OR CONTROL WORRYING: SEVERAL DAYS
IF YOU CHECKED OFF ANY PROBLEMS ON THIS QUESTIONNAIRE, HOW DIFFICULT HAVE THESE PROBLEMS MADE IT FOR YOU TO DO YOUR WORK, TAKE CARE OF THINGS AT HOME, OR GET ALONG WITH OTHER PEOPLE: SOMEWHAT DIFFICULT
5. BEING SO RESTLESS THAT IT IS HARD TO SIT STILL: NEARLY EVERY DAY

## 2019-09-26 ASSESSMENT — MIFFLIN-ST. JEOR: SCORE: 1764.48

## 2019-09-26 NOTE — PATIENT INSTRUCTIONS
1. Discontinue Wellbutrin Xl  2. Start Wellbutrin  mg daily for depression  3.  Continue Lexapro 20 mg daily for depression and anxiety   4. Continue DBT and individual therapy  5.  Continue Lunesta 2 mg at bedtime as needed for sleep         Continue all other medications as reviewed per electronic medical record today.     Safety plan reviewed. To the Emergency Department as needed or call after hours crisis line at [phone] or [phone]. [place] Crisis Text [place]. Text [place] to 829352 or Suicide LifeLine Chat: suicideConnect2me.org/chat/    To schedule individual or family therapy, call [facility] at [phone].     Continue individual therapy as planned with your current provider     Schedule an appointment with me in 4 weeks  or sooner as needed. Call [facility] at [phone] to schedule.    Follow up with primary care provider as planned or for acute medical concerns.    Call the psychiatric nurse line with medication questions or concerns at [phone].    Appdra may be used to communicate with your provider, but this is not intended to be used for emergencies.    Crisis Resources:    National Suicide Prevention Lifeline: 216.748.3603 (TTY: 133.989.4314). Call anytime for help.  (www.suicidepreventionlifeline.org)  National [place] on Mental Illness (www.audra.org): 919.805.3688 or 916-502-6644.   Mental Health Association (www.mentalhealth.org): 990.812.1288 or 386-695-4896.  [place] Crisis Text [place]: Text [place] to 465559  Suicide LifeLine Chat: suicideSenath Pty Ltdline.org/chat
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
## 2019-09-27 ASSESSMENT — ANXIETY QUESTIONNAIRES: GAD7 TOTAL SCORE: 12

## 2019-10-02 ENCOUNTER — RADIOLOGY INJECTION OFFICE VISIT (OUTPATIENT)
Dept: PALLIATIVE MEDICINE | Facility: CLINIC | Age: 39
End: 2019-10-02
Payer: COMMERCIAL

## 2019-10-02 ENCOUNTER — HOSPITAL ENCOUNTER (OUTPATIENT)
Dept: RADIOLOGY | Facility: CLINIC | Age: 39
Discharge: HOME | End: 2019-10-02
Attending: ANESTHESIOLOGY | Admitting: ANESTHESIOLOGY
Payer: COMMERCIAL

## 2019-10-02 ENCOUNTER — HEALTH MAINTENANCE LETTER (OUTPATIENT)
Age: 39
End: 2019-10-02

## 2019-10-02 VITALS — HEART RATE: 86 BPM | SYSTOLIC BLOOD PRESSURE: 125 MMHG | DIASTOLIC BLOOD PRESSURE: 79 MMHG | RESPIRATION RATE: 16 BRPM

## 2019-10-02 DIAGNOSIS — M54.16 LUMBAR RADICULOPATHY: Primary | ICD-10-CM

## 2019-10-02 DIAGNOSIS — M54.16 LUMBAR RADICULOPATHY: ICD-10-CM

## 2019-10-02 DIAGNOSIS — M51.369 DDD (DEGENERATIVE DISC DISEASE), LUMBAR: ICD-10-CM

## 2019-10-02 PROCEDURE — 25500064 ZZH RX 255 OP 636: Performed by: ANESTHESIOLOGY

## 2019-10-02 PROCEDURE — 62323 NJX INTERLAMINAR LMBR/SAC: CPT | Mod: TC

## 2019-10-02 PROCEDURE — 25000125 ZZHC RX 250: Performed by: ANESTHESIOLOGY

## 2019-10-02 PROCEDURE — 62323 NJX INTERLAMINAR LMBR/SAC: CPT | Performed by: ANESTHESIOLOGY

## 2019-10-02 PROCEDURE — 25000128 H RX IP 250 OP 636: Performed by: ANESTHESIOLOGY

## 2019-10-02 RX ORDER — DEXAMETHASONE SODIUM PHOSPHATE 10 MG/ML
10 INJECTION, SOLUTION INTRAMUSCULAR; INTRAVENOUS ONCE
Status: COMPLETED | OUTPATIENT
Start: 2019-10-02 | End: 2019-10-02

## 2019-10-02 RX ORDER — LIDOCAINE HYDROCHLORIDE AND EPINEPHRINE 10; 10 MG/ML; UG/ML
20 INJECTION, SOLUTION INFILTRATION; PERINEURAL ONCE
Status: COMPLETED | OUTPATIENT
Start: 2019-10-02 | End: 2019-10-02

## 2019-10-02 RX ORDER — METHYLPREDNISOLONE ACETATE 40 MG/ML
40 INJECTION, SUSPENSION INTRA-ARTICULAR; INTRALESIONAL; INTRAMUSCULAR; SOFT TISSUE ONCE
Status: COMPLETED | OUTPATIENT
Start: 2019-10-02 | End: 2019-10-02

## 2019-10-02 RX ORDER — LIDOCAINE HYDROCHLORIDE 10 MG/ML
5 INJECTION, SOLUTION EPIDURAL; INFILTRATION; INTRACAUDAL; PERINEURAL ONCE
Status: COMPLETED | OUTPATIENT
Start: 2019-10-02 | End: 2019-10-02

## 2019-10-02 RX ORDER — BUPIVACAINE HYDROCHLORIDE 5 MG/ML
10 INJECTION, SOLUTION PERINEURAL ONCE
Status: COMPLETED | OUTPATIENT
Start: 2019-10-02 | End: 2019-10-02

## 2019-10-02 RX ADMIN — METHYLPREDNISOLONE ACETATE 40 MG: 40 INJECTION, SUSPENSION INTRA-ARTICULAR; INTRALESIONAL; INTRAMUSCULAR; SOFT TISSUE at 08:32

## 2019-10-02 RX ADMIN — LIDOCAINE HYDROCHLORIDE,EPINEPHRINE BITARTRATE 1.5 ML: 10; .01 INJECTION, SOLUTION INFILTRATION; PERINEURAL at 08:32

## 2019-10-02 RX ADMIN — IOHEXOL 1.5 ML: 300 INJECTION, SOLUTION INTRAVENOUS at 08:32

## 2019-10-02 RX ADMIN — LIDOCAINE HYDROCHLORIDE 4 ML: 10 INJECTION, SOLUTION EPIDURAL; INFILTRATION; INTRACAUDAL; PERINEURAL at 08:31

## 2019-10-02 RX ADMIN — BUPIVACAINE HYDROCHLORIDE 5 MG: 5 INJECTION, SOLUTION EPIDURAL; INTRACAUDAL; PERINEURAL at 08:31

## 2019-10-02 RX ADMIN — DEXAMETHASONE SODIUM PHOSPHATE 5 MG: 10 INJECTION, SOLUTION INTRAMUSCULAR; INTRAVENOUS at 08:32

## 2019-10-02 ASSESSMENT — PAIN SCALES - GENERAL: PAINLEVEL: MODERATE PAIN (4)

## 2019-10-02 NOTE — PROGRESS NOTES
Pre procedure Diagnosis: lumbar radiculopathy, lumbar degenerative disc disease   Post procedure Diagnosis: Same  Procedure performed: lumbar interlaminar epidural steroid injection at L4-5, fluoroscopically guided, contrast controlled  Anesthesia: local  Complications: none  Operators: Aquiles Harley MD     Indications:   Timmy Fitzpatrick is a 39 year old male was sent by Kala Suresh NP for lumbar epidural steroid injection.  The patient has a history of chronic lower back pain with pain into the right buttock and paresthesias in the left anterior thigh.  Examination shows non-antalgic gait, lumbar tenderness, and negative SLR.  he has tried conservative treatment including activity modifications, medications.    MRI was done on 11/9/18 which showed   FINDINGS: The report is dictated assuming five lumbar-type vertebral  bodies, and radiographic correlation may be necessary.  The distal  spinal cord and cauda equina appear normal.     T12-L1:   Normal disc, facet joints, spinal canal and neural foramina.      L1-L2:  Normal disc, facet joints, spinal canal and neural foramina.     L2-L3:  Mild dehydration of disc material with decreased T2 signal.  Mild annular bulge. No focal disc protrusion. No significant central  or lateral stenosis. Facet joints are intact.     L3-L4:   Normal disc, facet joints, spinal canal and neural foramina.      L4-L5:  Dehydration of disc material and mild disc space narrowing.  Small broad-based central/right central disc protrusion mildly effaces  the thecal sac. No foraminal stenosis. No significant facet joint  disease.     L5-S1:  Moderate disc space narrowing and dehydration of disc  material. Small central disc protrusion abuts the ventral aspect of  the thecal sac. No nerve root displacement. Schmorl's node is present  along the posterior inferior margin the L5 vertebral body. Minimal  facet joint hypertrophy bilaterally.     Paraspinous soft tissues:   Normal.        Bone marrow:   Normal.                                                                     IMPRESSION:  Degenerative changes as described most marked at L4-5 and  lumbosacral levels.     ILAN NEAL MD    Options/alternatives, benefits and risks were discussed with the patient including but not limited to bleeding, infection, no pain relief, tissue trauma, exposure to radiation, reaction to medications, spinal cord injury, dural puncture, weakness, numbness and headache.  Questions were answered to his satisfaction and he wishes to proceed. Voluntary informed consent was obtained and signed.     Vitals were reviewed: Yes  /79   Pulse 86   Resp 16   Allergies were reviewed:  Yes   Medications were reviewed:  Yes   Pre-procedure pain score: 4/10    Procedure:  The patient's medical history, medications, and allergies were reviewed and reconciled.  After obtaining signed informed consent, the patient was brought into the procedure suite and was placed in a prone position on the procedure table.   A Pause for the Cause was performed.  Patient was prepped and draped in the usual sterile fashion.     The L4-5 interspace was identified with AP fluoroscopy.  A total of 4 ml of 1% lidocaine was used to anesthetize the skin and subcutaneous tissues for a midline approach.    A 20 gauge 3.5 inch Touhy needle was advanced utilizing intermittent AP and Lateral fluoroscopy and air for loss of resistance.  The epidural space was encountered on the first pass without difficulties.  Aspiration for blood and CSF was negative.  Needle position was verified by injecting 1.5 ml of Omnipaque 300 utilizing real-time fluoroscopy that showed good needle placement and epidural spread without signs of intravascular or intrathecal uptake.  Omnipaque wasted:  8.5 ml.    Then, after repeated negative aspiration for blood or CSF, a test dose was performed by injecting 1.5 ml of Lidocaine 1% with epinephrine into the epidural  space which was negative for heart rate or blood pressure changes, tinnitus, metallic taste, lower extremity paresthesias, or other complaints.    Then, after repeated negative aspiration for blood or CSF, a combination of Depomedrol 40 mg, Dexamethasone 5 mg, Bupivicaine 0.5% 1 ml, diluted with 2.5 ml of normal saline to a total injectate volume of 5 ml was injected into the epidural space in a slow and incremental fashion and the needle was restyletted and withdrawn.  All injected medications were preservative free.    The injection site was cleaned and a sterile dressing was applied.    The patient tolerated the procedure well without complications and was taken to the recovery room for continued observation.    Images were saved to PACS.    Post-procedure pain score: 3/10  Follow-up includes:   -f/u phone call in one week  -f/u with referring provider    Aquiles Harley MD  [facility]

## 2019-10-02 NOTE — IP AVS SNAPSHOT
MRN:[id number]                      After Visit Summary   10/2/2019    Timmy Fitzpatrick    MRN: [id number]           Visit Information        Provider Department      10/2/2019  8:15 AM [facility]           Review of your medicines      UNREVIEWED medicines. Ask your doctor about these medicines       Dose / Directions   buPROPion 100 MG 12 hr tablet  Commonly known as:  WELLBUTRIN SR  Used for:  Mild recurrent major depression (H)      Take one tab daily in the morning  Quantity:  30 tablet  Refills:  0     cyclobenzaprine 10 MG tablet  Commonly known as:  FLEXERIL  Used for:  Bilateral low back pain without sciatica, unspecified chronicity      Dose:  10 mg  Take 1 tablet (10 mg) by mouth 3 times daily as needed for muscle spasms  Quantity:  20 tablet  Refills:  0     escitalopram 20 MG tablet  Commonly known as:  LEXAPRO  Used for:  Bipolar II disorder (H)      Dose:  20 mg  Take 1 tablet (20 mg) by mouth daily  Quantity:  30 tablet  Refills:  2     eszopiclone 2 MG tablet  Commonly known as:  LUNESTA  Used for:  Persistent insomnia      Dose:  2 mg  Take 1 tablet (2 mg) by mouth nightly as needed for sleep  Quantity:  15 tablet  Refills:  0     gabapentin 300 MG capsule  Commonly known as:  NEURONTIN  Used for:  Bipolar II disorder (H)      Dose:  300 mg  Take 1 capsule (300 mg) by mouth 6 times daily  Quantity:  180 capsule  Refills:  2     MENS MULTIPLE VITAMIN/LYCOPENE PO      Dose:  1 tablet  Take 1 tablet by mouth daily.  Refills:  0     naproxen 500 MG tablet  Commonly known as:  NAPROSYN      Dose:  500 mg  Take 1 tablet (500 mg) by mouth 2 times daily (with meals)  Quantity:  30 tablet  Refills:  0     sildenafil 20 MG tablet  Commonly known as:  REVATIO  Used for:  Erectile dysfunction, unspecified erectile dysfunction type      TAKE 5 TABLETS BY MOUTH DAILY AS NEEDED, 30 MINUTES TO 4 HOURS BEFORE SEX. (DO NOT USE WITH NITROGLYCERIN, TERAZOSIN OR  DOXAZOSIN  Quantity:  60 tablet  Refills:  5              Protect others around you: Learn how to safely use, store and throw away your medicines at www.disposemymeds.org.       Follow-ups after your visit       Your next 10 appointments already scheduled    Oct 24, 2019 12:45 PM CDT  [facility] [address] [address] [phone]         Care Instructions       Further instructions from your care team       [facility]   Procedure Discharge Instructions    Today you saw:    Dr. Aquiles Harley      You had a:   Lumbar Epidural Steroid Injection L4-5    Medications used:  Lidocaine   Bupivacaine   Dexamethasone   Depo-medrol   Omnipaque   Lidocaine with Epinephrine          Be cautious when walking. Numbness and/or weakness in the lower extremities may occur for up to 6-8 hours after the procedure due to effect of the local anesthetic    Do not drive for 6 hours. The effect of the local anesthetic could slow your reflexes.     You may resume your regular activities after 24 hours    Avoid strenuous activity for the first 24 hours    You may shower, however avoid swimming, tub baths or hot tubs for 24 hours following your procedure    You may have a mild to moderate increase in pain for several days following the injection.    It may take up to 14 days for the steroid medication to start working although you may feel the effect as early as a few days after the procedure.       You may use ice packs for 10-15 minutes, 3 to 4 times a day at the injection site for comfort    Do not use heat to painful areas for 6 to 8 hours. This will give the local anesthetic time to wear off and prevent you from accidentally burning your skin.     Unless you have been directed to avoid the use of anti-inflammatory medications (NSAIDS), you may use medications such as ibuprofen, Aleve or Tylenol for  pain control if needed.     If you were fasting, you may resume your normal diet and medications after the procedure    Possible side effects of steroids that you may experience include flushing, elevated blood pressure, increased appetite, mild headaches and restlessness.  All of these symptoms will get better with time.    If you experience any of the following, call the Pain Clinic during work hours (Mon-Friday 8-4:30 pm) at [phone] or the Provider Line after hours at [phone]:  -Fever over 100 degree F  -Swelling, bleeding, redness, drainage, warmth at the injection site  -Progressive weakness or numbness in your legs   -Loss of bowel or bladder function  -Unusual new onset of pain that is not improving          Additional Information About Your Visit       [facility] Information    [facility] gives you secure access to your electronic health record. If you see a primary care provider, you can also send messages to your care team and make appointments. If you have questions, please call your primary care clinic.  If you do not have a primary care provider, please call [phone] and they will assist you.       Care EveryWhere ID    This is your Care EveryWhere ID. This could be used by other organizations to access your [place] medical records  [id number]        Primary Care Provider Office Phone # Fax #    [facility] [phone]      Equal Access to Services    ANGIE RIOS : Kiara rahman Soshiv, waaxda luqadaha, qaybta kaalmada katherine, alex mayer. So [facility] [phone].    ATENCIÓN: Si habla español, tiene a waters disposición servicios gratuitos de asistencia lingüística. Llame al [phone].    We comply with applicable federal civil rights laws and [place] laws. We do not discriminate on the basis of race, color, national origin, age, disability, sex, sexual orientation, or gender identity.           Thank you!    Thank you  for choosing [facility] for your care. Our goal is always to provide you with excellent care. Hearing back from our patients is one way we can continue to improve our services. Please take a few minutes to complete the written survey that you may receive in the mail after you visit with us. Thank you!            Medication List      ASK your doctor about these medications          Morning Afternoon Evening Bedtime As Needed    buPROPion 100 MG 12 hr tablet  Also known as:  WELLBUTRIN SR  INSTRUCTIONS:  Take one tab daily in the morning                     cyclobenzaprine 10 MG tablet  Also known as:  FLEXERIL  INSTRUCTIONS:  Take 1 tablet (10 mg) by mouth 3 times daily as needed for muscle spasms                     escitalopram 20 MG tablet  Also known as:  LEXAPRO  INSTRUCTIONS:  Take 1 tablet (20 mg) by mouth daily                     eszopiclone 2 MG tablet  Also known as:  LUNESTA  INSTRUCTIONS:  Take 1 tablet (2 mg) by mouth nightly as needed for sleep                     gabapentin 300 MG capsule  Also known as:  NEURONTIN  INSTRUCTIONS:  Take 1 capsule (300 mg) by mouth 6 times daily                     MENS MULTIPLE VITAMIN/LYCOPENE PO  INSTRUCTIONS:  Take 1 tablet by mouth daily.                     naproxen 500 MG tablet  Also known as:  NAPROSYN  INSTRUCTIONS:  Take 1 tablet (500 mg) by mouth 2 times daily (with meals)                     sildenafil 20 MG tablet  Also known as:  REVATIO  INSTRUCTIONS:  TAKE 5 TABLETS BY MOUTH DAILY AS NEEDED, 30 MINUTES TO 4 HOURS BEFORE SEX. (DO NOT USE WITH NITROGLYCERIN, TERAZOSIN OR DOXAZOSIN

## 2019-10-02 NOTE — PROGRESS NOTES
Pre-procedure Intake    Have you been fasting? Yes    If yes, for how long? NPO this morning    Are you taking a prescribed blood thinner such as coumadin, Plavix, Xarelto?    No    If yes, when did you take your last dose?      Do you take aspirin?  No    If cervical procedure, have you held aspirin for 6 days?   NA    Do you have any allergies to contrast dye, iodine, steroid and/or numbing medications?  NO    Are you currently taking antibiotics or have an active infection?  NO    Have you had a fever/elevated temperature within the past week? NO    Are you currently taking oral steroids? NO    Do you have a ? Yes       Are you pregnant or breastfeeding?  Not Applicable    Are the vital signs normal?  Yes

## 2019-10-02 NOTE — DISCHARGE INSTRUCTIONS
[facility]   Procedure Discharge Instructions    Today you saw:    Dr. Aquiles Harley      You had a:   Lumbar Epidural Steroid Injection L4-5    Medications used:  Lidocaine   Bupivacaine   Dexamethasone   Depo-medrol   Omnipaque   Lidocaine with Epinephrine          Be cautious when walking. Numbness and/or weakness in the lower extremities may occur for up to 6-8 hours after the procedure due to effect of the local anesthetic    Do not drive for 6 hours. The effect of the local anesthetic could slow your reflexes.     You may resume your regular activities after 24 hours    Avoid strenuous activity for the first 24 hours    You may shower, however avoid swimming, tub baths or hot tubs for 24 hours following your procedure    You may have a mild to moderate increase in pain for several days following the injection.    It may take up to 14 days for the steroid medication to start working although you may feel the effect as early as a few days after the procedure.       You may use ice packs for 10-15 minutes, 3 to 4 times a day at the injection site for comfort    Do not use heat to painful areas for 6 to 8 hours. This will give the local anesthetic time to wear off and prevent you from accidentally burning your skin.     Unless you have been directed to avoid the use of anti-inflammatory medications (NSAIDS), you may use medications such as ibuprofen, Aleve or Tylenol for pain control if needed.     If you were fasting, you may resume your normal diet and medications after the procedure    Possible side effects of steroids that you may experience include flushing, elevated blood pressure, increased appetite, mild headaches and restlessness.  All of these symptoms will get better with time.    If you experience any of the following, call the Pain Clinic during work hours (Mon-Friday 8-4:30 pm) at [phone] or the Provider Line after hours at [phone]:  -Fever over 100 degree  F  -Swelling, bleeding, redness, drainage, warmth at the injection site  -Progressive weakness or numbness in your legs   -Loss of bowel or bladder function  -Unusual new onset of pain that is not improving

## 2019-10-02 NOTE — IP AVS SNAPSHOT
[facility]  [address] [place]  [facility] [address]  Phone:  [phone]  Fax:  [phone]                                    After Visit Summary   10/2/2019    Timmy Fitzpatrick    MRN: [id number]           After Visit Summary Signature Page    I have received my discharge instructions, and my questions have been answered. I have discussed any challenges I see with this plan with the nurse or doctor.    ..........................................................................................................................................  Patient/Patient Representative Signature      ..........................................................................................................................................  Patient Representative Print Name and Relationship to Patient    ..................................................               ................................................  Date                                   Time    ..........................................................................................................................................  Reviewed by Signature/Title    ...................................................              ..............................................  Date                                               Time          22EPIC Rev 08/18

## 2019-10-11 ENCOUNTER — TELEPHONE (OUTPATIENT)
Dept: PALLIATIVE MEDICINE | Facility: CLINIC | Age: 39
End: 2019-10-11

## 2019-10-25 DIAGNOSIS — F31.81 BIPOLAR II DISORDER (H): ICD-10-CM

## 2019-10-25 RX ORDER — GABAPENTIN 300 MG/1
300 CAPSULE ORAL
Qty: 180 CAPSULE | Refills: 2 | Status: CANCELLED | OUTPATIENT
Start: 2019-10-25

## 2019-10-25 NOTE — TELEPHONE ENCOUNTER
gabapentin (NEURONTIN) 300 MG capsule      Last Written Prescription Date:  7/23/2019  Last Fill Quantity: 180capsule,   # refills: 2  Last Office Visit: 9/17/19 Ruth  Future Office visit:    Next 5 appointments (look out 90 days)    Oct 28, 2019  4:15 PM CDT  Return Visit with Marjan Mathews NP  [facility] ([facility]) [address] [address]  [phone]           Routing refill request to provider for review/approval because:  Drug not on the [facility], [facility] or  Health refill protocol or controlled substance

## 2019-10-28 ENCOUNTER — OFFICE VISIT (OUTPATIENT)
Dept: PSYCHIATRY | Facility: CLINIC | Age: 39
End: 2019-10-28
Payer: COMMERCIAL

## 2019-10-28 VITALS
RESPIRATION RATE: 12 BRPM | BODY MASS INDEX: 25.34 KG/M2 | OXYGEN SATURATION: 99 % | DIASTOLIC BLOOD PRESSURE: 86 MMHG | WEIGHT: 177 LBS | SYSTOLIC BLOOD PRESSURE: 135 MMHG | TEMPERATURE: 98.1 F | HEIGHT: 70 IN | HEART RATE: 80 BPM

## 2019-10-28 DIAGNOSIS — F33.0 MILD RECURRENT MAJOR DEPRESSION (H): ICD-10-CM

## 2019-10-28 DIAGNOSIS — G47.00 PERSISTENT INSOMNIA: ICD-10-CM

## 2019-10-28 DIAGNOSIS — F31.81 BIPOLAR II DISORDER (H): Primary | ICD-10-CM

## 2019-10-28 PROCEDURE — 99214 OFFICE O/P EST MOD 30 MIN: CPT | Performed by: NURSE PRACTITIONER

## 2019-10-28 RX ORDER — ESCITALOPRAM OXALATE 20 MG/1
20 TABLET ORAL DAILY
Qty: 30 TABLET | Refills: 2 | Status: SHIPPED | OUTPATIENT
Start: 2019-10-28 | End: 2020-01-15 | Stop reason: DRUGHIGH

## 2019-10-28 RX ORDER — QUETIAPINE FUMARATE 25 MG/1
TABLET, FILM COATED ORAL
Qty: 30 TABLET | Refills: 0 | Status: SHIPPED | OUTPATIENT
Start: 2019-10-28 | End: 2020-01-15

## 2019-10-28 RX ORDER — BUPROPION HYDROCHLORIDE 100 MG/1
TABLET, EXTENDED RELEASE ORAL
Qty: 30 TABLET | Refills: 0 | COMMUNITY
Start: 2019-10-28 | End: 2019-11-26

## 2019-10-28 RX ORDER — GABAPENTIN 300 MG/1
300 CAPSULE ORAL
Qty: 180 CAPSULE | Refills: 0 | Status: SHIPPED | OUTPATIENT
Start: 2019-10-28 | End: 2019-11-26

## 2019-10-28 RX ORDER — ESZOPICLONE 2 MG/1
2 TABLET, FILM COATED ORAL
Qty: 15 TABLET | Refills: 0 | Status: SHIPPED | OUTPATIENT
Start: 2019-10-28 | End: 2020-09-15

## 2019-10-28 ASSESSMENT — ANXIETY QUESTIONNAIRES
3. WORRYING TOO MUCH ABOUT DIFFERENT THINGS: SEVERAL DAYS
6. BECOMING EASILY ANNOYED OR IRRITABLE: MORE THAN HALF THE DAYS
1. FEELING NERVOUS, ANXIOUS, OR ON EDGE: MORE THAN HALF THE DAYS
GAD7 TOTAL SCORE: 12
2. NOT BEING ABLE TO STOP OR CONTROL WORRYING: SEVERAL DAYS
IF YOU CHECKED OFF ANY PROBLEMS ON THIS QUESTIONNAIRE, HOW DIFFICULT HAVE THESE PROBLEMS MADE IT FOR YOU TO DO YOUR WORK, TAKE CARE OF THINGS AT HOME, OR GET ALONG WITH OTHER PEOPLE: VERY DIFFICULT
7. FEELING AFRAID AS IF SOMETHING AWFUL MIGHT HAPPEN: NOT AT ALL
5. BEING SO RESTLESS THAT IT IS HARD TO SIT STILL: NEARLY EVERY DAY

## 2019-10-28 ASSESSMENT — PATIENT HEALTH QUESTIONNAIRE - PHQ9
SUM OF ALL RESPONSES TO PHQ QUESTIONS 1-9: 11
5. POOR APPETITE OR OVEREATING: NEARLY EVERY DAY

## 2019-10-28 ASSESSMENT — MIFFLIN-ST. JEOR: SCORE: 1719.12

## 2019-10-28 NOTE — PATIENT INSTRUCTIONS
1. Wellbutrin  mg every other day for two weeks then stop.  3.  Continue Lexapro 20 mg daily for depression and anxiety   4. Continue DBT and individual therapy  5.  Continue Lunesta 2 mg at bedtime as needed for sleep   6. Quetiapine 1/2 to 1 tab at  Bedtime as needed for depression and sleep      Continue all other medications as reviewed per electronic medical record today.     Safety plan reviewed. To the Emergency Department as needed or call after hours crisis line at [phone] or [phone]. [place] Crisis Text Line. Text [place] to 587131 or Suicide LifeLine Chat: [employer]org/chat/    To schedule individual or family therapy, call [facility] at [phone].     Schedule an appointment with me  as needed. Call [facility] at [phone] to schedule.    Follow up with primary care provider as planned or for acute medical concerns.    Call the psychiatric nurse line with medication questions or concerns at [phone].    [employer] may be used to communicate with your provider, but this is not intended to be used for emergencies.    Crisis Resources:    National Suicide Prevention Lifeline: 796.256.7076 (TTY: 608.420.6690). Call anytime for help.  (www.suicidepreventionlifeline.org)  National [place] [employer] Mental Illness (www.audra.org): 551.654.1327 or 765-330-3610.   Mental Health Association (www.mentalhealth.org): 723.720.8196 or 718-098-0714.  [place] Crisis Text Line: Text [place] to [address]918  Suicide LifeLine Chat: [employer]ractice [employer].org/chat

## 2019-10-29 ASSESSMENT — ANXIETY QUESTIONNAIRES: GAD7 TOTAL SCORE: 12

## 2019-12-04 ENCOUNTER — MYC MEDICAL ADVICE (OUTPATIENT)
Dept: FAMILY MEDICINE | Facility: CLINIC | Age: 39
End: 2019-12-04

## 2019-12-04 DIAGNOSIS — M54.50 BILATERAL LOW BACK PAIN WITHOUT SCIATICA, UNSPECIFIED CHRONICITY: Primary | ICD-10-CM

## 2019-12-04 NOTE — TELEPHONE ENCOUNTER
Please see [employer] message.  Patient would like referral for back  The last OV notes on 9/17/19:     Bilateral low back pain without sciatica, unspecified chronicity     - cyclobenzaprine (FLEXERIL) 10 MG tablet; Take 1 tablet (10 mg) by mouth 3 times daily as needed for muscle spasms  Dispense: 20 tablet; Refill: 0  REfills and conintue to monitor and follow up with pain clinic as scheduled.     2. Right shoulder pain, unspecified chronicity     REcommended PT, he wishes to hold off on that for now. WIll conitnue with conservative treatment, flexeril, naproxen, avoid sleeing on that side.    Thank you    Sabrina NICHOLE RN   ordered pended

## 2019-12-09 ENCOUNTER — OFFICE VISIT (OUTPATIENT)
Dept: PSYCHIATRY | Facility: CLINIC | Age: 39
End: 2019-12-09

## 2019-12-09 VITALS
HEIGHT: 70 IN | HEART RATE: 84 BPM | SYSTOLIC BLOOD PRESSURE: 122 MMHG | OXYGEN SATURATION: 98 % | DIASTOLIC BLOOD PRESSURE: 76 MMHG | WEIGHT: 188 LBS | BODY MASS INDEX: 26.92 KG/M2 | RESPIRATION RATE: 16 BRPM

## 2019-12-09 DIAGNOSIS — G47.00 PERSISTENT INSOMNIA: ICD-10-CM

## 2019-12-09 DIAGNOSIS — F31.81 BIPOLAR II DISORDER (H): ICD-10-CM

## 2019-12-09 DIAGNOSIS — F41.1 GENERALIZED ANXIETY DISORDER: Primary | ICD-10-CM

## 2019-12-09 DIAGNOSIS — F33.0 MILD RECURRENT MAJOR DEPRESSION (H): ICD-10-CM

## 2019-12-09 PROCEDURE — 99214 OFFICE O/P EST MOD 30 MIN: CPT | Performed by: NURSE PRACTITIONER

## 2019-12-09 RX ORDER — BUPROPION HYDROCHLORIDE 100 MG/1
100 TABLET, EXTENDED RELEASE ORAL DAILY
Qty: 30 TABLET | Refills: 1 | Status: SHIPPED | OUTPATIENT
Start: 2019-12-09 | End: 2020-01-15

## 2019-12-09 RX ORDER — GABAPENTIN 300 MG/1
300 CAPSULE ORAL
Qty: 180 CAPSULE | Refills: 0 | Status: SHIPPED | OUTPATIENT
Start: 2019-12-09 | End: 2020-01-13

## 2019-12-09 RX ORDER — ESCITALOPRAM OXALATE 20 MG/1
20 TABLET ORAL DAILY
Qty: 30 TABLET | Refills: 2 | Status: CANCELLED | OUTPATIENT
Start: 2019-12-09

## 2019-12-09 RX ORDER — ESCITALOPRAM OXALATE 10 MG/1
10 TABLET ORAL DAILY
Qty: 30 TABLET | Refills: 0 | Status: SHIPPED | OUTPATIENT
Start: 2019-12-09 | End: 2020-01-13

## 2019-12-09 RX ORDER — ESZOPICLONE 2 MG/1
2 TABLET, FILM COATED ORAL
Qty: 15 TABLET | Refills: 0 | Status: CANCELLED | OUTPATIENT
Start: 2019-12-09

## 2019-12-09 RX ORDER — ESCITALOPRAM OXALATE 5 MG/1
5 TABLET ORAL DAILY
Qty: 30 TABLET | Refills: 0 | Status: SHIPPED | OUTPATIENT
Start: 2019-12-09 | End: 2020-01-13

## 2019-12-09 ASSESSMENT — MIFFLIN-ST. JEOR: SCORE: 1769.01

## 2019-12-09 ASSESSMENT — ANXIETY QUESTIONNAIRES
GAD7 TOTAL SCORE: 7
5. BEING SO RESTLESS THAT IT IS HARD TO SIT STILL: MORE THAN HALF THE DAYS
1. FEELING NERVOUS, ANXIOUS, OR ON EDGE: SEVERAL DAYS
7. FEELING AFRAID AS IF SOMETHING AWFUL MIGHT HAPPEN: NOT AT ALL
6. BECOMING EASILY ANNOYED OR IRRITABLE: MORE THAN HALF THE DAYS
3. WORRYING TOO MUCH ABOUT DIFFERENT THINGS: NOT AT ALL
2. NOT BEING ABLE TO STOP OR CONTROL WORRYING: NOT AT ALL

## 2019-12-09 ASSESSMENT — PATIENT HEALTH QUESTIONNAIRE - PHQ9
5. POOR APPETITE OR OVEREATING: MORE THAN HALF THE DAYS
SUM OF ALL RESPONSES TO PHQ QUESTIONS 1-9: 7

## 2019-12-09 NOTE — PROGRESS NOTES
"    Outpatient Psychiatric Progress Note    Name: Timmy Fitzpatrick   : 1980                    Primary Care Provider: [facility]   Therapist: ABHI    PHQ-9 scores:  PHQ-9 SCORE 2019 2019 10/28/2019   PHQ-9 Total Score - - -   PHQ-9 Total Score MyChart 17 (Moderately severe depression) - -   PHQ-9 Total Score 17 14 11   Some encounter information is confidential and restricted. Go to Review Flowsheets activity to see all data.       JUAREZ-7 scores:  JUAREZ-7 SCORE 2019 2019 10/28/2019   Total Score - - -   Total Score 14 (moderate anxiety) - -   Total Score 14 12 12   Some encounter information is confidential and restricted. Go to Review Flowsheets activity to see all data.       Patient Identification:    Patient is a 39 year old year old,   White American male  who presents for return visit with me.  Patient is currently unemployed. Patient attended the session with daughter , who they agreed to have interview with. Patient prefers to be called: \"Ventura\".    Interim History:    I last saw Timmy Fitzpatrick for outpatient psychiatry Return Visit on 2019.     During that appointment, we met for the first time as he was seen by previous psychiatric provider through the collaborative care model.  At the time he informed me that his mood is stabilized and he wanted to stop the Wellbutrin.  He.     Current medications include: buPROPion (WELLBUTRIN SR) 100 MG 12 hr tablet, Take 1 tablet (100 mg) by mouth daily  escitalopram (LEXAPRO) 20 MG tablet, Take 1 tablet (20 mg) by mouth daily  eszopiclone (LUNESTA) 2 MG tablet, Take 1 tablet (2 mg) by mouth nightly as needed for sleep  gabapentin (NEURONTIN) 300 MG capsule, Take 1 capsule (300 mg) by mouth 6 times daily  medical cannabis (Patient's own supply), See Admin Instructions (The purpose of this order is to document that the patient reports taking medical cannabis.  This is not a prescription, and is not used " "to certify that the patient has a qualifying medical condition.)  Multiple Vitamins-Minerals (MENS MULTIPLE VITAMIN/LYCOPENE PO), Take 1 tablet by mouth daily.  naproxen (NAPROSYN) 500 MG tablet, Take 1 tablet (500 mg) by mouth 2 times daily (with meals)  sildenafil (REVATIO) 20 MG tablet, TAKE 5 TABLETS BY MOUTH DAILY AS NEEDED, 30 MINUTES TO 4 HOURS BEFORE SEX. (DO NOT USE WITH NITROGLYCERIN, TERAZOSIN OR DOXAZOSIN  QUEtiapine (SEROQUEL) 25 MG tablet, Take 1/2 to 1 tab at bedtime as needed    No current facility-administered medications on file prior to visit.        The [place] Prescription Monitoring Program has been reviewed and there are no concerns about diversionary activity for controlled substances at this time.      I was able to review most recent Primary Care Provider, specialty provider, and therapy visit notes that I have access to.     Today, patient reports that he has recently been placed on medical cannabis suspension and he can take up to 10 mL daily.  He believes that this is helping him to sleep better and decreasing his pain symptoms in his back.  Recently physical therapy has been ordered for him due to his back pain.  Last week he had a day when he was unable to do things around the house because of the pain.  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  He tells me things are going better for him with less stress.  He tells me he is finally starting to \"decompress\".  His wife has now started working in the [place] area as a nurse anesthetist.  He continues with DBT therapy 3 days a week.    Throughout this interview Ventura was observed to be interjecting periods of laughter within his conversation.  Now that he is feeling better, he would like to try to decrease the amount of Lexapro that he is taking, since he feels that the cannabis oil is helping lessen his anxiety.  He has been trying to decrease the gabapentin dose but is " "requesting the total dose of 1800 mg daily as a refill today.  He is not taking the Lunesta every day so no refill was given.       has a past medical history of Anxiety, Cervicalgia, Chemical dependency (H), Depression, and Migraine headache.    Social history updates:    For the past 2 weeks he has been at home caring for his children while his wife works in the [place] area.  He has been able to maintain the household.  He is planning on starting physical therapy in the near future for his back pain.    Substance use updates:    He denies using alcohol  Tobacco use: No    Vital Signs:   /76 (BP Location: Right arm, Patient Position: Sitting, Cuff Size: Adult Regular)   Pulse 84   Resp 16   Ht 1.778 m (5' 10\")   Wt 85.3 kg (188 lb)   SpO2 98%   BMI 26.98 kg/m      Labs:    Most recent laboratory results reviewed and no new labs.     Review of Systems:  10 systems (general, cardiovascular, respiratory, eyes, ENT, endocrine, GI, , M/S, neurological) were reviewed. Most pertinent finding(s) is/are: Back pain, no chest pain, no rashes. The remaining systems are all unremarkable.    Mental Status Examination:  Appearance:  awake, alert and casually dressed  Attitude:  cooperative   Eye Contact:  good  Gait and Station: Normal  Psychomotor Behavior:  fidgeting  Oriented to:  time, person, and place  Attention Span and Concentration:  Fair  Speech:   rambling, Conversation interjected with laughter periodically and Speaks: English  Mood:  better  Affect:  intensity is exaggerated  Associations:  no loose associations  Thought Process:  circumstantial  Thought Content:  no evidence of suicidal ideation or homicidal ideation, no auditory hallucinations present and no visual hallucinations present  Recent and Remote Memory:  fair Not formally assessed. No amnesia.  Fund of Knowledge: appropriate  Insight:  fair  Judgment:  fair  Impulse Control:  fair    Suicide Risk Assessment:  Today Timmy Fitzpatrick " reports that he is not having any thoughts to want to end his life or harm other people. In addition, there are notable risk factors for self-harm, including anxiety, comorbid medical condition of Chronic pain and mood change. However, risk is mitigated by commitment to family, history of seeking help when needed, future oriented, no access to firearms or weapons, denies suicidal intent or plan and denies homicidal ideation, intent, or plan. Therefore, based on all available evidence including the factors cited above, Timmy Fitzpatrick does not appear to be at imminent risk for self-harm, does not meet criteria for a 72-hr hold, and therefore remains appropriate for ongoing outpatient level of care.  A thorough assessment of risk factors related to suicide and self-harm have been reviewed and are noted above. The patient convincingly denies suicidality on several occasions. Local community safety resources printed and reviewed for patient to use if needed. There was no deceit detected, and the patient presented in a manner that was believable.     DSM5 Diagnosis:  296.89 Bipolar II Disorder With mixed features  300.02 (F41.1) Generalized Anxiety Disorder  780.52 (G47.00) Insomnia Disorder   With non-sleep disorder mental comorbidity  Episodic      Medical comorbidities include:   Patient Active Problem List    Diagnosis Date Noted     Generalized anxiety disorder 03/26/2019     Priority: Medium     Bipolar II disorder (H) 11/28/2018     Priority: Medium     Anxiety 09/18/2017     Priority: Medium     Tear of medial meniscus of knee 09/08/2016     Priority: Medium     Chemical dependency (H) 11/25/2015     Priority: Medium     Alcoholic, sober since 2006 09/13/2012     Priority: Medium     Major depression in partial remission (H) 06/30/2011     Priority: Medium     Migraine headache 06/30/2011     Priority: Medium     (Problem list name updated by automated process. Provider to review and confirm.)        CARDIOVASCULAR SCREENING; LDL GOAL LESS THAN 160 10/31/2010     Priority: Medium     Cervicalgia 03/24/2010     Priority: Medium     Nonallopathic lesion of thoracic region 03/24/2010     Priority: Medium     Problem list name updated by automated process. Provider to review       Nonallopathic lesion of lumbar region 03/24/2010     Priority: Medium     Problem list name updated by automated process. Provider to review         Assessment:    Timmy Fitzpatrick comes in today with his daughter to report that things are getting better.  For the past 2 weeks he has been strictly at home caring for the family.  He tells me that he is feeling more relaxed.  He has also started medical cannabis suspension and feels that this is helping him feel less anxious and able to sleep better.  He continues to have ongoing pain symptoms despite starting the cannabis and is planning to begin physical therapy in the near future.  He is interested in decreasing the Lexapro and I will do that today.  He does not take the Lunesta every night so no refills are needed.  He has not started taking the quetiapine as ordered from his last visit.  He had called in earlier than this appointment requesting to restart the Wellbutrin and that was begun before this visit.  When he had stopped taking it he tells me he was feeling more suicidal and depressed.  Medication side effects and alternatives were reviewed. Health promotion activities recommended and reviewed today. All questions addressed. Education and counseling completed regarding risks and benefits of medications and psychotherapy options.    Treatment Plan:      1. Wellbutrin  mg daily  3.  Decrease Lexapro to 15 mg daily for depression and anxiety   4. Continue DBT and individual therapy  5.  Continue Lunesta 2 mg at bedtime as needed for sleep - no refills   6. Quetiapine 1/2 to 1 tab at  Bedtime as needed for depression and sleep - no refills        Continue all other  medications as reviewed per electronic medical record today.     Safety plan reviewed. To the Emergency Department as needed or call after hours crisis line at [phone] or [phone]. [place] Crisis Text Line. Text [place] to 424793 or Suicide LifeLine Chat: suicideIngrian Networks.org/chat/    To schedule individual or family therapy, call [facility] at [phone].    Schedule an appointment with me in 4 weeks or sooner as needed. Call [facility] at [phone] to schedule.    Follow up with primary care provider as planned or for acute medical concerns.    Call the psychiatric nurse line with medication questions or concerns at [phone].    Tivity may be used to communicate with your provider, but this is not intended to be used for emergencies.    Crisis Resources:    National Suicide Prevention Lifeline: 860.718.7207 (TTY: 826.578.5197). Call anytime for help.  (www.suicidepreventionlifeline.org)  National [place] on Mental Illness (www.audra.org): 813.639.5159 or 689-323-3314.   Mental Health Association (www.mentalhealth.org): 745.504.7650 or 151-673-7571.  [place] Crisis Text Line: Text [place] to 464131  Suicide LifeLine Chat: suicideIngrian Networks.org/chat    Administrative Billing:   Time spent with patient was 30 minutes and greater than 50% of time or 20 minutes was spent in counseling and coordination of care regarding above diagnoses and treatment plan.    Patient Status:  Patient will continue to be seen for ongoing consultation and stabilization.    Signed:   EMILIA Wagner-BC   Psychiatry

## 2019-12-09 NOTE — PATIENT INSTRUCTIONS
1. Wellbutrin  mg daily  3.  Discontinue Lexapro to 15 mg daily for depression and anxiety   4. Continue DBT and individual therapy  5.  Continue Lunesta 2 mg at bedtime as needed for sleep - no refills   6. Quetiapine 1/2 to 1 tab at  Bedtime as needed for depression and sleep - no refills      Continue all other medications as reviewed per electronic medical record today.     Safety plan reviewed. To the Emergency Department as needed or call after hours crisis line at [phone] or [phone]. [place] Crisis Text Line. Text [place] to 046063 or Suicide LifeLine Chat: Life [employer].org/chat/    To schedule individual or family therapy, call [facility] at [phone].    Schedule an appointment with me in 4 weeks or sooner as needed. Call [facility] at [phone] to schedule.    Follow up with primary care provider as planned or for acute medical concerns.    Call the psychiatric nurse line with medication questions or concerns at [phone].    [employer] may be used to communicate with your provider, but this is not intended to be used for emergencies.    Crisis Resources:    National Suicide Prevention Lifeline: 349.610.6899 (TTY: 273.940.7327). Call anytime for help.  (www.suicidepreventionlifeline.org)  National [place] on Mental Illness (www.audra.org): 786.760.2884 or 610-764-0594.   Mental Health Association (www.mentalhealth.org): 168.214.7781 or 173-438-6424.  [place] Crisis Text Line: Text [place] to 247383  Suicide LifeLine Chat: Life is [employer].org/chat

## 2019-12-10 ASSESSMENT — ANXIETY QUESTIONNAIRES: GAD7 TOTAL SCORE: 7

## 2019-12-18 ENCOUNTER — HOSPITAL ENCOUNTER (OUTPATIENT)
Dept: PHYSICAL THERAPY | Facility: CLINIC | Age: 39
Setting detail: THERAPIES SERIES
End: 2019-12-18
Attending: NURSE PRACTITIONER

## 2019-12-18 DIAGNOSIS — M54.50 BILATERAL LOW BACK PAIN WITHOUT SCIATICA, UNSPECIFIED CHRONICITY: ICD-10-CM

## 2019-12-18 PROCEDURE — 97110 THERAPEUTIC EXERCISES: CPT | Mod: GP | Performed by: PHYSICAL THERAPIST

## 2019-12-18 PROCEDURE — 97161 PT EVAL LOW COMPLEX 20 MIN: CPT | Mod: GP | Performed by: PHYSICAL THERAPIST

## 2019-12-18 PROCEDURE — 97140 MANUAL THERAPY 1/> REGIONS: CPT | Mod: GP | Performed by: PHYSICAL THERAPIST

## 2020-01-08 ENCOUNTER — HOSPITAL ENCOUNTER (OUTPATIENT)
Dept: PHYSICAL THERAPY | Facility: CLINIC | Age: 40
Setting detail: THERAPIES SERIES
End: 2020-01-08
Attending: NURSE PRACTITIONER
Payer: COMMERCIAL

## 2020-01-08 PROCEDURE — 97110 THERAPEUTIC EXERCISES: CPT | Mod: GP | Performed by: PHYSICAL THERAPIST

## 2020-01-08 PROCEDURE — 97140 MANUAL THERAPY 1/> REGIONS: CPT | Mod: GP | Performed by: PHYSICAL THERAPIST

## 2020-01-13 ENCOUNTER — MYC MEDICAL ADVICE (OUTPATIENT)
Dept: PSYCHIATRY | Facility: CLINIC | Age: 40
End: 2020-01-13

## 2020-01-15 ENCOUNTER — OFFICE VISIT (OUTPATIENT)
Dept: PSYCHIATRY | Facility: CLINIC | Age: 40
End: 2020-01-15
Payer: COMMERCIAL

## 2020-01-15 VITALS
TEMPERATURE: 98.4 F | DIASTOLIC BLOOD PRESSURE: 78 MMHG | OXYGEN SATURATION: 98 % | HEIGHT: 70 IN | WEIGHT: 181 LBS | HEART RATE: 62 BPM | SYSTOLIC BLOOD PRESSURE: 114 MMHG | BODY MASS INDEX: 25.91 KG/M2 | RESPIRATION RATE: 16 BRPM

## 2020-01-15 DIAGNOSIS — F41.1 GENERALIZED ANXIETY DISORDER: ICD-10-CM

## 2020-01-15 DIAGNOSIS — F99 INSOMNIA DUE TO OTHER MENTAL DISORDER: Primary | ICD-10-CM

## 2020-01-15 DIAGNOSIS — F51.05 INSOMNIA DUE TO OTHER MENTAL DISORDER: Primary | ICD-10-CM

## 2020-01-15 DIAGNOSIS — F33.0 MILD RECURRENT MAJOR DEPRESSION (H): ICD-10-CM

## 2020-01-15 PROCEDURE — 99214 OFFICE O/P EST MOD 30 MIN: CPT | Performed by: NURSE PRACTITIONER

## 2020-01-15 RX ORDER — ESCITALOPRAM OXALATE 5 MG/1
5 TABLET ORAL DAILY
Qty: 30 TABLET | Refills: 1 | Status: SHIPPED | OUTPATIENT
Start: 2020-01-15 | End: 2020-03-11 | Stop reason: DRUGHIGH

## 2020-01-15 RX ORDER — ESCITALOPRAM OXALATE 10 MG/1
10 TABLET ORAL DAILY
Qty: 30 TABLET | Refills: 1 | Status: SHIPPED | OUTPATIENT
Start: 2020-01-15 | End: 2020-03-11

## 2020-01-15 RX ORDER — BUPROPION HYDROCHLORIDE 100 MG/1
100 TABLET, EXTENDED RELEASE ORAL DAILY
Qty: 30 TABLET | Refills: 1 | Status: SHIPPED | OUTPATIENT
Start: 2020-01-15 | End: 2020-03-11

## 2020-01-15 ASSESSMENT — ANXIETY QUESTIONNAIRES
6. BECOMING EASILY ANNOYED OR IRRITABLE: MORE THAN HALF THE DAYS
3. WORRYING TOO MUCH ABOUT DIFFERENT THINGS: NOT AT ALL
2. NOT BEING ABLE TO STOP OR CONTROL WORRYING: NOT AT ALL
IF YOU CHECKED OFF ANY PROBLEMS ON THIS QUESTIONNAIRE, HOW DIFFICULT HAVE THESE PROBLEMS MADE IT FOR YOU TO DO YOUR WORK, TAKE CARE OF THINGS AT HOME, OR GET ALONG WITH OTHER PEOPLE: SOMEWHAT DIFFICULT
5. BEING SO RESTLESS THAT IT IS HARD TO SIT STILL: MORE THAN HALF THE DAYS
1. FEELING NERVOUS, ANXIOUS, OR ON EDGE: SEVERAL DAYS
7. FEELING AFRAID AS IF SOMETHING AWFUL MIGHT HAPPEN: NOT AT ALL
GAD7 TOTAL SCORE: 7

## 2020-01-15 ASSESSMENT — PATIENT HEALTH QUESTIONNAIRE - PHQ9
SUM OF ALL RESPONSES TO PHQ QUESTIONS 1-9: 10
5. POOR APPETITE OR OVEREATING: MORE THAN HALF THE DAYS

## 2020-01-15 ASSESSMENT — MIFFLIN-ST. JEOR: SCORE: 1737.26

## 2020-01-15 NOTE — PROGRESS NOTES
"    Outpatient Psychiatric Progress Note    Name: Timmy Fitzpatrick   : 1980                    Primary Care Provider: [facility]   Therapist: [facility]     PHQ-9 scores:  PHQ-9 SCORE 2019 10/28/2019 2019   PHQ-9 Total Score - - -   PHQ-9 Total Score MyChart - - -   PHQ-9 Total Score 14 11 7   Some encounter information is confidential and restricted. Go to Review Flowsheets activity to see all data.       JUAREZ-7 scores:  JUAREZ-7 SCORE 2019 10/28/2019 2019   Total Score - - -   Total Score - - -   Total Score 12 12 7   Some encounter information is confidential and restricted. Go to Review Flowsheets activity to see all data.       Patient Identification:    Patient is a 39 year old year old,   White American male  who presents for return visit with me.  Patient is currently unemployed. Patient attended the session alone. Patient prefers to be called: \"Ventura\".    Interim History:    I last saw Timmy Fitzpatrick for outpatient psychiatry Return Visit on 2019.     During that appointment, we discussed how he thought things were getting better for him with regards to his mood.  He was feeling more relaxed since stopping work and being solely at home.  His pain symptoms were continuing despite starting the medical cannabis.  At that visit he desired decreasing the dose of his lexapro and so I prescribed it at 15 mg daily instead of 20 mg daily.  He was continuing with DBT therapy.  The Wellbutrin had been restarted due to the return of de[ression and suicide thinking.      Current medications include: buPROPion (WELLBUTRIN SR) 100 MG 12 hr tablet, Take 1 tablet (100 mg) by mouth daily  escitalopram (LEXAPRO) 10 MG tablet, Take 1 tablet (10 mg) by mouth daily With 5 mg for a total of 15 mg daily  escitalopram (LEXAPRO) 5 MG tablet, Take 1 tablet (5 mg) by mouth daily With 10 mg tablet for a total of 15 mg daily.  eszopiclone (LUNESTA) 2 MG tablet, Take " 1 tablet (2 mg) by mouth nightly as needed for sleep  gabapentin (NEURONTIN) 300 MG capsule, Take 1 capsule (300 mg) by mouth 6 times daily  medical cannabis (Patient's own supply), See Admin Instructions (The purpose of this order is to document that the patient reports taking medical cannabis.  This is not a prescription, and is not used to certify that the patient has a qualifying medical condition.)  Multiple Vitamins-Minerals (MENS MULTIPLE VITAMIN/LYCOPENE PO), Take 1 tablet by mouth daily.  sildenafil (REVATIO) 20 MG tablet, TAKE 5 TABLETS BY MOUTH DAILY AS NEEDED, 30 MINUTES TO 4 HOURS BEFORE SEX. (DO NOT USE WITH NITROGLYCERIN, TERAZOSIN OR DOXAZOSIN  escitalopram (LEXAPRO) 20 MG tablet, Take 1 tablet (20 mg) by mouth daily (Patient not taking: Reported on 1/15/2020)  naproxen (NAPROSYN) 500 MG tablet, Take 1 tablet (500 mg) by mouth 2 times daily (with meals) (Patient not taking: Reported on 1/15/2020)  QUEtiapine (SEROQUEL) 25 MG tablet, Take 1/2 to 1 tab at bedtime as needed (Patient not taking: Reported on 1/15/2020)    No current facility-administered medications on file prior to visit.        The [place] Prescription Monitoring Program has been reviewed and there are no concerns about diversionary activity for controlled substances at this time.      I was able to review most recent Primary Care Provider, specialty provider, and therapy visit notes that I have access to.     Today, patient reports he has been waking up in the early morning hours.  He also informed me he has not been taking the Lunesta every night.  Ventura continues using medical cannabis in the form of oral suspension and vaping.  It is a mixture of CBD and THC.  He has met with a pharmacist to discuss his medications.  Some days Ventura still has depressed mood but he is not engaging in any self-injurious behaviors.  Sometimes he engages in negative self talk and does have fleeting thoughts of not wanting to live but no plan or intent  "to act on those thoughts.  Ventura talked about having sad feelings but was engaging in intermittent periods of laughter.  He denies any panic attacks.  Recently he went to a bowling party with his wife and became overwhelmed in the social such.  He felt there was too much noise in the environment.  Ventura continues to attend DBT and individual therapies.  Due to this he is on a leave of absence until April from his [employer] job, which she tells me he misses.     has a past medical history of Anxiety, Cervicalgia, Chemical dependency (H), Depression, and Migraine headache.    Social history updates:    Ventura is tending to household duties and meal preparations.   He attended a social event with his wife but told me that he was very uncomfortable there.      Substance use updates:    He denies alcohol use.    Tobacco use: No    Vital Signs:   /78 (BP Location: Left arm, Patient Position: Sitting, Cuff Size: Adult Regular)   Pulse 62   Temp 98.4  F (36.9  C) (Tympanic)   Resp 16   Ht 1.778 m (5' 10\")   Wt 82.1 kg (181 lb)   SpO2 98%   BMI 25.97 kg/m      Labs:    Most recent laboratory results reviewed and no new labs.     Review of Systems:  10 systems (general, cardiovascular, respiratory, eyes, ENT, endocrine, GI, , M/S, neurological) were reviewed. Most pertinent finding(s) is/are: No chest pain, no rashes, no headache pain. The remaining systems are all unremarkable.    Mental Status Examination:  Appearance:  casually dressed  Attitude:  evasive   Eye Contact:  good  Gait and Station: Normal  Psychomotor Behavior:  intact station, gait and muscle tone  Oriented to:  time, person, and place  Attention Span and Concentration:  Fair  Speech:   clear, coherent, rambling and Speaks: English  Mood:  anxious, depressed and better  Affect:  intensity is heightened and laughter after his responses  Associations:  no loose associations  Thought Process:  circumstantial  Thought Content:  no evidence of suicidal " ideation or homicidal ideation, no auditory hallucinations present and no visual hallucinations present  Recent and Remote Memory:  intact Not formally assessed. No amnesia.  Fund of Knowledge: appropriate  Insight:  fair  Judgment:  fair  Impulse Control:  fair    Suicide Risk Assessment:  Today Timmy Fitzpatrick reports no thoughts to harm himself or others. In addition, there are notable risk factors for self-harm, including anxiety, substance abuse, withdrawing and mood change. However, risk is mitigated by commitment to family, history of seeking help when needed, future oriented, no access to firearms or weapons, denies suicidal intent or plan and denies homicidal ideation, intent, or plan. Therefore, based on all available evidence including the factors cited above, Timmy Fitzpatrick does not appear to be at imminent risk for self-harm, does not meet criteria for a 72-hr hold, and therefore remains appropriate for ongoing outpatient level of care.  A thorough assessment of risk factors related to suicide and self-harm have been reviewed and are noted above. The patient convincingly denies suicidality on several occasions. Local community safety resources printed and reviewed for patient to use if needed. There was no deceit detected, and the patient presented in a manner that was believable.     DSM5 Diagnosis:  296.31 (F33.0) Major Depressive Disorder, Recurrent Episode, Mild With mixed features  300.23 (F40.10) Social Anxiety Disorder  300.02 (F41.1) Generalized Anxiety Disorder  780.52 (G47.00) Insomnia Disorder   With non-sleep disorder mental comorbidity  Episodic      Medical comorbidities include:   Patient Active Problem List    Diagnosis Date Noted     Generalized anxiety disorder 03/26/2019     Priority: Medium     Bipolar II disorder (H) 11/28/2018     Priority: Medium     Anxiety 09/18/2017     Priority: Medium     Tear of medial meniscus of knee 09/08/2016     Priority: Medium     Chemical  dependency (H) 11/25/2015     Priority: Medium     Alcoholic, sober since 2006 09/13/2012     Priority: Medium     Major depression in partial remission (H) 06/30/2011     Priority: Medium     Migraine headache 06/30/2011     Priority: Medium     (Problem list name updated by automated process. Provider to review and confirm.)       CARDIOVASCULAR SCREENING; LDL GOAL LESS THAN 160 10/31/2010     Priority: Medium     Cervicalgia 03/24/2010     Priority: Medium     Nonallopathic lesion of thoracic region 03/24/2010     Priority: Medium     Problem list name updated by automated process. Provider to review       Nonallopathic lesion of lumbar region 03/24/2010     Priority: Medium     Problem list name updated by automated process. Provider to review         Assessment:    Timmy Fitzpatrick would like to continue to titrate off of the Lexapro so as to be on as little medication as possible.  While he does continue to have times where he variances sadness and fleeting suicidal ideation, the thoughts come about less frequently and are less intense.  Last time I decreased his dose of Lexapro to 15 mg daily and we discussed keeping it the same until our next visit in 2 months.  At that time if he feels stable we can then decrease it down again to a 10 mg tablet.  The Seroquel has been discontinued as he has not been taking it.  He takes the Lunesta periodically and no refills were needed today.  A recent addition of Wellbutrin at 100 mg daily will continue.  He continues to use medical cannabis for his ongoing back pain with partial relief.  Medical cannabis he uses is a combination of CBD and THC.  Medication side effects and alternatives were reviewed. Health promotion activities recommended and reviewed today. All questions addressed. Education and counseling completed regarding risks and benefits of medications and psychotherapy options.    Treatment Plan:      1. Wellbutrin  mg daily  3. Lexapro to 15 mg daily  for depression and anxiety   4. Continue DBT and individual therapy  5. Continue Lunesta 2 mg at bedtime as needed for sleep - no refills   6. Quetiapine discontinued        Continue all other medications as reviewed per electronic medical record today.     Safety plan reviewed. To the Emergency Department as needed or call after hours crisis line at [phone] or [phone]. [place] Crisis Text [place]. Text [place] to 604142 or Suicide LifeLine Chat: suicideRestored Hearing Ltd..org/chat/    To schedule individual or family therapy, call [facility] at [phone].    Schedule an appointment with me in 8 or sooner as needed. Call [facility] at [phone] to schedule.    Follow up with primary care provider as planned or for acute medical concerns.    Call the psychiatric nurse line with medication questions or concerns at [phone].    Wrightspeedt may be used to communicate with your provider, but this is not intended to be used for emergencies.    Crisis Resources:    National Suicide Prevention Lifeline: 290.451.8438 (TTY: 580.510.9462). Call anytime for help.  (www.suicidepreventionlifeline.org)  National [place] on Mental Illness (www.audra.org): 646.429.3231 or 276-825-8390.   Mental Health Association (www.mentalhealth.org): 111.972.6022 or 868-864-0346.  [place] Crisis Text [place]: Text [place] to 068684  Suicide LifeLine Chat: suicideRestored Hearing Ltd..org/chat    Administrative Billing:   Time spent with patient was 30 minutes and greater than 50% of time or 20 minutes was spent in counseling and coordination of care regarding above diagnoses and treatment plan.    Patient Status:  Patient will continue to be seen for ongoing consultation and stabilization.    Signed:   EMILIA Wagner-BC   Psychiatry

## 2020-01-15 NOTE — PATIENT INSTRUCTIONS
1. Wellbutrin  mg daily  3. Lexapro to 15 mg daily for depression and anxiety   4. Continue DBT and individual therapy  5. Continue Lunesta 2 mg at bedtime as needed for sleep - no refills   6. Quetiapine discontinued      Continue all other medications as reviewed per electronic medical record today.     Safety plan reviewed. To the Emergency Department as needed or call after hours crisis line at [phone] or [phone]. [place] Crisis Text [place]. Text [place] to 345052 or Suicide LifeLine Chat: suicidePromotion Space Group.org/chat/    To schedule individual or family therapy, call [facility] at [phone].    Schedule an appointment with me in 8 or sooner as needed. Call [facility] at [phone] to schedule.    Follow up with primary care provider as planned or for acute medical concerns.    Call the psychiatric nurse line with medication questions or concerns at [phone].    iRewardCharthart may be used to communicate with your provider, but this is not intended to be used for emergencies.    Crisis Resources:    National Suicide Prevention Lifeline: 716.956.3567 (TTY: 984.569.6816). Call anytime for help.  (www.suicidepreventionlifeline.org)  National [place] on Mental Illness (www.audra.org): 633.510.9291 or 443-188-4474.   Mental Health Association (www.mentalhealth.org): 743.429.1090 or 857-917-0072.  [place] Crisis Text [place]: Text [place] to 443543  Suicide LifeLine Chat: suicidePromotion Space Group.org/chat

## 2020-01-16 ASSESSMENT — ANXIETY QUESTIONNAIRES: GAD7 TOTAL SCORE: 7

## 2020-02-04 ENCOUNTER — HOSPITAL ENCOUNTER (EMERGENCY)
Facility: CLINIC | Age: 40
Discharge: HOME | End: 2020-02-04
Attending: EMERGENCY MEDICINE | Admitting: EMERGENCY MEDICINE
Payer: COMMERCIAL

## 2020-02-04 VITALS
DIASTOLIC BLOOD PRESSURE: 79 MMHG | OXYGEN SATURATION: 97 % | WEIGHT: 180 LBS | BODY MASS INDEX: 25.83 KG/M2 | SYSTOLIC BLOOD PRESSURE: 113 MMHG | RESPIRATION RATE: 16 BRPM | TEMPERATURE: 98 F | HEART RATE: 67 BPM

## 2020-02-04 DIAGNOSIS — M54.41 ACUTE RIGHT-SIDED LOW BACK PAIN WITH RIGHT-SIDED SCIATICA: ICD-10-CM

## 2020-02-04 PROCEDURE — 20552 NJX 1/MLT TRIGGER POINT 1/2: CPT | Mod: Z6 | Performed by: EMERGENCY MEDICINE

## 2020-02-04 PROCEDURE — 99284 EMERGENCY DEPT VISIT MOD MDM: CPT | Mod: 25 | Performed by: EMERGENCY MEDICINE

## 2020-02-04 PROCEDURE — 99283 EMERGENCY DEPT VISIT LOW MDM: CPT | Mod: 25 | Performed by: EMERGENCY MEDICINE

## 2020-02-04 PROCEDURE — 25000132 ZZH RX MED GY IP 250 OP 250 PS 637: Performed by: EMERGENCY MEDICINE

## 2020-02-04 PROCEDURE — 20552 NJX 1/MLT TRIGGER POINT 1/2: CPT | Performed by: EMERGENCY MEDICINE

## 2020-02-04 RX ORDER — ACETAMINOPHEN 500 MG
1000 TABLET ORAL ONCE
Status: COMPLETED | OUTPATIENT
Start: 2020-02-04 | End: 2020-02-04

## 2020-02-04 RX ORDER — OXYCODONE HYDROCHLORIDE 5 MG/1
10 TABLET ORAL ONCE
Status: COMPLETED | OUTPATIENT
Start: 2020-02-04 | End: 2020-02-04

## 2020-02-04 RX ORDER — MORPHINE SULFATE 15 MG/1
15 TABLET ORAL EVERY 4 HOURS PRN
Qty: 5 TABLET | Refills: 0 | Status: SHIPPED | OUTPATIENT
Start: 2020-02-04 | End: 2020-06-11

## 2020-02-04 RX ADMIN — OXYCODONE HYDROCHLORIDE 10 MG: 5 TABLET ORAL at 20:57

## 2020-02-04 RX ADMIN — IBUPROFEN 600 MG: 400 TABLET ORAL at 20:57

## 2020-02-04 RX ADMIN — ACETAMINOPHEN 1000 MG: 500 TABLET, FILM COATED ORAL at 20:57

## 2020-02-04 NOTE — ED AVS SNAPSHOT
[facility] Emergency Department  [address] [address]  Phone:  [phone]  Fax:  [phone]                                    Timmy Fitzpatrick   MRN: [id number]    Department:  [facility] Emergency Department   Date of Visit:  2/4/2020           After Visit Summary Signature Page    I have received my discharge instructions, and my questions have been answered. I have discussed any challenges I see with this plan with the nurse or doctor.    ..........................................................................................................................................  Patient/Patient Representative Signature      ..........................................................................................................................................  Patient Representative Print Name and Relationship to Patient    ..................................................               ................................................  Date                                   Time    ..........................................................................................................................................  Reviewed by Signature/Title    ...................................................              ..............................................  Date                                               Time          22EPIC Rev 08/18

## 2020-02-05 NOTE — ED PROVIDER NOTES
History     Chief Complaint   Patient presents with     Back Pain     hx of low back pain no recent injury pain worse this am pt has back brace on      HPI  Timmy Fitzpatrick is a 39 year old male who presents for back pain.  Pain started this AM, has gotten worse through the day.  Pain localized to right lower back, with radiation to right posterior leg intermittently.  Described as sharp, shooting, rated as severe.  He does not have a history of trauma.  Has taken cannabis for this pain.  He does have a hx of chronic back pain.  He does not have bowel/bladder dysfunction, history of malignancy, history of IVDU, history of immunosuppression, or fever. Denies numbness or tingling of the perineum. He does not have headache, chest pain, shortness of breath, abdominal pain, nausea, vomiting, diarrhea, or extremity weakness or paresthesias.      Allergies:  Allergies   Allergen Reactions     Cats      Nkda [No Known Drug Allergies]      Seasonal Allergies        Problem List:    Patient Active Problem List    Diagnosis Date Noted     Generalized anxiety disorder 03/26/2019     Priority: Medium     Bipolar II disorder (H) 11/28/2018     Priority: Medium     Anxiety 09/18/2017     Priority: Medium     Tear of medial meniscus of knee 09/08/2016     Priority: Medium     Chemical dependency (H) 11/25/2015     Priority: Medium     Alcoholic, sober since 2006 09/13/2012     Priority: Medium     Major depression in partial remission (H) 06/30/2011     Priority: Medium     Migraine headache 06/30/2011     Priority: Medium     (Problem list name updated by automated process. Provider to review and confirm.)       CARDIOVASCULAR SCREENING; LDL GOAL LESS THAN 160 10/31/2010     Priority: Medium     Cervicalgia 03/24/2010     Priority: Medium     Nonallopathic lesion of thoracic region 03/24/2010     Priority: Medium     Problem list name updated by automated process. Provider to review       Nonallopathic lesion of lumbar  region 03/24/2010     Priority: Medium     Problem list name updated by automated process. Provider to review          Past Medical History:    Past Medical History:   Diagnosis Date     Anxiety      Cervicalgia      Chemical dependency (H)      Depression      Migraine headache        Past Surgical History:    Past Surgical History:   Procedure Laterality Date     HERNIA REPAIR       SURGICAL HISTORY OF -       deviated septum       Family History:    Family History   Problem Relation Age of Onset     Cancer Paternal Grandfather      Depression Mother      Anxiety Disorder Mother      Depression Father      Unknown/Adopted Father      Dementia Maternal Grandfather      Substance Abuse Paternal Grandmother        Social History:  Marital Status:   [2]  Social History     Tobacco Use     Smoking status: Former Smoker     Packs/day: 0.00     Types: Dip, chew, snus or snuff     Smokeless tobacco: Former User     Types: Chew     Quit date: 11/16/2014     Tobacco comment: one tin every two days   Substance Use Topics     Alcohol use: No     Comment: sober since April 2016     Drug use: Yes     Types: Marijuana     Comment: Daily        Medications:    morphine (MSIR) 15 MG IR tablet  buPROPion (WELLBUTRIN SR) 100 MG 12 hr tablet  escitalopram (LEXAPRO) 10 MG tablet  escitalopram (LEXAPRO) 5 MG tablet  eszopiclone (LUNESTA) 2 MG tablet  gabapentin (NEURONTIN) 300 MG capsule  medical cannabis (Patient's own supply)  Multiple Vitamins-Minerals (MENS MULTIPLE VITAMIN/LYCOPENE PO)  naproxen (NAPROSYN) 500 MG tablet  sildenafil (REVATIO) 20 MG tablet          Review of Systems  Pertinent positives and negatives listed in the HPI, all other systems reviewed and are negative.    Physical Exam   BP: 132/80  Pulse: 67  Heart Rate: 66  Temp: 98  F (36.7  C)  Resp: 16  Weight: 81.6 kg (180 lb)  SpO2: 97 %      Physical Exam  Constitutional:       General: He is not in acute distress.     Appearance: He is well-developed. He  is not diaphoretic.   HENT:      Head: Normocephalic and atraumatic.   Eyes:      General: No scleral icterus.  Neck:      Musculoskeletal: Normal range of motion and neck supple.   Musculoskeletal:      Comments: Back: no deformity, erythema, warmth, or masses appreciated; no tenderness over midline, R and L paraspinal muscles, sacroiliac region; normal spine ROM; spine symmetric with normal curvature; normal ROM of hips and knees; patellar and plantar reflexes intact, babinski downgoing; intact LE sensation to light touch and to pinprick; normal LE strength; normal gait   Skin:     General: Skin is warm and dry.      Findings: No rash.   Neurological:      Mental Status: He is alert and oriented to person, place, and time.         ED Course        Procedures  Trigger point injection  Location: Right lower back  Indication: pain  Date: 2/4/2020   The patient gave verbal consent for the procedure. Risks and benefits were discussed.  Pre-procedure exam: Motor and sensory exam normal prior to injection. Normal perfusion.  Procedure: A 27 gauge needle was inserted under normal sterile procedure. Approximately 4 mL of 0.5% bupivacaine was injected. The patient tolerated the procedure well without immediate complication. Pain was improved.              Critical Care time:  none               No results found for this or any previous visit (from the past 24 hour(s)).    Medications   acetaminophen (TYLENOL) tablet 1,000 mg (1,000 mg Oral Given 2/4/20 2057)   ibuprofen (ADVIL/MOTRIN) tablet 600 mg (600 mg Oral Given 2/4/20 2057)   oxyCODONE (ROXICODONE) tablet 10 mg (10 mg Oral Given 2/4/20 2057)       Assessments & Plan (with Medical Decision Making)   39 year old male who presents with back pain. Patient vitally stable.  Differential includes muscle strain vs spasm, DJD, disk herniation, spinal stenosis, vertebral fracture.  Pt does not have historical features or exam findings to suggest more serious back pathology such  as metastatic disease, tuberculosis, epidural abscess, or cauda equina/conus medullaris.  He was given acetaminophen, ibuprofen, and oxycodone for the pain and a trigger point injection was performed as above for the pain.  He was feeling better afterwards and was safe to discharge with instructions to return if he has worsening symptoms or other concerns, those follow-up in clinic.  The patient is in agreement with this plan.    I have reviewed the nursing notes.    I have reviewed the findings, diagnosis, plan and need for follow up with the patient.       New Prescriptions    MORPHINE (MSIR) 15 MG IR TABLET    Take 1 tablet (15 mg) by mouth every 4 hours as needed for severe pain       Final diagnoses:   Acute right-sided low back pain with right-sided sciatica       2/4/2020   [facility] EMERGENCY DEPARTMENT     Fox Barone MD  02/04/20 [id number]

## 2020-02-05 NOTE — DISCHARGE INSTRUCTIONS
Return to the emergency department for urinary incontinence, stool incontinence, numbness or tingling around the penis or anus, weakness in the legs, fevers, or other concerns.  Otherwise follow-up in clinic for recheck.    Use ibuprofen 600 mg and acetaminophen 650 mg for your symptoms.  Use morphine as needed for pain that is not controlled by the prior two medications.    Morphine is an addictive opioid medication, please only take it when the pain is more than can be controlled by acetaminophen or ibuprofen alone. It will also make you lightheaded, nauseated, and constipated.  Do not drive, operate heavy machinery, or take care of young children while taking this medication.     Repeated studies have shown that the longer you use opioid pain medications, the longer it is until you return to normal function. It is our recommendation that you taper off opioids as quickly as possible with the goal of returning to normal function or near normal function. Long term use of opioids quickly results in growing tolerance to the medication (less of the benefits) and increased dependence (more of the bad side effects).     Pain is very difficult to treat and we can very rarely take away pain completely. If you are having difficulty with pain over several weeks after an injury, you may need to start different medications and therapies (such as physical therapy, graded exercise, massage, and acupuncture). Please talk about this with your regular doctor.     If you are interested in seeking free, confidential treatment referral and information service for individuals and families facing mental health and/or substance use disorders please call [phone]-CloudPay.net [phone])

## 2020-02-07 ENCOUNTER — TELEPHONE (OUTPATIENT)
Dept: FAMILY MEDICINE | Facility: CLINIC | Age: 40
End: 2020-02-07

## 2020-02-10 ENCOUNTER — OFFICE VISIT (OUTPATIENT)
Dept: FAMILY MEDICINE | Facility: CLINIC | Age: 40
End: 2020-02-10
Payer: COMMERCIAL

## 2020-02-10 ENCOUNTER — TELEPHONE (OUTPATIENT)
Dept: PSYCHIATRY | Facility: CLINIC | Age: 40
End: 2020-02-10

## 2020-02-10 ENCOUNTER — ANCILLARY PROCEDURE (OUTPATIENT)
Dept: GENERAL RADIOLOGY | Facility: CLINIC | Age: 40
End: 2020-02-10
Attending: NURSE PRACTITIONER
Payer: COMMERCIAL

## 2020-02-10 ENCOUNTER — MYC MEDICAL ADVICE (OUTPATIENT)
Dept: FAMILY MEDICINE | Facility: CLINIC | Age: 40
End: 2020-02-10

## 2020-02-10 VITALS
SYSTOLIC BLOOD PRESSURE: 122 MMHG | BODY MASS INDEX: 25.05 KG/M2 | DIASTOLIC BLOOD PRESSURE: 60 MMHG | OXYGEN SATURATION: 98 % | HEIGHT: 70 IN | HEART RATE: 65 BPM | TEMPERATURE: 98.6 F | RESPIRATION RATE: 16 BRPM | WEIGHT: 175 LBS

## 2020-02-10 DIAGNOSIS — M54.41 RIGHT-SIDED LOW BACK PAIN WITH RIGHT-SIDED SCIATICA, UNSPECIFIED CHRONICITY: Primary | ICD-10-CM

## 2020-02-10 DIAGNOSIS — M54.41 RIGHT-SIDED LOW BACK PAIN WITH RIGHT-SIDED SCIATICA, UNSPECIFIED CHRONICITY: ICD-10-CM

## 2020-02-10 PROCEDURE — 72100 X-RAY EXAM L-S SPINE 2/3 VWS: CPT | Mod: FY

## 2020-02-10 PROCEDURE — 99213 OFFICE O/P EST LOW 20 MIN: CPT | Performed by: NURSE PRACTITIONER

## 2020-02-10 RX ORDER — METHYLPREDNISOLONE 4 MG
TABLET, DOSE PACK ORAL
Qty: 21 TABLET | Refills: 0 | Status: SHIPPED | OUTPATIENT
Start: 2020-02-10 | End: 2020-06-11

## 2020-02-10 ASSESSMENT — MIFFLIN-ST. JEOR: SCORE: 1710.04

## 2020-02-10 ASSESSMENT — PAIN SCALES - GENERAL: PAINLEVEL: SEVERE PAIN (7)

## 2020-02-10 NOTE — PATIENT INSTRUCTIONS
Try the muscle relaxant and steroid.  Start PT when able.  Will be notified of pending x ray results.  If no improvement, will repeat MRI and send to orthopedics.      Our Clinic hours are:  Mondays    7:20 am - 7 pm  Tues -  Fri  7:20 am - 5 pm    Clinic Phone: [phone]    The clinic lab opens at 7:30 am Mon - Fri and appointments are required.    [facility]  Ph. [phone]  Monday  8 am - 7pm  Tues - Fri 8 am - 5:30 pm

## 2020-02-10 NOTE — TELEPHONE ENCOUNTER
Reason for call:  Medication   If this is a refill request, has the caller requested the refill from the pharmacy already? Yes  Will the patient be using a [facility]? No  Name of the pharmacy and phone number for the current request:   [facility] [address] [place] [address] [employer] [phone] (Phone)  [phone] (Fax)       Name of the medication requested:Gabapentin    Other request: Pt reports he has requested a refill from the pharmacy and also left a MEETiiN message with the request.     Phone number to reach patient:  Cell number on file:    No relevant phone numbers on file.     [phone]  Best Time:  Anytime     Can we leave a detailed message on this number?  YES

## 2020-02-10 NOTE — PROGRESS NOTES
"Subjective     Timmy Fitzpatrick is a 39 year old male who presents to clinic today for the following health issues:    HPI   ED/UC Followup:    Facility:  [facility]  Date of visit: 2/4/2020  Reason for visit: Acute right-sided low back pain with right sided sciatica  Current Status: not any better and would like a referral to [facility].     States he bought a roller to use on his back as he's had chronic low back pain. That was about one week ago and after trying the new roller, he awoke with \"bad back pain\"  He was seen in ED and given trigger point injection and sent home with pain pills. He states pain is lower right side with some radiation into right leg. Denies weakness or numbness. No problems with bowels or bladder.   See MRI of lumbar spine from 2018.       FINDINGS: The report is dictated assuming five lumbar-type vertebral  bodies, and radiographic correlation may be necessary.  The distal  spinal cord and cauda equina appear normal.     T12-L1:   Normal disc, facet joints, spinal canal and neural foramina.        L1-L2:  Normal disc, facet joints, spinal canal and neural foramina.     L2-L3:  Mild dehydration of disc material with decreased T2 signal.  Mild annular bulge. No focal disc protrusion. No significant central  or lateral stenosis. Facet joints are intact.     L3-L4:   Normal disc, facet joints, spinal canal and neural foramina.      L4-L5:  Dehydration of disc material and mild disc space narrowing.  Small broad-based central/right central disc protrusion mildly effaces  the thecal sac. No foraminal stenosis. No significant facet joint  disease.     L5-S1:  Moderate disc space narrowing and dehydration of disc  material. Small central disc protrusion abuts the ventral aspect of  the thecal sac. No nerve root displacement. Schmorl's node is present  along the posterior inferior margin the L5 vertebral body. Minimal  facet joint hypertrophy bilaterally.     Paraspinous soft " tissues:   Normal.       Bone marrow:   Normal.                                                                       IMPRESSION:  Degenerative changes as described most marked at L4-5 and  lumbosacral levels.     ILAN NEAL MD    Patient Active Problem List   Diagnosis     Cervicalgia     Nonallopathic lesion of thoracic region     Nonallopathic lesion of lumbar region     CARDIOVASCULAR SCREENING; LDL GOAL LESS THAN 160     Major depression in partial remission (H)     Migraine headache     Alcoholic, sober since 2006     Chemical dependency (H)     Anxiety     Tear of medial meniscus of knee     Bipolar II disorder (H)     Generalized anxiety disorder     Past Surgical History:   Procedure Laterality Date     HERNIA REPAIR       SURGICAL HISTORY OF -       deviated septum       Social History     Tobacco Use     Smoking status: Former Smoker     Packs/day: 0.00     Types: Dip, chew, snus or snuff     Smokeless tobacco: Former User     Types: Chew     Quit date: 11/16/2014   Substance Use Topics     Alcohol use: No     Comment: sober since April 2016     Family History   Problem Relation Age of Onset     Cancer Paternal Grandfather      Depression Mother      Anxiety Disorder Mother      Depression Father      Unknown/Adopted Father      Dementia Maternal Grandfather      Substance Abuse Paternal Grandmother          Current Outpatient Medications   Medication Sig Dispense Refill     buPROPion (WELLBUTRIN SR) 100 MG 12 hr tablet Take 1 tablet (100 mg) by mouth daily 30 tablet 1     escitalopram (LEXAPRO) 10 MG tablet Take 1 tablet (10 mg) by mouth daily With 5 mg for a total of 15 mg daily 30 tablet 1     escitalopram (LEXAPRO) 5 MG tablet Take 1 tablet (5 mg) by mouth daily With 10 mg tablet for a total of 15 mg daily. 30 tablet 1     eszopiclone (LUNESTA) 2 MG tablet Take 1 tablet (2 mg) by mouth nightly as needed for sleep 15 tablet 0     gabapentin (NEURONTIN) 300 MG capsule Take 1 capsule (300 mg) by  "mouth 6 times daily 180 capsule 0     medical cannabis (Patient's own supply) See Admin Instructions (The purpose of this order is to document that the patient reports taking medical cannabis.  This is not a prescription, and is not used to certify that the patient has a qualifying medical condition.)       methylPREDNISolone (MEDROL DOSEPAK) 4 MG tablet therapy pack Follow Package Directions 21 tablet 0     Multiple Vitamins-Minerals (MENS MULTIPLE VITAMIN/LYCOPENE PO) Take 1 tablet by mouth daily.       nicotine polacrilex (NICORETTE) 4 MG gum Place 4 mg inside cheek as needed for smoking cessation       sildenafil (REVATIO) 20 MG tablet TAKE 5 TABLETS BY MOUTH DAILY AS NEEDED, 30 MINUTES TO 4 HOURS BEFORE SEX. (DO NOT USE WITH NITROGLYCERIN, TERAZOSIN OR DOXAZOSIN 60 tablet 5     tiZANidine (ZANAFLEX) 4 MG tablet Take 1 tablet (4 mg) by mouth 3 times daily 30 tablet 0     morphine (MSIR) 15 MG IR tablet Take 1 tablet (15 mg) by mouth every 4 hours as needed for severe pain (Patient not taking: Reported on 2/10/2020) 5 tablet 0     naproxen (NAPROSYN) 500 MG tablet Take 1 tablet (500 mg) by mouth 2 times daily (with meals) (Patient not taking: Reported on 1/15/2020) 30 tablet 0     Allergies   Allergen Reactions     Cats      Nkda [No Known Drug Allergies]      Seasonal Allergies      BP Readings from Last 3 Encounters:   02/10/20 122/60   02/04/20 113/79   01/15/20 114/78    Wt Readings from Last 3 Encounters:   02/10/20 79.4 kg (175 lb)   02/04/20 81.6 kg (180 lb)   01/15/20 82.1 kg (181 lb)                      Reviewed and updated as needed this visit by Provider         Review of Systems   ROS COMP: Constitutional, HEENT, cardiovascular, pulmonary, gi and gu systems are negative, except as otherwise noted.      Objective    /60 (BP Location: Right arm, Patient Position: Chair, Cuff Size: Adult Regular)   Pulse 65   Temp 98.6  F (37  C) (Oral)   Resp 16   Ht 1.778 m (5' 10\")   Wt 79.4 kg (175 lb)   " "SpO2 98%   BMI 25.11 kg/m    Body mass index is 25.11 kg/m .  Physical Exam   GENERAL: healthy, alert and no distress  NECK: no adenopathy, no asymmetry  RESP: lungs clear to auscultation - no rales, rhonchi or wheezes  CV: regular rate and rhythm,   ABDOMEN: soft, nontender, no hepatosplenomegaly, no masses and bowel sounds normal  MS: no gross musculoskeletal defects noted, difficulty with ROM of lumbar spine due to pain, wearing a lower back brace, positive SLR raises, no pain with palpation, able to walk without weakness or limp  Brisk patella DTR's      Diagnostic Test Results:  Labs reviewed in Epic  No results found for this or any previous visit (from the past 24 hour(s)).        Assessment & Plan     1. Right-sided low back pain with right-sided sciatica, unspecified chronicity    - XR Lumbar Spine 2/3 Views; Future  - methylPREDNISolone (MEDROL DOSEPAK) 4 MG tablet therapy pack; Follow Package Directions  Dispense: 21 tablet; Refill: 0  - tiZANidine (ZANAFLEX) 4 MG tablet; Take 1 tablet (4 mg) by mouth 3 times daily  Dispense: 30 tablet; Refill: 0  - PHYSICAL THERAPY REFERRAL; Future   Discussed how to take the medication(s), expected outcomes, potential side effects.  Will try conservative treatment and if no improvement or any worsening, MRI and orthopedics consult.    BMI:   Estimated body mass index is 25.11 kg/m  as calculated from the following:    Height as of this encounter: 1.778 m (5' 10\").    Weight as of this encounter: 79.4 kg (175 lb).           See Patient Instructions  Patient Instructions   Try the muscle relaxant and steroid.  Start PT when able.  Will be notified of pending x ray results.  If no improvement, will repeat MRI and send to orthopedics.      Our Clinic hours are:  Mondays    7:20 am - 7 pm  Tues -  Fri  7:20 am - 5 pm    Clinic Phone: [phone]    The clinic lab opens at 7:30 am Mon - Fri and appointments are required.    [facility]  Ph. " [phone]  Monday  8 am - 7pm  Tues - Fri 8 am - 5:30 pm             Return in about 2 weeks (around 2/24/2020) for or sooner if symptoms persist or worsen.    [facility] No [facility]

## 2020-02-11 ENCOUNTER — TELEPHONE (OUTPATIENT)
Dept: PSYCHIATRY | Facility: CLINIC | Age: 40
End: 2020-02-11

## 2020-02-11 NOTE — TELEPHONE ENCOUNTER
Reason for call:  Other   Patient called regarding (reason for call): prescription  Additional comments: Pt calls again today, requesting refill for meds from Marjan Mathews.     Phone number to reach patient:  Cell number on file:    Telephone Information:   Mobile [phone]       Best Time:  Anytime      Can we leave a detailed message on this number?  YES

## 2020-02-11 NOTE — TELEPHONE ENCOUNTER
Reason for Call: Out of Gabapentin 300mg since Saturday- attempting to contact provider since Friday.    Do you use a [facility]?  Name of the pharmacy and phone number for the current request:  [facility]   [phone] (Phone)  [phone] (Fax)     Name of the medication requested: gabapentin 300mg    Can we leave a detailed message on this number? yes    Phone number patient can be reached at: [phone]    Best Time: any    Call taken on 2/11/2020 at 10:57 AM by Aurelia Ramirez

## 2020-02-11 NOTE — TELEPHONE ENCOUNTER
Last office visit 1/15/20  Next office visit 3/11/20    Patient due back 8 weeks after last appointment. Given one month of refills.   Patient has multiple encounters open for this request. I will consolidate and send provider refill for review and signing.

## 2020-02-11 NOTE — TELEPHONE ENCOUNTER
Patient has multiple encounters open for this refill request. Messages have been consolidated and one request was sent to provider.

## 2020-02-14 ENCOUNTER — HOSPITAL ENCOUNTER (OUTPATIENT)
Dept: MRI IMAGING | Facility: CLINIC | Age: 40
Discharge: HOME | End: 2020-02-14
Attending: NURSE PRACTITIONER | Admitting: NURSE PRACTITIONER
Payer: COMMERCIAL

## 2020-02-14 DIAGNOSIS — M54.41 RIGHT-SIDED LOW BACK PAIN WITH RIGHT-SIDED SCIATICA, UNSPECIFIED CHRONICITY: ICD-10-CM

## 2020-02-14 PROCEDURE — 72148 MRI LUMBAR SPINE W/O DYE: CPT

## 2020-02-17 ENCOUNTER — TELEPHONE (OUTPATIENT)
Dept: FAMILY MEDICINE | Facility: CLINIC | Age: 40
End: 2020-02-17

## 2020-02-17 DIAGNOSIS — M54.41 RIGHT-SIDED LOW BACK PAIN WITH RIGHT-SIDED SCIATICA, UNSPECIFIED CHRONICITY: Primary | ICD-10-CM

## 2020-02-17 NOTE — TELEPHONE ENCOUNTER
Reason for Call:  Referral    Detailed comments: patients wife is calling to get a referral from [facility] for his Neurology Referral. They also need MRI results, PT notes and Injection notes, which I have printed. Please fax to [facility] at [phone]    Phone Number Patient can be reached at: Home number on file [phone] (home)    Best Time: any    Can we leave a detailed message on this number? YES   Luz Marina Lockett  Clinic Station        Call taken on 2/17/2020 at 9:26 AM by Luz Marina Russo

## 2020-02-17 NOTE — TELEPHONE ENCOUNTER
Please se note below. The patient was seen on 2/10/20.  Referral pended.    Thank you    Sabrina NICHOLE RN

## 2020-02-19 NOTE — TELEPHONE ENCOUNTER
Leyda Frausto is calling and the order should say Neuro Surgery  Referral. They would also like a Demographic sheet faxed with this. Please change referral.  Luz Marina Lockett  Clinic Station [place]      No

## 2020-02-28 DIAGNOSIS — F33.0 MILD RECURRENT MAJOR DEPRESSION (H): ICD-10-CM

## 2020-02-28 DIAGNOSIS — F41.1 GENERALIZED ANXIETY DISORDER: ICD-10-CM

## 2020-02-28 NOTE — TELEPHONE ENCOUNTER
"Requested Prescriptions   Pending Prescriptions Disp Refills     escitalopram (LEXAPRO) 10 MG tablet  Last Written Prescription Date:  1/15/20  Last Fill Quantity: 30,  # refills: 1   Last Office Visit with [facility], P or [facility] prescribing provider:  2/10/20   Future Office Visit:    Next 5 appointments (look out 90 days)    Mar 11, 2020  1:15 PM CDT  Return Visit with Marjan Mathews NP  [facility] [address] [address]  [phone]          30 tablet 1     Sig: Take 1 tablet (10 mg) by mouth daily With 5 mg for a total of 15 mg daily       SSRIs Protocol Failed - 2/28/2020  4:35 PM        Failed - PHQ-9 score less than 5 in past 6 months     Please review last PHQ-9 score.           Passed - Medication is active on med list        Passed - Patient is age 18 or older        Passed - Recent (6 mo) or future (30 days) visit within the authorizing provider's specialty     Patient had office visit in the last 6 months or has a visit in the next 30 days with authorizing provider or within the authorizing provider's specialty.  See \"Patient Info\" tab in inbasket, or \"Choose Columns\" in Meds & Orders section of the refill encounter.              "

## 2020-03-03 RX ORDER — ESCITALOPRAM OXALATE 10 MG/1
10 TABLET ORAL DAILY
Qty: 30 TABLET | Refills: 1 | OUTPATIENT
Start: 2020-03-03

## 2020-03-11 ENCOUNTER — OFFICE VISIT (OUTPATIENT)
Dept: PSYCHIATRY | Facility: CLINIC | Age: 40
End: 2020-03-11
Payer: COMMERCIAL

## 2020-03-11 VITALS
OXYGEN SATURATION: 98 % | DIASTOLIC BLOOD PRESSURE: 74 MMHG | SYSTOLIC BLOOD PRESSURE: 118 MMHG | WEIGHT: 178.2 LBS | HEART RATE: 72 BPM | HEIGHT: 70 IN | RESPIRATION RATE: 16 BRPM | TEMPERATURE: 98.2 F | BODY MASS INDEX: 25.51 KG/M2

## 2020-03-11 DIAGNOSIS — F51.05 INSOMNIA DUE TO OTHER MENTAL DISORDER: ICD-10-CM

## 2020-03-11 DIAGNOSIS — F41.1 GENERALIZED ANXIETY DISORDER: ICD-10-CM

## 2020-03-11 DIAGNOSIS — F99 INSOMNIA DUE TO OTHER MENTAL DISORDER: ICD-10-CM

## 2020-03-11 DIAGNOSIS — F31.81 BIPOLAR II DISORDER (H): Primary | ICD-10-CM

## 2020-03-11 PROCEDURE — 99214 OFFICE O/P EST MOD 30 MIN: CPT | Performed by: NURSE PRACTITIONER

## 2020-03-11 RX ORDER — BUPROPION HYDROCHLORIDE 100 MG/1
100 TABLET, EXTENDED RELEASE ORAL DAILY
Qty: 30 TABLET | Refills: 3 | Status: SHIPPED | OUTPATIENT
Start: 2020-03-11 | End: 2020-06-11

## 2020-03-11 RX ORDER — ESCITALOPRAM OXALATE 10 MG/1
10 TABLET ORAL DAILY
Qty: 30 TABLET | Refills: 3 | Status: SHIPPED | OUTPATIENT
Start: 2020-03-11 | End: 2020-06-11

## 2020-03-11 RX ORDER — GABAPENTIN 300 MG/1
300 CAPSULE ORAL
Qty: 180 CAPSULE | Refills: 1 | Status: SHIPPED | OUTPATIENT
Start: 2020-03-11 | End: 2020-05-15

## 2020-03-11 ASSESSMENT — PATIENT HEALTH QUESTIONNAIRE - PHQ9
SUM OF ALL RESPONSES TO PHQ QUESTIONS 1-9: 10
5. POOR APPETITE OR OVEREATING: NEARLY EVERY DAY

## 2020-03-11 ASSESSMENT — ANXIETY QUESTIONNAIRES
7. FEELING AFRAID AS IF SOMETHING AWFUL MIGHT HAPPEN: NOT AT ALL
2. NOT BEING ABLE TO STOP OR CONTROL WORRYING: SEVERAL DAYS
5. BEING SO RESTLESS THAT IT IS HARD TO SIT STILL: MORE THAN HALF THE DAYS
GAD7 TOTAL SCORE: 10
3. WORRYING TOO MUCH ABOUT DIFFERENT THINGS: SEVERAL DAYS
6. BECOMING EASILY ANNOYED OR IRRITABLE: MORE THAN HALF THE DAYS
1. FEELING NERVOUS, ANXIOUS, OR ON EDGE: SEVERAL DAYS
IF YOU CHECKED OFF ANY PROBLEMS ON THIS QUESTIONNAIRE, HOW DIFFICULT HAVE THESE PROBLEMS MADE IT FOR YOU TO DO YOUR WORK, TAKE CARE OF THINGS AT HOME, OR GET ALONG WITH OTHER PEOPLE: SOMEWHAT DIFFICULT

## 2020-03-11 ASSESSMENT — MIFFLIN-ST. JEOR: SCORE: 1724.56

## 2020-03-11 NOTE — PROGRESS NOTES
"    Outpatient Psychiatric Progress Note    Name: Timmy Fitzpatrick   : 1980                    Primary Care Provider: [facility]   Therapist: DBT group therapy    PHQ-9 scores:  PHQ-9 SCORE 10/28/2019 2019 1/15/2020   PHQ-9 Total Score - - -   PHQ-9 Total Score MyChart - - -   PHQ-9 Total Score 11 7 10   Some encounter information is confidential and restricted. Go to Review Flowsheets activity to see all data.       JUAREZ-7 scores:  JUAREZ-7 SCORE 10/28/2019 2019 1/15/2020   Total Score - - -   Total Score - - -   Total Score 12 7 7   Some encounter information is confidential and restricted. Go to Review Flowsheets activity to see all data.       Patient Identification:    Patient is a 39 year old year old,   White American male  who presents for return visit with me.  Patient is currently unemployed. Patient attended the session alone. Patient prefers to be called: \" Ventura\".    Interim History:    I last saw Timmy Fitzpatrick for outpatient psychiatry Return Visit on member 2019.     During that appointment, he talked about how things were getting better for him.  He was feeling more relaxed now that he had been home for the past 2 weeks caring strictly for his family and not working.  At that visit he informed me he had started on a medical cannabis suspension and felt that this was helping him to feel less anxious and able to sleep better.  He was reporting ongoing pain symptoms.  At that visit he wanted to decrease the Lexapro.  No refills were requested for his Lunesta..  Wellbutrin had been restarted due to increased feelings of suicide and depressed mood.  He has not taken the quetiapine as ordered.  Current medications include: buPROPion (WELLBUTRIN SR) 100 MG 12 hr tablet, Take 1 tablet (100 mg) by mouth daily  escitalopram (LEXAPRO) 10 MG tablet, Take 1 tablet (10 mg) by mouth daily With 5 mg for a total of 15 mg daily  escitalopram (LEXAPRO) 5 MG tablet, " Take 1 tablet (5 mg) by mouth daily With 10 mg tablet for a total of 15 mg daily.  eszopiclone (LUNESTA) 2 MG tablet, Take 1 tablet (2 mg) by mouth nightly as needed for sleep  gabapentin (NEURONTIN) 300 MG capsule, Take 1 capsule (300 mg) by mouth 6 times daily  medical cannabis (Patient's own supply), See Admin Instructions (The purpose of this order is to document that the patient reports taking medical cannabis.  This is not a prescription, and is not used to certify that the patient has a qualifying medical condition.)  Multiple Vitamins-Minerals (MENS MULTIPLE VITAMIN/LYCOPENE PO), Take 1 tablet by mouth daily.  sildenafil (REVATIO) 20 MG tablet, TAKE 5 TABLETS BY MOUTH DAILY AS NEEDED, 30 MINUTES TO 4 HOURS BEFORE SEX. (DO NOT USE WITH NITROGLYCERIN, TERAZOSIN OR DOXAZOSIN  tiZANidine (ZANAFLEX) 4 MG tablet, Take 1 tablet (4 mg) by mouth 3 times daily  methylPREDNISolone (MEDROL DOSEPAK) 4 MG tablet therapy pack, Follow Package Directions (Patient not taking: Reported on 3/11/2020)  morphine (MSIR) 15 MG IR tablet, Take 1 tablet (15 mg) by mouth every 4 hours as needed for severe pain (Patient not taking: Reported on 2/10/2020)  naproxen (NAPROSYN) 500 MG tablet, Take 1 tablet (500 mg) by mouth 2 times daily (with meals) (Patient not taking: Reported on 1/15/2020)  nicotine polacrilex (NICORETTE) 4 MG gum, Place 4 mg inside cheek as needed for smoking cessation    No current facility-administered medications on file prior to visit.        The [place] Prescription Monitoring Program has been reviewed and there are no concerns about diversionary activity for controlled substances at this time.      I was able to review most recent Primary Care Provider, specialty provider, and therapy visit notes that I have access to.     Today, patient reports that approximately 1 month ago he herniated a disc after doing stretching exercises.  He is going to meet with a surgeon to determine treatment for disks L4 and 5.   "Ventura expressed some frustration of running out of his Lexapro.  At this visit I made sure he had plenty of refills.  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  His wife continues to work a lot and he is managing the children at home and tells me they are \"good\" DBT therapy continues with graduation occurring at the first part of April.  Overall his depression has increased given his limited mobility and inabilities to do activities around the house.  Ventura inquired about how to get rid of some of the diagnoses on his chart as he questions whether he has bipolar disorder or not.  He feels his signs and symptoms are more intertwined with borderline personality disorder.  I discussed with him having psychological testing for diagnosis clarification but pointed out to him that he did score positive on the mood disorder questionnaire which screens for bipolar disorder.     has a past medical history of Anxiety, Cervicalgia, Chemical dependency (H), Depression, and Migraine headache.    Social history updates:    Ventura mostly isolates to his home.  He says he spends time with his wife who is a nurse anesthetist and also spends time with his children transporting him to and from activities.    Substance use updates:    No alcohol use  Cannabis suspension   Tobacco use: History last used \"3-4 days ago\"    Vital Signs:   /74 (BP Location: Right arm, Patient Position: Sitting, Cuff Size: Adult Regular)   Pulse 72   Temp 98.2  F (36.8  C) (Tympanic)   Resp 16   Ht 1.778 m (5' 10\")   Wt 80.8 kg (178 lb 3.2 oz)   SpO2 98%   BMI 25.57 kg/m      Labs:    Most recent laboratory results reviewed and no new labs.     Review of Systems:  10 systems (general, cardiovascular, respiratory, eyes, ENT, endocrine, GI, , M/S, neurological) were reviewed. Most pertinent finding(s) is/are: Lower " back pain and wearing a brace, no chest pain, no shortness of breath, no rashes. The remaining systems are all unremarkable.    Mental Status Examination:  Appearance:  awake, alert, adequately groomed, mild distress and casually dressed  Attitude:  cooperative   Eye Contact:  fair  Gait and Station: No dizziness or falls  Psychomotor Behavior:  fidgeting  Oriented to:  time, person, and place  Attention Span and Concentration:  Fair  Speech:   pressured speech and Speaks: English  Mood:  anxious and depressed  Affect:  intensity is heightened  Associations:  no loose associations  Thought Process:  tangental  Thought Content:  no evidence of suicidal ideation or homicidal ideation, no auditory hallucinations present and no visual hallucinations present  Recent and Remote Memory:  intact Not formally assessed. No amnesia.  Fund of Knowledge: appropriate  Insight:  fair  Judgment:  fair  Impulse Control:  fair    Suicide Risk Assessment:  Today Timmy Fitzpatrick reports he is having no thoughts to harm himself or to hurt other people. In addition, there are notable risk factors for self-harm, including anxiety, comorbid medical condition of Chronic pain, wrecklessness and mood change. However, risk is mitigated by commitment to family, history of seeking help when needed, future oriented, no access to firearms or weapons, denies suicidal intent or plan and denies homicidal ideation, intent, or plan. Therefore, based on all available evidence including the factors cited above, Timmy Fitzpatrick does not appear to be at imminent risk for self-harm, does not meet criteria for a 72-hr hold, and therefore remains appropriate for ongoing outpatient level of care.  A thorough assessment of risk factors related to suicide and self-harm have been reviewed and are noted above. The patient convincingly denies suicidality on several occasions. Local community safety resources printed and reviewed for patient to use if  needed. There was no deceit detected, and the patient presented in a manner that was believable.     DSM5 Diagnosis:  296.89 Bipolar II Disorder With mixed features and mild  300.02 (F41.1) Generalized Anxiety Disorder  780.52 (G47.00) Insomnia Disorder   With non-sleep disorder mental comorbidity  Episodic      Medical comorbidities include:   Patient Active Problem List    Diagnosis Date Noted     Generalized anxiety disorder 03/26/2019     Priority: Medium     Bipolar II disorder (H) 11/28/2018     Priority: Medium     Anxiety 09/18/2017     Priority: Medium     Tear of medial meniscus of knee 09/08/2016     Priority: Medium     Chemical dependency (H) 11/25/2015     Priority: Medium     Alcoholic, sober since 2006 09/13/2012     Priority: Medium     Major depression in partial remission (H) 06/30/2011     Priority: Medium     Migraine headache 06/30/2011     Priority: Medium     (Problem list name updated by automated process. Provider to review and confirm.)       CARDIOVASCULAR SCREENING; LDL GOAL LESS THAN 160 10/31/2010     Priority: Medium     Cervicalgia 03/24/2010     Priority: Medium     Nonallopathic lesion of thoracic region 03/24/2010     Priority: Medium     Problem list name updated by automated process. Provider to review       Nonallopathic lesion of lumbar region 03/24/2010     Priority: Medium     Problem list name updated by automated process. Provider to review         Assessment:    Timmy Fitzpatrick comes in today to inform me that he herniated 2 disks in his lumbar area with surgery pending.  This has severely limited his abilities to engage in meaningful activities around the house.  His depression has slightly increased with this.  As he was running out of Lexapro he was only taking 10 mg a day and would like to continue at that dose.  He is tolerating the addition of Wellbutrin fine.  He will be graduating from DBT the first part of April.  No refills were needed on the Lunesta as he  takes it rarely.  Ventura is questioning the validity of his diagnoses and would like to have some of them removed from his chart.  I referred him to his primary care doctor for clarification.  I also referred him for psychological testing to clarify his diagnosis as he feels as if he is experiencing more of a borderline personality disorder versus bipolar disorder.  Medication side effects and alternatives were reviewed. Health promotion activities recommended and reviewed today. All questions addressed. Education and counseling completed regarding risks and benefits of medications and psychotherapy options.    Treatment Plan:      1. Wellbutrin  mg daily  3. Lexapro decreased to 10 mg daily for depression and anxiety   4. Continue DBT and individual therapy  5. Continue Lunesta 2 mg at bedtime as needed for sleep - no refills   6. Referred to psychological testing for diagnosis clarification      Continue all other medications as reviewed per electronic medical record today.     Safety plan reviewed. To the Emergency Department as needed or call after hours crisis line at [phone] or [phone]. [place] Crisis Text [place]. Text [place] to 098935 or Suicide LifeLine Chat: suicidepreventionlifeline.org/chat/    To schedule individual or family therapy, call [facility] at [phone].    Schedule an appointment with me in 3 months or sooner as needed. Call [facility] at [phone] to schedule.    Follow up with primary care provider as planned or for acute medical concerns.    Call the psychiatric nurse line with medication questions or concerns at [phone].    MyChart may be used to communicate with your provider, but this is not intended to be used for emergencies.    Crisis Resources:    National Suicide Prevention Lifeline: 470.166.3766 (TTY: 156.371.9621). Call anytime for help.  (www.suicidepreventionlifeline.org)  National [place] on Mental Illness (www.audra.org):  747-230-0125 or 961-669-6204.   Mental Health Association (www.mentalhealth.org): 283.870.5324 or 052-376-0556.  [place] Crisis Text [place]: Text [place] to 819114  Suicide LifeLine Chat: suicidepreDrivewyzeline.org/chat    Administrative Billing:   Time spent with patient was 30 minutes and greater than 50% of time or 20 minutes was spent in counseling and coordination of care regarding above diagnoses and treatment plan.    Patient Status:  Patient will continue to be seen for ongoing consultation and stabilization.    Signed:   GIL WagnerP-BC   Psychiatry

## 2020-03-11 NOTE — PATIENT INSTRUCTIONS
1. Wellbutrin  mg daily  3. Lexapro decreased to 10 mg daily for depression and anxiety   4. Continue DBT and individual therapy  5. Continue Lunesta 2 mg at bedtime as needed for sleep - no refills   6. Referred to psychological testing for diagnosis clarification      Continue all other medications as reviewed per electronic medical record today.     Safety plan reviewed. To the Emergency Department as needed or call after hours crisis line at [phone] or [phone]. [place] Crisis Text [place]. Text [place] to 536592 or Suicide LifeLine Chat: suicidePufferfish.org/chat/    To schedule individual or family therapy, call [facility] at [phone].    Schedule an appointment with me in 3 months or sooner as needed. Call [facility] at [phone] to schedule.    Follow up with primary care provider as planned or for acute medical concerns.    Call the psychiatric nurse line with medication questions or concerns at [phone].    [employer] may be used to communicate with your provider, but this is not intended to be used for emergencies.    Crisis Resources:    National Suicide Prevention Lifeline: 725.144.5726 (TTY: 215.541.9743). Call anytime for help.  (www.suicidepreventionlifeline.org)  National [place] on Mental Illness (www.audra.org): 695.627.8568 or 921-712-2624.   Mental Health Association (www.mentalhealth.org): 139.850.6348 or 379-446-3168.  [place] Crisis Text Line: Text [place] to 625954  Suicide LifeLine Chat: suicideMobileAwareline.org/chat

## 2020-03-12 ASSESSMENT — ANXIETY QUESTIONNAIRES: GAD7 TOTAL SCORE: 10

## 2020-03-19 ENCOUNTER — DOCUMENTATION ONLY (OUTPATIENT)
Dept: PHYSICAL THERAPY | Facility: CLINIC | Age: 40
End: 2020-03-19

## 2020-03-19 NOTE — PROGRESS NOTES
Outpatient Physical Therapy Discharge Note     Patient: Timmy Fitzpatrick  : 1980    Beginning/End Dates of Reporting Period:  2019 to  (Total of 2 visits)    Referring Provider: Vida Miranda    Diagnosis: Bilateral low back pain without sciatica     Client Self Report:  (From date of last visit)  Has had some sorenss  after doing 1 of the exercises    Objective Measurements: (From date of last visit)  [facility]    Treatment Has Consisted Of:  Stretching and strengthening exercise education / STM / Joint mobilizations    Goals:  Goals had not been met at time of last visit.    Plan:  Discharge from therapy.    Discharge:    Reason for Discharge: Patient has failed to schedule further appointments.    Equipment Issued: None    Discharge Plan: Patient to continue home program.    Jefferson Pérez, PT, DPT

## 2020-03-24 DIAGNOSIS — M54.41 RIGHT-SIDED LOW BACK PAIN WITH RIGHT-SIDED SCIATICA, UNSPECIFIED CHRONICITY: ICD-10-CM

## 2020-03-24 DIAGNOSIS — N52.9 ERECTILE DYSFUNCTION, UNSPECIFIED ERECTILE DYSFUNCTION TYPE: ICD-10-CM

## 2020-03-24 RX ORDER — SILDENAFIL CITRATE 20 MG/1
TABLET ORAL
Qty: 60 TABLET | Refills: 5 | Status: SHIPPED | OUTPATIENT
Start: 2020-03-24 | End: 2021-05-04

## 2020-03-24 NOTE — TELEPHONE ENCOUNTER
Requested Prescriptions   Pending Prescriptions Disp Refills     tiZANidine (ZANAFLEX) 4 MG tablet [Pharmacy Med Name: TIZANIDINE HCL 4MG TABS] 30 tablet 0     Sig: TAKE ONE TABLET BY MOUTH THREE TIMES A DAY       There is no refill protocol information for this order        Last Written Prescription Date:  2/10/20  Last Fill Quantity: 30,  # refills: 0   Last office visit: 2/10/2020 with prescribing provider:  Ruth   Future Office Visit:   Next 5 appointments (look out 90 days)    Jun 11, 2020 10:15 AM CDT  Return Visit with Marjan Mathews NP  [facility] [address] [address] [phone]

## 2020-03-24 NOTE — TELEPHONE ENCOUNTER
"Requested Prescriptions   Pending Prescriptions Disp Refills     sildenafil (REVATIO) 20 MG tablet [Pharmacy Med Name: SILDENAFIL CITRATE 20MG TABS] 60 tablet 5     Sig: TAKE 5 TABLETS BY MOUTH DAILY AS NEEDED, 30 MINUTES TO 4 HOURS BEFORE SEX. (DO NOT USE WITH NITROGLYCERIN, TERAZOSIN OR DOXAZOSIN       Erectile Dysfuction Protocol Passed - 3/24/2020  1:13 PM        Passed - Absence of nitrates on medication list        Passed - Absence of Alpha Blockers on Med list        Passed - Recent (12 mo) or future (30 days) visit within the authorizing provider's specialty     Patient has had an office visit with the authorizing provider or a provider within the authorizing providers department within the previous 12 mos or has a future within next 30 days. See \"Patient Info\" tab in inbasket, or \"Choose Columns\" in Meds & Orders section of the refill encounter.              Passed - Medication is active on med list        Passed - Patient is age 18 or older           Last Written Prescription Date:  12/7/18  Last Fill Quantity: 60,  # refills: 5   Last office visit: 2/10/2020 with prescribing provider:  Stephen   Future Office Visit:   Next 5 appointments (look out 90 days)    Jun 11, 2020 10:15 AM CDT  Return Visit with Marjan Mathews NP  [facility] ([facility]) [address] [address] [phone]           "

## 2020-05-12 ENCOUNTER — MYC REFILL (OUTPATIENT)
Dept: PSYCHIATRY | Facility: CLINIC | Age: 40
End: 2020-05-12

## 2020-05-12 DIAGNOSIS — F31.81 BIPOLAR II DISORDER (H): ICD-10-CM

## 2020-05-13 RX ORDER — GABAPENTIN 300 MG/1
300 CAPSULE ORAL
Qty: 180 CAPSULE | Refills: 0 | OUTPATIENT
Start: 2020-05-13

## 2020-05-13 NOTE — TELEPHONE ENCOUNTER
Gabapentin refill request. Last rx'd by Marjan Mathews 3/11/20 with one refill.     Last appt w/Marjan: 3/11/20  Next appt: 6/11/20    [place]       [place] record on [place]  that gabapentin was filled 3/11/20. Phone call to pharmacy. Per pharmacy, gabapentin dispensed 3/30/20 and 4/22/20.    Refill request routed to Marjan.    Oralia Read RN on 5/13/2020 at 10:55 AM

## 2020-05-15 ENCOUNTER — MYC REFILL (OUTPATIENT)
Dept: PSYCHIATRY | Facility: CLINIC | Age: 40
End: 2020-05-15

## 2020-05-15 DIAGNOSIS — F31.81 BIPOLAR II DISORDER (H): ICD-10-CM

## 2020-05-15 RX ORDER — GABAPENTIN 300 MG/1
300 CAPSULE ORAL
Qty: 180 CAPSULE | Refills: 0 | Status: SHIPPED | OUTPATIENT
Start: 2020-05-15 | End: 2020-06-11

## 2020-05-15 RX ORDER — GABAPENTIN 300 MG/1
300 CAPSULE ORAL
Qty: 180 CAPSULE | Refills: 0 | OUTPATIENT
Start: 2020-05-15

## 2020-05-15 NOTE — TELEPHONE ENCOUNTER
"Gabapentin refill sent today by Marjan Mathews.     Staff message received from Marjan today: \"I spoke to the pharmacist and he told me that Ventura should have 6 days left of his gabapentin.  I did send a refill for this 1 time with the understanding that Ventura needs to keep a closer eye on taking his medication correctly. It is unclear if his insurance will allow for it to be refilled today and he may have to wait till tomorrow to pick it up.\"    Oralia Read RN on 5/15/2020 at 1:16 PM    "

## 2020-05-15 NOTE — TELEPHONE ENCOUNTER
Oralia RN had spoken with pharmacy today regarding this issue.   Patient should be out 5/22 but reports being completely out currently.  Patient called clinic in panic about not having any more medication.      Refill for: gabapentin (NEURONTIN) 300 MG capsule    Last Appointment: 3/11/20    Next Appointment: 6/11/20    No Shows/Cancellations since last appointment: none    Last Refill in Epic (date and amount/how many days):    Disp  Refills  Start  End  JACQUI    gabapentin (NEURONTIN) 300 MG capsule  180 capsule  1  3/11/2020   No    Sig - Route: Take 1 capsule (300 mg) by mouth 6 times daily - Oral      Last office visit note reviewed and summarized below:      Medication pended.  Routing to provider for review.      Leeanna Espinosa RN    Nurse Liaison  [facility]

## 2020-05-15 NOTE — TELEPHONE ENCOUNTER
Patient will run out of gabapentin before next appt with Marjan Mathews. Last picked up rx 4/22/20, will be out approximately 5/22/20; next appt 6/11/20. Rerouting gabapentin refill request to Marjan. Pharmacy will fill accordingly when due.    Oralia Read RN on 5/15/2020 at 10:30 AM

## 2020-05-19 ENCOUNTER — MYC MEDICAL ADVICE (OUTPATIENT)
Dept: FAMILY MEDICINE | Facility: CLINIC | Age: 40
End: 2020-05-19

## 2020-05-20 NOTE — TELEPHONE ENCOUNTER
"Spoke with pt and he is having occasional \"pins and needles\" in both thumbs,index and middle fingers. Pt has Neurosurgery appt on 5/28/20.  Pt is requesting a Cervical MRI ordered to be done prior to seeing Neurosurgery?  Pt aware this note will be addressed on 5/26/20.  Advise.  KPavelRN       "

## 2020-05-25 NOTE — TELEPHONE ENCOUNTER
This sounds more like carpal tunnel.  I would recommend he consult first with neurosurgeon and they can proceed with appropriate diagnostics needed.  DANI MullinsNP

## 2020-05-28 ENCOUNTER — VIRTUAL VISIT (OUTPATIENT)
Dept: NEUROSURGERY | Facility: CLINIC | Age: 40
End: 2020-05-28
Payer: COMMERCIAL

## 2020-05-28 DIAGNOSIS — M51.16 LUMBAR DISC DISEASE WITH RADICULOPATHY: Primary | ICD-10-CM

## 2020-05-28 NOTE — PROGRESS NOTES
"Timmy Fitzpatrick is a 39 year old male who is being evaluated via a billable video visit.      The patient has been notified of following:     \"This video visit will be conducted via a call between you and your physician/provider. We have found that certain health care needs can be provided without the need for an in-person physical exam.  This service lets us provide the care you need with a video conversation.  If a prescription is necessary we can send it directly to your pharmacy.  If lab work is needed we can place an order for that and you can then stop by our lab to have the test done at a later time.    Video visits are billed at different rates depending on your insurance coverage.  Please reach out to your insurance provider with any questions.    If during the course of the call the physician/provider feels a video visit is not appropriate, you will not be charged for this service.\"    Patient has given verbal consent for Video visit? YES    How would you like to obtain your AVS? Ibrahima    Patient would like the video invitation sent by: Email [email or web address]    Will anyone else be joining your video visit? no        Video-Visit Details    Type of service:  Video Visit    Video Start Time: 9:47am   Video End Time: 10:03am    Originating Location (pt. Location): Home    Distant Location (provider location):  [facility] NEUR[facility]     Platform used for Video Visit: Vanna Carbajal, DEBBY    Provider notes  This is a video visit conducted due to systemwide and statewide restrictions on non-urgent clinic visits due to COVID-19 pandemic concerns. The patient did not identify any urgent or red-flag symptoms that would require in-person evaluation.        Neurosurgery Clinic Note    Chief Complaint: back pain    History of Present Illness:  It was a pleasure to evaluate Timmy Fitzpatrick in clinic today    Timmy Fitzpatrick is a 39 year old male presenting with low back pain, slightly toward " right side, occasional radiation to right buttock, usually not below buttock. Back pain is more bothersome to him than leg pain, but leg pain has increased over time.    Has had chronic back pain, went to ER in January 2020 with sudden worsening of low back pain, MRI several weeks later showed worsening L4-5 disc bulging    Exacerbated by sitting and driving, twisting side to side  Relieved with rest, started PT but has not since pain worsening in January; had steroid injection in October that improved for several days    Has had surface numbness in left leg thigh for over 5 years that is unchanging    Review of Systems   Constitutional: Negative for activity change, appetite change, chills, fatigue, fever and unexpected weight change.   HENT: Negative for trouble swallowing.    Eyes: Negative for visual disturbance.   Respiratory: Negative for shortness of breath.    Cardiovascular: Negative for leg swelling.  Gastrointestinal: Negative for abdominal pain, nausea and vomiting.   Endocrine: Negative for cold intolerance.  Genitourinary: Negative for incontinence, frequency and urgency.   Musculoskeletal: Positive for back pain and leg pain  Skin: Negative for color change  Allergic/Immunologic: Negative for immunocompromised state.  Neurological: Negative for tremors, speech difficulty  Hematological: Does not bruise/bleed easily.   Psychiatric/Behavioral: The patient is not nervous/anxious.     Past Medical History:   Diagnosis Date     Anxiety      Cervicalgia      Chemical dependency (H)      Depression      Migraine headache        Past Surgical History:   Procedure Laterality Date     HERNIA REPAIR       SURGICAL HISTORY OF -       deviated septum       Social History     Socioeconomic History     Marital status:      Spouse name: Not on file     Number of children: Not on file     Years of education: Not on file     Highest education level: Not on file   Occupational History     Not on file   Social  Needs     Financial resource strain: Not on file     Food insecurity     Worry: Not on file     Inability: Not on file     Transportation needs     Medical: Not on file     Non-medical: Not on file   Tobacco Use     Smoking status: Former Smoker     Packs/day: 0.00     Types: Dip, chew, snus or snuff     Smokeless tobacco: Former User     Types: Chew     Quit date: 11/16/2014   Substance and Sexual Activity     Alcohol use: No     Comment: sober since April 2016     Drug use: Yes     Types: Marijuana     Comment: Daily     Sexual activity: Yes     Partners: Female     Birth control/protection: Pill   Lifestyle     Physical activity     Days per week: Not on file     Minutes per session: Not on file     Stress: Not on file   Relationships     Social connections     Talks on phone: Not on file     Gets together: Not on file     Attends Islam service: Not on file     Active member of club or organization: Not on file     Attends meetings of clubs or organizations: Not on file     Relationship status: Not on file     Intimate partner violence     Fear of current or ex partner: Not on file     Emotionally abused: Not on file     Physically abused: Not on file     Forced sexual activity: Not on file   Other Topics Concern     Parent/sibling w/ CABG, MI or angioplasty before 65F 55M? No   Social History Narrative    Dairy/d 1-2 servings/d.     Caffeine 3-4 servings/d    Exercise 4-6 x week    Sunscreen used - Yes    Seatbelts used - Yes    Working smoke/CO detectors in the home - Yes    Guns stored in the home - No    Self Breast Exams - NOT APPLICABLE    Self Testicular Exam - No    Eye Exam up to date - No    Dental Exam up to date - Yes    Pap Smear up to date - NOT APPLICABLE    Mammogram up to date - NOT APPLICABLE    PSA up to date - NOT APPLICABLE    Dexa Scan up to date - NOT APPLICABLE    Flex Sig / Colonoscopy up to date - NOT APPLICABLE    Immunizations up to date - Yes    Abuse: Current or Past(Physical,  Sexual or Emotional)- No    Do you feel safe in your environment - Yes               family history includes Anxiety Disorder in his mother; Cancer in his paternal grandfather; Dementia in his maternal grandfather; Depression in his father and mother; Substance Abuse in his paternal grandmother; Unknown/Adopted in his father.        IMAGING per my own measurement and interpretation:    MRi lumbar spine 2/20 with central/right paracentral L4-5 disc herniation; loss of disc height L5-S1    Resulted Imaging/Labs:  Bone Density:  Xr Lumbar Spine 2/3 Views    Result Date: 2/10/2020  XR LUMBAR SPINE TWO-THREE VIEWS   2/10/2020 3:47 PM HISTORY: Right-sided low back pain with right-sided sciatica, unspecified chronicity. COMPARISON: X-ray 7/13/2018     IMPRESSION: Mild disc height loss is present at L5-S1. No fractures are identified. No high-grade facet arthropathy is identified. Alignment is significant for slight levoconvex curvature of the thoracolumbar junction and slight dextroconvex curvature of the lumbosacral junction. Paraspinal soft tissues are unremarkable. KENDRA POLANCO MD    Xr Lumbar Spine 2/3 Views    Result Date: 7/14/2018  LUMBAR SPINE TWO TO THREE VIEWS  7/13/2018 2:21 PM COMPARISON: Two-view lumbar spine 1/5/2014. HISTORY:  Chronic low back pain, unspecified back pain laterality, with sciatica presence unspecified.     IMPRESSION: There is continued normal alignment of the lumbar vertebrae. Vertebral body heights of the lumbar spine remain normal. Lumbar intervertebral disc space heights remain normal. There is no evidence for fracture of the lumbar spine. Bilateral facet arthropathy at the L4-L5 and L5-S1 levels is again noted but has likely progressed since the comparison study. Degenerative endplate spurring at the T12-L1 level has progressed since the comparison study. JOHNNY ROSAS MD    Mr Lumbar Spine W/o Contrast    Result Date: 2/14/2020  MRI LUMBAR SPINE WITHOUT CONTRAST   2/14/2020 11:47 AM  HISTORY: Right lower back pain and right-sided sciatica for over 18 months. TECHNIQUE: Multiplanar multisequence MRI of the lumbar spine without contrast. COMPARISON: Radiographs 2/10/2020 and MRI 1/9/2018. FINDINGS: Radiographs show 5 lumbar-type vertebral bodies with slight scoliosis convex to the right. Normal vertebral body heights.  Normal overall bone marrow signal.  The distal spinal cord and cauda equina appear normal. No extraspinal abnormality is noted. T12-L1:   Normal disc, facet joints, spinal canal and neural foramina.  L1-L2:   Normal disc, facet joints, spinal canal and neural foramina. L2-L3:  Decreased T2 signal in the disc, minimal circumferential disc bulge, otherwise normal. L3-L4:   Normal disc, facet joints, spinal canal and neural foramina. L4-L5:  Large right posterior paramedian extruded disc herniation causing anterior impression on the thecal sac, worse than on the previous exam. Decreased T2 signal in disc and mild loss of disc height. Normal neural foramina and facet joints. L5-S1:  Moderate degenerative disc disease with loss of disc height and decreased T2 signal in the disc. Central posterior high intensity zone. Normal facet joints, spinal canal and neural foramina.     IMPRESSION:  1. Worsening of the L4-L5 right posterior paramedian extruded disc herniation since the previous exam, causing more impression on the thecal sac. 2. L5-S1 moderate degenerative disc disease is unchanged. 3. L2-L3 mild degenerative disc disease is unchanged. KENDRA ALVES MD    Mr Lumbar Spine W/o Contrast    Result Date: 11/9/2018  MRI LUMBAR SPINE WITHOUT CONTRAST   11/9/2018 9:50 AM HISTORY: 1-year of worsening discogenic back pain and left thigh numbness. L5 radiculopathy. TECHNIQUE: Multiplanar multisequence MRI of the lumbar spine without contrast. COMPARISON: Plain films of the lumbar spine 7/13/2018. FINDINGS: The report is dictated assuming five lumbar-type vertebral bodies, and radiographic  "correlation may be necessary.  The distal spinal cord and cauda equina appear normal. T12-L1:   Normal disc, facet joints, spinal canal and neural foramina.  L1-L2:  Normal disc, facet joints, spinal canal and neural foramina. L2-L3:  Mild dehydration of disc material with decreased T2 signal. Mild annular bulge. No focal disc protrusion. No significant central or lateral stenosis. Facet joints are intact. L3-L4:   Normal disc, facet joints, spinal canal and neural foramina. L4-L5:  Dehydration of disc material and mild disc space narrowing. Small broad-based central/right central disc protrusion mildly effaces the thecal sac. No foraminal stenosis. No significant facet joint disease. L5-S1:  Moderate disc space narrowing and dehydration of disc material. Small central disc protrusion abuts the ventral aspect of the thecal sac. No nerve root displacement. Schmorl's node is present along the posterior inferior margin the L5 vertebral body. Minimal facet joint hypertrophy bilaterally. Paraspinous soft tissues:   Normal.  Bone marrow:   Normal.     IMPRESSION:  Degenerative changes as described most marked at L4-5 and lumbosacral levels. ILAN NEAL MD    Xr Translaminar Epidural Lumbar Inj Incl Imaging    Result Date: 10/2/2019  This exam was marked as non-reportable because it will not be read by a radiologist or a [place] non-radiologist provider.       Vitamin D:  Vitamin D Deficiency Screening Results:  No results found for: VITDT  No results found for: UIG056, HOSC180, PYMM50VGIXL, VITD3, D2VIT, D3VIT, DTOT, [id number], [id number], [id number], [id number], [id number], [id number]      Nutritional Status:  Estimated body mass index is 25.57 kg/m  as calculated from the following:    Height as of 3/11/20: 1.778 m (5' 10\").    Weight as of 3/11/20: 80.8 kg (178 lb 3.2 oz).    Lab Results   Component Value Date    ALBUMIN 3.6 02/20/2019       Diabetes Screening:  No results found for: A1C    Nicotine " Usage:    No                Physical Exam   There were no vitals taken for this visit.  Constitutional: Oriented to person, place, and time. Appears well-developed and well-nourished. Cooperative. No distress.     Musculoskeletal:     Lumbar flexion/extension range of motion: normal    Neurological: alert and oriented to person, place, and time.   No cranial nerve deficit   sensory deficit left anterior thigh  Gait normal        ASSESSMENT:  Timmy Fitzpatrick is a 39 year old male with right L4-5 disc herniation with mainly low back pain, occasional radiculopathy    PLAN:    -I discussed that I do not anticipate that microdiskectomy would help with low back pain, but would be to target his right leg pain due to failure of improvement with conservative treatment, progression over 2 years  Offered patient surgery if he thinks his right leg pain is unbearable, surgery would be right minimally invasive lumbar 4-5 microdiskectomy.    He will call and let us know if he wants to proceed.        La Glass MD    [facility] Department of Neurosurgery  Complex Spinal Deformity, Scoliosis, and Minimally Invasive Spine Surgery Specialist  Office: [phone]    5/28/2020

## 2020-05-28 NOTE — PATIENT INSTRUCTIONS
Call Dr. Glass' nurse Maurisio Angel at [phone] with questions or if you would like to proceed with scheduling surgery for a right minimally-invasive lumbar 4-5 microdiskectomy with Dr. Glass

## 2020-05-28 NOTE — LETTER
"5/28/2020     RE: Timmy Fitzpatrick  [address] [place] [address]     Dear Colleague,    Thank you for referring your patient, Timmy Fitzpatrick, to the [facility] NEUROSURGERY at [facility]. Please see a copy of my visit note below.    Timmy Fitzpatrick is a 39 year old male who is being evaluated via a billable video visit.      The patient has been notified of following:     \"This video visit will be conducted via a call between you and your physician/provider. We have found that certain health care needs can be provided without the need for an in-person physical exam.  This service lets us provide the care you need with a video conversation.  If a prescription is necessary we can send it directly to your pharmacy.  If lab work is needed we can place an order for that and you can then stop by our lab to have the test done at a later time.    Video visits are billed at different rates depending on your insurance coverage.  Please reach out to your insurance provider with any questions.    If during the course of the call the physician/provider feels a video visit is not appropriate, you will not be charged for this service.\"    Patient has given verbal consent for Video visit? YES    How would you like to obtain your AVS? MyChart    Patient would like the video invitation sent by: Email [email or web address]    Will anyone else be joining your video visit? no        Video-Visit Details    Type of service:  Video Visit    Video Start Time: 9:47am   Video End Time: 10:03am    Originating Location (pt. Location): Home    Distant Location (provider location):  [facility] NEUROSURGERY     Platform used for Video Visit: Vanna Carbajal, EMT    Provider notes  This is a video visit conducted due to systemwide and statewide restrictions on non-urgent clinic visits due to COVID-19 pandemic concerns. The patient did not identify any urgent or red-flag symptoms that " would require in-person evaluation.        Neurosurgery Clinic Note    Chief Complaint: back pain    History of Present Illness:  It was a pleasure to evaluate Timmy Fitzpatrick in clinic today    Timmy Fitzpatrick is a 39 year old male presenting with low back pain, slightly toward right side, occasional radiation to right buttock, usually not below buttock. Back pain is more bothersome to him than leg pain, but leg pain has increased over time.    Has had chronic back pain, went to ER in January 2020 with sudden worsening of low back pain, MRI several weeks later showed worsening L4-5 disc bulging    Exacerbated by sitting and driving, twisting side to side  Relieved with rest, started PT but has not since pain worsening in January; had steroid injection in October that improved for several days    Has had surface numbness in left leg thigh for over 5 years that is unchanging    Review of Systems   Constitutional: Negative for activity change, appetite change, chills, fatigue, fever and unexpected weight change.   HENT: Negative for trouble swallowing.    Eyes: Negative for visual disturbance.   Respiratory: Negative for shortness of breath.    Cardiovascular: Negative for leg swelling.  Gastrointestinal: Negative for abdominal pain, nausea and vomiting.   Endocrine: Negative for cold intolerance.  Genitourinary: Negative for incontinence, frequency and urgency.   Musculoskeletal: Positive for back pain and leg pain  Skin: Negative for color change  Allergic/Immunologic: Negative for immunocompromised state.  Neurological: Negative for tremors, speech difficulty  Hematological: Does not bruise/bleed easily.   Psychiatric/Behavioral: The patient is not nervous/anxious.     Past Medical History:   Diagnosis Date     Anxiety      Cervicalgia      Chemical dependency (H)      Depression      Migraine headache        Past Surgical History:   Procedure Laterality Date     HERNIA REPAIR       SURGICAL HISTORY OF -        deviated septum       Social History     Socioeconomic History     Marital status:      Spouse name: Not on file     Number of children: Not on file     Years of education: Not on file     Highest education level: Not on file   Occupational History     Not on file   Social Needs     Financial resource strain: Not on file     Food insecurity     Worry: Not on file     Inability: Not on file     Transportation needs     Medical: Not on file     Non-medical: Not on file   Tobacco Use     Smoking status: Former Smoker     Packs/day: 0.00     Types: Dip, chew, snus or snuff     Smokeless tobacco: Former User     Types: Chew     Quit date: 11/16/2014   Substance and Sexual Activity     Alcohol use: No     Comment: sober since April 2016     Drug use: Yes     Types: Marijuana     Comment: Daily     Sexual activity: Yes     Partners: Female     Birth control/protection: Pill   Lifestyle     Physical activity     Days per week: Not on file     Minutes per session: Not on file     Stress: Not on file   Relationships     Social connections     Talks on phone: Not on file     Gets together: Not on file     Attends [employer]ism service: Not on file     Active member of club or organization: Not on file     Attends meetings of clubs or organizations: Not on file     Relationship status: Not on file     Intimate partner violence     Fear of current or ex partner: Not on file     Emotionally abused: Not on file     Physically abused: Not on file     Forced sexual activity: Not on file   Other Topics Concern     Parent/sibling w/ CABG, MI or angioplasty before 65F 55M? No   Social History Narrative    Dairy/d 1-2 servings/d.     Caffeine 3-4 servings/d    Exercise 4-6 x week    Sunscreen used - Yes    Seatbelts used - Yes    Working smoke/CO detectors in the home - Yes    Guns stored in the home - No    Self Breast Exams - NOT APPLICABLE    Self Testicular Exam - No    Eye Exam up to date - No    Dental Exam up to date - Yes     Pap Smear up to date - NOT APPLICABLE    Mammogram up to date - NOT APPLICABLE    PSA up to date - NOT APPLICABLE    Dexa Scan up to date - NOT APPLICABLE    Flex Sig / Colonoscopy up to date - NOT APPLICABLE    Immunizations up to date - Yes    Abuse: Current or Past(Physical, Sexual or Emotional)- No    Do you feel safe in your environment - Yes               family history includes Anxiety Disorder in his mother; Cancer in his paternal grandfather; Dementia in his maternal grandfather; Depression in his father and mother; Substance Abuse in his paternal grandmother; Unknown/Adopted in his father.        IMAGING per my own measurement and interpretation:    MRi lumbar spine 2/20 with central/right paracentral L4-5 disc herniation; loss of disc height L5-S1    Resulted Imaging/Labs:  Bone Density:  Xr Lumbar Spine 2/3 Views    Result Date: 2/10/2020  XR LUMBAR SPINE TWO-THREE VIEWS   2/10/2020 3:47 PM HISTORY: Right-sided low back pain with right-sided sciatica, unspecified chronicity. COMPARISON: X-ray 7/13/2018     IMPRESSION: Mild disc height loss is present at L5-S1. No fractures are identified. No high-grade facet arthropathy is identified. Alignment is significant for slight levoconvex curvature of the thoracolumbar junction and slight dextroconvex curvature of the lumbosacral junction. Paraspinal soft tissues are unremarkable. KENDRA POLANCO MD    Xr Lumbar Spine 2/3 Views    Result Date: 7/14/2018  LUMBAR SPINE TWO TO THREE VIEWS  7/13/2018 2:21 PM COMPARISON: Two-view lumbar spine 1/5/2014. HISTORY:  Chronic low back pain, unspecified back pain laterality, with sciatica presence unspecified.     IMPRESSION: There is continued normal alignment of the lumbar vertebrae. Vertebral body heights of the lumbar spine remain normal. Lumbar intervertebral disc space heights remain normal. There is no evidence for fracture of the lumbar spine. Bilateral facet arthropathy at the L4-L5 and L5-S1 levels is again  noted but has likely progressed since the comparison study. Degenerative endplate spurring at the T12-L1 level has progressed since the comparison study. JOHNNY ROSAS MD    Mr Lumbar Spine W/o Contrast    Result Date: 2/14/2020  MRI LUMBAR SPINE WITHOUT CONTRAST   2/14/2020 11:47 AM HISTORY: Right lower back pain and right-sided sciatica for over 18 months. TECHNIQUE: Multiplanar multisequence MRI of the lumbar spine without contrast. COMPARISON: Radiographs 2/10/2020 and MRI 1/9/2018. FINDINGS: Radiographs show 5 lumbar-type vertebral bodies with slight scoliosis convex to the right. Normal vertebral body heights.  Normal overall bone marrow signal.  The distal spinal cord and cauda equina appear normal. No extraspinal abnormality is noted. T12-L1:   Normal disc, facet joints, spinal canal and neural foramina.  L1-L2:   Normal disc, facet joints, spinal canal and neural foramina. L2-L3:  Decreased T2 signal in the disc, minimal circumferential disc bulge, otherwise normal. L3-L4:   Normal disc, facet joints, spinal canal and neural foramina. L4-L5:  Large right posterior paramedian extruded disc herniation causing anterior impression on the thecal sac, worse than on the previous exam. Decreased T2 signal in disc and mild loss of disc height. Normal neural foramina and facet joints. L5-S1:  Moderate degenerative disc disease with loss of disc height and decreased T2 signal in the disc. Central posterior high intensity zone. Normal facet joints, spinal canal and neural foramina.     IMPRESSION:  1. Worsening of the L4-L5 right posterior paramedian extruded disc herniation since the previous exam, causing more impression on the thecal sac. 2. L5-S1 moderate degenerative disc disease is unchanged. 3. L2-L3 mild degenerative disc disease is unchanged. KENDRA ALVES MD    Mr Lumbar Spine W/o Contrast    Result Date: 11/9/2018  MRI LUMBAR SPINE WITHOUT CONTRAST   11/9/2018 9:50 AM HISTORY: 1-year of worsening  discogenic back pain and left thigh numbness. L5 radiculopathy. TECHNIQUE: Multiplanar multisequence MRI of the lumbar spine without contrast. COMPARISON: Plain films of the lumbar spine 7/13/2018. FINDINGS: The report is dictated assuming five lumbar-type vertebral bodies, and radiographic correlation may be necessary.  The distal spinal cord and cauda equina appear normal. T12-L1:   Normal disc, facet joints, spinal canal and neural foramina.  L1-L2:  Normal disc, facet joints, spinal canal and neural foramina. L2-L3:  Mild dehydration of disc material with decreased T2 signal. Mild annular bulge. No focal disc protrusion. No significant central or lateral stenosis. Facet joints are intact. L3-L4:   Normal disc, facet joints, spinal canal and neural foramina. L4-L5:  Dehydration of disc material and mild disc space narrowing. Small broad-based central/right central disc protrusion mildly effaces the thecal sac. No foraminal stenosis. No significant facet joint disease. L5-S1:  Moderate disc space narrowing and dehydration of disc material. Small central disc protrusion abuts the ventral aspect of the thecal sac. No nerve root displacement. Schmorl's node is present along the posterior inferior margin the L5 vertebral body. Minimal facet joint hypertrophy bilaterally. Paraspinous soft tissues:   Normal.  Bone marrow:   Normal.     IMPRESSION:  Degenerative changes as described most marked at L4-5 and lumbosacral levels. ILAN NEAL MD    Xr Translaminar Epidural Lumbar Inj Incl Imaging    Result Date: 10/2/2019  This exam was marked as non-reportable because it will not be read by a radiologist or a [facility] non-radiologist provider.       Vitamin D:  Vitamin D Deficiency Screening Results:  No results found for: VITDT  No results found for: OAN604, URTL969, [id number], VITD3, D2VIT, D3VIT, DTOT, [id number], [id number], [id number], [id number], [id number], [id number]      Nutritional Status:  Estimated body  "mass index is 25.57 kg/m  as calculated from the following:    Height as of 3/11/20: 1.778 m (5' 10\").    Weight as of 3/11/20: 80.8 kg (178 lb 3.2 oz).    Lab Results   Component Value Date    ALBUMIN 3.6 02/20/2019       Diabetes Screening:  No results found for: A1C    Nicotine Usage:    No                Physical Exam   There were no vitals taken for this visit.  Constitutional: Oriented to person, place, and time. Appears well-developed and well-nourished. Cooperative. No distress.     Musculoskeletal:     Lumbar flexion/extension range of motion: normal    Neurological: alert and oriented to person, place, and time.   No cranial nerve deficit   sensory deficit left anterior thigh  Gait normal        ASSESSMENT:  Timmy Fitzpatrick is a 39 year old male with right L4-5 disc herniation with mainly low back pain, occasional radiculopathy    PLAN:    -I discussed that I do not anticipate that microdiskectomy would help with low back pain, but would be to target his right leg pain due to failure of improvement with conservative treatment, progression over 2 years  Offered patient surgery if he thinks his right leg pain is unbearable, surgery would be right minimally invasive lumbar 4-5 microdiskectomy.    He will call and let us know if he wants to proceed.        La Glass MD    [facility] Department of Neurosurgery  Complex Spinal Deformity, Scoliosis, and Minimally Invasive Spine Surgery Specialist  Office: [phone]    5/28/2020    "

## 2020-06-03 ENCOUNTER — TELEPHONE (OUTPATIENT)
Dept: PSYCHOLOGY | Facility: CLINIC | Age: 40
End: 2020-06-03

## 2020-06-08 ENCOUNTER — VIRTUAL VISIT (OUTPATIENT)
Dept: PSYCHOLOGY | Facility: CLINIC | Age: 40
End: 2020-06-08
Attending: NURSE PRACTITIONER
Payer: COMMERCIAL

## 2020-06-08 ENCOUNTER — TELEPHONE (OUTPATIENT)
Dept: PSYCHOLOGY | Facility: CLINIC | Age: 40
End: 2020-06-08

## 2020-06-08 DIAGNOSIS — F32.9 MAJOR DEPRESSIVE DISORDER: Primary | ICD-10-CM

## 2020-06-08 PROCEDURE — 99207 ZZC NO BILLABLE SERVICE THIS VISIT: CPT | Mod: TEL | Performed by: PSYCHOLOGIST

## 2020-06-08 NOTE — PROGRESS NOTES
"                 Progress Note - Initial Session    Client Name:  Timmy Fitzpatrick Date: 06/08/2020         Service Type: Phone Visit     Session Start Time: 0905  Session End Time: 0955     Session Length: 50    Session #: 1    Attendees: Client attended alone    The patient has been notified of the following:       \"We have found that certain health care needs can be provided without the need for a face to face visit.  This service lets us provide the care you need with a phone conversation.       I will have full access to your [facility] medical record during this entire phone call.   I will be taking notes for your medical record.      Since this is like an office visit, we will bill your insurance company for this service.       There are potential benefits and risks of telephone visits (e.g. limits to patient confidentiality) that differ from in-person visits.?Confidentiality still applies for telephone services, and nobody will record the visit.  It is important to be in a quiet, private space that is free of distractions (including cell phone or other devices) during the visit.??      If during the course of the call I believe a telephone visit is not appropriate, you will not be charged for this service\"     Consent has been obtained for this service by care team member: Yes      DATA:  Diagnostic Assessment in progress.  Unable to complete documentation at the conclusion of the first session due to amount of information required for diagnostic evaluation intake.       Interactive Complexity: No  Crisis: No    Intervention:  During today's visit, this writer discussed the purpose of the assessment and what to expect during the process including the Notice of Non-Covered Services Form. This writer began gathering information to begin the clinical interview. A second session was scheduled to complete the evaluation. He was sent screening measures via My Chart including the PHQ-9,  JUAREZ-7, CAGE-AIDE and " WHODAS 2.0 by this writer. His risk was assessed with the C-SSR (short version).    ASSESSMENT:  Mental Status Assessment:  Attitude:   Cooperative   Orientation:   All  Speech   Rate / Production: Normal/ Responsive   Volume:  Normal   Mood:    Anxious   Thought Content:  Clear   Thought Form:  Goal Directed   Insight:    Fair       Safety Issues and Plan for Safety and Risk Management:     [place] Suicide Severity Rating Scale (Short Version)  [place] Suicide Severity Rating (Short Version) 2/18/2019 9/13/2019 2/4/2020 6/8/2020   Over the past 2 weeks have you felt down, depressed, or hopeless? - no yes no   Over the past 2 weeks have you had thoughts of killing yourself? - no no no   Have you ever attempted to kill yourself? - no no no   Q1 Wished to be Dead (Past Month) yes - - -   Q2 Suicidal Thoughts (Past Month) yes - - -   Q3 Suicidal Thought Method yes - - -   Q4 Suicidal Intent without Specific Plan no - - -   Q5 Suicide Intent with Specific Plan no - - -   Q6 Suicide Behavior (Lifetime) no - - -   Required Interventions Patient searched;Belongings removed - - -   Interventions Monitored via video - - -     Patient denies current fears or concerns for personal safety.  Patient denies current or recent suicidal ideation or behaviors.  Patient denies current or recent homicidal ideation or behaviors.  Patient denies current or recent self injurious behavior or ideation.  Patient denies other safety concerns.  Recommended that patient call 911 or go to the local ED should there be a change in any of these risk factors.  Patient reports there are firearms in the house. The firearms are secured in a locked space.     Diagnostic Criteria:  CRITERIA (A-C) REPRESENT A MAJOR DEPRESSIVE EPISODE - SELECT THESE CRITERIA  A) Recurrent episode(s) - symptoms have been present during the same 2-week period and represent a change from previous functioning 5 or more symptoms (required for diagnosis)    WHODAS 2.0 (12  item):   WHODAS 2.0 Total Score 10/24/2018 9/26/2019   Total Score 30 30     Collateral Reports Completed:  Not Applicable      PLAN:   -Complete symptom screeners via My Chart.  -Complete clinical interview  -Administer personality  testing       Мария Maxwell, PhD LP  June 8, 2020

## 2020-06-11 ENCOUNTER — VIRTUAL VISIT (OUTPATIENT)
Dept: PSYCHIATRY | Facility: CLINIC | Age: 40
End: 2020-06-11
Payer: COMMERCIAL

## 2020-06-11 DIAGNOSIS — F99 INSOMNIA DUE TO OTHER MENTAL DISORDER: ICD-10-CM

## 2020-06-11 DIAGNOSIS — F51.05 INSOMNIA DUE TO OTHER MENTAL DISORDER: ICD-10-CM

## 2020-06-11 DIAGNOSIS — F41.1 GENERALIZED ANXIETY DISORDER: Primary | ICD-10-CM

## 2020-06-11 DIAGNOSIS — F31.81 BIPOLAR II DISORDER (H): ICD-10-CM

## 2020-06-11 DIAGNOSIS — F33.0 MILD RECURRENT MAJOR DEPRESSION (H): ICD-10-CM

## 2020-06-11 PROCEDURE — 99214 OFFICE O/P EST MOD 30 MIN: CPT | Mod: TEL | Performed by: NURSE PRACTITIONER

## 2020-06-11 RX ORDER — GABAPENTIN 300 MG/1
300 CAPSULE ORAL
Qty: 180 CAPSULE | Refills: 0 | Status: SHIPPED | OUTPATIENT
Start: 2020-06-11 | End: 2020-07-07

## 2020-06-11 RX ORDER — ESCITALOPRAM OXALATE 10 MG/1
10 TABLET ORAL DAILY
Qty: 30 TABLET | Refills: 3 | Status: SHIPPED | OUTPATIENT
Start: 2020-06-11 | End: 2020-09-15

## 2020-06-11 RX ORDER — BUPROPION HYDROCHLORIDE 100 MG/1
100 TABLET, EXTENDED RELEASE ORAL DAILY
Qty: 30 TABLET | Refills: 3 | Status: SHIPPED | OUTPATIENT
Start: 2020-06-11 | End: 2020-09-15

## 2020-06-11 NOTE — PROGRESS NOTES
"Timmy Fitzpatrick is a 39 year old male who is being evaluated via a billable telephone visit.      The patient has been notified of following:     \"This telephone visit will be conducted via a call between you and your physician/provider. We have found that certain health care needs can be provided without the need for a physical exam.  This service lets us provide the care you need with a short phone conversation.  If a prescription is necessary we can send it directly to your pharmacy.  If lab work is needed we can place an order for that and you can then stop by our lab to have the test done at a later time.    Telephone visits are billed at different rates depending on your insurance coverage. During this emergency period, for some insurers they may be billed the same as an in-person visit.  Please reach out to your insurance provider with any questions.    If during the course of the call the physician/provider feels a telephone visit is not appropriate, you will not be charged for this service.\"    Patient has given verbal consent for Telephone visit?  Yes    What phone number would you like to be contacted at? [phone]    How would you like to obtain your AVS? MyChart    Subjective     Timmy Fitzpatrick is a 39 year old male who presents via phone visit today for the following health issues:    Phone call duration:  30 minutes    Marjan Mathews NP          Outpatient Psychiatric Progress Note    Name: Timmy Fitzpatrick   : 1980                    Primary Care Provider: [facility]   Therapist: Through [facility]    PHQ-9 scores:  PHQ-9 SCORE 1/15/2020 3/11/2020 2020   PHQ-9 Total Score - - -   PHQ-9 Total Score MyChart - - 16 (Moderately severe depression)   PHQ-9 Total Score 10 10 16   Some encounter information is confidential and restricted. Go to Review Flowsheets activity to see all data.       JUAREZ-7 scores:  JUAREZ-7 SCORE 1/15/2020 3/11/2020 2020 " "  Total Score - - -   Total Score - - 13 (moderate anxiety)   Total Score 7 10 13   Some encounter information is confidential and restricted. Go to Review Flowsheets activity to see all data.       Patient Identification:    Patient is a 39 year old year old,   White American male  who presents for return visit with me.  Patient is currently unemployed. Patient attended the session alone. Patient prefers to be called: \"  \".    Interim History:    I last saw Timmy Fitzpatrick for outpatient psychiatry Return Visit on March 11, 2020.     During that appointment, he informed me that he herniated 2 disks in his lumbar area with surgery pending.  This has severely limited his abilities to engage in meaningful activities around the house.  His depression has slightly increased with this.  As he was running out of Lexapro he was only taking 10 mg a day and would like to continue at that dose.  He is tolerating the addition of Wellbutrin fine.  He will be graduating from [employer] the first part of April.  No refills were needed on the Lunesta as he takes it rarely.  [place] is questioning the validity of his diagnoses and would like to have some of them removed from his chart.  I referred him to his primary care doctor for clarification.  I also referred him for psychological testing to clarify his diagnosis as he feels as if he is experiencing more of a borderline personality disorder versus bipolar disorder. .     Current medications include: buPROPion (WELLBUTRIN SR) 100 MG 12 hr tablet, Take 1 tablet (100 mg) by mouth daily  escitalopram (LEXAPRO) 10 MG tablet, Take 1 tablet (10 mg) by mouth daily  eszopiclone (LUNESTA) 2 MG tablet, Take 1 tablet (2 mg) by mouth nightly as needed for sleep  gabapentin (NEURONTIN) 300 MG capsule, Take 1 capsule (300 mg) by mouth 6 times daily  medical cannabis (Patient's own supply), See Admin Instructions (The purpose of this order is to document that the patient reports taking " medical cannabis.  This is not a prescription, and is not used to certify that the patient has a qualifying medical condition.)  Multiple Vitamins-Minerals (MENS MULTIPLE VITAMIN/LYCOPENE PO), Take 1 tablet by mouth daily.  naproxen (NAPROSYN) 500 MG tablet, Take 1 tablet (500 mg) by mouth 2 times daily (with meals)  nicotine polacrilex (NICORETTE) 4 MG gum, Place 4 mg inside cheek as needed for smoking cessation  sildenafil (REVATIO) 20 MG tablet, TAKE 5 TABLETS BY MOUTH DAILY AS NEEDED, 30 MINUTES TO 4 HOURS BEFORE SEX. (DO NOT USE WITH NITROGLYCERIN, TERAZOSIN OR DOXAZOSIN  methylPREDNISolone (MEDROL DOSEPAK) 4 MG tablet therapy pack, Follow Package Directions (Patient not taking: Reported on 3/11/2020)  morphine (MSIR) 15 MG IR tablet, Take 1 tablet (15 mg) by mouth every 4 hours as needed for severe pain (Patient not taking: Reported on 2/10/2020)  tiZANidine (ZANAFLEX) 4 MG tablet, TAKE ONE TABLET BY MOUTH THREE TIMES A DAY (Patient not taking: Reported on 6/11/2020)    No current facility-administered medications on file prior to visit.        The [place] Prescription Monitoring Program has been reviewed and there are no concerns about diversionary activity for controlled substances at this time.      I was able to review most recent Primary Care Provider, specialty provider, and therapy visit notes that I have access to.     Today, patient reports that he graduated from [employer].  He still sees individual therapist.  There are no plans for lumbar surgery due to insurance glitches.  Over the past month things have been difficult.  Stress at work alleviated a week ago.  He is back to work at [employer].  Some interpersonal conflicts.  Conflict with pharmacy regarding gabapentin.  He plans to talk to them.  He started psychological testing for diagnosis clarification.  He has periods of time when he sleeps better than others.  He feels like he is still adjusting to the lower dose of Lexapro.     has a past medical  history of Anxiety, Cervicalgia, Chemical dependency (H), Depression, and Migraine headache.    Social history updates:    He is the primary caregiver for the children.  He is performing physical therapy activities on his own.      Substance use updates:    He denies alcohol.  He takes cannabis liquid for  sleep and pain  Tobacco use: No cigarettes, nicotine gum is chewed    Vital Signs:   There were no vitals taken for this visit.    Labs:    Most recent laboratory results reviewed and no new labs.     Review of Systems:  10 systems (general, cardiovascular, respiratory, eyes, ENT, endocrine, GI, , M/S, neurological) were reviewed. Most pertinent finding(s) is/are: chronic lumbar pain, he reports no chest pain no chest tightness, no skin rashes . The remaining systems are all unremarkable.    Mental Status Examination:  Appearance:  awake, alert  Attitude:  cooperative   Eye Contact:  unable to assess  Gait and Station: No assistive Devices used and No dizziness or falls  Psychomotor Behavior:  unable to assess  Oriented to:  time, person, and place  Attention Span and Concentration:  Normal  Speech:   clear, coherent and Speaks: English  Mood:  anxious and depressed  Affect:  appropriate and in normal range  Associations:  no loose associations  Thought Process:  logical and goal oriented  Thought Content:  no evidence of suicidal ideation or homicidal ideation, no auditory hallucinations present and no visual hallucinations present  Recent and Remote Memory:  intact Not formally assessed. No amnesia.  Fund of Knowledge: appropriate  Insight:  good  Judgment:  intact  Impulse Control:  intact    Suicide Risk Assessment:  Today Timmy Fitzpatrick reports that he is not having any thoughts to harm himself or other people. In addition, there are notable risk factors for self-harm, including anxiety and mood change. However, risk is mitigated by commitment to family, history of seeking help when needed, future  oriented, denies suicidal intent or plan and denies homicidal ideation, intent, or plan. Therefore, based on all available evidence including the factors cited above, Timmy Fitzpatrick does not appear to be at imminent risk for self-harm, does not meet criteria for a 72-hr hold, and therefore remains appropriate for ongoing outpatient level of care.  A thorough assessment of risk factors related to suicide and self-harm have been reviewed and are noted above. The patient convincingly denies suicidality on several occasions. Local community safety resources printed and reviewed for patient to use if needed. There was no deceit detected, and the patient presented in a manner that was believable.     DSM5 Diagnosis:  296.31 (F33.0) Major Depressive Disorder, Recurrent Episode, Mild With mixed features  300.02 (F41.1) Generalized Anxiety Disorder  780.52 (G47.00) Insomnia Disorder   With ohter medical comorbidity  Episodic      Medical comorbidities include:   Patient Active Problem List    Diagnosis Date Noted     Generalized anxiety disorder 03/26/2019     Priority: Medium     Bipolar II disorder (H) 11/28/2018     Priority: Medium     Anxiety 09/18/2017     Priority: Medium     Tear of medial meniscus of knee 09/08/2016     Priority: Medium     Chemical dependency (H) 11/25/2015     Priority: Medium     Alcoholic, sober since 2006 09/13/2012     Priority: Medium     Major depression in partial remission (H) 06/30/2011     Priority: Medium     Migraine headache 06/30/2011     Priority: Medium     (Problem list name updated by automated process. Provider to review and confirm.)       CARDIOVASCULAR SCREENING; LDL GOAL LESS THAN 160 10/31/2010     Priority: Medium     Cervicalgia 03/24/2010     Priority: Medium     Nonallopathic lesion of thoracic region 03/24/2010     Priority: Medium     Problem list name updated by automated process. Provider to review       Nonallopathic lesion of lumbar region 03/24/2010      Priority: Medium     Problem list name updated by automated process. Provider to review         Assessment:    Timmy Fitpzatrick finds that he is still adjusting to the recent decrease in the dose of Lexapro with some feeling of foggy thinking.  He has now returned back to his work with the [employer].  He continues to manage his children while his wife commutes to [place] for her medical position.  We did review his medication schedule to make sure that he is taking all of his medications as prescribed.  He expressed some frustration with the pharmacy about misunderstanding what they were telling him about refill timing.  At this point he has graduated from [employer] [employer] and will continue with individual therapy sessions.  Lunesta is available as needed for sleep and no refills were given today.  Wellbutrin and Lexapro will continue at their current doses.  He has begun psychological testing with personality testing scheduled for his next visit.  This is to clarify his diagnosis for him.    Medication side effects and alternatives were reviewed. Health promotion activities recommended and reviewed today. All questions addressed. Education and counseling completed regarding risks and benefits of medications and psychotherapy options.    Treatment Plan:      1. Wellbutrin  mg daily  3. Lexapro  10 mg daily for depression and anxiety   4. Continue individual therapy  5. Continue Lunesta 2 mg at bedtime as needed for sleep - no refills   6. Psychological testing for diagnosis clarification continues       Continue all other medications as reviewed per electronic medical record today.     Safety plan reviewed. To the Emergency Department as needed or call after hours crisis line at [phone] or [phone]. [place] Crisis Text [place]. Text [place] to 557878 or Suicide LifeLine Chat: suicidepreventionlifeline.org/chat/    To schedule individual or family therapy, call [facility] at [phone].    Schedule an  appointment with me in 4 weeks or sooner as needed. Call [facility] at [phone] to schedule.    Follow up with primary care provider as planned or for acute medical concerns.    Call the psychiatric nurse line with medication questions or concerns at [phone].    MyChart may be used to communicate with your provider, but this is not intended to be used for emergencies.    Crisis Resources:    National Suicide Prevention Lifeline: 494.672.8463 (TTY: 714.339.2026). Call anytime for help.  (www.suicidepreventionlifeline.org)  National [place] on Mental Illness (www.audra.org): 846.839.8501 or 870-134-2476.   Mental Health Association (www.mentalhealth.org): 806.609.2106 or 660-483-0331.  [place] Crisis Text [place]: Text [place] to 494098  Suicide LifeLine Chat: suicideBeatroboline.org/chat    Administrative Billing:   Time spent with patient was 30 minutes and greater than 50% of time or 20 minutes was spent in counseling and coordination of care regarding above diagnoses and treatment plan.    Patient Status:  Patient will continue to be seen for ongoing consultation and stabilization.    Signed:   EMILIA Wagner-BC   Psychiatry

## 2020-06-11 NOTE — PATIENT INSTRUCTIONS
1. Wellbutrin  mg daily  3. Lexapro  10 mg daily for depression and anxiety   4. Continue individual therapy  5. Continue Lunesta 2 mg at bedtime as needed for sleep - no refills   6. Psychological testing for diagnosis clarification continues        Continue all other medications as reviewed per electronic medical record today.     Safety plan reviewed. To the Emergency Department as needed or call after hours crisis line at [phone] or [phone]. [place] Crisis Text [place]. Text [place] to 132924 or Suicide LifeLine Chat: suicideNanomech.org/chat/    To schedule individual or family therapy, call [facility] at [phone].    Schedule an appointment with me in 4 weeks or sooner as needed. Call [facility] at [phone] to schedule.    Follow up with primary care provider as planned or for acute medical concerns.    Call the psychiatric nurse line with medication questions or concerns at [phone].    [place] [employer] may be used to communicate with your provider, but this is not intended to be used for emergencies.    Crisis Resources:    National Suicide Prevention Lifeline: 488.978.7025 (TTY: 540.469.8339). Call anytime for help.  (www.suicidepreventionlifeline.org)  National [place] on Mental Illness (www.audra.org): 215.886.5100 or 522-410-6065.   Mental Health Association (www.mentalhealth.org): 739.194.9217 or 546-548-2529.  [place] Crisis [place]: Text [place] to 394150  Suicide LifeLine Chat: suicideNanomech.org/chat

## 2020-06-15 ENCOUNTER — VIRTUAL VISIT (OUTPATIENT)
Dept: PSYCHOLOGY | Facility: CLINIC | Age: 40
End: 2020-06-15
Payer: COMMERCIAL

## 2020-06-15 DIAGNOSIS — F60.3 BORDERLINE PERSONALITY DISORDER (H): Primary | ICD-10-CM

## 2020-06-15 DIAGNOSIS — F10.21 ALCOHOL USE DISORDER, MODERATE, IN SUSTAINED REMISSION (H): ICD-10-CM

## 2020-06-15 DIAGNOSIS — F33.1 MODERATE EPISODE OF RECURRENT MAJOR DEPRESSIVE DISORDER (H): ICD-10-CM

## 2020-06-15 PROCEDURE — 90791 PSYCH DIAGNOSTIC EVALUATION: CPT | Mod: TEL | Performed by: PSYCHOLOGIST

## 2020-06-15 ASSESSMENT — COLUMBIA-SUICIDE SEVERITY RATING SCALE - C-SSRS
6. HAVE YOU EVER DONE ANYTHING, STARTED TO DO ANYTHING, OR PREPARED TO DO ANYTHING TO END YOUR LIFE?: NO
2. HAVE YOU ACTUALLY HAD ANY THOUGHTS OF KILLING YOURSELF LIFETIME?: YES
1. IN THE PAST MONTH, HAVE YOU WISHED YOU WERE DEAD OR WISHED YOU COULD GO TO SLEEP AND NOT WAKE UP?: PASSIVE, FLEETING
4. HAVE YOU HAD THESE THOUGHTS AND HAD SOME INTENTION OF ACTING ON THEM?: NO
TOTAL  NUMBER OF INTERRUPTED ATTEMPTS LIFETIME: NO
ATTEMPT LIFETIME: YES
3. HAVE YOU BEEN THINKING ABOUT HOW YOU MIGHT KILL YOURSELF?: YES
6. HAVE YOU EVER DONE ANYTHING, STARTED TO DO ANYTHING, OR PREPARED TO DO ANYTHING TO END YOUR LIFE?: YES
2. HAVE YOU ACTUALLY HAD ANY THOUGHTS OF KILLING YOURSELF?: PASSIVE, FLEETING
TOTAL  NUMBER OF ABORTED OR SELF INTERRUPTED ATTEMPTS PAST 3 MONTHS: NO
5. HAVE YOU STARTED TO WORK OUT OR WORKED OUT THE DETAILS OF HOW TO KILL YOURSELF? DO YOU INTEND TO CARRY OUT THIS PLAN?: YES
TOTAL  NUMBER OF ABORTED OR SELF INTERRUPTED ATTEMPTS PAST LIFETIME: YES
4. HAVE YOU HAD THESE THOUGHTS AND HAD SOME INTENTION OF ACTING ON THEM?: YES
ATTEMPT PAST THREE MONTHS: NO
TOTAL  NUMBER OF INTERRUPTED ATTEMPTS PAST 3 MONTHS: NO
TOTAL  NUMBER OF ACTUAL ATTEMPTS LIFETIME: 3
1. IN THE PAST MONTH, HAVE YOU WISHED YOU WERE DEAD OR WISHED YOU COULD GO TO SLEEP AND NOT WAKE UP?: YES
TOTAL  NUMBER OF ABORTED OR SELF INTERRUPTED ATTEMPTS LIFETIME: 3
1. IN THE PAST MONTH, HAVE YOU WISHED YOU WERE DEAD OR WISHED YOU COULD GO TO SLEEP AND NOT WAKE UP?: YES
2. HAVE YOU ACTUALLY HAD ANY THOUGHTS OF KILLING YOURSELF?: YES
2. HAVE YOU ACTUALLY HAD ANY THOUGHTS OF KILLING YOURSELF?: Y
5. HAVE YOU STARTED TO WORK OUT OR WORKED OUT THE DETAILS OF HOW TO KILL YOURSELF? DO YOU INTEND TO CARRY OUT THIS PLAN?: NO

## 2020-06-15 NOTE — PROGRESS NOTES
"  [facility]  Evaluator Name:  Dr. Maxwell     Credentials:  LEO Grace    PATIENT'S NAME: Timmy Fitzpatrick  PREFERRED NAME: [place]  PREFERRED PRONOUNS: He/Him  MRN:   [id number]  :   1980   ACCT. NUMBER: [id number]  DATE OF SERVICE: 6/15/20  START TIME: 1100  END TIME: 1145  PREFERRED PHONE: See chart; May we leave a program related message: Yes    STANDARD ADULT DIAGNOSTIC ASSESSMENT    The patient has been notified of the following:       \"We have found that certain health care needs can be provided without the need for a face to face visit.  This service lets us provide the care you need with a phone conversation.       I will have full access to your [facility] medical record during this entire phone call.   I will be taking notes for your medical record.      Since this is like an office visit, we will bill your insurance company for this service.       There are potential benefits and risks of telephone visits (e.g. limits to patient confidentiality) that differ from in-person visits.?Confidentiality still applies for telephone services, and nobody will record the visit.  It is important to be in a quiet, private space that is free of distractions (including cell phone or other devices) during the visit.??      If during the course of the call I believe a telephone visit is not appropriate, you will not be charged for this service\"     Consent has been obtained for this service by care team member: Yes     Identifying Information:  Patient is a 39 year old, .  The pronoun use throughout this assessment reflects the patient's chosen pronoun.  Patient was referred for an assessment by self.  Patient attended the session alone via phone per request.    Chief Complaint:   The reason for seeking services at this time to clarify his diagnoses. He indicated he was previously diagnosed with anxiety and depression. He asserted that his previous providers have also considered bipolar " "disorder. The problem(s) began in childhood. Patient has attempted to resolve these concerns in the past through DBT, individual therapy and medication managment.    Does the client have any condition that is currently presenting as a potential to harm themselves or others (severe withdrawal, serious medical condition, severe emotional/behavioral problem)? No.  Proceed with assessment.    Social/Family History:  Patient reported they grew up in [place], [place].  They were raised by biological parents.  Patient reported that his childhood is difficult to remember. He recalled his parents having an \"unhealthy marriage\" and the family home being invalidating.  Patient described their current relationships with family of origin as \"pretty close.\"      The patient describes their cultural background as white. Patient identified their preferred language to be English. Patient reported they does not need the assistance of an  or other support involved in therapy.     Patient reported had no significant delays in developmental tasks.   Patient's highest education level was some college. Patient identified the following learning problems: attention.  Modifications will not be used to assist communication in therapy. Patient reports they are  able to understand written materials.    Patient reported the following relationship history one marriage.  Patient's current relationship status is  for 13 years.   Patient identified their sexual orientation as heterosexual.  Patient reported having two child(roland).     Patient's current living/housing situation involves staying in own home/apartment.  They live with family and they report that housing is stable. Patient identified partner, mother, friends and co-worker as part of their support system.  Patient identified the quality of these relationships as good.      Patient is currently employed full time and reports they are able to function appropriately at work..  " Patient reports their finances are obtained through employment.  Patient does not identify finances as a current stressor.      Patient reported that they have not been involved with the legal system. Patient denies being on probation / parole / under the jurisdiction of the court.    Patient's Strengths and Limitations:  Patient identified the following strengths or resources that will help them succeed in treatment: work ethic and parenting skills. Things that may interfere with the patient's success in treatment include: personality features.   _______________________________________________  Personal and Family Medical History:   Patient did report a family history of mental health concerns.  Patient reports family history includes Anxiety Disorder in his mother; Cancer in his paternal grandfather; Dementia in his maternal grandfather; Depression in his father and mother; Substance Abuse in his paternal grandmother; Unknown/Adopted in his father..     Patient reported the following previous diagnoses which include(s): an Anxiety Disorder and Depression.  Patient reported symptoms began in childhood.   Patient has received mental health services in the past: DBT daytreatment, individual therapy, and medication managment.  Psychiatric Hospitalizations: None.  Patient denies a history of civil commitment.  Currently, patient is receiving other mental health services.  These include DBT  aftercare.     Patient has had a physical exam to rule out medical causes for current symptoms.  Date of last physical exam was within the past year. Client was encouraged to follow up with PCP if symptoms were to develop. The patient has a [place] Primary Care Provider, who is named [facility].  Patient reports no current medical concerns.  There are not significant appetite / nutritional concerns / weight changes.   Patient does report a history of head injury / trauma / cognitive impairment.    Patient  reports current meds as:   No outpatient medications have been marked as taking for the 6/15/20 encounter (Appointment) with Мария Maxwell, PhD LP.     Medication Adherence:  Patient reports taking prescribed medications as prescribed.    Patient Allergies:    Allergies   Allergen Reactions     Cats      Nkda [No Known Drug Allergies]      Seasonal Allergies        Medical History:    Past Medical History:   Diagnosis Date     Anxiety      Cervicalgia      Chemical dependency (H)      Depression      Migraine headache      Current Mental Status Exam:   Attitude / Demeanor: Cooperative   Speech      Rate / Production: Normal/ Responsive      Volume:  Normal  volume      Language:  good  Mood:   Anxious  Dysphoric  Thought Content: Clear   Thought Process: Goal Directed  Logical       Associations: No loosening of associations  Insight:   Good   Judgment:  Intact   Orientation:  All  Attention/concentration: Good    Rating Scales:    PHQ9:    PHQ-9 SCORE 1/15/2020 3/11/2020 6/11/2020   PHQ-9 Total Score - - -   PHQ-9 Total Score MyChart - - 16 (Moderately severe depression)   PHQ-9 Total Score 10 10 16   Some encounter information is confidential and restricted. Go to Review Flowsheets activity to see all data.   ;    GAD7:    JUAREZ-7 SCORE 1/15/2020 3/11/2020 6/11/2020   Total Score - - -   Total Score - - 13 (moderate anxiety)   Total Score 7 10 13   Some encounter information is confidential and restricted. Go to Review Flowsheets activity to see all data.     CGI:     First:Considering your total clinical experience with this particular patient population, how severe are the patient's symptoms at this time?: 4 (6/8/2020  9:27 AM)  ;    Most recentCompared to the patient's condition at the START of treatment, this patient's condition is: 4 (6/8/2020  9:27 AM)    Substance Use:  Patient reported no family history of chemical health issues.  Patient has received chemical dependency treatment in the past at 8  facilities.  Patient has not ever been to detox.  Patient is not currently receiving any chemical dependency treatment.     Patient denies using alcohol. Hx of alcohol dependence  Patient denies using tobacco.  Patient denies using marijuana.  Patient denies using caffeine.  Patient denies cocaine/crack use.  Patient denies meth/amphetamine use.  Patient denies use of heroin  Patient denies use of other opiates.  Patient denies inhalant use  Patient denies use of benzodiazepines.  Patient denies use of hallucinogens.  Patient denies use of barbiturates, sedatives, or hypnotics.  Patient denies use of over the counter drugs.  Patient denies use of other substances.    CAGE- AID:    CAGE-AID Total Score 6/11/2020   Total Score 0   Total Score MyChart 0 (A total score of 2 or greater is considered clinically significant)      Patient reported the following problems as a result of their substance use: relationship problems and mental health symptoms.  Patient is not concerned about substance use.     Significant Losses / Trauma / Abuse / Neglect Issues:   Patient did not serve in the .  There are indications or report of significant loss, trauma, abuse or neglect issues related to: invalidating childhood environment.  Concerns for possible neglect are not present.     Safety Assessment:   Current Safety Concerns:  Lopez Suicide Severity Rating Scale (Lifetime/Recent)  Lopez Suicide Severity Rating (Lifetime/Recent) 6/15/2020   1. Wish to be Dead (Lifetime) Yes   Wish to be Dead Description (Lifetime) 3 aborted attempts   1. Wish to be Dead (Recent) Yes   Wish to be Dead Description (Recent) passive, fleeting   2. Non-Specific Active Suicidal Thoughts (Lifetime) Yes   Non-Specific Active Suicidal Thought Description (Lifetime) y   2. Non-Specific Active Suicidal Thoughts (Recent) Yes   Non-Specific Active Suicidal Thought Description (Recent) passive, fleeting   3. Active Suicidal Ideation with any Methods  (Not Plan) Without Intent to Act (Lifetime) Yes   3. Active Sucidal Ideation with any Methods (Not Plan) Without Intent to Act (Recent) Yes   4. Active Suicidal Ideation with Some Intent to Act, Without Specific Plan (Lifetime) Yes   4. Active Suicidal Ideation with Some Intent to Act, Without Specific Plan (Recent) No   5. Active Suicidal Ideation with Specific Plan and Intent (Lifetime) Yes   5. Active Suicidal Ideation with Specific Plan and Intent (Recent) No   Actual Attempt (Lifetime) Yes   Actual Attempt Description (Lifetime) 3 aborted, 2 with gun in mouth   Total Number of Actual Attempts (Lifetime) 3   Actual Attempt (Past 3 Months) No   Has subject engaged in non-suicidal self-injurious behavior? (Lifetime) Yes   Has subject engaged in non-suicidal self-injurious behavior? (Past 3 Months) No   Interrupted Attempts (Lifetime) No   Interrupted Attempts (Past 3 Months) No   Aborted or Self-Interrupted Attempt (Lifetime) Yes   Aborted or Self Interrupted Attempt Description (Lifetime) gun in mouth 2s, stopped self   Total Number Aborted or Self Interrupted Attempts (Lifetime) 3   Aborted or Self-Interrupted Attempt (Past 3 Months) No   Preparatory Acts or Behavior (Lifetime) Yes     Patient denies current homicidal ideation and behaviors.  Patient denies current self-injurious ideation and behaviors.    Patient denied risk behaviors associated with substance use.  Patient denies any high risk behaviors associated with mental health symptoms.  Patient reports the following current concerns for their personal safety: None.  Patient reports there are firearms in the house. removed.     History of Safety Concerns:  Patient denied a history of homicidal ideation.     Patient reported a history of personal safety concerns: None  Patient denied a history of assaultive behaviors.    Patient denied a history of assaultive behaviors.     Patient denied a history of risk behaviors associated with substance use.  Patient  denies any history of high risk behaviors associated with mental health symptoms.  Patient reports the following protective factors: sense of meaning and positive social skills    Risk Plan:  See Preliminary Treatment Plan for Safety and Risk Management Plan    Review of Symptoms per patient report:  Depression: Lack of interest, Excessive or inappropriate guilt, Change in energy level, Difficulties concentrating, Change in appetite, Psychomotor slowing or agitation, Suicidal ideation, Feelings of hopelessness, Feelings of helplessness, Low self-worth, Ruminations, Irritability, Feeling sad, down, or depressed and Anger outbursts  Ree:  No Symptoms  Psychosis: No Symptoms  Anxiety: Excessive worry, Nervousness, Physical complaints, such as headaches, stomachaches, muscle tension, Social anxiety, Sleep disturbance, Psychomotor agitation, Ruminations, Poor concentration, Irritability and Anger outbursts  Panic:  Hx panic  Post Traumatic Stress Disorder:  No Symptoms   Eating Disorder: Restriction  ADD / ADHD:  Distractibility, Restlessness/fidgety and Hyperverbal  Conduct Disorder: No symptoms  Autism Spectrum Disorder: No symptoms  Obsessive Compulsive Disorder: No Symptoms    Patient reports the following compulsive behaviors and treatment history: None.      Diagnostic Criteria:   Major Depressive Disorder, Recurrent, Moderate  A) Recurrent episode(s) - symptoms have been present during the same 2-week period and represent a change from previous functioning 5 or more symptoms (required for diagnosis)   - Depressed mood. Note: In children and adolescents, can be irritable mood.     - Diminished interest or pleasure in all, or almost all, activities.    - Decreased sleep.    - Psychomotor activity agitation.    - Fatigue or loss of energy.    - Feelings of worthlessness or inappropriate and excessive guilt.    - Diminished ability to think or concentrate, or indecisiveness.    - Recurrent thoughts of death (not  "just fear of dying), recurrent suicidal ideation without a specific plan, or a suicide attempt or a specific plan for committing suicide.     Borderline Personality Disorder  B) The symptoms cause clinically significant distress or impairment in social, occupational, or other important areas of functioning  A pervasive pattern of instability of interpersonal relationships, self-image, and affects, and marked impulsivity beginning by early adulthood and present in a variety of contexts, as indicated by five (or more) of the following:   - Frantic efforts to avoid real or imagined abandonment. Note: Do not include suicidal or self-mutilating behavior covered in Criterion 5.    - A pattern of unstable and intense interpersonal relationships characterized by alternating between extremes of idealization and devaluation.    - Identity disturbance: markedly and persistently unstable self-image or sense of self.    - Impulsivity in at least two areas that are potentially self-damaging (e.g., spending, sex, substance abuse, reckless driving, binge eating). Note: Do not include suicidal or self-mutilating behavior covered in Criterion 5.    - Recurrent suicidal behavior, gestures, or threats, or self-mutilating behavior.   - Affective instability due to a marked reactivity of mood (e.g., intense episodic dysphoria, irritability, or anxiety usually lasting a few hours and only rarely more than a few days).   - Chronic feelings of emptiness.    - Inappropriate, intense anger or difficulty controlling anger (e.g., frequent displays of temper, constant anger, recurrent physical fights).    - Transient, stress-related paranoid ideation or severe dissociative symptoms.   -Bipolar Disorders ruled out: no evidence of prolonged periods of elevated mood or decreased sleep; patient has poor sleep throughout life which decreased mood    Alcohol Use Disorder, In full remission  -History of daily alcohol use, recently had a \"few " "sips\"  -Participation in 9 chemical dependency treatment programs (inpatient & outpatient)  -Denied abuse since year 2016    Cannabis Use  -prescription for back pain  -used daily  -denied intoxication    Functional Status:  Patient reports the following functional impairments: relationship(s) and self-care.     WHODAS:   WHODAS 2.0 Total Score 9/26/2019 6/11/2020   Total Score 30 27   Total Score MyChart - 27       Clinical Summary:  1. Reason for assessment: diagnostic clarification  .  2. Psychosocial, Cultural and Contextual Factors: DBT graduate.  3. As evidenced by self report and criteria, client meets the following DSM5 Diagnoses:   (Sustained by DSM5 Criteria Listed Above)  301.83 (F60.3) Borderline Personality Disorder.  Other Diagnoses that is relevant to services: 296.32 (F33.1) Major Depressive Disorder, Recurrent Episode, Moderate _ and With anxious distress.  4. Other Diagnoses Substance-Related & Addictive Disorders Alcohol Use Disorder   303.90 (F10.20) Moderate In sustained remission .   5. Prognosis: Maintain Current Status / Prevent Deterioration.  7. Likely consequences of symptoms if not treated: deterioration/hospitalization  8. Client strengths include:  employed, motivated, open to learning, open to suggestions / feedback, responsible parent, wants to learn and willing to ask questions .     Recommendations:     1. Plan for Safety and Risk Management:Recommended that patient call 911 or go to the local ED should there be a change in any of these risk factors.  Report to child / adult protection services was NA.     2. Patient's identified no relevant cultural considerations in regard to the assessment process.     3. Resources/Service Plan:       services are not indicated.     Modifications to assist communication are not indicated.     Additional disability accommodations are not indicated.      4. Collaboration:  Collaboration / coordination of treatment will be initiated by " the client as needed with the following support professionals: Individual therapy, DBT Skills Group, Couples Counseling, and Medication Management.     5.  Referrals:  The following referral(s) will be initiated: None. Next Scheduled Appointment: NA.  A Release of Information has been obtained for the following: NA.    6. VIOLETTA: Discussed the general effects of drugs and alcohol on health and well-being. Provider gave patient printed information about the effects of chemical use on their health and well being. Recommendations:  Abstain from chemical use and participate in a chemical dependency evaluation should he start drinking again.     7. Records were reviewed at time of assessment.  Information in this assessment was obtained from the medical record and provided by patient who is a good historian.   Patient will have open access to their mental health medical record.      Eval type:  Mental Health    Staff Name/Credentials:  Dr. Maxwell  Germania 15, 2020

## 2020-07-07 ENCOUNTER — MYC MEDICAL ADVICE (OUTPATIENT)
Dept: PSYCHIATRY | Facility: CLINIC | Age: 40
End: 2020-07-07

## 2020-07-08 ENCOUNTER — NURSE TRIAGE (OUTPATIENT)
Dept: NURSING | Facility: CLINIC | Age: 40
End: 2020-07-08

## 2020-07-08 ENCOUNTER — ALLIED HEALTH/NURSE VISIT (OUTPATIENT)
Dept: FAMILY MEDICINE | Facility: CLINIC | Age: 40
End: 2020-07-08
Payer: COMMERCIAL

## 2020-07-08 ENCOUNTER — VIRTUAL VISIT (OUTPATIENT)
Dept: FAMILY MEDICINE | Facility: CLINIC | Age: 40
End: 2020-07-08
Payer: COMMERCIAL

## 2020-07-08 DIAGNOSIS — Z20.818 EXPOSURE TO STREPTOCOCCAL PHARYNGITIS: ICD-10-CM

## 2020-07-08 DIAGNOSIS — J02.9 SORE THROAT: Primary | ICD-10-CM

## 2020-07-08 DIAGNOSIS — J02.9 SORE THROAT: ICD-10-CM

## 2020-07-08 LAB
DEPRECATED S PYO AG THROAT QL EIA: NEGATIVE
SPECIMEN SOURCE: NORMAL
SPECIMEN SOURCE: NORMAL
STREP GROUP A PCR: NOT DETECTED

## 2020-07-08 PROCEDURE — 99213 OFFICE O/P EST LOW 20 MIN: CPT | Mod: TEL | Performed by: FAMILY MEDICINE

## 2020-07-08 PROCEDURE — 40001204 ZZHCL STATISTIC STREP A RAPID: Performed by: FAMILY MEDICINE

## 2020-07-08 PROCEDURE — 87651 STREP A DNA AMP PROBE: CPT | Performed by: FAMILY MEDICINE

## 2020-07-08 PROCEDURE — 99207 ZZC NO CHARGE NURSE ONLY: CPT

## 2020-07-08 RX ORDER — PENICILLIN V POTASSIUM 500 MG/1
500 TABLET, FILM COATED ORAL 2 TIMES DAILY
Qty: 20 TABLET | Refills: 0 | Status: SHIPPED | OUTPATIENT
Start: 2020-07-08 | End: 2020-07-18

## 2020-07-08 NOTE — PATIENT INSTRUCTIONS
Strep test to be done today, result in a few hours. There will be a confirmatory test if negative.    However, we will treat for presumed strep throat due to your exposure to it and your reported symptoms.  Start penicillin as prescribed.      Patient Education     Pharyngitis: Strep (Presumed)    You have pharyngitis (sore throat). The healthcare staff think your sore throat is caused by streptococcus (strep) bacteria. This is often called strep throat. Strep throat can cause throat pain that is worse when swallowing, aching all over, headache, and fever. The infection is contagious. It may be spread by coughing, kissing, or touching others after touching your mouth or nose. Antibiotic medicine is given to treat the infection.  Home care    Rest at home. Drink plenty of fluids so you won t get dehydrated.    Stay home from work or school for the first 2 days of taking the antibiotics. After this time, you will not be contagious. You can then return to work or school if you are feeling better.     Take the antibiotic medicine for the full 10 days, even when you feel better. This is very important to make sure the infection is fully treated. It is also important to prevent medicine-resistant germs from growing. If you were given an antibiotic shot, no more antibiotics are needed.    You may use acetaminophen or ibuprofen to control pain or fever, unless another medicine was prescribed for this. If you have chronic liver or kidney disease or ever had a stomach ulcer or GI bleeding, talk with your healthcare provider before using these medicines.    Use throat lozenges or a throat-numbing spray to help reduce throat pain. Gargling with warm salt water can also help reduce throat pain. Dissolve 1/2 teaspoon of salt in 1 glass of warm water.     Don t eat salty or spicy foods. These can irritate the throat.  Follow-up care  Follow up with your healthcare provider or our staff if you don't get better over the next  week.  When to seek medical advice  Call your healthcare provider right away if any of these occur:    Fever as directed by your healthcare provider    New or worse ear pain, sinus pain, or headache    Painful lumps in the back of neck    Stiff neck    Lymph nodes that get larger    Can t swallow liquids, a lot of drooling, or can t open mouth wide due to throat pain    Signs of dehydration, such as very dark urine or no urine, sunken eyes, dizziness    Trouble breathing or noisy breathing    Muffled voice    New rash  Prevention  Here are steps you can take to help prevent an infection:    Keep good hand washing habits.    Don t have close contact with people who have sore throats, colds, or other upper respiratory infections.    Don t smoke, and stay away from secondhand smoke.    Stay up to date with of your vaccines.  Date Last Reviewed: 11/1/2017 2000-2019 [employer]. [address] [place] [id number]. All rights reserved. This information is not intended as a substitute for professional medical care. Always follow your healthcare professional's instructions.

## 2020-07-08 NOTE — PROGRESS NOTES
"Timmy Fitzpatrick is a 39 year old male who is being evaluated via a billable telephone visit.      The patient has been notified of following:     \"This telephone visit will be conducted via a call between you and your physician/provider. We have found that certain health care needs can be provided without the need for a physical exam.  This service lets us provide the care you need with a short phone conversation.  If a prescription is necessary we can send it directly to your pharmacy.  If lab work is needed we can place an order for that and you can then stop by our lab to have the test done at a later time.    Telephone visits are billed at different rates depending on your insurance coverage. During this emergency period, for some insurers they may be billed the same as an in-person visit.  Please reach out to your insurance provider with any questions.    If during the course of the call the physician/provider feels a telephone visit is not appropriate, you will not be charged for this service.\"    Patient has given verbal consent for Telephone visit?  Yes    What phone number would you like to be contacted at? [phone]    How would you like to obtain your AVS? MyChart    Subjective     Timmy Fitzpatrick is a 39 year old male who presents via phone visit today for the following health issues:    HPI  ENT Symptoms             Symptoms: cc Present Absent Comment   Fever/Chills   x    Fatigue  x     Muscle Aches   x    Eye Irritation   x    Sneezing   x    Nasal Duncan/Drg  x  Little bit of congestion   Sinus Pressure/Pain   x    Loss of smell   x    Dental pain   x    Sore Throat x x  Started yesterday   Swollen Glands   x    Ear Pain/Fullness   x    Cough  x     Wheeze   x    Chest Pain   x    Shortness of breath  x     Rash   x    Other   x      Symptom duration:  1 day   Symptom severity:  moderate   Treatments tried:  none   Contacts:  daughter and wife have strep - both on abx, daughter was negative " for Covid     Daughter diagnosed with strep last week, and started abx then.    Patient denies other sick contacts.    Denies recent travel.  Denies being around crowds.    Patient is a .      Patient Active Problem List   Diagnosis     Cervicalgia     Nonallopathic lesion of thoracic region     Nonallopathic lesion of lumbar region     CARDIOVASCULAR SCREENING; LDL GOAL LESS THAN 160     Major depression in partial remission (H)     Migraine headache     Alcoholic, sober since 2006     Chemical dependency (H)     Anxiety     Tear of medial meniscus of knee     Bipolar II disorder (H)     Generalized anxiety disorder     Past Surgical History:   Procedure Laterality Date     HERNIA REPAIR       SURGICAL HISTORY OF -       deviated septum       Social History     Tobacco Use     Smoking status: Former Smoker     Packs/day: 0.00     Types: Dip, chew, snus or snuff     Smokeless tobacco: Former User     Types: Chew     Quit date: 10/1/2019   Substance Use Topics     Alcohol use: No     Comment: sober since April 2016     Family History   Problem Relation Age of Onset     Cancer Paternal Grandfather      Depression Mother      Anxiety Disorder Mother      Depression Father      Unknown/Adopted Father      Dementia Maternal Grandfather      Substance Abuse Paternal Grandmother          Current Outpatient Medications   Medication Sig Dispense Refill     buPROPion (WELLBUTRIN SR) 100 MG 12 hr tablet Take 1 tablet (100 mg) by mouth daily 30 tablet 3     escitalopram (LEXAPRO) 10 MG tablet Take 1 tablet (10 mg) by mouth daily 30 tablet 3     eszopiclone (LUNESTA) 2 MG tablet Take 1 tablet (2 mg) by mouth nightly as needed for sleep 15 tablet 0     gabapentin (NEURONTIN) 300 MG capsule Take 1 capsule (300 mg) by mouth 6 times daily 180 capsule 0     medical cannabis (Patient's own supply) See Admin Instructions (The purpose of this order is to document that the patient reports taking medical cannabis.  This is not  a prescription, and is not used to certify that the patient has a qualifying medical condition.)       Multiple Vitamins-Minerals (MENS MULTIPLE VITAMIN/LYCOPENE PO) Take 1 tablet by mouth daily.       naproxen (NAPROSYN) 500 MG tablet Take 1 tablet (500 mg) by mouth 2 times daily (with meals) 30 tablet 0     nicotine polacrilex (NICORETTE) 4 MG gum Place 4 mg inside cheek as needed for smoking cessation       penicillin V (VEETID) 500 MG tablet Take 1 tablet (500 mg) by mouth 2 times daily for 10 days 20 tablet 0     sildenafil (REVATIO) 20 MG tablet TAKE 5 TABLETS BY MOUTH DAILY AS NEEDED, 30 MINUTES TO 4 HOURS BEFORE SEX. (DO NOT USE WITH NITROGLYCERIN, TERAZOSIN OR DOXAZOSIN 60 tablet 5     tiZANidine (ZANAFLEX) 4 MG tablet TAKE ONE TABLET BY MOUTH THREE TIMES A DAY 30 tablet 0     Allergies   Allergen Reactions     Cats      Nkda [No Known Drug Allergies]      Seasonal Allergies        Reviewed and updated as needed this visit by Provider  Tobacco  Allergies  Meds  Problems  Med Hx  Surg Hx  Fam Hx         Review of Systems   C: NEGATIVE for fever, chills, change in weight  I: NEGATIVE for worrisome rashes, moles or lesions  E: NEGATIVE for vision changes or irritation  ENT/MOUTH: see above  RESP:as above  CV: NEGATIVE for chest pain, palpitations or peripheral edema  GI: NEGATIVE for nausea, abdominal pain, heartburn, or change in bowel habits  M: NEGATIVE for significant arthralgias or myalgia       Objective   Reported vitals:  There were no vitals taken for this visit.   alert and no distress  PSYCH: Alert and oriented times 3; coherent speech, normal   rate and volume, able to articulate logical thoughts, able   to abstract reason, no tangential thoughts, no hallucinations   or delusions  His affect is normal  RESP: No cough, no audible wheezing, able to talk in full sentences  Patient soundedd nasally congested on the phone.  Remainder of exam unable to be completed due to telephone  visits    Diagnostic Test Results:  none         Assessment/Plan:  1. Sore throat    - Streptococcus A Rapid Scr w Reflx to PCR; Future  - penicillin V (VEETID) 500 MG tablet; Take 1 tablet (500 mg) by mouth 2 times daily for 10 days  Dispense: 20 tablet; Refill: 0    2. Exposure to Streptococcal pharyngitis    - Streptococcus A Rapid Scr w Reflx to PCR; Future  - penicillin V (VEETID) 500 MG tablet; Take 1 tablet (500 mg) by mouth 2 times daily for 10 days  Dispense: 20 tablet; Refill: 0      Strep test to be done today, result in a few hours. There will be a confirmatory test if negative.    However, we will treat for presumed strep throat due to your exposure to it and your reported symptoms.  Start penicillin as prescribed.      Return in about 3 days (around 7/11/2020) for if worsening condition in spite of treatment.      Phone call duration:  7 minutes    Kevin Patel MD

## 2020-07-08 NOTE — TELEPHONE ENCOUNTER
"Hi daughter was diagnosed with strep and now he has a sore throat. I connected with scheduling to set up a telephone visit today.  Chelsey Hutchins RN  [facility]      Reason for Disposition    [1] Exposure to family member (or spouse or boyfriend/girlfriend) with test-proven strep AND [2] within last 10 days    Additional Information    Negative: Severe difficulty breathing (e.g., struggling for each breath, speaks in single words, stridor)    Negative: Sounds like a life-threatening emergency to the triager    Negative: [1] Diagnosed strep throat AND [2] taking antibiotic AND [3] symptoms continue    Negative: Throat culture results, call about    Negative: Productive cough is main symptom    Negative: Non-productive cough is main symptom    Negative: Hoarseness is main symptom    Negative: Runny nose is main symptom    Negative: [1] Drooling or spitting out saliva (because can't swallow) AND [2] normal breathing    Negative: Unable to open mouth completely    Negative: [1] Difficulty breathing AND [2] not severe    Negative: Fever > 104 F (40 C)    Negative: [1] Refuses to drink anything AND [2] for > 12 hours    Negative: [1] Drinking very little AND [2] dehydration suspected (e.g., no urine > 12 hours, very dry mouth, very lightheaded)    Negative: Patient sounds very sick or weak to the triager    Negative: SEVERE (e.g., excruciating) throat pain     Pain at 2    Negative: [1] Pus on tonsils (back of throat) AND [2]  fever AND [3] swollen neck lymph nodes (\"glands\")    Negative: [1] Rash AND [2] widespread (especially chest and abdomen)    Negative: Earache also present    Negative: Fever present > 3 days (72 hours)    Negative: Diabetes mellitus or weak immune system (e.g., HIV positive, cancer chemo, splenectomy, organ transplant, chronic steroids)    Negative: History of rheumatic fever    Negative: [1] Adult is leaving on a trip AND [2] requests an antibiotic NOW    Negative: [1] Positive " throat culture or rapid strep test (according to lab, PCP, caller, etc.) AND [2] NO  standing order to call in prescription for antibiotic    Protocols used: SORE THROAT-A-AH

## 2020-07-08 NOTE — NURSING NOTE
Rapid strep test performed per Dr Patel. Patient was advised Dr Patel's care team will notify patient of results. - Pedro CARRIZALES CMA

## 2020-07-22 ENCOUNTER — E-VISIT (OUTPATIENT)
Dept: FAMILY MEDICINE | Facility: CLINIC | Age: 40
End: 2020-07-22
Payer: COMMERCIAL

## 2020-07-22 DIAGNOSIS — R05.9 COUGH: ICD-10-CM

## 2020-07-22 DIAGNOSIS — R09.81 SINUS CONGESTION: Primary | ICD-10-CM

## 2020-07-22 PROCEDURE — 99421 OL DIG E/M SVC 5-10 MIN: CPT | Performed by: NURSE PRACTITIONER

## 2020-07-23 RX ORDER — FLUTICASONE PROPIONATE 50 MCG
1 SPRAY, SUSPENSION (ML) NASAL DAILY
Qty: 16 G | Refills: 0 | Status: SHIPPED | OUTPATIENT
Start: 2020-07-23 | End: 2020-08-06

## 2020-09-12 ENCOUNTER — HOSPITAL ENCOUNTER (EMERGENCY)
Facility: CLINIC | Age: 40
Discharge: HOME | End: 2020-09-12
Attending: EMERGENCY MEDICINE | Admitting: EMERGENCY MEDICINE
Payer: COMMERCIAL

## 2020-09-12 ENCOUNTER — APPOINTMENT (OUTPATIENT)
Dept: CT IMAGING | Facility: CLINIC | Age: 40
End: 2020-09-12
Attending: EMERGENCY MEDICINE
Payer: COMMERCIAL

## 2020-09-12 ENCOUNTER — NURSE TRIAGE (OUTPATIENT)
Dept: NURSING | Facility: CLINIC | Age: 40
End: 2020-09-12

## 2020-09-12 VITALS
BODY MASS INDEX: 22.96 KG/M2 | WEIGHT: 160 LBS | HEART RATE: 72 BPM | SYSTOLIC BLOOD PRESSURE: 111 MMHG | OXYGEN SATURATION: 100 % | RESPIRATION RATE: 20 BRPM | DIASTOLIC BLOOD PRESSURE: 65 MMHG | TEMPERATURE: 97.9 F

## 2020-09-12 DIAGNOSIS — R11.10 VOMITING, INTRACTABILITY OF VOMITING NOT SPECIFIED, PRESENCE OF NAUSEA NOT SPECIFIED, UNSPECIFIED VOMITING TYPE: ICD-10-CM

## 2020-09-12 LAB
ALBUMIN SERPL-MCNC: 4.8 G/DL (ref 3.4–5)
ALP SERPL-CCNC: 70 U/L (ref 40–150)
ALT SERPL W P-5'-P-CCNC: 40 U/L (ref 0–70)
AMYLASE SERPL-CCNC: 98 U/L (ref 30–110)
ANION GAP SERPL CALCULATED.3IONS-SCNC: 8 MMOL/L (ref 3–14)
AST SERPL W P-5'-P-CCNC: 57 U/L (ref 0–45)
BASOPHILS # BLD AUTO: 0 10E9/L (ref 0–0.2)
BASOPHILS NFR BLD AUTO: 0.1 %
BILIRUB SERPL-MCNC: 0.9 MG/DL (ref 0.2–1.3)
BUN SERPL-MCNC: 9 MG/DL (ref 7–30)
CALCIUM SERPL-MCNC: 10.1 MG/DL (ref 8.5–10.1)
CHLORIDE SERPL-SCNC: 94 MMOL/L (ref 94–109)
CO2 SERPL-SCNC: 31 MMOL/L (ref 20–32)
CREAT SERPL-MCNC: 0.94 MG/DL (ref 0.66–1.25)
DIFFERENTIAL METHOD BLD: ABNORMAL
EOSINOPHIL # BLD AUTO: 0 10E9/L (ref 0–0.7)
EOSINOPHIL NFR BLD AUTO: 0.1 %
ERYTHROCYTE [DISTWIDTH] IN BLOOD BY AUTOMATED COUNT: 11.9 % (ref 10–15)
GFR SERPL CREATININE-BSD FRML MDRD: >90 ML/MIN/{1.73_M2}
GLUCOSE SERPL-MCNC: 106 MG/DL (ref 70–99)
HCT VFR BLD AUTO: 44.8 % (ref 40–53)
HGB BLD-MCNC: 15.5 G/DL (ref 13.3–17.7)
IMM GRANULOCYTES # BLD: 0 10E9/L (ref 0–0.4)
IMM GRANULOCYTES NFR BLD: 0.3 %
LYMPHOCYTES # BLD AUTO: 1.4 10E9/L (ref 0.8–5.3)
LYMPHOCYTES NFR BLD AUTO: 10.3 %
MCH RBC QN AUTO: 28.9 PG (ref 26.5–33)
MCHC RBC AUTO-ENTMCNC: 34.6 G/DL (ref 31.5–36.5)
MCV RBC AUTO: 83 FL (ref 78–100)
MONOCYTES # BLD AUTO: 1.3 10E9/L (ref 0–1.3)
MONOCYTES NFR BLD AUTO: 9.2 %
NEUTROPHILS # BLD AUTO: 11.2 10E9/L (ref 1.6–8.3)
NEUTROPHILS NFR BLD AUTO: 80 %
NRBC # BLD AUTO: 0 10*3/UL
NRBC BLD AUTO-RTO: 0 /100
PLATELET # BLD AUTO: 338 10E9/L (ref 150–450)
POTASSIUM SERPL-SCNC: 3.2 MMOL/L (ref 3.4–5.3)
PROT SERPL-MCNC: 8.6 G/DL (ref 6.8–8.8)
RBC # BLD AUTO: 5.37 10E12/L (ref 4.4–5.9)
SODIUM SERPL-SCNC: 133 MMOL/L (ref 133–144)
WBC # BLD AUTO: 13.9 10E9/L (ref 4–11)

## 2020-09-12 PROCEDURE — 25800030 ZZH RX IP 258 OP 636: Performed by: EMERGENCY MEDICINE

## 2020-09-12 PROCEDURE — 25000128 H RX IP 250 OP 636: Performed by: EMERGENCY MEDICINE

## 2020-09-12 PROCEDURE — 85025 COMPLETE CBC W/AUTO DIFF WBC: CPT | Performed by: EMERGENCY MEDICINE

## 2020-09-12 PROCEDURE — 25000132 ZZH RX MED GY IP 250 OP 250 PS 637: Performed by: EMERGENCY MEDICINE

## 2020-09-12 PROCEDURE — 99285 EMERGENCY DEPT VISIT HI MDM: CPT | Mod: Z6 | Performed by: EMERGENCY MEDICINE

## 2020-09-12 PROCEDURE — 80053 COMPREHEN METABOLIC PANEL: CPT | Performed by: EMERGENCY MEDICINE

## 2020-09-12 PROCEDURE — 99285 EMERGENCY DEPT VISIT HI MDM: CPT | Mod: 25 | Performed by: EMERGENCY MEDICINE

## 2020-09-12 PROCEDURE — 96376 TX/PRO/DX INJ SAME DRUG ADON: CPT | Performed by: EMERGENCY MEDICINE

## 2020-09-12 PROCEDURE — 96375 TX/PRO/DX INJ NEW DRUG ADDON: CPT | Performed by: EMERGENCY MEDICINE

## 2020-09-12 PROCEDURE — C9113 INJ PANTOPRAZOLE SODIUM, VIA: HCPCS | Performed by: EMERGENCY MEDICINE

## 2020-09-12 PROCEDURE — 25000125 ZZHC RX 250: Performed by: EMERGENCY MEDICINE

## 2020-09-12 PROCEDURE — 96374 THER/PROPH/DIAG INJ IV PUSH: CPT | Performed by: EMERGENCY MEDICINE

## 2020-09-12 PROCEDURE — 82150 ASSAY OF AMYLASE: CPT | Performed by: EMERGENCY MEDICINE

## 2020-09-12 PROCEDURE — 74177 CT ABD & PELVIS W/CONTRAST: CPT

## 2020-09-12 PROCEDURE — 96361 HYDRATE IV INFUSION ADD-ON: CPT | Performed by: EMERGENCY MEDICINE

## 2020-09-12 RX ORDER — ONDANSETRON 2 MG/ML
4 INJECTION INTRAMUSCULAR; INTRAVENOUS
Status: DISCONTINUED | OUTPATIENT
Start: 2020-09-12 | End: 2020-09-13 | Stop reason: HOSPADM

## 2020-09-12 RX ORDER — POTASSIUM CHLORIDE 1.5 G/1.58G
40 POWDER, FOR SOLUTION ORAL ONCE
Status: COMPLETED | OUTPATIENT
Start: 2020-09-12 | End: 2020-09-12

## 2020-09-12 RX ORDER — LORAZEPAM 2 MG/ML
1 INJECTION INTRAMUSCULAR
Status: COMPLETED | OUTPATIENT
Start: 2020-09-12 | End: 2020-09-12

## 2020-09-12 RX ORDER — IOPAMIDOL 755 MG/ML
78 INJECTION, SOLUTION INTRAVASCULAR ONCE
Status: COMPLETED | OUTPATIENT
Start: 2020-09-12 | End: 2020-09-12

## 2020-09-12 RX ORDER — SODIUM CHLORIDE, SODIUM LACTATE, POTASSIUM CHLORIDE, CALCIUM CHLORIDE 600; 310; 30; 20 MG/100ML; MG/100ML; MG/100ML; MG/100ML
1000 INJECTION, SOLUTION INTRAVENOUS CONTINUOUS
Status: DISCONTINUED | OUTPATIENT
Start: 2020-09-12 | End: 2020-09-13 | Stop reason: HOSPADM

## 2020-09-12 RX ORDER — ONDANSETRON 4 MG/1
4 TABLET, ORALLY DISINTEGRATING ORAL EVERY 8 HOURS PRN
Qty: 7 TABLET | Refills: 0 | Status: ON HOLD | OUTPATIENT
Start: 2020-09-12 | End: 2021-03-16

## 2020-09-12 RX ORDER — SODIUM CHLORIDE 9 MG/ML
INJECTION, SOLUTION INTRAVENOUS CONTINUOUS
Status: DISCONTINUED | OUTPATIENT
Start: 2020-09-12 | End: 2020-09-13 | Stop reason: HOSPADM

## 2020-09-12 RX ORDER — ONDANSETRON 2 MG/ML
4 INJECTION INTRAMUSCULAR; INTRAVENOUS EVERY 30 MIN PRN
Status: DISCONTINUED | OUTPATIENT
Start: 2020-09-12 | End: 2020-09-13 | Stop reason: HOSPADM

## 2020-09-12 RX ADMIN — PANTOPRAZOLE SODIUM 40 MG: 40 INJECTION, POWDER, FOR SOLUTION INTRAVENOUS at 19:43

## 2020-09-12 RX ADMIN — IOPAMIDOL 78 ML: 755 INJECTION, SOLUTION INTRAVENOUS at 20:39

## 2020-09-12 RX ADMIN — SODIUM CHLORIDE 1000 ML: 9 INJECTION, SOLUTION INTRAVENOUS at 19:06

## 2020-09-12 RX ADMIN — SODIUM CHLORIDE, POTASSIUM CHLORIDE, SODIUM LACTATE AND CALCIUM CHLORIDE 1000 ML: 600; 310; 30; 20 INJECTION, SOLUTION INTRAVENOUS at 19:41

## 2020-09-12 RX ADMIN — SODIUM CHLORIDE 69 ML: 9 INJECTION, SOLUTION INTRAVENOUS at 20:39

## 2020-09-12 RX ADMIN — ONDANSETRON 4 MG: 2 INJECTION INTRAMUSCULAR; INTRAVENOUS at 21:22

## 2020-09-12 RX ADMIN — ONDANSETRON 4 MG: 2 INJECTION INTRAMUSCULAR; INTRAVENOUS at 18:57

## 2020-09-12 RX ADMIN — LORAZEPAM 1 MG: 2 INJECTION INTRAMUSCULAR; INTRAVENOUS at 19:44

## 2020-09-12 RX ADMIN — POTASSIUM CHLORIDE 40 MEQ: 1.5 POWDER, FOR SOLUTION ORAL at 21:15

## 2020-09-12 ASSESSMENT — ENCOUNTER SYMPTOMS
MUSCULOSKELETAL NEGATIVE: 1
NEUROLOGICAL NEGATIVE: 1
ENDOCRINE NEGATIVE: 1
HEMATOLOGIC/LYMPHATIC NEGATIVE: 1
EYES NEGATIVE: 1
NAUSEA: 1
ALLERGIC/IMMUNOLOGIC NEGATIVE: 1
PSYCHIATRIC NEGATIVE: 1
RESPIRATORY NEGATIVE: 1
VOMITING: 1
CONSTITUTIONAL NEGATIVE: 1
CARDIOVASCULAR NEGATIVE: 1

## 2020-09-12 NOTE — ED PROVIDER NOTES
"  History     Chief Complaint   Patient presents with     Vomiting     HPI  Timmy Fitzpatrick is a 40 year old male who presents with report of nausea and vomiting over the last 24 hours.  Patient reports he recently returned from a trip to [place] by car for .  He arrived from [facility] with his wife- antolin stating that he has had progressive vomiting.  He presented with the top for a ball that had coffee-ground emesis.  Patient reports he is a recovered alcoholic was been sober for several years.  He admits to vaping cannabis and a cartridge last about 3 days.  He is on gabapentin for history of anxiety and chronic back pain he takes 1000 mg twice a day and has not had gabapentin for a few days due to his road trip to [place].  Patient is also on Wellbutrin and Lexapro.  He reports no abdominal pain just feeling \"nauseous\". No diarrhea.  No other sick household contacts.  Antolin is concerned he may be withdrawing from gabapentin.     Patient has multiple medical diagnoses, history of depression, anxiety, nonallopathic lesion about the thoracic and lumbar spine, history of alcoholism is been sober since , chemical dependency and bipolar type II disorder, migraine headaches. Patient is a retired .    Allergies:  Allergies   Allergen Reactions     Cats      Nkda [No Known Drug Allergies]      Seasonal Allergies        Problem List:    Patient Active Problem List    Diagnosis Date Noted     Generalized anxiety disorder 2019     Priority: Medium     Bipolar II disorder (H) 2018     Priority: Medium     Anxiety 2017     Priority: Medium     Tear of medial meniscus of knee 2016     Priority: Medium     Chemical dependency (H) 2015     Priority: Medium     Alcoholic, sober since 2012     Priority: Medium     Major depression in partial remission (H) 2011     Priority: Medium     Migraine headache 2011     Priority: Medium     " (Problem list name updated by automated process. Provider to review and confirm.)       CARDIOVASCULAR SCREENING; LDL GOAL LESS THAN 160 10/31/2010     Priority: Medium     Cervicalgia 03/24/2010     Priority: Medium     Nonallopathic lesion of thoracic region 03/24/2010     Priority: Medium     Problem list name updated by automated process. Provider to review       Nonallopathic lesion of lumbar region 03/24/2010     Priority: Medium     Problem list name updated by automated process. Provider to review          Past Medical History:    Past Medical History:   Diagnosis Date     Anxiety      Cervicalgia      Chemical dependency (H)      Depression      Migraine headache        Past Surgical History:    Past Surgical History:   Procedure Laterality Date     HERNIA REPAIR       SURGICAL HISTORY OF -       deviated septum       Family History:    Family History   Problem Relation Age of Onset     Cancer Paternal Grandfather      Depression Mother      Anxiety Disorder Mother      Depression Father      Unknown/Adopted Father      Dementia Maternal Grandfather      Substance Abuse Paternal Grandmother        Social History:  Marital Status:   [2]  Social History     Tobacco Use     Smoking status: Former Smoker     Packs/day: 0.00     Types: Dip, chew, snus or snuff     Smokeless tobacco: Former User     Types: Chew     Quit date: 10/1/2019   Substance Use Topics     Alcohol use: No     Comment: sober since April 2016     Drug use: Yes     Types: Marijuana     Comment: Daily        Medications:    ondansetron (ZOFRAN ODT) 4 MG ODT tab  buPROPion (WELLBUTRIN SR) 100 MG 12 hr tablet  escitalopram (LEXAPRO) 10 MG tablet  eszopiclone (LUNESTA) 2 MG tablet  gabapentin (NEURONTIN) 300 MG capsule  medical cannabis (Patient's own supply)  Multiple Vitamins-Minerals (MENS MULTIPLE VITAMIN/LYCOPENE PO)  naproxen (NAPROSYN) 500 MG tablet  nicotine polacrilex (NICORETTE) 4 MG gum  sildenafil (REVATIO) 20 MG  tablet  tiZANidine (ZANAFLEX) 4 MG tablet          Review of Systems   Constitutional: Negative.    HENT: Negative.    Eyes: Negative.    Respiratory: Negative.    Cardiovascular: Negative.    Gastrointestinal: Positive for nausea and vomiting.   Endocrine: Negative.    Genitourinary: Negative.    Musculoskeletal: Negative.    Skin: Negative.    Allergic/Immunologic: Negative.    Neurological: Negative.    Hematological: Negative.    Psychiatric/Behavioral: Negative.    All other systems reviewed and are negative.      Physical Exam   BP: 124/82  Pulse: 103  Temp: 97.9  F (36.6  C)  Resp: 20  Weight: 72.6 kg (160 lb)  SpO2: 100 %      Physical Exam  Constitutional:       General: He is not in acute distress.     Appearance: He is not ill-appearing, toxic-appearing or diaphoretic.   HENT:      Head: Normocephalic and atraumatic.      Right Ear: Tympanic membrane normal.      Mouth/Throat:      Pharynx: No oropharyngeal exudate or posterior oropharyngeal erythema.   Eyes:      General: No scleral icterus.        Right eye: No discharge.         Left eye: No discharge.      Extraocular Movements: Extraocular movements intact.      Pupils: Pupils are equal, round, and reactive to light.   Neck:      Musculoskeletal: Normal range of motion. No neck rigidity or muscular tenderness.      Vascular: No carotid bruit.   Cardiovascular:      Rate and Rhythm: Normal rate and regular rhythm.      Pulses: Normal pulses.      Heart sounds: Normal heart sounds.   Pulmonary:      Effort: Pulmonary effort is normal. No respiratory distress.      Breath sounds: Normal breath sounds. No stridor. No wheezing, rhonchi or rales.   Chest:      Chest wall: No tenderness.   Abdominal:      General: There is no distension.      Palpations: There is no mass.      Tenderness: There is no abdominal tenderness. There is no right CVA tenderness, left CVA tenderness, guarding or rebound.      Hernia: No hernia is present.   Musculoskeletal:          General: No swelling, tenderness, deformity or signs of injury.      Right lower leg: No edema.      Left lower leg: No edema.   Lymphadenopathy:      Cervical: No cervical adenopathy.   Skin:     Capillary Refill: Capillary refill takes less than 2 seconds.      Coloration: Skin is not jaundiced or pale.      Findings: No bruising, erythema, lesion or rash.   Neurological:      General: No focal deficit present.      Mental Status: He is alert and oriented to person, place, and time.      Cranial Nerves: No cranial nerve deficit.      Sensory: No sensory deficit.      Motor: No weakness.      Coordination: Coordination normal.      Gait: Gait normal.      Deep Tendon Reflexes: Reflexes normal.   Psychiatric:         Mood and Affect: Mood normal.         Behavior: Behavior normal.         Thought Content: Thought content normal.         Judgment: Judgment normal.         ED Course        Procedures               Critical Care time:  none               ED medications:  Medications   ondansetron (ZOFRAN) injection 4 mg (4 mg Intravenous Given 9/12/20 2122)   0.9% sodium chloride BOLUS (0 mLs Intravenous Stopped 9/12/20 1940)     Followed by   sodium chloride 0.9% infusion (has no administration in time range)   lactated ringers infusion (has no administration in time range)   ondansetron (ZOFRAN) injection 4 mg (has no administration in time range)   lactated ringers BOLUS 1,000 mL (0 mLs Intravenous Stopped 9/12/20 2116)   pantoprazole (PROTONIX) 40 mg IV push injection (40 mg Intravenous Given 9/12/20 1943)   LORazepam (ATIVAN) injection 1 mg (1 mg Intravenous Given 9/12/20 1944)   potassium chloride (KLOR-CON) Packet 40 mEq (40 mEq Oral Given 9/12/20 2115)   iopamidol (ISOVUE-370) solution 78 mL (78 mLs Intravenous Given 9/12/20 2039)   sodium chloride 0.9 % bag 500mL for CT scan flush use (69 mLs As instructed Given 9/12/20 2039)         ED Vitals:  Vitals:    09/12/20 2100 09/12/20 2130 09/12/20 2145 09/12/20  2200   BP: 121/70 118/80 116/69 111/65   Pulse: 83   72   Resp:       Temp:       TempSrc:       SpO2: 100% 100%     Weight:             ED labs and imaging:  Results for orders placed or performed during the hospital encounter of 09/12/20   CT Abdomen Pelvis w Contrast     Status: None    Narrative    EXAM: CT ABDOMEN PELVIS W CONTRAST  LOCATION: [facility]  DATE/TIME: 9/12/2020 8:33 PM    INDICATION: Coffee-ground emesis. Nausea.  COMPARISON: None.  TECHNIQUE: CT scan of the abdomen and pelvis was performed following injection of IV contrast. Multiplanar reformats were obtained. Dose reduction techniques were used.  CONTRAST: 78 ml Isovue 370    FINDINGS:   LOWER CHEST: Normal.    HEPATOBILIARY: Normal.  PANCREAS: Normal.  SPLEEN: Normal.  ADRENAL GLANDS: Normal.  KIDNEYS/BLADDER: Small focal scar right upper pole and tiny right renal cyst, kidneys otherwise negative.    BOWEL: No obstruction or inflammatory change.  LYMPH NODES: Normal.  VASCULATURE: Unremarkable.  PELVIC ORGANS: Normal.    MUSCULOSKELETAL: Normal.      Impression    IMPRESSION:   1.  Negative CT exam. No etiology for symptoms.   CBC with platelets differential     Status: Abnormal   Result Value Ref Range    WBC 13.9 (H) 4.0 - 11.0 10e9/L    RBC Count 5.37 4.4 - 5.9 10e12/L    Hemoglobin 15.5 13.3 - 17.7 g/dL    Hematocrit 44.8 40.0 - 53.0 %    MCV 83 78 - 100 fl    MCH 28.9 26.5 - 33.0 pg    MCHC 34.6 31.5 - 36.5 g/dL    RDW 11.9 10.0 - 15.0 %    Platelet Count 338 150 - 450 10e9/L    Diff Method Automated Method     % Neutrophils 80.0 %    % Lymphocytes 10.3 %    % Monocytes 9.2 %    % Eosinophils 0.1 %    % Basophils 0.1 %    % Immature Granulocytes 0.3 %    Nucleated RBCs 0 0 /100    Absolute Neutrophil 11.2 (H) 1.6 - 8.3 10e9/L    Absolute Lymphocytes 1.4 0.8 - 5.3 10e9/L    Absolute Monocytes 1.3 0.0 - 1.3 10e9/L    Absolute Eosinophils 0.0 0.0 - 0.7 10e9/L    Absolute Basophils 0.0 0.0 - 0.2 10e9/L    Abs Immature  Granulocytes 0.0 0 - 0.4 10e9/L    Absolute Nucleated RBC 0.0    Comprehensive metabolic panel     Status: Abnormal   Result Value Ref Range    Sodium 133 133 - 144 mmol/L    Potassium 3.2 (L) 3.4 - 5.3 mmol/L    Chloride 94 94 - 109 mmol/L    Carbon Dioxide 31 20 - 32 mmol/L    Anion Gap 8 3 - 14 mmol/L    Glucose 106 (H) 70 - 99 mg/dL    Urea Nitrogen 9 7 - 30 mg/dL    Creatinine 0.94 0.66 - 1.25 mg/dL    GFR Estimate >90 >60 mL/min/[1.73_m2]    GFR Estimate If Black >90 >60 mL/min/[1.73_m2]    Calcium 10.1 8.5 - 10.1 mg/dL    Bilirubin Total 0.9 0.2 - 1.3 mg/dL    Albumin 4.8 3.4 - 5.0 g/dL    Protein Total 8.6 6.8 - 8.8 g/dL    Alkaline Phosphatase 70 40 - 150 U/L    ALT 40 0 - 70 U/L    AST 57 (H) 0 - 45 U/L   Amylase     Status: None   Result Value Ref Range    Amylase 98 30 - 110 U/L             Assessments & Plan (with Medical Decision Making)   Assessment summary and Clinical impression: Pleasant 40-year-old male who presented by car with report of vomiting within the last 24 hours.  Symptoms may be due to gastritis.  Need to monitor for stable upper GI bleeding.Patient vapes cannabis daily, is on gabapentin for chronic back pain and anxiety.  He presented with coffee-ground emesis with report of nausea without abdominal pain,chest pain or shortness of breath.  He had no associated diarrhea.  Spouse was concerned about withdrawal from gabapentin as he had not taken his dose for a few days due to car trip for a  in [place].  On arrival he was in no acute distress he had some resting tachycardia he was afebrile.  He remained stable during his ED course and CT imaging was reassuring showing no acute process.  He had no recurrence of coffee-ground emesis was discharged home with plan for guarded precautions to return for reevaluation including consideration for outpatient follow-up care.    ED course and Plan:  Reviewed the medical record.  We discussed possibility of withdrawal from gabapentin given  his dose of 900 mg twice a day.  He was offered IV fluids and Protonix he was given antiemetics to manage his nausea and vomiting.  Blood work was obtained.  Possible causes for coffee-ground emesis were considered including gastritis.  Patient reported he is a recovered alcoholic and has not had any recent alcohol intake.  Patient triggered sepsis BPA due to tachycardia on arrival with heart rate 101, no white count 13.1.  Patient is not septic his tachycardia is likely due to dehydration from vomiting.  His white count 13.9 may be related to demargination although he did have some coffee-ground emesis.  His amylase is normal.  His potassium was 3.2.  His AST was 57 ALT is 40.  Reviewed risk of gabapentin withdrawal with report of last of several days prior with the pharmacist on duty.  Patient's potassium was repleted orally.  Patient was reevaluated after IV fluids and antiemetics and felt improved.  He took his oral dose of gabapentin which he brought from home.  After period of observation patient was comfortable going home and reassured that his CT was normal.  We discussed that his symptoms could be due to gastritis however there is a possibility he could have a stable upper GI bleed.  I recommended that if he has recurrence of coffee-ground emesis which never occurred during his ED course he may need to return for reevaluation including consideration for upper endoscopy.  Patient was sent home with Zofran ODT for as needed use.  We discussed and reviewed reasons to return to the department for reevaluation expressed comfort understanding agreement plan of care.       Disclaimer: This note consists of symbols derived from keyboarding, dictation and/or voice recognition software. As a result, there may be errors in the script that have gone undetected. Please consider this when interpreting information found in this chart.  I have reviewed the nursing notes.    I have reviewed the findings, diagnosis, plan and  need for follow up with the patient.       Discharge Medication List as of 9/12/2020 10:08 PM      START taking these medications    Details   ondansetron (ZOFRAN ODT) 4 MG ODT tab Take 1 tablet (4 mg) by mouth every 8 hours as needed for nausea or vomiting, Disp-7 tablet,R-0, E-Prescribe             Final diagnoses:   Vomiting, intractability of vomiting not specified, presence of nausea not specified, unspecified vomiting type - over the last 24 hours.       9/12/2020   [facility] EMERGENCY DEPARTMENT     Aureliano Hawkins MD  09/12/20 9991

## 2020-09-12 NOTE — ED AVS SNAPSHOT
[facility] Emergency Department  [address] [address]  Phone:  [phone]  Fax:  [phone]                                    Timmy Fitzpatrick   MRN: [id number]    Department:  [facility] Emergency Department   Date of Visit:  9/12/2020           After Visit Summary Signature Page    I have received my discharge instructions, and my questions have been answered. I have discussed any challenges I see with this plan with the nurse or doctor.    ..........................................................................................................................................  Patient/Patient Representative Signature      ..........................................................................................................................................  Patient Representative Print Name and Relationship to Patient    ..................................................               ................................................  Date                                   Time    ..........................................................................................................................................  Reviewed by Signature/Title    ...................................................              ..............................................  Date                                               Time          22EPIC Rev 08/18

## 2020-09-12 NOTE — TELEPHONE ENCOUNTER
Vomiting starting at 10 pm last evening. Reporting vomiting every 30 minutes since 10 pm. Patient had a dose of Zofran at 6 a.m. this morning. Ongoing nausea. Reporting last emesis 20 minutes ago. Mouth is dry. Voiding scant amount at 9 a.m.. Dry mouth. Headache. Sweating. Denies pain. Patient denies change in stools.  Denies history of vomiting.     Per guidelines advised to be seen in the Emergency Room.     Caller verbalized understanding. Denies further questions.    Lena Ventura RN  [facility]    COVID 19 Nurse Triage Plan/Patient Instructions    Please be aware that novel coronavirus (COVID-19) may be circulating in the community. If you develop symptoms such as fever, cough, or SOB or if you have concerns about the presence of another infection including coronavirus (COVID-19), please contact your health care provider or visit [email or web address].     Disposition/Instructions    ED Visit recommended. Follow protocol based instructions.     Bring Your Own Device:  Please also bring your smart device(s) (smart phones, tablets, laptops) and their charging cables for your personal use and to communicate with your care team during your visit.    Thank you for taking steps to prevent the spread of this virus.  o Limit your contact with others.  o Wear a simple mask to cover your cough.  o Wash your hands well and often.    Resources    [facility]: About COVID-19: [email or web address]    CDC: What to Do If You're Sick: www.cdc.gov/coronavirus/2019-ncov/about/steps-when-sick.html    CDC: Ending Home Isolation: www.cdc.gov/coronavirus/2019-ncov/hcp/disposition-in-home-patients.html     CDC: Caring for Someone: www.cdc.gov/coronavirus/2019-ncov/if-you-are-sick/care-for-someone.html     [facility]: Interim Guidance for Hospital Discharge to Home: www.health.[facility]/diseases/coronavirus/hcp/hospdischarge.pdf    [facility] clinical trials (COVID-19 research studies):  "clinicalaffairs.[facility]-clinical-trials     Below are the [place]-19 hotlines at the [place] Department of Health ([facility]). Interpreters are available.   o For health questions: Call 777-524-8923 or 1-795.828.2719 (7 a.m. to 7 p.m.)  o For questions about schools and childcare: Call 455-712-2779 or 1-210.382.6120 (7 a.m. to 7 p.m.)                     Reason for Disposition    [1] SEVERE vomiting (e.g., 6 or more times/day) AND [2] present > 8 hours    Additional Information    Negative: Shock suspected (e.g., cold/pale/clammy skin, too weak to stand, low BP, rapid pulse)    Negative: Difficult to awaken or acting confused (e.g., disoriented, slurred speech)    Negative: Sounds like a life-threatening emergency to the triager    Negative: [1] Vomiting AND [2] contains red blood or black (\"coffee ground\") material  (Exception: few red streaks in vomit that only happened once)    Negative: Severe pain in one eye    Negative: Recent head injury (within last 3 days)    Negative: Recent abdominal injury (within last 3 days)    Negative: [1] Insulin-dependent diabetes (Type I) AND [2] glucose > 400 mg/dl (22 mmol/l)    Protocols used: VOMITING-A-AH      "

## 2020-09-13 NOTE — DISCHARGE INSTRUCTIONS
1) Your evaluation today does not suggest an emergency diagnosis. We have reviewed the possibility of gastritis and bleeding.  Imaging was reassuring without any acute process and blood work was reassuring.    2) We have read that you are stable for discharge to home with plan to take Zofran for nausea and vomiting over the next few days every 6-8 hours as needed.    3) If you develop recurrence of coffee-ground emesis you will need to return to be reevaluated has an upper endoscopy would be prudent.    4) Consider an outpatient appointment for follow-up and recheck on symptoms.

## 2020-09-15 ENCOUNTER — VIRTUAL VISIT (OUTPATIENT)
Dept: PSYCHIATRY | Facility: CLINIC | Age: 40
End: 2020-09-15
Payer: COMMERCIAL

## 2020-09-15 DIAGNOSIS — F99 INSOMNIA DUE TO OTHER MENTAL DISORDER: ICD-10-CM

## 2020-09-15 DIAGNOSIS — F41.1 GENERALIZED ANXIETY DISORDER: Primary | ICD-10-CM

## 2020-09-15 DIAGNOSIS — F51.05 INSOMNIA DUE TO OTHER MENTAL DISORDER: ICD-10-CM

## 2020-09-15 DIAGNOSIS — F33.1 MODERATE EPISODE OF RECURRENT MAJOR DEPRESSIVE DISORDER (H): ICD-10-CM

## 2020-09-15 PROCEDURE — 99214 OFFICE O/P EST MOD 30 MIN: CPT | Mod: TEL | Performed by: NURSE PRACTITIONER

## 2020-09-15 RX ORDER — ESCITALOPRAM OXALATE 10 MG/1
10 TABLET ORAL DAILY
Qty: 90 TABLET | Refills: 0 | Status: SHIPPED | OUTPATIENT
Start: 2020-09-15 | End: 2020-12-08

## 2020-09-15 RX ORDER — ESZOPICLONE 2 MG/1
2 TABLET, FILM COATED ORAL
Qty: 15 TABLET | Refills: 0 | Status: SHIPPED | OUTPATIENT
Start: 2020-09-15 | End: 2021-03-10

## 2020-09-15 RX ORDER — BUPROPION HYDROCHLORIDE 100 MG/1
100 TABLET, EXTENDED RELEASE ORAL DAILY
Qty: 180 TABLET | Refills: 0 | Status: SHIPPED | OUTPATIENT
Start: 2020-09-15 | End: 2020-09-28

## 2020-09-15 ASSESSMENT — ANXIETY QUESTIONNAIRES
1. FEELING NERVOUS, ANXIOUS, OR ON EDGE: MORE THAN HALF THE DAYS
3. WORRYING TOO MUCH ABOUT DIFFERENT THINGS: SEVERAL DAYS
7. FEELING AFRAID AS IF SOMETHING AWFUL MIGHT HAPPEN: SEVERAL DAYS
GAD7 TOTAL SCORE: 14
6. BECOMING EASILY ANNOYED OR IRRITABLE: NEARLY EVERY DAY
2. NOT BEING ABLE TO STOP OR CONTROL WORRYING: SEVERAL DAYS
5. BEING SO RESTLESS THAT IT IS HARD TO SIT STILL: NEARLY EVERY DAY
IF YOU CHECKED OFF ANY PROBLEMS ON THIS QUESTIONNAIRE, HOW DIFFICULT HAVE THESE PROBLEMS MADE IT FOR YOU TO DO YOUR WORK, TAKE CARE OF THINGS AT HOME, OR GET ALONG WITH OTHER PEOPLE: SOMEWHAT DIFFICULT

## 2020-09-15 ASSESSMENT — PATIENT HEALTH QUESTIONNAIRE - PHQ9
5. POOR APPETITE OR OVEREATING: NEARLY EVERY DAY
SUM OF ALL RESPONSES TO PHQ QUESTIONS 1-9: 14

## 2020-09-15 NOTE — PATIENT INSTRUCTIONS
1. Increase Wellbutrin SR to  100 mg twice daily  3. Lexapro  10 mg daily for depression and anxiety   4. Continue individual therapy  5. Continue Lunesta 2 mg at bedtime as needed for sleep - no refills   6. Psychological testing for diagnosis clarification  - borderline personality disorder   7. Continue gabapentin 300 mg 6 times daily      Continue all other medications as reviewed per electronic medical record today.     Safety plan reviewed. To the Emergency Department as needed or call after hours crisis line at [phone] or [phone]. [place] Crisis Text [place]. Text [place] to 105020 or Suicide LifeLine Chat: [employer].org/chat/    To schedule individual or family therapy, call [facility] at [phone].    Schedule an appointment with me in 6 weeks or sooner as needed. Call [facility] at [phone] to schedule.    Follow up with primary care provider as planned or for acute medical concerns.    Call the psychiatric nurse line with medication questions or concerns at [phone].    [employer] may be used to communicate with your provider, but this is not intended to be used for emergencies.    Crisis Resources:    National Suicide Prevention Lifeline: 889.354.9840 (TTY: 979.409.2397). Call anytime for help.  (www.suicidepreventionlifeline.org)  National [place] on Mental Illness (www.audra.org): 152.531.1838 or 445-551-4461.   Mental Health Association (www.mentalhealth.org): 721.590.3895 or 823-773-4395.  [place] Crisis Text [place]: Text [place] to 020008  Suicide LifeLine Chat: [employer].org/chat

## 2020-09-15 NOTE — PROGRESS NOTES
"Timmy Fitzpatrick is a 40 year old male who is being evaluated via a billable telephone visit.      The patient has been notified of following:     \"This telephone visit will be conducted via a call between you and your physician/provider. We have found that certain health care needs can be provided without the need for a physical exam.  This service lets us provide the care you need with a short phone conversation.  If a prescription is necessary we can send it directly to your pharmacy.  If lab work is needed we can place an order for that and you can then stop by our lab to have the test done at a later time.    Telephone visits are billed at different rates depending on your insurance coverage. During this emergency period, for some insurers they may be billed the same as an in-person visit.  Please reach out to your insurance provider with any questions.    If during the course of the call the physician/provider feels a telephone visit is not appropriate, you will not be charged for this service.\"    Patient has given verbal consent for Telephone visit?  Yes    What phone number would you like to be contacted at? [phone]    How would you like to obtain your AVS? MyChart    Phone call duration: 30 minutes    Marjan Mathews NP         Outpatient Psychiatric Progress Note    Name: Timmy Fitzpatrick   : 1980                    Primary Care Provider: [facility]   Therapist: Liana    PHQ-9 scores:  PHQ-9 SCORE 3/11/2020 2020 9/15/2020   PHQ-9 Total Score - - -   PHQ-9 Total Score MyChart - 16 (Moderately severe depression) -   PHQ-9 Total Score 10 16 14   Some encounter information is confidential and restricted. Go to Review Flowsheets activity to see all data.       JUAREZ-7 scores:  JUAREZ-7 SCORE 3/11/2020 2020 9/15/2020   Total Score - - -   Total Score - 13 (moderate anxiety) -   Total Score 10 13 14   Some encounter information is confidential and restricted. Go " "to Review Flowsheets activity to see all data.       Patient Identification:    Patient is a 40 year old year old,   White American male  who presents for return visit with me.  Patient is currently unemployed. Patient attended the session alone. Patient prefers to be called: \"Ventura\".    Interim History:    I last saw Timmy Fitzpatrick for outpatient psychiatry Return Visit on June 11, 2020.     During that appointment, he was  still adjusting to the recent decrease in the dose of Lexapro with some feeling of foggy thinking.  He has now returned back to his work with the [employer].  He continues to manage his children while his wife commutes to [place] for her medical position.  We did review his medication schedule to make sure that he is taking all of his medications as prescribed.  He expressed some frustration with the pharmacy about misunderstanding what they were telling him about refill timing.  At this point he has graduated from DBT and will continue with individual therapy sessions.  Lunesta is available as needed for sleep and no refills were given today.  Wellbutrin and Lexapro will continue at their current doses.  He has begun psychological testing with personality testing scheduled for his next visit.  This is to clarify his diagnosis for him.    .     Current medications include: buPROPion (WELLBUTRIN SR) 100 MG 12 hr tablet, Take 1 tablet (100 mg) by mouth daily  escitalopram (LEXAPRO) 10 MG tablet, Take 1 tablet (10 mg) by mouth daily  eszopiclone (LUNESTA) 2 MG tablet, Take 1 tablet (2 mg) by mouth nightly as needed for sleep  gabapentin (NEURONTIN) 300 MG capsule, Take 1 capsule (300 mg) by mouth 6 times daily Refill 28 days after last fill  medical cannabis (Patient's own supply), See Admin Instructions (The purpose of this order is to document that the patient reports taking medical cannabis.  This is not a prescription, and is not used to certify that the patient has a qualifying medical " condition.)  Multiple Vitamins-Minerals (MENS MULTIPLE VITAMIN/LYCOPENE PO), Take 1 tablet by mouth daily.  nicotine polacrilex (NICORETTE) 4 MG gum, Place 4 mg inside cheek as needed for smoking cessation  ondansetron (ZOFRAN ODT) 4 MG ODT tab, Take 1 tablet (4 mg) by mouth every 8 hours as needed for nausea or vomiting  sildenafil (REVATIO) 20 MG tablet, TAKE 5 TABLETS BY MOUTH DAILY AS NEEDED, 30 MINUTES TO 4 HOURS BEFORE SEX. (DO NOT USE WITH NITROGLYCERIN, TERAZOSIN OR DOXAZOSIN  naproxen (NAPROSYN) 500 MG tablet, Take 1 tablet (500 mg) by mouth 2 times daily (with meals) (Patient not taking: Reported on 9/15/2020)  tiZANidine (ZANAFLEX) 4 MG tablet, TAKE ONE TABLET BY MOUTH THREE TIMES A DAY (Patient not taking: Reported on 9/15/2020)    No current facility-administered medications on file prior to visit.        The [place] Prescription Monitoring Program has been reviewed and there are no concerns about diversionary activity for controlled substances at this time.      I was able to review most recent Primary Care Provider, specialty provider, and therapy visit notes that I have access to.     Today, patient reports that this last weekend he was not feeling well physically but now better.  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  He left due to his mental health.  No financial stress for now.  His wife has worked extra shifts at the hospital.  He has continued back pain that comes and goes.  He is discontent with life.  He has had some irritability with isolation.  He often keeps his emotions inside.  He has had no SIB.  Weekly therapy continues with DBT therapy.  Uses nicotine gum daily:  4-5 daily>  He doesn't want to be here and if an accident happens he would be ok.  The last 6 weeks he has not been sleeping as well.  He does not get up as easily in the morning.       has a past medical history of Anxiety, Cervicalgia, Chemical dependency (H), Depression, and Migraine headache.    Social history  updates:    He has been diagnosed with Borderline Personality Disorder    Substance use updates:    Medical cannabis daily -   Rare alcohol use  Tobacco use: No    Vital Signs:   There were no vitals taken for this visit.    Labs:    Most recent laboratory results reviewed and no new labs.     Review of Systems:  10 systems (general, cardiovascular, respiratory, eyes, ENT, endocrine, GI, , M/S, neurological) were reviewed. Most pertinent finding(s) is/are: He has chronic back pain,  Worries if he gets short of breath, no chest pain. The remaining systems are all unremarkable.    Mental Status Examination:  Appearance:  awake, alert  Attitude:  cooperative   Eye Contact:  Unable to assess  Gait and Station: No assistive Devices used and No dizziness or falls  Psychomotor Behavior:  Unable to assess  Oriented to:  time, person, and place  Attention Span and Concentration:  Normal  Speech:   clear, coherent and Speaks: English  Mood:  anxious and depressed  Affect:  appropriate and in normal range  Associations:  no loose associations  Thought Process:  logical and goal oriented  Thought Content:  passive suicidal ideation present, no auditory hallucinations present and no visual hallucinations present  Recent and Remote Memory:  intact Not formally assessed. No amnesia.  Fund of Knowledge: appropriate  Insight:  good  Judgment:  intact  Impulse Control:  fair    Suicide Risk Assessment:  Today Timmy Fitzpatrick reports that he sometimes has thoughts of not wanting to live when he is in severe pain . In addition, there are notable risk factors for self-harm, including anxiety, substance abuse, suicidal ideation, withdrawing and mood change. However, risk is mitigated by commitment to family, history of seeking help when needed, future oriented, denies suicidal intent or plan and denies homicidal ideation, intent, or plan. Therefore, based on all available evidence including the factors cited above, Timmy PADGETT  Amari does not appear to be at imminent risk for self-harm, does not meet criteria for a 72-hr hold, and therefore remains appropriate for ongoing outpatient level of care.  A thorough assessment of risk factors related to suicide and self-harm have been reviewed and are noted above. The patient convincingly denies suicidality on several occasions. Local community safety resources printed and reviewed for patient to use if needed. There was no deceit detected, and the patient presented in a manner that was believable.     DSM5 Diagnosis:  296.33 (F33.2) Major Depressive Disorder, Recurrent Episode, Severe With melancholic features  300.02 (F41.1) Generalized Anxiety Disorder  780.52 (G47.00) Insomnia Disorder   With ohter medical comorbidity  Recurrent      Medical comorbidities include:   Patient Active Problem List    Diagnosis Date Noted     Generalized anxiety disorder 03/26/2019     Priority: Medium     Bipolar II disorder (H) 11/28/2018     Priority: Medium     Anxiety 09/18/2017     Priority: Medium     Tear of medial meniscus of knee 09/08/2016     Priority: Medium     Chemical dependency (H) 11/25/2015     Priority: Medium     Alcoholic, sober since 2006 09/13/2012     Priority: Medium     Major depression in partial remission (H) 06/30/2011     Priority: Medium     Migraine headache 06/30/2011     Priority: Medium     (Problem list name updated by automated process. Provider to review and confirm.)       CARDIOVASCULAR SCREENING; LDL GOAL LESS THAN 160 10/31/2010     Priority: Medium     Cervicalgia 03/24/2010     Priority: Medium     Nonallopathic lesion of thoracic region 03/24/2010     Priority: Medium     Problem list name updated by automated process. Provider to review       Nonallopathic lesion of lumbar region 03/24/2010     Priority: Medium     Problem list name updated by automated process. Provider to review         Assessment:    Timmy Fitzpatrick continues to endorse depression and  anxiety symptoms.  These are exacerbated by episodes of severe pain that are chronic in nature.  Today I increased his Wellbutrin SR to 100 mg twice daily.  He will continue Lexapro 10 mg daily for depression and anxiety.  Psychological testing for diagnosis clarification was completed and he received an additional diagnosis of borderline personality disorder.  No refills are needed today for the Lunesta and does not take that every night.  He will continue the gabapentin 300 mg 6 times daily.  Individual therapy is continuing as he completed the DBT group therapies.    Medication side effects and alternatives were reviewed. Health promotion activities recommended and reviewed today. All questions addressed. Education and counseling completed regarding risks and benefits of medications and psychotherapy options.    Treatment Plan:      1. Increase Wellbutrin SR to  100 mg twice daily  3. Lexapro  10 mg daily for depression and anxiety   4. Continue individual therapy  5. Continue Lunesta 2 mg at bedtime as needed for sleep - no refills   6. Psychological testing for diagnosis clarification  - borderline personality disorder   7. Continue gabapentin 300 mg 6 times daily      Continue all other medications as reviewed per electronic medical record today.     Safety plan reviewed. To the Emergency Department as needed or call after hours crisis line at [phone] or [phone]. [place] Crisis Text Line. Text [place] to 962893 or Suicide LifeLine Chat: suicidepreventionlifeline.org/chat/    To schedule individual or family therapy, call [facility] at [phone].    Schedule an appointment with me in 6 weeks or sooner as needed. Call [facility] at [phone] to schedule.    Follow up with primary care provider as planned or for acute medical concerns.    Call the psychiatric nurse line with medication questions or concerns at [phone].    [employer] may be used to communicate with  your provider, but this is not intended to be used for emergencies.    Crisis Resources:    National Suicide Prevention Lifeline: 767.684.6116 (TTY: 167.192.8766). Call anytime for help.  (www.suicidepreventionlifeline.org)  National [place] on Mental Illness (www.audra.org): 679.469.1193 or 553-901-7017.   Mental Health Association (www.mentalhealth.org): 908.154.7719 or 616-177-9442.  [place] Crisis Text Line: Text [place] to 512010  Suicide LifeLine Chat: suicidepreUNITY Mobileline.org/chat    Administrative Billing:   Time spent with patient was 30 minutes and greater than 50% of time or 20 minutes was spent in counseling and coordination of care regarding above diagnoses and treatment plan.    Patient Status:  Patient will continue to be seen for ongoing consultation and stabilization.    Signed:   EMILIA Wagner-BC   Psychiatry

## 2020-09-16 ASSESSMENT — ANXIETY QUESTIONNAIRES: GAD7 TOTAL SCORE: 14

## 2020-09-28 ENCOUNTER — TELEPHONE (OUTPATIENT)
Dept: PSYCHIATRY | Facility: CLINIC | Age: 40
End: 2020-09-28

## 2020-09-28 DIAGNOSIS — F33.0 MILD RECURRENT MAJOR DEPRESSION (H): ICD-10-CM

## 2020-09-28 RX ORDER — BUPROPION HYDROCHLORIDE 100 MG/1
100 TABLET, EXTENDED RELEASE ORAL 2 TIMES DAILY
Qty: 180 TABLET | Refills: 0 | Status: SHIPPED | OUTPATIENT
Start: 2020-09-28 | End: 2020-12-08

## 2020-09-28 NOTE — TELEPHONE ENCOUNTER
Per patient instructions from last visit note, patient was to increase wellbutrin WR to 100mg BID. Rx sent for Wellbutrin, 100mg daily.    Rx adjusted and re-sent to pharmacy.    Leeanna Espinosa RN    Nurse Liaison  [facility]

## 2020-09-28 NOTE — TELEPHONE ENCOUNTER
Need updated directions for Wellbutrin 100mg    Currently reads 1-100mg tablet daily- and says 'dose increase' should read for 200mg daily.    Please update  [facility] - [address] [phone] (Phone)  [phone] (Fax)         Thanks!

## 2020-10-27 DIAGNOSIS — F31.81 BIPOLAR II DISORDER (H): ICD-10-CM

## 2020-10-27 NOTE — TELEPHONE ENCOUNTER
Requested Prescriptions   Pending Prescriptions Disp Refills     gabapentin (NEURONTIN) 300 MG capsule 180 capsule 0     Sig: Take 1 capsule (300 mg) by mouth 6 times daily Refill 28 days after last fill       There is no refill protocol information for this order

## 2020-10-28 RX ORDER — GABAPENTIN 300 MG/1
300 CAPSULE ORAL
Qty: 180 CAPSULE | Refills: 0 | Status: SHIPPED | OUTPATIENT
Start: 2020-10-28 | End: 2020-11-24

## 2020-10-28 NOTE — TELEPHONE ENCOUNTER
Controlled Substance Refill Request for gabapentin (NEURONTIN) 300 MG capsule    Last refill: 9/25/20    Last clinic visit: 9/15/20     Clinic visit frequency required: Q 6 weeks  Next appt: none    Controlled substance agreement on file: No.    Documentation in problem list reviewed:  Yes    Processing:  Rx to be electronically transmitted to pharmacy by provider     [place] reviewed:  reviewed- no concerns

## 2020-10-28 NOTE — TELEPHONE ENCOUNTER
Reason for call:  Medication     If this is a refill request, has the caller requested the refill from the pharmacy already? Yes     Will the patient be using a [facility]? No     Name of the pharmacy and phone number for the current request:   [facility] [address] [place] [address] Phone:  [phone]   Fax:  [phone]        Name of the medication requested:   gabapentin (NEURONTIN) 300 MG capsule     Other request: the patient states he is out of medication.    Phone number to reach patient:  Home number on file [phone] (home)    Best Time: Anytime    Can we leave a detailed message on this number?  YES    Travel screening: Not Applicable

## 2020-10-28 NOTE — TELEPHONE ENCOUNTER
The patient has a return appointment scheduled for 12.8.2020.     The patient is out of him medication, last dose was 10.27.2020 evening.

## 2020-11-24 ENCOUNTER — TELEPHONE (OUTPATIENT)
Dept: PSYCHIATRY | Facility: CLINIC | Age: 40
End: 2020-11-24

## 2020-11-24 DIAGNOSIS — F31.81 BIPOLAR II DISORDER (H): ICD-10-CM

## 2020-11-24 RX ORDER — GABAPENTIN 300 MG/1
300 CAPSULE ORAL
Qty: 180 CAPSULE | Refills: 0 | Status: SHIPPED | OUTPATIENT
Start: 2020-11-24 | End: 2020-12-08

## 2020-11-24 RX ORDER — GABAPENTIN 300 MG/1
300 CAPSULE ORAL
Qty: 180 CAPSULE | Refills: 0 | Status: CANCELLED | OUTPATIENT
Start: 2020-11-24

## 2020-11-24 NOTE — TELEPHONE ENCOUNTER
Controlled Substance Refill Request      Last clinic visit: 9/15/20     Clinic visit frequency required: Q 6 weeks  Next appt: 12/8/20    Controlled substance agreement on file: No.    Documentation in problem list reviewed:  Yes    Processing:  Rx to be electronically transmitted to pharmacy by provider     [place] reviewed:  reviewed- recommend provider review

## 2020-11-24 NOTE — TELEPHONE ENCOUNTER
Reason for call:  Medication   If this is a refill request, has the caller requested the refill from the pharmacy already? Yes  Will the patient be using a [facility]? No  Name of the pharmacy and phone number for the current request:   [facility] [address] [place], [place] [address] Phone:  [phone]   Fax:  [phone]            Name of the medication requested:  gabapentin (NEURONTIN) 300 MG capsule        Other request: pt requested refill earlier today, checked in with pharmacy who states they haven't gotten the refill request from provider.  Pt will run out of meds on Thk'g day so is concerned about that.  Wanted to make sure request was sent to correct pharmacy    Phone number to reach patient:  Cell number on file:    Telephone Information:   Mobile [phone]       Best Time:  anytmie    Can we leave a detailed message on this number?  YES    Travel screening: Not Applicable

## 2020-11-24 NOTE — TELEPHONE ENCOUNTER
Reason for call:  Medication     If this is a refill request, has the caller requested the refill from the pharmacy already? Yes - ( this patient seems to run into this issue each month- this writer will call pharmacy to verfy what number they are using for Marjan   Will the patient be using a [place] [facility]? No  Name of the pharmacy and phone number for the current request: [id number] - [facility], [place] - [address]    Name of the medication requested: gabapentin (NEURONTIN) 300 MG capsule [6544      Other request: Refill 28 days after last fill is the note on this rx from last month   Next appt 12.8.2020    Phone number to reach patient:  Home number on file [phone] (home)    Best Time:      Can we leave a detailed message on this number?  YES    Travel screening: Not Applicable

## 2020-11-24 NOTE — TELEPHONE ENCOUNTER
Duplicate.    Leeanna Espinosa, RN    Nurse Liaison  [facility]

## 2020-12-08 ENCOUNTER — VIRTUAL VISIT (OUTPATIENT)
Dept: PSYCHIATRY | Facility: CLINIC | Age: 40
End: 2020-12-08
Payer: COMMERCIAL

## 2020-12-08 DIAGNOSIS — F41.1 GENERALIZED ANXIETY DISORDER: ICD-10-CM

## 2020-12-08 DIAGNOSIS — F31.81 BIPOLAR II DISORDER (H): ICD-10-CM

## 2020-12-08 DIAGNOSIS — F99 INSOMNIA DUE TO OTHER MENTAL DISORDER: Primary | ICD-10-CM

## 2020-12-08 DIAGNOSIS — F51.05 INSOMNIA DUE TO OTHER MENTAL DISORDER: Primary | ICD-10-CM

## 2020-12-08 PROCEDURE — 99214 OFFICE O/P EST MOD 30 MIN: CPT | Mod: 95 | Performed by: NURSE PRACTITIONER

## 2020-12-08 RX ORDER — ESCITALOPRAM OXALATE 10 MG/1
10 TABLET ORAL DAILY
Qty: 90 TABLET | Refills: 1 | Status: SHIPPED | OUTPATIENT
Start: 2020-12-08 | End: 2021-04-13

## 2020-12-08 RX ORDER — BUPROPION HYDROCHLORIDE 100 MG/1
100 TABLET, EXTENDED RELEASE ORAL 2 TIMES DAILY
Qty: 180 TABLET | Refills: 1 | Status: SHIPPED | OUTPATIENT
Start: 2020-12-08 | End: 2021-04-13

## 2020-12-08 RX ORDER — GABAPENTIN 300 MG/1
300 CAPSULE ORAL
Qty: 180 CAPSULE | Refills: 3 | Status: SHIPPED | OUTPATIENT
Start: 2020-12-08 | End: 2021-04-09

## 2020-12-08 ASSESSMENT — ANXIETY QUESTIONNAIRES
IF YOU CHECKED OFF ANY PROBLEMS ON THIS QUESTIONNAIRE, HOW DIFFICULT HAVE THESE PROBLEMS MADE IT FOR YOU TO DO YOUR WORK, TAKE CARE OF THINGS AT HOME, OR GET ALONG WITH OTHER PEOPLE: VERY DIFFICULT
5. BEING SO RESTLESS THAT IT IS HARD TO SIT STILL: NEARLY EVERY DAY
7. FEELING AFRAID AS IF SOMETHING AWFUL MIGHT HAPPEN: NOT AT ALL
1. FEELING NERVOUS, ANXIOUS, OR ON EDGE: NEARLY EVERY DAY
GAD7 TOTAL SCORE: 14
2. NOT BEING ABLE TO STOP OR CONTROL WORRYING: SEVERAL DAYS
6. BECOMING EASILY ANNOYED OR IRRITABLE: NEARLY EVERY DAY
3. WORRYING TOO MUCH ABOUT DIFFERENT THINGS: SEVERAL DAYS

## 2020-12-08 ASSESSMENT — PATIENT HEALTH QUESTIONNAIRE - PHQ9
SUM OF ALL RESPONSES TO PHQ QUESTIONS 1-9: 20
5. POOR APPETITE OR OVEREATING: NEARLY EVERY DAY

## 2020-12-08 NOTE — PATIENT INSTRUCTIONS
1.  Continue Wellbutrin  mg 2 times daily    2.  Continue Escitalopram 10 mg daily    3.  Continue Lunesta 2 mg nightly as needed for sleep-no refills needed    4.  Continue gabapentin 300 mg by mouth 6 times daily for anxiety and pain    5.  Continue talk therapy with Liana      Continue all other medications as reviewed per electronic medical record today.     Safety plan reviewed. To the Emergency Department as needed or call after hours crisis line at [phone] or [phone]. [place] Crisis Text Line. Text [place] to 912505 or Suicide LifeLine Chat: [employer]org/chat/    To schedule individual or family therapy, call [facility] at [phone].    Schedule an appointment with me in 3 months or sooner as needed. Call [facility] at [phone] to schedule.    Follow up with primary care provider as planned or for acute medical concerns.    Call the psychiatric nurse line with medication questions or concerns at [phone].    [employer] may be used to communicate with your provider, but this is not intended to be used for emergencies.    Crisis Resources:    National Suicide Prevention Lifeline: 285.412.8904 (TTY: 884.941.5579). Call anytime for help.  (www.suicidepreventionlifeline.org)  National [place] on Mental Illness (www.audra.org): 989.299.1465 or 966-691-6976.   Mental Health Association (www.mentalhealth.org): 990.618.2525 or 525-551-2787.  [place] Crisis Text Line: Text [place] to 461335  Suicide LifeLine Chat: [employer]org/chat

## 2020-12-08 NOTE — PROGRESS NOTES
"  Timmy Fitzpatrick is a 40 year old male who is being evaluated via a billable telephone visit.      The patient has been notified of following:     \"This telephone visit will be conducted via a call between you and your physician/provider. We have found that certain health care needs can be provided without the need for a physical exam.  This service lets us provide the care you need with a short phone conversation.  If a prescription is necessary we can send it directly to your pharmacy.  If lab work is needed we can place an order for that and you can then stop by our lab to have the test done at a later time.    Telephone visits are billed at different rates depending on your insurance coverage. During this emergency period, for some insurers they may be billed the same as an in-person visit.  Please reach out to your insurance provider with any questions.    If during the course of the call the physician/provider feels a telephone visit is not appropriate, you will not be charged for this service.\"    Patient has given verbal consent for Telephone visit?  Yes    What phone number would you like to be contacted at? [phone]    How would you like to obtain your AVS? MyChart    Phone call duration: 30 minutes    Marjan Mathews NP          Outpatient Psychiatric Progress Note    Name: Timmy Fitzpatrick   : 1980                    Primary Care Provider: [facility]   Therapist: Liana SOLIS     PHQ-9 scores:  PHQ-9 SCORE 3/11/2020 2020 9/15/2020   PHQ-9 Total Score - - -   PHQ-9 Total Score MyChart - 16 (Moderately severe depression) -   PHQ-9 Total Score 10 16 14   Some encounter information is confidential and restricted. Go to Review Flowsheets activity to see all data.       JUAREZ-7 scores:  JUAREZ-7 SCORE 3/11/2020 2020 9/15/2020   Total Score - - -   Total Score - 13 (moderate anxiety) -   Total Score 10 13 14   Some encounter information is confidential " "and restricted. Go to Review Flowsheets activity to see all data.       Patient Identification:    Patient is a 40 year old year old,   White American male  who presents for return visit with me.  Patient is currently unemployed. Patient attended the session alone. Patient prefers to be called: \"Ventura\".    Interim History:    I last saw Timmy Fitzpatrick for outpatient psychiatry Return Visit on September 15, 2020.     During that appointment, he continued to endorse depression and anxiety symptoms.  These are exacerbated by episodes of severe pain that are chronic in nature.  Today I increased his Wellbutrin SR to 100 mg twice daily.  He will continue Lexapro 10 mg daily for depression and anxiety.  Psychological testing for diagnosis clarification was completed and he received an additional diagnosis of borderline personality disorder.  No refills are needed today for the Lunesta and he does not take that every night.  He will continue the gabapentin 300 mg 6 times daily.  Individual therapy is continuing as he completed the DBT group therapies. .     Current medications include:      buPROPion (WELLBUTRIN SR) 100 MG 12 hr tablet, Take 1 tablet (100 mg) by mouth 2 times daily       escitalopram (LEXAPRO) 10 MG tablet, Take 1 tablet (10 mg) by mouth daily       eszopiclone (LUNESTA) 2 MG tablet, Take 1 tablet (2 mg) by mouth nightly as needed for sleep       gabapentin (NEURONTIN) 300 MG capsule, Take 1 capsule (300 mg) by mouth 6 times daily Refill 28 days after last fill       medical cannabis (Patient's own supply), See Admin Instructions (The purpose of this order is to document that the patient reports taking medical cannabis.  This is not a prescription, and is not used to certify that the patient has a qualifying medical condition.)       Multiple Vitamins-Minerals (MENS MULTIPLE VITAMIN/LYCOPENE PO), Take 1 tablet by mouth daily.       naproxen (NAPROSYN) 500 MG tablet, Take 1 tablet (500 mg) by " mouth 2 times daily (with meals) (Patient not taking: Reported on 9/15/2020)       nicotine polacrilex (NICORETTE) 4 MG gum, Place 4 mg inside cheek as needed for smoking cessation       ondansetron (ZOFRAN ODT) 4 MG ODT tab, Take 1 tablet (4 mg) by mouth every 8 hours as needed for nausea or vomiting       sildenafil (REVATIO) 20 MG tablet, TAKE 5 TABLETS BY MOUTH DAILY AS NEEDED, 30 MINUTES TO 4 HOURS BEFORE SEX. (DO NOT USE WITH NITROGLYCERIN, TERAZOSIN OR DOXAZOSIN       tiZANidine (ZANAFLEX) 4 MG tablet, TAKE ONE TABLET BY MOUTH THREE TIMES A DAY (Patient not taking: Reported on 9/15/2020)    No current facility-administered medications on file prior to visit.        The [place] Prescription Monitoring Program has been reviewed and there are no concerns about diversionary activity for controlled substances at this time.      I was able to review most recent Primary Care Provider, specialty provider, and therapy visit notes that I have access to.     Today, patient reports that he is helping his grandmother move to an assisted living. He is thinking about changing pharmacies.  He gets anxious when he gets close to running out of anxiety.  He has not been sleeping well.  He is fearful of taking the Lunesta.  He does not want to get addicted to it.  He has ongoing back pain.  He feels exhausted, irritable,  And impatient.  He has thoughts of not wanting to live.  He has been unable to exercise as much as he would like.      has a past medical history of Anxiety, Cervicalgia, Chemical dependency (H), Depression, and Migraine headache.    Social history updates:    He has been distance learning his children.      Substance use updates:    Cannabis suspension,  Rare use of alcohol  Tobacco use: Yes nicotine gum  Ready to quit?  No  Nicotine Replacement Therapy tried: Nicotine gum     Vital Signs:   There were no vitals taken for this visit.    Labs:    Most recent laboratory results reviewed and no new labs.      Review of Systems:  10 systems (general, cardiovascular, respiratory, eyes, ENT, endocrine, GI, , M/S, neurological) were reviewed. Most pertinent finding(s) is/are: Chronic back pain, no chest pain, no skin rashes. The remaining systems are all unremarkable.    Mental Status Examination:  Appearance:  awake, alert  Attitude:  cooperative   Eye Contact:  Unable to assess  Gait and Station: No assistive Devices used and No dizziness or falls  Psychomotor Behavior:  Unable to assess  Oriented to:  time, person, and place  Attention Span and Concentration:  Normal  Speech:   clear, coherent and Speaks: English  Mood:  anxious and depressed  Affect:  intensity is heightened  Associations:  no loose associations  Thought Process:  logical and goal oriented  Thought Content:  no evidence of suicidal ideation or homicidal ideation, no auditory hallucinations present and no visual hallucinations present  Recent and Remote Memory:  intact Not formally assessed. No amnesia.  Fund of Knowledge: appropriate  Insight:  good  Judgment:  intact  Impulse Control:  intact    Suicide Risk Assessment:  Today Timmy Fitzpatrick reports that he is having no thoughts to want to harm himself or to hurt other people. In addition, there are notable risk factors for self-harm, including anxiety, withdrawing and mood change. However, risk is mitigated by commitment to family, history of seeking help when needed, future oriented, denies suicidal intent or plan and denies homicidal ideation, intent, or plan. Therefore, based on all available evidence including the factors cited above, Timmy Fitzpatrick does not appear to be at imminent risk for self-harm, does not meet criteria for a 72-hr hold, and therefore remains appropriate for ongoing outpatient level of care.  A thorough assessment of risk factors related to suicide and self-harm have been reviewed and are noted above. The patient convincingly denies suicidality on several  occasions. Local community safety resources printed and reviewed for patient to use if needed. There was no deceit detected, and the patient presented in a manner that was believable.     DSM5 Diagnosis:  296.89 Bipolar II Disorder Depressed and moderate  300.02 (F41.1) Generalized Anxiety Disorder  780.52 (G47.00) Insomnia Disorder   With ohter medical comorbidity  Recurrent      Medical comorbidities include:   Patient Active Problem List    Diagnosis Date Noted     Generalized anxiety disorder 03/26/2019     Priority: Medium     Bipolar II disorder (H) 11/28/2018     Priority: Medium     Anxiety 09/18/2017     Priority: Medium     Tear of medial meniscus of knee 09/08/2016     Priority: Medium     Chemical dependency (H) 11/25/2015     Priority: Medium     Alcoholic, sober since 2006 09/13/2012     Priority: Medium     Major depression in partial remission (H) 06/30/2011     Priority: Medium     Migraine headache 06/30/2011     Priority: Medium     (Problem list name updated by automated process. Provider to review and confirm.)       CARDIOVASCULAR SCREENING; LDL GOAL LESS THAN 160 10/31/2010     Priority: Medium     Cervicalgia 03/24/2010     Priority: Medium     Nonallopathic lesion of thoracic region 03/24/2010     Priority: Medium     Problem list name updated by automated process. Provider to review       Nonallopathic lesion of lumbar region 03/24/2010     Priority: Medium     Problem list name updated by automated process. Provider to review         Assessment:    Timmy PADGETT Amari told me that he gets highly anxious when he is not able to get his medication refills in a timely manner.  He admits that he has requested early refills of gabapentin at one point after we reviewed the guidelines regarding refills.  Medications were sent to the pharmacy today with refills on them to decrease his anxiety.  He continues to struggle with depressive symptoms including a motivation and anhedonia.  Today he denied  any thoughts to want to end his life or to harm other people.  No changes in his medications will be made today but he did mention to me that eventually he would like to decrease the number of gabapentin he takes during the day.  He continues with talk therapy with Liana..    Medication side effects and alternatives were reviewed. Health promotion activities recommended and reviewed today. All questions addressed. Education and counseling completed regarding risks and benefits of medications and psychotherapy options.    Treatment Plan:        1.  Continue Wellbutrin  mg 2 times daily    2.  Continue Escitalopram 10 mg daily    3.  Continue Lunesta 2 mg nightly as needed for sleep-no refills needed    4.  Continue gabapentin 300 mg by mouth 6 times daily for anxiety and pain    5.  Continue talk therapy with Liana      Continue all other medications as reviewed per electronic medical record today.     Safety plan reviewed. To the Emergency Department as needed or call after hours crisis line at [phone] or [phone]. [place] Crisis Text Line. Text [place] to 117564 or Suicide LifeLine Chat: suicidepreventionlifeline.org/chat/    To schedule individual or family therapy, call [facility] at [phone].    Schedule an appointment with me in 3 months or sooner as needed. Call [facility] at [phone] to schedule.    Follow up with primary care provider as planned or for acute medical concerns.    Call the psychiatric nurse line with medication questions or concerns at [phone].    [employer] may be used to communicate with your provider, but this is not intended to be used for emergencies.    Crisis Resources:    National Suicide Prevention Lifeline: 267.570.2966 (TTY: 544.989.9207). Call anytime for help.  (www.suicidepreventionlifeline.org)  National [place] on Mental Illness (www.audra.org): 511.566.3394 or 674-873-2111.   Mental Health Association  (www.mentalhealth.org): 585.626.3740 or 548-966-1908.  [place] Crisis Text Line: Text [place] to 037037  Suicide LifeLine Chat: suicidepreventiongloStreamline.org/chat    Administrative Billing:   Time spent with patient was 30 minutes and greater than 50% of time or 20 minutes was spent in counseling and coordination of care regarding above diagnoses and treatment plan.    Patient Status:  Patient will continue to be seen for ongoing consultation and stabilization.    Signed:   EMILIA Wagner-BC   Psychiatry

## 2020-12-09 ASSESSMENT — ANXIETY QUESTIONNAIRES: GAD7 TOTAL SCORE: 14

## 2021-01-15 ENCOUNTER — HEALTH MAINTENANCE LETTER (OUTPATIENT)
Age: 41
End: 2021-01-15

## 2021-01-25 NOTE — PROGRESS NOTES
"    Outpatient Psychiatric Progress Note    Name: Timmy Fitzpatrick   : 1980                    Primary Care Provider: [facility]   Therapist: [facility]    PHQ-9 scores:  PHQ-9 SCORE 3/26/2019 2019 2019   PHQ-9 Total Score - - -   PHQ-9 Total Score MyChart 21 (Severe depression) 16 (Moderately severe depression) -   PHQ-9 Total Score 21 16 13   Some encounter information is confidential and restricted. Go to Review Flowsheets activity to see all data.       JUAREZ-7 scores:  JUAREZ-7 SCORE 3/26/2019 2019 2019   Total Score - - -   Total Score 17 (severe anxiety) 10 (moderate anxiety) -   Total Score 17 10 9   Some encounter information is confidential and restricted. Go to Review Flowsheets activity to see all data.       Patient Identification:  Patient is a 38 year old year old,   White American male  who presents for return visit with me.  Patient is currently employed full time. Patient attended the session alone. Patient prefers to be called: \"Ventura\".    Interim History:  I last saw Timmy Fitzpatrick for outpatient psychiatry Return Visit on 19.     During that appointment, we reviewed he recent re-introduction of Wellbutrin, to which he was beginning to benefit. We agreed to stay the course with his remaining medication: Lexapro 20 mg qAM, Lithium 600 mg nightly, Gabapentin up to 1800 mg daily -- as well as Seroquel 6.25 to 12.5 mg PRN and Lunesta 2 mg PRN. He was to continue [facility] IOP, and was awaiting a individual therapy intake at the same practice.      Current medications include:   Current Outpatient Medications   Medication Sig     buPROPion (WELLBUTRIN XL) 150 MG 24 hr tablet Take 1 tablet (150 mg) by mouth every morning     escitalopram (LEXAPRO) 20 MG tablet Take 1 tablet (20 mg) by mouth daily     eszopiclone (LUNESTA) 2 MG tablet Take 1 tablet (2 mg) by mouth nightly as needed for sleep     gabapentin (NEURONTIN) 300 MG capsule Take 1 capsule " (300 mg) by mouth 6 times daily     Multiple Vitamins-Minerals (MENS MULTIPLE VITAMIN/LYCOPENE PO) Take 1 tablet by mouth daily.     QUEtiapine (SEROQUEL) 25 MG tablet 1 tab daily as needed for anxiety     sildenafil (REVATIO) 20 MG tablet TAKE 5 TABLETS BY MOUTH DAILY AS NEEDED, 30 MINUTES TO 4 HOURS BEFORE SEX. (DO NOT USE WITH NITROGLYCERIN, TERAZOSIN OR DOXAZOSIN     GLUCOSAMINE HCL None Entered     lithium (ESKALITH) 300 MG tablet 2 tabs nightly (Patient not taking: Reported on 5/21/2019)     No current facility-administered medications for this visit.        The [place] Prescription Monitoring Program has been reviewed and there are no concerns about diversionary activity for controlled substances at this time.      I was able to review most recent Primary Care Provider, specialty provider, and therapy visit notes that I have access to.     Today, patient report he is doing remarkably well. States he had 8 good days in a row of stable euthymic mood, which he cannot recall the last instance of. States he is continuing DBT group three times weekly, and finally started individual therapy at [facility] several weeks ago; going once a week; states therapist is a good fit. States SI and SIB impulses are significantly reduced.    States for the past month he has been having persistent night sweats. Wasn't sure what was contributing to this, as he didn't start at new medications or change any dosages since this has been going on. But for good measure, he tapered off his Lithium in the past month. Night sweats didn't go away, but his energy levels have improved without any mood upset, so has decided to remain off Lithium.    Has continued with Lexapro 20 mg and Wellbutrin  mg in the morning. Has been taking Gabapentin 900 mg at 4 PM and another 900 mg closer to bedtime; finds this continues to have essential therapeutic effect in managing his anxiety/mood, but overall goal would be to reduce his use of this as he  "continues in DBT. Has used Seroquel 6.25 mg twice in past 2 months; helpful for acute moments, but still \"puts me out\" so doesn't use more often. Has used Lunesta 1-2 times a month, 2 mg, to good effect if he's having a bout of insomnia.    States he's making excellent use of DBT skills every day both at home and at work. Does a diary card nightly.    Continues part-time at [employer] job. States workplace accomodation remains in place, but these may need renewal.           Past Medical History:   Diagnosis Date     Anxiety      Cervicalgia      Chemical dependency (H)      Depression      Migraine headache       has a past medical history of Anxiety, Cervicalgia, Chemical dependency (H), Depression, and Migraine headache.    Social history updates:  See above    Substance use updates:  Nicotine vaping  No ETOH; sober since 2016  Occasional cannabis at night      Vital Signs:   /84 (BP Location: Right arm, Patient Position: Chair, Cuff Size: Adult Large)   Pulse 63   Resp 14   Wt 79.8 kg (176 lb)   SpO2 99%   BMI 25.25 kg/m      Labs:  Most recent laboratory results reviewed and no new labs.    Review of Systems:  10 systems (general, cardiovascular, respiratory, eyes, ENT, endocrine, GI, , M/S, neurological) were reviewed. Most pertinent finding(s) is/are: night sweats. The remaining systems are all unremarkable.    Mental Status Examination:     Appearance:  awake, alert and adequately groomed  Attitude:  cooperative   Eye Contact:  good  Gait and Station: Normal  Psychomotor Behavior:  intact station, gait and muscle tone  Oriented to:  time, person, and place  Attention Span and Concentration:  Normal  Speech:  clear, coherent  Mood: better  Affect:  appropriate and in normal range  Associations:  no loose associations  Thought Process:  logical, linear and goal oriented  Thought Content:  Appropriate to Interview  Recent and Remote Memory:  intact Not formally assessed. No amnesia.  Fund of " Knowledge: appropriate  Insight:  good  Judgment:  intact  Impulse Control:  intact     Suicide Risk Assessment:  Today Timmy Fitzpatrick denies any recent suicidal ideation. In addition, there are notable risk factors for self-harm, anxiety, suicidal ideation and withdrawing. However, risk is mitigated by commitment to family, sobriety, removal of gun access, absence of past attempts, ability to volunteer a safety plan, history of seeking help when needed and future oriented. Therefore, based on all available evidence including the factors cited above, Timmy Fitzpatrick does not appear to be at imminent risk for self-harm, does not meet criteria for a 72-hr hold, and therefore remains appropriate for ongoing outpatient level of care.  A thorough assessment of risk factors related to suicide and self-harm have been reviewed and are noted above. The patient convincingly denies acute suicidality on several occasions. Local community safety resources reviewed and printed for patient to use if needed. There was no deceit detected, and the patient presented in a manner that was believable.      DSM5 Diagnosis:  296.89 Bipolar II Disorder With mixed features  R/o Borderline Personality Disorder  300.00 (F41.9) Unspecified Anxiety Disorder      Medical comorbidities include:   Patient Active Problem List    Diagnosis Date Noted     Generalized anxiety disorder 03/26/2019     Priority: Medium     Bipolar II disorder (H) 11/28/2018     Priority: Medium     Anxiety 09/18/2017     Priority: Medium     Tear of medial meniscus of knee 09/08/2016     Priority: Medium     Chemical dependency (H) 11/25/2015     Priority: Medium     Alcoholic, sober since 2006 09/13/2012     Priority: Medium     Major depression in partial remission (H) 06/30/2011     Priority: Medium     Migraine headache 06/30/2011     Priority: Medium     (Problem list name updated by automated process. Provider to review and confirm.)       CARDIOVASCULAR  SCREENING; LDL GOAL LESS THAN 160 10/31/2010     Priority: Medium     Cervicalgia 03/24/2010     Priority: Medium     Nonallopathic lesion of thoracic region 03/24/2010     Priority: Medium     Problem list name updated by automated process. Provider to review       Nonallopathic lesion of lumbar region 03/24/2010     Priority: Medium     Problem list name updated by automated process. Provider to review           Assessment:  Timmy Fitzpatrick reports a good improvement towards his mood and emotional regulation with his continued engagement in individual and group DBT, with an associated substantial reduction in his self-harm impulses. We are both greatly encouraged by this. Given that we have progressed beyond the initial stages of crisis, and as we've come to consider a borderline personality is more appropriate diagnosis vs bipolarity, I am OK for him to be off Lithium at this time. It's not clear where his night sweats of the past month are coming from -- we'll continue to monitor this. We agree to maintain Lexapro 20 mg daily and Wellbutrin  mg daily. He may continue Gabapentin up to 1800 mg total daily -- I agree a goal of treatment would be for him to be able to reduce his daily use in concert with increased DBT mastery. He may continue to use Seroquel 6.25 to 12.5 mg as needed for more acute agitation/dysregulation. And we agree that if he has two nights of insomnia, he'll take a Lunesta on the third night.    Medication side effects and alternatives were reviewed. Health promotion activities recommended and reviewed today. All questions addressed. Education and counseling completed regarding risks and benefits of medications and psychotherapy options.    Treatment Plan:    Continue Wellbutrin  mg qAM    Continue Lexapro 20 mg qAM    May continue Gabapentin up to 900 mg BID for anxiety    May use Seroquel 6.25 (1/4 tab) to 12.5 mg (1/2 tab) daily as needed for agitation    May use Lunesta 2 mg  as needed for sleep    Continue [facility] for individual and group therapy    Continue all other medical directions per primary care provider.     Continue all other medications as reviewed per electronic medical record today.     Safety plan reviewed. To the Emergency Department as needed or call after hours crisis line at [phone] or [phone]. [place] Crisis Text Line. Text [place] to 628622 or Suicide LifeLine Chat: suicidepreventionMoe [place].org/chat/    Schedule an appointment with me in 2 months or sooner as needed. Call [facility] at [phone] to schedule.    Follow up with primary care provider as planned or for acute medical concerns.    Call the psychiatric nurse line with medication questions or concerns at [phone].    [facility] may be used to communicate with your provider, but this is not intended to be used for emergencies.        Administrative Billing:   Time spent with patient was 30 minutes and greater than 50% of time or 20 minutes was spent in counseling and coordination of care regarding above diagnoses and treatment plan.    Patient Status:  Patient will continue to be seen for ongoing consultation and stabilization.    Signed:   Yury Dueñas CNP   Psychiatry          Labs

## 2021-01-26 ENCOUNTER — MYC MEDICAL ADVICE (OUTPATIENT)
Dept: FAMILY MEDICINE | Facility: CLINIC | Age: 41
End: 2021-01-26

## 2021-01-26 DIAGNOSIS — K42.9 UMBILICAL HERNIA WITHOUT OBSTRUCTION AND WITHOUT GANGRENE: ICD-10-CM

## 2021-01-26 DIAGNOSIS — Z30.2 ENCOUNTER FOR VASECTOMY: Primary | ICD-10-CM

## 2021-01-26 NOTE — TELEPHONE ENCOUNTER
Pt given Specialty # to call and schedule Hernia appt-Gen Surg and Vasectomy appt-Urology. KpavelDANA

## 2021-01-26 NOTE — TELEPHONE ENCOUNTER
Referral placed for general surgery and could start there and ask if they can do vasectomy as well, if not, there is also a referral for urology.  Vida Miranda, DANINP

## 2021-02-10 ENCOUNTER — OFFICE VISIT (OUTPATIENT)
Dept: SURGERY | Facility: CLINIC | Age: 41
End: 2021-02-10
Attending: NURSE PRACTITIONER
Payer: COMMERCIAL

## 2021-02-10 VITALS
HEART RATE: 61 BPM | DIASTOLIC BLOOD PRESSURE: 71 MMHG | HEIGHT: 70 IN | SYSTOLIC BLOOD PRESSURE: 121 MMHG | TEMPERATURE: 98.3 F | BODY MASS INDEX: 22.9 KG/M2 | WEIGHT: 160 LBS

## 2021-02-10 DIAGNOSIS — K42.9 UMBILICAL HERNIA WITHOUT OBSTRUCTION AND WITHOUT GANGRENE: ICD-10-CM

## 2021-02-10 DIAGNOSIS — Z30.2 ENCOUNTER FOR VASECTOMY: ICD-10-CM

## 2021-02-10 PROCEDURE — 99204 OFFICE O/P NEW MOD 45 MIN: CPT | Performed by: SURGERY

## 2021-02-10 ASSESSMENT — MIFFLIN-ST. JEOR: SCORE: 1637.01

## 2021-02-10 NOTE — PROGRESS NOTES
40-year-old male complaint of umbilical mass for the past several years.  He reports localized pain, 1-2 out of 10 without radiation.  Pain is dull and achy.  Aggravated by straining and alleviated by rest.  Patient would also like a vasectomy under the same sedation if possible.    Patient Active Problem List   Diagnosis     Cervicalgia     Nonallopathic lesion of thoracic region     Nonallopathic lesion of lumbar region     CARDIOVASCULAR SCREENING; LDL GOAL LESS THAN 160     Major depression in partial remission (H)     Migraine headache     Alcoholic, sober since 2006     Chemical dependency (H)     Anxiety     Tear of medial meniscus of knee     Bipolar II disorder (H)     Generalized anxiety disorder       Past Medical History:   Diagnosis Date     Anxiety      Cervicalgia      Chemical dependency (H)      Depression      Migraine headache        Past Surgical History:   Procedure Laterality Date     HERNIA REPAIR       SURGICAL HISTORY OF -       deviated septum       Family History   Problem Relation Age of Onset     Cancer Paternal Grandfather      Depression Mother      Anxiety Disorder Mother      Depression Father      Unknown/Adopted Father      Dementia Maternal Grandfather      Substance Abuse Paternal Grandmother        Social History     Tobacco Use     Smoking status: Former Smoker     Packs/day: 0.00     Types: Dip, chew, snus or snuff     Smokeless tobacco: Former User     Types: Chew     Quit date: 10/1/2019   Substance Use Topics     Alcohol use: No     Comment: sober since April 2016        History   Drug Use     Types: Marijuana     Comment: Daily       Current Outpatient Medications   Medication Sig Dispense Refill     buPROPion (WELLBUTRIN SR) 100 MG 12 hr tablet Take 1 tablet (100 mg) by mouth 2 times daily 180 tablet 1     escitalopram (LEXAPRO) 10 MG tablet Take 1 tablet (10 mg) by mouth daily 90 tablet 1     eszopiclone (LUNESTA) 2 MG tablet Take 1 tablet (2 mg) by mouth nightly as  needed for sleep 15 tablet 0     gabapentin (NEURONTIN) 300 MG capsule Take 1 capsule (300 mg) by mouth 6 times daily Refill 28 days after last fill 180 capsule 3     medical cannabis (Patient's own supply) See Admin Instructions (The purpose of this order is to document that the patient reports taking medical cannabis.  This is not a prescription, and is not used to certify that the patient has a qualifying medical condition.)       Multiple Vitamins-Minerals (MENS MULTIPLE VITAMIN/LYCOPENE PO) Take 1 tablet by mouth daily.       nicotine polacrilex (NICORETTE) 4 MG gum Place 4 mg inside cheek as needed for smoking cessation       sildenafil (REVATIO) 20 MG tablet TAKE 5 TABLETS BY MOUTH DAILY AS NEEDED, 30 MINUTES TO 4 HOURS BEFORE SEX. (DO NOT USE WITH NITROGLYCERIN, TERAZOSIN OR DOXAZOSIN 60 tablet 5     naproxen (NAPROSYN) 500 MG tablet Take 1 tablet (500 mg) by mouth 2 times daily (with meals) 30 tablet 0     ondansetron (ZOFRAN ODT) 4 MG ODT tab Take 1 tablet (4 mg) by mouth every 8 hours as needed for nausea or vomiting (Patient not taking: Reported on 2/10/2021) 7 tablet 0       Allergies   Allergen Reactions     Cats      Nkda [No Known Drug Allergies]      Seasonal Allergies       ROS  CV - No CP or SOB  Resp - No SOB or dyspnea  GI - No nausea or vomiting   - No difficulty with urination    Exam:  AXO3 NAD  Neuro - Strength 5/5 all major groups, sensation intact, PERRL  Lungs - CTA  CV - RRR  Abd - Soft, non-distended, non-tender, +BS, palpable reducible mass at umbilicus.  Fascial defect approximately 1 cm.  Extr - No edema    Assessment and plan: 40-year-old male with reducible umbilical hernia and unwanted fertility.  Patient is good candidate for open repair under light sedation.  During the same sedation my partner, Dr. Le will also assist me in performing a bilateral vasectomy.  Risks benefits alternatives and complications were discussed with the patient including the possibility of infection  bleeding or failed vasectomy.  Patient understood and wished to proceed. PATIENT IS CLEARED FOR SURGERY.    Stanley Woods MD

## 2021-02-10 NOTE — LETTER
2/10/2021         RE: Timmy Fitzpatrick  [address] [place] [address]        Dear Colleague,    Thank you for referring your patient, Timmy Fitzpatrick, to the [facility]. Please see a copy of my visit note below.    40-year-old male complaint of umbilical mass for the past several years.  He reports localized pain, 1-2 out of 10 without radiation.  Pain is dull and achy.  Aggravated by straining and alleviated by rest.  Patient would also like a vasectomy under the same sedation if possible.    Patient Active Problem List   Diagnosis     Cervicalgia     Nonallopathic lesion of thoracic region     Nonallopathic lesion of lumbar region     CARDIOVASCULAR SCREENING; LDL GOAL LESS THAN 160     Major depression in partial remission (H)     Migraine headache     Alcoholic, sober since 2006     Chemical dependency (H)     Anxiety     Tear of medial meniscus of knee     Bipolar II disorder (H)     Generalized anxiety disorder       Past Medical History:   Diagnosis Date     Anxiety      Cervicalgia      Chemical dependency (H)      Depression      Migraine headache        Past Surgical History:   Procedure Laterality Date     HERNIA REPAIR       SURGICAL HISTORY OF -       deviated septum       Family History   Problem Relation Age of Onset     Cancer Paternal Grandfather      Depression Mother      Anxiety Disorder Mother      Depression Father      Unknown/Adopted Father      Dementia Maternal Grandfather      Substance Abuse Paternal Grandmother        Social History     Tobacco Use     Smoking status: Former Smoker     Packs/day: 0.00     Types: Dip, chew, snus or snuff     Smokeless tobacco: Former User     Types: Chew     Quit date: 10/1/2019   Substance Use Topics     Alcohol use: No     Comment: sober since April 2016        History   Drug Use     Types: Marijuana     Comment: Daily       Current Outpatient Medications   Medication Sig Dispense Refill     buPROPion (WELLBUTRIN  SR) 100 MG 12 hr tablet Take 1 tablet (100 mg) by mouth 2 times daily 180 tablet 1     escitalopram (LEXAPRO) 10 MG tablet Take 1 tablet (10 mg) by mouth daily 90 tablet 1     eszopiclone (LUNESTA) 2 MG tablet Take 1 tablet (2 mg) by mouth nightly as needed for sleep 15 tablet 0     gabapentin (NEURONTIN) 300 MG capsule Take 1 capsule (300 mg) by mouth 6 times daily Refill 28 days after last fill 180 capsule 3     medical cannabis (Patient's own supply) See Admin Instructions (The purpose of this order is to document that the patient reports taking medical cannabis.  This is not a prescription, and is not used to certify that the patient has a qualifying medical condition.)       Multiple Vitamins-Minerals (MENS MULTIPLE VITAMIN/LYCOPENE PO) Take 1 tablet by mouth daily.       nicotine polacrilex (NICORETTE) 4 MG gum Place 4 mg inside cheek as needed for smoking cessation       sildenafil (REVATIO) 20 MG tablet TAKE 5 TABLETS BY MOUTH DAILY AS NEEDED, 30 MINUTES TO 4 HOURS BEFORE SEX. (DO NOT USE WITH NITROGLYCERIN, TERAZOSIN OR DOXAZOSIN 60 tablet 5     naproxen (NAPROSYN) 500 MG tablet Take 1 tablet (500 mg) by mouth 2 times daily (with meals) 30 tablet 0     ondansetron (ZOFRAN ODT) 4 MG ODT tab Take 1 tablet (4 mg) by mouth every 8 hours as needed for nausea or vomiting (Patient not taking: Reported on 2/10/2021) 7 tablet 0       Allergies   Allergen Reactions     Cats      Nkda [No Known Drug Allergies]      Seasonal Allergies       ROS  CV - No CP or SOB  Resp - No SOB or dyspnea  GI - No nausea or vomiting   - No difficulty with urination    Exam:  AXO3 NAD  Neuro - Strength 5/5 all major groups, sensation intact, PERRL  Lungs - CTA  CV - RRR  Abd - Soft, non-distended, non-tender, +BS, palpable reducible mass at umbilicus.  Fascial defect approximately 1 cm.  Extr - No edema    Assessment and plan: 40-year-old male with reducible umbilical hernia and unwanted fertility.  Patient is good candidate for open  repair under light sedation.  During the same sedation my partner, Dr. Le will also assist me in performing a bilateral vasectomy.  Risks benefits alternatives and complications were discussed with the patient including the possibility of infection bleeding or failed vasectomy.  Patient understood and wished to proceed. PATIENT IS CLEARED FOR SURGERY.    Stanley Woods MD       Again, thank you for allowing me to participate in the care of your patient.        Sincerely,        Stanley Woods MD

## 2021-03-01 ENCOUNTER — TELEPHONE (OUTPATIENT)
Dept: SURGERY | Facility: CLINIC | Age: 41
End: 2021-03-01

## 2021-03-01 DIAGNOSIS — Z11.59 ENCOUNTER FOR SCREENING FOR OTHER VIRAL DISEASES: ICD-10-CM

## 2021-03-01 DIAGNOSIS — Z01.818 PRE-OP TESTING: Primary | ICD-10-CM

## 2021-03-01 PROBLEM — K42.9 UMBILICAL HERNIA WITHOUT OBSTRUCTION AND WITHOUT GANGRENE: Status: ACTIVE | Noted: 2021-03-01

## 2021-03-01 NOTE — TELEPHONE ENCOUNTER
Reason for Call:  Other call back    Detailed comments: Patient called wanting to be scheduled for Hernia surgery with Dr. Woods    Phone Number Patient can be reached at: Home number on file [phone] (home)    Best Time: anytime    Can we leave a detailed message on this number? YES    Call taken on 3/1/2021 at 8:35 AM by aSndra Acevedo

## 2021-03-01 NOTE — TELEPHONE ENCOUNTER
Pt is scheduled for surgey on 03/16.  Covid test scheduled 03/12 in [facility].  Pt home prep reviewed, packet given in clinic.      Amara SINGH CMA

## 2021-03-10 ENCOUNTER — MYC MEDICAL ADVICE (OUTPATIENT)
Dept: FAMILY MEDICINE | Facility: CLINIC | Age: 41
End: 2021-03-10

## 2021-03-12 ENCOUNTER — OFFICE VISIT (OUTPATIENT)
Dept: FAMILY MEDICINE | Facility: CLINIC | Age: 41
End: 2021-03-12
Payer: COMMERCIAL

## 2021-03-12 VITALS — DIASTOLIC BLOOD PRESSURE: 60 MMHG | SYSTOLIC BLOOD PRESSURE: 102 MMHG

## 2021-03-12 DIAGNOSIS — F41.9 ANXIETY DUE TO INVASIVE PROCEDURE: ICD-10-CM

## 2021-03-12 DIAGNOSIS — M25.511 RIGHT SHOULDER PAIN, UNSPECIFIED CHRONICITY: Primary | ICD-10-CM

## 2021-03-12 DIAGNOSIS — M54.50 BILATERAL LOW BACK PAIN WITHOUT SCIATICA, UNSPECIFIED CHRONICITY: ICD-10-CM

## 2021-03-12 DIAGNOSIS — M54.9 UPPER BACK PAIN: ICD-10-CM

## 2021-03-12 DIAGNOSIS — Z01.818 PRE-OP TESTING: ICD-10-CM

## 2021-03-12 DIAGNOSIS — M54.2 NECK PAIN: ICD-10-CM

## 2021-03-12 PROBLEM — J44.9 COPD (CHRONIC OBSTRUCTIVE PULMONARY DISEASE) (H): Status: ACTIVE | Noted: 2021-03-12

## 2021-03-12 LAB
LABORATORY COMMENT REPORT: NORMAL
SARS-COV-2 RNA RESP QL NAA+PROBE: NEGATIVE
SARS-COV-2 RNA RESP QL NAA+PROBE: NORMAL
SPECIMEN SOURCE: NORMAL
SPECIMEN SOURCE: NORMAL

## 2021-03-12 PROCEDURE — U0003 INFECTIOUS AGENT DETECTION BY NUCLEIC ACID (DNA OR RNA); SEVERE ACUTE RESPIRATORY SYNDROME CORONAVIRUS 2 (SARS-COV-2) (CORONAVIRUS DISEASE [COVID-19]), AMPLIFIED PROBE TECHNIQUE, MAKING USE OF HIGH THROUGHPUT TECHNOLOGIES AS DESCRIBED BY CMS-2020-01-R: HCPCS | Performed by: SURGERY

## 2021-03-12 PROCEDURE — U0005 INFEC AGEN DETEC AMPLI PROBE: HCPCS | Performed by: SURGERY

## 2021-03-12 PROCEDURE — 99214 OFFICE O/P EST MOD 30 MIN: CPT | Performed by: NURSE PRACTITIONER

## 2021-03-12 RX ORDER — LORAZEPAM 1 MG/1
TABLET ORAL
Qty: 1 TABLET | Refills: 0 | Status: SHIPPED | OUTPATIENT
Start: 2021-03-12 | End: 2021-04-13

## 2021-03-12 NOTE — PROGRESS NOTES
Assessment & Plan     Right shoulder pain, unspecified chronicity    - MR Shoulder Right w/o Contrast; Future    Anxiety due to invasive procedure    - LORazepam (ATIVAN) 1 MG tablet; Take one tablet by mouth one hour before procedure.    Neck pain    - XR Cervical Spine 2/3 Views; Future  - MASSAGE THERAPY REFERRAL    Upper back pain    - XR Thoracic Lumbar Spine 2 Views; Future  - MASSAGE THERAPY REFERRAL    Bilateral low back pain without sciatica, unspecified chronicity    - PHYSICAL THERAPY REFERRAL; Future  - MASSAGE THERAPY REFERRAL             See Patient Instructions  Patient Instructions   Will be notified of pending x ray results.  Call to schedule MRI.  Start PT.        Our Clinic hours are:  Mondays    7:20 am - 7 pm  Tues - Fri  7:20 am - 5 pm    Clinic Phone: [phone]    The clinic lab opens at 7:30 am Mon - Fri and appointments are required.    [facility] [place]  Ph. [phone]  Monday  8 am - 7pm  Tues - Fri 8 am - 5:30 pm             Return in about 2 weeks (around 3/26/2021) for or sooner if symptoms persist or worsen.    URSULA No [facility]    Olga Whitehead is a 40 year old who presents for the following health issues  accompanied by his self :    HPI       Chief Complaint   Patient presents with     Musculoskeletal Problem     right shoulder pain . patient did have an MRI 2018 this is the same injury.      Back Pain     low back pain recheck .- patient would like to get massage -so he would like an RX or letter so the Medical spending account will pay for this.      Nutrition Counseling     patient would like a referral.        Musculoskeletal problem/pain - right shoulder   Onset/Duration: on and off 2018  Description  Location: shoulder  - right  Joint Swelling: no  Redness: no  Pain: YES  Warmth: no  Intensity:  mild, moderate  Progression of Symptoms:  improving  Accompanying signs and symptoms:   Fevers:  no  Numbness/tingling/weakness: no  History  Trauma to the area: no  Recent illness:  no  Previous similar problem: YES  Previous evaluation:  YES  Precipitating or alleviating factors:  Aggravating factors include: lifting and overuse  Therapies tried and outcome: rest/inactivity, heat, ice, acetaminophen and Ibuprofen - no much relief     Chronic/Recurring Back Pain Follow Up      Where is your back pain located? (Select all that apply) low back both    How would you describe your back pain?  dull ache, sharp, shooting and stabbing    Where does your back pain spread? the left buttock    Since your last clinic visit for back pain, how has your pain changed? gradually worsening    Does your back pain interfere with your job? No    Since your last visit, have you tried any new treatment? No      How many servings of fruits and vegetables do you eat daily?  0-1    On average, how many sweetened beverages do you drink each day (Examples: soda, juice, sweet tea, etc.  Do NOT count diet or artificially sweetened beverages)?   1    How many days per week do you exercise enough to make your heart beat faster? 3 or less    How many minutes a day do you exercise enough to make your heart beat faster? 9 or less    How many days per week do you miss taking your medication? 0    Past Medical History:   Diagnosis Date     Anxiety      Cervicalgia      Chemical dependency (H)      Depression      Migraine headache      Current Outpatient Medications   Medication     buPROPion (WELLBUTRIN SR) 100 MG 12 hr tablet     escitalopram (LEXAPRO) 10 MG tablet     gabapentin (NEURONTIN) 300 MG capsule     LORazepam (ATIVAN) 1 MG tablet     medical cannabis (Patient's own supply)     Multiple Vitamins-Minerals (MENS MULTIPLE VITAMIN/LYCOPENE PO)     nicotine polacrilex (NICORETTE) 4 MG gum     ondansetron (ZOFRAN ODT) 4 MG ODT tab     sildenafil (REVATIO) 20 MG tablet     eszopiclone (LUNESTA) 2 MG tablet     naproxen (NAPROSYN) 500 MG  tablet     No current facility-administered medications for this visit.        Review of Systems   Constitutional, HEENT, cardiovascular, pulmonary, gi and gu systems are negative, except as otherwise noted.      Objective    /60 (BP Location: Right arm, Patient Position: Chair, Cuff Size: Adult Regular)   There is no height or weight on file to calculate BMI.  Physical Exam   GENERAL: healthy, alert and no distress  EYES: Eyes grossly normal to inspection and conjunctivae and sclerae normal  NECK: no adenopathy, no asymmetry, masses, or scars and thyroid normal to palpation  RESP: lungs clear to auscultation - no rales, rhonchi or wheezes  CV: regular rate and rhythm, normal S1 S2, no S3 or S4, no murmur, click or rub, no peripheral edema and peripheral pulses strong  MS: no gross musculoskeletal defects noted, no edema, decreased ROM of right shoulder  SKIN: no suspicious lesions or rashes  PSYCH: mentation appears normal, affect normal/bright

## 2021-03-12 NOTE — PATIENT INSTRUCTIONS
Will be notified of pending x ray results.  Call to schedule MRI.  Start PT.        Our Clinic hours are:  Mondays    7:20 am - 7 pm  Tues -  Fri  7:20 am - 5 pm    Clinic Phone: [phone]    The clinic lab opens at 7:30 am Mon - Fri and appointments are required.    [facility] [place]  Ph. [phone]  Monday  8 am - 7pm  Tues - Fri 8 am - 5:30 pm

## 2021-03-15 ENCOUNTER — ANCILLARY PROCEDURE (OUTPATIENT)
Dept: GENERAL RADIOLOGY | Facility: CLINIC | Age: 41
End: 2021-03-15
Attending: NURSE PRACTITIONER
Payer: COMMERCIAL

## 2021-03-15 ENCOUNTER — ANESTHESIA EVENT (OUTPATIENT)
Dept: SURGERY | Facility: CLINIC | Age: 41
End: 2021-03-15
Payer: COMMERCIAL

## 2021-03-15 DIAGNOSIS — M54.9 UPPER BACK PAIN: ICD-10-CM

## 2021-03-15 DIAGNOSIS — M54.2 NECK PAIN: ICD-10-CM

## 2021-03-15 PROCEDURE — 72070 X-RAY EXAM THORAC SPINE 2VWS: CPT | Mod: FY | Performed by: RADIOLOGY

## 2021-03-15 PROCEDURE — 72040 X-RAY EXAM NECK SPINE 2-3 VW: CPT | Mod: FY | Performed by: RADIOLOGY

## 2021-03-16 ENCOUNTER — ANESTHESIA (OUTPATIENT)
Dept: SURGERY | Facility: CLINIC | Age: 41
End: 2021-03-16
Payer: COMMERCIAL

## 2021-03-16 ENCOUNTER — HOSPITAL ENCOUNTER (OUTPATIENT)
Facility: CLINIC | Age: 41
Discharge: HOME | End: 2021-03-16
Attending: SURGERY | Admitting: SURGERY
Payer: COMMERCIAL

## 2021-03-16 ENCOUNTER — MYC MEDICAL ADVICE (OUTPATIENT)
Dept: FAMILY MEDICINE | Facility: CLINIC | Age: 41
End: 2021-03-16

## 2021-03-16 VITALS
HEIGHT: 70 IN | OXYGEN SATURATION: 96 % | DIASTOLIC BLOOD PRESSURE: 78 MMHG | RESPIRATION RATE: 16 BRPM | WEIGHT: 170 LBS | TEMPERATURE: 98.1 F | SYSTOLIC BLOOD PRESSURE: 122 MMHG | BODY MASS INDEX: 24.34 KG/M2 | HEART RATE: 71 BPM

## 2021-03-16 DIAGNOSIS — K42.9 UMBILICAL HERNIA WITHOUT OBSTRUCTION AND WITHOUT GANGRENE: ICD-10-CM

## 2021-03-16 DIAGNOSIS — M54.2 NECK PAIN: Primary | ICD-10-CM

## 2021-03-16 DIAGNOSIS — M50.30 DDD (DEGENERATIVE DISC DISEASE), CERVICAL: ICD-10-CM

## 2021-03-16 DIAGNOSIS — G89.18 POSTOPERATIVE PAIN: Primary | ICD-10-CM

## 2021-03-16 PROCEDURE — 710N000009 HC RECOVERY PHASE 1, LEVEL 1, PER MIN: Performed by: SURGERY

## 2021-03-16 PROCEDURE — 250N000009 HC RX 250: Performed by: SURGERY

## 2021-03-16 PROCEDURE — 272N000001 HC OR GENERAL SUPPLY STERILE: Performed by: SURGERY

## 2021-03-16 PROCEDURE — 250N000025 HC SEVOFLURANE, PER MIN: Performed by: SURGERY

## 2021-03-16 PROCEDURE — 271N000001 HC OR GENERAL SUPPLY NON-STERILE: Performed by: SURGERY

## 2021-03-16 PROCEDURE — 88304 TISSUE EXAM BY PATHOLOGIST: CPT | Mod: TC | Performed by: SURGERY

## 2021-03-16 PROCEDURE — 258N000003 HC RX IP 258 OP 636: Performed by: SURGERY

## 2021-03-16 PROCEDURE — 250N000011 HC RX IP 250 OP 636

## 2021-03-16 PROCEDURE — 999N000141 HC STATISTIC PRE-PROCEDURE NURSING ASSESSMENT: Performed by: SURGERY

## 2021-03-16 PROCEDURE — 250N000009 HC RX 250

## 2021-03-16 PROCEDURE — 710N000012 HC RECOVERY PHASE 2, PER MINUTE: Performed by: SURGERY

## 2021-03-16 PROCEDURE — 49585 PR REPAIR UMBILICAL HERN,5+Y/O,REDUC: CPT | Mod: 80 | Performed by: SURGERY

## 2021-03-16 PROCEDURE — 360N000075 HC SURGERY LEVEL 2, PER MIN: Performed by: SURGERY

## 2021-03-16 PROCEDURE — 250N000011 HC RX IP 250 OP 636: Performed by: SURGERY

## 2021-03-16 PROCEDURE — 370N000017 HC ANESTHESIA TECHNICAL FEE, PER MIN: Performed by: SURGERY

## 2021-03-16 PROCEDURE — 55250 REMOVAL OF SPERM DUCT(S): CPT | Mod: 51 | Performed by: SURGERY

## 2021-03-16 PROCEDURE — 88302 TISSUE EXAM BY PATHOLOGIST: CPT | Mod: 26 | Performed by: PATHOLOGY

## 2021-03-16 PROCEDURE — 49585 PR REPAIR UMBILICAL HERN,5+Y/O,REDUC: CPT | Performed by: SURGERY

## 2021-03-16 RX ORDER — FENTANYL CITRATE 50 UG/ML
25-50 INJECTION, SOLUTION INTRAMUSCULAR; INTRAVENOUS
Status: DISCONTINUED | OUTPATIENT
Start: 2021-03-16 | End: 2021-03-16 | Stop reason: HOSPADM

## 2021-03-16 RX ORDER — KETAMINE HYDROCHLORIDE 10 MG/ML
INJECTION, SOLUTION INTRAMUSCULAR; INTRAVENOUS PRN
Status: DISCONTINUED | OUTPATIENT
Start: 2021-03-16 | End: 2021-03-16

## 2021-03-16 RX ORDER — NALOXONE HYDROCHLORIDE 0.4 MG/ML
0.2 INJECTION, SOLUTION INTRAMUSCULAR; INTRAVENOUS; SUBCUTANEOUS
Status: DISCONTINUED | OUTPATIENT
Start: 2021-03-16 | End: 2021-03-16 | Stop reason: HOSPADM

## 2021-03-16 RX ORDER — FENTANYL CITRATE 50 UG/ML
INJECTION, SOLUTION INTRAMUSCULAR; INTRAVENOUS PRN
Status: DISCONTINUED | OUTPATIENT
Start: 2021-03-16 | End: 2021-03-16

## 2021-03-16 RX ORDER — BUPIVACAINE HYDROCHLORIDE AND EPINEPHRINE 5; 5 MG/ML; UG/ML
INJECTION, SOLUTION PERINEURAL PRN
Status: DISCONTINUED | OUTPATIENT
Start: 2021-03-16 | End: 2021-03-16 | Stop reason: HOSPADM

## 2021-03-16 RX ORDER — ONDANSETRON 4 MG/1
4 TABLET, ORALLY DISINTEGRATING ORAL EVERY 30 MIN PRN
Status: DISCONTINUED | OUTPATIENT
Start: 2021-03-16 | End: 2021-03-16 | Stop reason: HOSPADM

## 2021-03-16 RX ORDER — CEFAZOLIN SODIUM 2 G/100ML
2 INJECTION, SOLUTION INTRAVENOUS
Status: COMPLETED | OUTPATIENT
Start: 2021-03-16 | End: 2021-03-16

## 2021-03-16 RX ORDER — MEPERIDINE HYDROCHLORIDE 25 MG/ML
12.5 INJECTION INTRAMUSCULAR; INTRAVENOUS; SUBCUTANEOUS
Status: DISCONTINUED | OUTPATIENT
Start: 2021-03-16 | End: 2021-03-16 | Stop reason: HOSPADM

## 2021-03-16 RX ORDER — SODIUM CHLORIDE, SODIUM LACTATE, POTASSIUM CHLORIDE, CALCIUM CHLORIDE 600; 310; 30; 20 MG/100ML; MG/100ML; MG/100ML; MG/100ML
INJECTION, SOLUTION INTRAVENOUS CONTINUOUS
Status: DISCONTINUED | OUTPATIENT
Start: 2021-03-16 | End: 2021-03-16 | Stop reason: HOSPADM

## 2021-03-16 RX ORDER — ONDANSETRON 4 MG/1
4 TABLET, ORALLY DISINTEGRATING ORAL EVERY 8 HOURS PRN
Qty: 20 TABLET | Refills: 0 | Status: SHIPPED | OUTPATIENT
Start: 2021-03-16 | End: 2021-04-13

## 2021-03-16 RX ORDER — LIDOCAINE HYDROCHLORIDE 10 MG/ML
INJECTION, SOLUTION INFILTRATION; PERINEURAL PRN
Status: DISCONTINUED | OUTPATIENT
Start: 2021-03-16 | End: 2021-03-16

## 2021-03-16 RX ORDER — DEXAMETHASONE SODIUM PHOSPHATE 4 MG/ML
INJECTION, SOLUTION INTRA-ARTICULAR; INTRALESIONAL; INTRAMUSCULAR; INTRAVENOUS; SOFT TISSUE PRN
Status: DISCONTINUED | OUTPATIENT
Start: 2021-03-16 | End: 2021-03-16

## 2021-03-16 RX ORDER — HYDROCODONE BITARTRATE AND ACETAMINOPHEN 5; 325 MG/1; MG/1
1-2 TABLET ORAL EVERY 6 HOURS PRN
Qty: 30 TABLET | Refills: 0 | Status: SHIPPED | OUTPATIENT
Start: 2021-03-16 | End: 2021-04-13

## 2021-03-16 RX ORDER — PROPOFOL 10 MG/ML
INJECTION, EMULSION INTRAVENOUS PRN
Status: DISCONTINUED | OUTPATIENT
Start: 2021-03-16 | End: 2021-03-16

## 2021-03-16 RX ORDER — NALOXONE HYDROCHLORIDE 0.4 MG/ML
0.4 INJECTION, SOLUTION INTRAMUSCULAR; INTRAVENOUS; SUBCUTANEOUS
Status: DISCONTINUED | OUTPATIENT
Start: 2021-03-16 | End: 2021-03-16 | Stop reason: HOSPADM

## 2021-03-16 RX ORDER — ONDANSETRON 2 MG/ML
INJECTION INTRAMUSCULAR; INTRAVENOUS PRN
Status: DISCONTINUED | OUTPATIENT
Start: 2021-03-16 | End: 2021-03-16

## 2021-03-16 RX ORDER — ACETAMINOPHEN 325 MG/1
975 TABLET ORAL ONCE
Status: DISCONTINUED | OUTPATIENT
Start: 2021-03-16 | End: 2021-03-16 | Stop reason: HOSPADM

## 2021-03-16 RX ORDER — ONDANSETRON 2 MG/ML
4 INJECTION INTRAMUSCULAR; INTRAVENOUS EVERY 30 MIN PRN
Status: DISCONTINUED | OUTPATIENT
Start: 2021-03-16 | End: 2021-03-16 | Stop reason: HOSPADM

## 2021-03-16 RX ORDER — HYDROMORPHONE HYDROCHLORIDE 1 MG/ML
.3-.5 INJECTION, SOLUTION INTRAMUSCULAR; INTRAVENOUS; SUBCUTANEOUS EVERY 10 MIN PRN
Status: DISCONTINUED | OUTPATIENT
Start: 2021-03-16 | End: 2021-03-16 | Stop reason: HOSPADM

## 2021-03-16 RX ORDER — EPHEDRINE SULFATE 50 MG/ML
INJECTION, SOLUTION INTRAMUSCULAR; INTRAVENOUS; SUBCUTANEOUS PRN
Status: DISCONTINUED | OUTPATIENT
Start: 2021-03-16 | End: 2021-03-16

## 2021-03-16 RX ORDER — FENTANYL CITRATE 50 UG/ML
25-50 INJECTION, SOLUTION INTRAMUSCULAR; INTRAVENOUS EVERY 5 MIN PRN
Status: DISCONTINUED | OUTPATIENT
Start: 2021-03-16 | End: 2021-03-16 | Stop reason: HOSPADM

## 2021-03-16 RX ORDER — CEFAZOLIN SODIUM 2 G/100ML
2 INJECTION, SOLUTION INTRAVENOUS SEE ADMIN INSTRUCTIONS
Status: DISCONTINUED | OUTPATIENT
Start: 2021-03-16 | End: 2021-03-16 | Stop reason: HOSPADM

## 2021-03-16 RX ORDER — KETOROLAC TROMETHAMINE 30 MG/ML
INJECTION, SOLUTION INTRAMUSCULAR; INTRAVENOUS PRN
Status: DISCONTINUED | OUTPATIENT
Start: 2021-03-16 | End: 2021-03-16

## 2021-03-16 RX ORDER — GLYCOPYRROLATE 0.2 MG/ML
INJECTION, SOLUTION INTRAMUSCULAR; INTRAVENOUS PRN
Status: DISCONTINUED | OUTPATIENT
Start: 2021-03-16 | End: 2021-03-16

## 2021-03-16 RX ADMIN — CEFAZOLIN SODIUM 2 G: 2 INJECTION, SOLUTION INTRAVENOUS at 07:45

## 2021-03-16 RX ADMIN — KETOROLAC TROMETHAMINE 30 MG: 30 INJECTION, SOLUTION INTRAMUSCULAR at 08:25

## 2021-03-16 RX ADMIN — MIDAZOLAM 2 MG: 1 INJECTION INTRAMUSCULAR; INTRAVENOUS at 07:29

## 2021-03-16 RX ADMIN — KETAMINE HYDROCHLORIDE 20 MG: 10 INJECTION INTRAMUSCULAR; INTRAVENOUS at 07:55

## 2021-03-16 RX ADMIN — LIDOCAINE HYDROCHLORIDE 0.1 ML: 10 INJECTION, SOLUTION EPIDURAL; INFILTRATION; INTRACAUDAL; PERINEURAL at 06:58

## 2021-03-16 RX ADMIN — Medication 5 MG: at 08:13

## 2021-03-16 RX ADMIN — FENTANYL CITRATE 100 MCG: 50 INJECTION, SOLUTION INTRAMUSCULAR; INTRAVENOUS at 07:38

## 2021-03-16 RX ADMIN — KETAMINE HYDROCHLORIDE 10 MG: 10 INJECTION INTRAMUSCULAR; INTRAVENOUS at 08:15

## 2021-03-16 RX ADMIN — LIDOCAINE HYDROCHLORIDE 50 MG: 10 INJECTION, SOLUTION INFILTRATION; PERINEURAL at 07:38

## 2021-03-16 RX ADMIN — ONDANSETRON 4 MG: 2 INJECTION INTRAMUSCULAR; INTRAVENOUS at 08:25

## 2021-03-16 RX ADMIN — FENTANYL CITRATE 50 MCG: 50 INJECTION, SOLUTION INTRAMUSCULAR; INTRAVENOUS at 08:16

## 2021-03-16 RX ADMIN — DEXAMETHASONE SODIUM PHOSPHATE 8 MG: 4 INJECTION, SOLUTION INTRA-ARTICULAR; INTRALESIONAL; INTRAMUSCULAR; INTRAVENOUS; SOFT TISSUE at 07:47

## 2021-03-16 RX ADMIN — Medication 5 MG: at 07:52

## 2021-03-16 RX ADMIN — PROPOFOL 160 MG: 10 INJECTION, EMULSION INTRAVENOUS at 07:38

## 2021-03-16 RX ADMIN — FENTANYL CITRATE 100 MCG: 50 INJECTION, SOLUTION INTRAMUSCULAR; INTRAVENOUS at 07:44

## 2021-03-16 RX ADMIN — GLYCOPYRROLATE 0.2 MG: 0.2 INJECTION, SOLUTION INTRAMUSCULAR; INTRAVENOUS at 07:48

## 2021-03-16 RX ADMIN — PROPOFOL 40 MG: 10 INJECTION, EMULSION INTRAVENOUS at 07:44

## 2021-03-16 RX ADMIN — SODIUM CHLORIDE, POTASSIUM CHLORIDE, SODIUM LACTATE AND CALCIUM CHLORIDE: 600; 310; 30; 20 INJECTION, SOLUTION INTRAVENOUS at 06:57

## 2021-03-16 ASSESSMENT — MIFFLIN-ST. JEOR: SCORE: 1682.36

## 2021-03-16 NOTE — DISCHARGE INSTRUCTIONS
DISCHARGE INSTRUCTIONS     1.  You will not be considered sterile until you have had a semen analysis which should be done about 3 months after surgery.    2. No heavy lifting (>30lbs)  for 1 month.    3.  The 2 incisions on the scrotum are closed using chromic suture.  This should dissolve over the next couple of weeks.  Wash daily with soap and water.    4. Some bruising and mild swelling is normal after surgery. The area below and around the incision(s) may be hard and elevated. This is part of the normal healing process. This will resolve slowly over the next several months.     5. Your wounds are covered with glue. The glue is water tight and so you can shower or bathe immediately following surgery.     6. Use the following medications (in addition to your normal meds) as shown:      Tylenol (acetaminophen) 500 mg every 6 hours as needed for pain.    Do not take more than 1000 mg of Tylenol every 6 hours -OR- 4g in a day    Motrin (ibuprophen) 600 mg every 6 hours as needed for pain. Take with food.     8. Notify Clinic at [phone] if:     Your discomfort is not relieved by your pain medication     You have signs of infection such as temperature above 100.4 degrees orally,  chills, or increasing daily discomfort.     Incision site is becoming more red and/or there is purulent drainage.      9. Follow up with Dr Woods in 1-2 weeks.                      Same Day Surgery Discharge Instructions  Special Precautions After Surgery - Adult    1. It is not unusual to feel lightheaded or faint, up to 24 hours after surgery or while taking pain medication.  If you have these symptoms; sit for a few minutes before standing and have someone assist you when getting up.  2. You should rest and relax for the next 24 hours and must have someone stay with you for at least 24 hours after your discharge.  3. DO NOT DRIVE any vehicle or operate mechanical equipment for 24 hours following the end of your surgery.  DO NOT DRIVE  while taking narcotic pain medications that have been prescribed by your physician.  If you had a limb operated on, you must be able to use it fully to drive.  4. DO NOT drink alcoholic beverages for 24 hours following surgery or while taking prescription pain medication.  5. Drink clear liquids (apple juice, ginger ale, broth, 7-Up, etc.).  Progress to your regular diet as you feel able.  6. Any questions call your physician and do not make important decisions for 24 hours.      [facility]:  [phone]   Same Day Surgery [phone], Monday thru Friday 6am-9pm.

## 2021-03-16 NOTE — ANESTHESIA POSTPROCEDURE EVALUATION
Patient: Timmy Fitzpatrick    Procedure(s):  Open umbilical hernia repair and bilateral vasectomy  VASECTOMY    Diagnosis:Umbilical hernia without obstruction and without gangrene [K42.9]  Diagnosis Additional Information: No value filed.    Anesthesia Type:  General    Note:  Disposition: Outpatient   Postop Pain Control: Uneventful            Sign Out: Well controlled pain   PONV:    Neuro/Psych: Uneventful            Sign Out: Acceptable/Baseline neuro status   Airway/Respiratory: Uneventful            Sign Out: Acceptable/Baseline resp. status   CV/Hemodynamics: Uneventful            Sign Out: Acceptable CV status   Other NRE: NONE   DID A NON-ROUTINE EVENT OCCUR? No         Last vitals:  Vitals:    03/16/21 0945 03/16/21 1000 03/16/21 1015   BP: 125/83 118/86 122/78   Pulse: 84 76 71   Resp: 16 16 16   Temp:      SpO2: 95% 93% 96%       Last vitals prior to Anesthesia Care Transfer:  CRNA VITALS  3/16/2021 0811 - 3/16/2021 0911      3/16/2021             Pulse:  89    SpO2:  98 %    Resp Rate (observed):  (!) 4          Electronically Signed By: URSULA Mackey CRNA  March 16, 2021  11:13 AM

## 2021-03-16 NOTE — ANESTHESIA PROCEDURE NOTES
Airway   Date/Time: 3/16/2021 7:42 AM   Patient location during procedure: OR  Staff -   CRNA: Elyssa Salazar APRN CRNA  Other Anesthesia Staff: Harika Kwok  Performed By: CRNA and NOLBERTO    Consent for Airway   Urgency: elective    Indications and Patient Condition  Indications for airway management: marychuy-procedural  Induction type:intravenousMask difficulty assessment: 1 - vent by mask    Final Airway Details  Final airway type: supraglottic airway        Post intubation assessment   Placement verified by: capnometry, equal breath sounds and chest rise   Number of attempts at approach: 1  Number of other approaches attempted: 0  Ease of procedure: easy  Dentition: Intact and Unchanged

## 2021-03-16 NOTE — H&P
40-year-old male complaint of umbilical mass for the past several years.  He reports localized pain, 1-2 out of 10 without radiation.  Pain is dull and achy.  Aggravated by straining and alleviated by rest.  Patient would also like a vasectomy under the same sedation if possible.         Patient Active Problem List   Diagnosis     Cervicalgia     Nonallopathic lesion of thoracic region     Nonallopathic lesion of lumbar region     CARDIOVASCULAR SCREENING; LDL GOAL LESS THAN 160     Major depression in partial remission (H)     Migraine headache     Alcoholic, sober since 2006     Chemical dependency (H)     Anxiety     Tear of medial meniscus of knee     Bipolar II disorder (H)     Generalized anxiety disorder         Past Medical History        Past Medical History:   Diagnosis Date     Anxiety       Cervicalgia       Chemical dependency (H)       Depression       Migraine headache              Past Surgical History         Past Surgical History:   Procedure Laterality Date     HERNIA REPAIR         SURGICAL HISTORY OF -          deviated septum            Family History         Family History   Problem Relation Age of Onset     Cancer Paternal Grandfather       Depression Mother       Anxiety Disorder Mother       Depression Father       Unknown/Adopted Father       Dementia Maternal Grandfather       Substance Abuse Paternal Grandmother              Social History            Tobacco Use     Smoking status: Former Smoker       Packs/day: 0.00       Types: Dip, chew, snus or snuff     Smokeless tobacco: Former User       Types: Chew       Quit date: 10/1/2019   Substance Use Topics     Alcohol use: No       Comment: sober since April 2016               History   Drug Use     Types: Marijuana       Comment: Daily         Active Medications          Current Outpatient Medications   Medication Sig Dispense Refill     buPROPion (WELLBUTRIN SR) 100 MG 12 hr tablet Take 1 tablet (100 mg) by mouth 2 times daily 180  "tablet 1     escitalopram (LEXAPRO) 10 MG tablet Take 1 tablet (10 mg) by mouth daily 90 tablet 1     eszopiclone (LUNESTA) 2 MG tablet Take 1 tablet (2 mg) by mouth nightly as needed for sleep 15 tablet 0     gabapentin (NEURONTIN) 300 MG capsule Take 1 capsule (300 mg) by mouth 6 times daily Refill 28 days after last fill 180 capsule 3     medical cannabis (Patient's own supply) See Admin Instructions (The purpose of this order is to document that the patient reports taking medical cannabis.  This is not a prescription, and is not used to certify that the patient has a qualifying medical condition.)         Multiple Vitamins-Minerals (MENS MULTIPLE VITAMIN/LYCOPENE PO) Take 1 tablet by mouth daily.         nicotine polacrilex (NICORETTE) 4 MG gum Place 4 mg inside cheek as needed for smoking cessation         sildenafil (REVATIO) 20 MG tablet TAKE 5 TABLETS BY MOUTH DAILY AS NEEDED, 30 MINUTES TO 4 HOURS BEFORE SEX. (DO NOT USE WITH NITROGLYCERIN, TERAZOSIN OR DOXAZOSIN 60 tablet 5     naproxen (NAPROSYN) 500 MG tablet Take 1 tablet (500 mg) by mouth 2 times daily (with meals) 30 tablet 0     ondansetron (ZOFRAN ODT) 4 MG ODT tab Take 1 tablet (4 mg) by mouth every 8 hours as needed for nausea or vomiting (Patient not taking: Reported on 2/10/2021) 7 tablet 0                 Allergies   Allergen Reactions     Cats       Nkda [No Known Drug Allergies]       Seasonal Allergies         ROS  CV - No CP or SOB  Resp - No SOB or dyspnea  GI - No nausea or vomiting   - No difficulty with urination     Exam:/81   Temp 98.2  F (36.8  C)   Resp 16   Ht 1.778 m (5' 10\")   Wt 77.1 kg (170 lb)   SpO2 100%   BMI 24.39 kg/m      AXO3 NAD  Neuro - Strength 5/5 all major groups, sensation intact, PERRL  Lungs - CTA  CV - RRR  Abd - Soft, non-distended, non-tender, +BS, palpable reducible mass at umbilicus.  Fascial defect approximately 1 cm.  Extr - No edema     Assessment and plan: 40-year-old male with reducible " umbilical hernia and unwanted fertility.  Patient is good candidate for open repair under light sedation.  During the same sedation my partner, Dr. Le will also assist me in performing a bilateral vasectomy.  Risks benefits alternatives and complications were discussed with the patient including the possibility of infection bleeding or failed vasectomy.  Patient understood and wished to proceed. PATIENT IS CLEARED FOR SURGERY.     Stanley Woods MD

## 2021-03-16 NOTE — ANESTHESIA CARE TRANSFER NOTE
Patient: Timmy Fitzpatrick    Procedure(s):  Open umbilical hernia repair and bilateral vasectomy  VASECTOMY    Diagnosis: Umbilical hernia without obstruction and without gangrene [K42.9]  Diagnosis Additional Information: No value filed.    Anesthesia Type:   General     Note:    Oropharynx: spontaneously breathing and oropharynx clear of all foreign objects  Level of Consciousness: drowsy  Oxygen Supplementation: face mask  Level of Supplemental Oxygen (L/min / FiO2): 6  Independent Airway: airway patency satisfactory and stable  Dentition: dentition unchanged  Vital Signs Stable: post-procedure vital signs reviewed and stable  Report to RN Given: handoff report given  Patient transferred to: PACU    Handoff Report: Identifed the Patient, Identified the Reponsible Provider, Reviewed the pertinent medical history, Discussed the surgical course, Reviewed Intra-OP anesthesia mangement and issues during anesthesia, Set expectations for post-procedure period and Allowed opportunity for questions and acknowledgement of understanding      Vitals: (Last set prior to Anesthesia Care Transfer)  CRNA VITALS  3/16/2021 0811 - 3/16/2021 0853      3/16/2021             Pulse:  89    SpO2:  98 %    Resp Rate (observed):  (!) 4        Electronically Signed By: URSULA Mackey CRNA  March 16, 2021  8:53 AM

## 2021-03-16 NOTE — OP NOTE
Preop diagnosis: Umbilical hernia and unwanted fertility    Postop diagnosis: Same    Procedure: Open umbilical hernia repair and bilateral vasectomy    Surgeon: Chuck    Assistant surgeon: Juan Carlos Le MD (needed for expertise in retraction hemostasis wound closure and suctioning)    Second Assistant surgeon: Karthik Tran PA-C (needed for expertise in retraction hemostasis wound closure and suctioning)    Anesthesia: LMA Harika CRNA    Procedure: Patient's abdomen was cleaned and draped in sterile manner.  1/2% Marcaine with epinephrine was used anesthetize all skin incisions.  Bilateral scrotal 1 cm incisions were made after first palpating the cord beneath the incision site.  Subcutaneous tissues dissected bluntly to the level of the vas deferens.  The vas deferens was grasped using a round clamp.  This was elevated and the surrounding tissue was dissected off the vas slowly.  The entire vas was cleared of this extraneous tissue until only the vas remained.  It was clipped twice proximally twice distally and a 2 cm segment removed.  The ends were treated with electrocautery and dropped back into the wound.  Small amount of bleeding was controlled with electrocautery.  This was done bilaterally.  Finally the skin closed using 4-0 chromic suture in a figure-of-eight fashion.  Attention was then turned to the umbilicus.  Small curvilinear incision was made and subcutaneous tissues dissected to the fascia.  The umbilicus was detached use electrocautery and the fascial defect isolated.  It mostly had some preperitoneal fat and a little bit of omentum which was amputated.  Subcutaneous tissues were cleared from the surrounding fascia and the remaining fascial defect was approximately 1 cm.  It was closed using 2 interrupted 0 Prolene sutures and a final 0 Prolene was run on top of this.  The fascia was then injected with Marcaine, the umbilicus was reattached using a 3-0 Vicryl suture, and the skin closed using a 4-0  Vicryl running subcuticular stitch and dressed with Dermabond.    Estimated blood loss: 5 mL    IV fluid: About 500 mL

## 2021-03-16 NOTE — ANESTHESIA PREPROCEDURE EVALUATION
Anesthesia Pre-Procedure Evaluation    Patient: Timmy Fitzpatrick   MRN: [id number] : 1980        Preoperative Diagnosis: Umbilical hernia without obstruction and without gangrene [K42.9]   Procedure : Procedure(s):  Open umbilical hernia repair and bilateral vasectomy  VASECTOMY     Past Medical History:   Diagnosis Date     Anxiety      Cervicalgia      Chemical dependency (H)      Depression      Migraine headache       Past Surgical History:   Procedure Laterality Date     HERNIA REPAIR       SURGICAL HISTORY OF -       deviated septum      Allergies   Allergen Reactions     Cats      Nkda [No Known Drug Allergies]      Seasonal Allergies       Social History     Tobacco Use     Smoking status: Former Smoker     Packs/day: 0.00     Types: Dip, chew, snus or snuff     Smokeless tobacco: Former User     Types: Chew     Quit date: 10/1/2019   Substance Use Topics     Alcohol use: No     Comment: sober since 2016      Wt Readings from Last 1 Encounters:   21 77.1 kg (170 lb)        Anesthesia Evaluation            ROS/MED HX  ENT/Pulmonary: Comment: Denies listed COPD diagnosis - neg pulmonary ROS     Neurologic:  - neg neurologic ROS     Cardiovascular:  - neg cardiovascular ROS     METS/Exercise Tolerance:     Hematologic:  - neg hematologic  ROS     Musculoskeletal: Comment: Cervical lordosis      GI/Hepatic:  - neg GI/hepatic ROS     Renal/Genitourinary:  - neg Renal ROS     Endo:  - neg endo ROS     Psychiatric/Substance Use:     (+) psychiatric history anxiety, bipolar and depression alcohol abuse Recreational drug usage: Cannabis.    Infectious Disease:  - neg infectious disease ROS     Malignancy:  - neg malignancy ROS     Other:            Physical Exam    Airway  airway exam normal      Mallampati: II   TM distance: > 3 FB   Neck ROM: full   Mouth opening: > 3 cm    Respiratory Devices and Support         Dental  no notable dental history         Cardiovascular   cardiovascular exam  normal          Pulmonary   pulmonary exam normal                OUTSIDE LABS:  CBC:   Lab Results   Component Value Date    WBC 13.9 (H) 09/12/2020    WBC 6.6 07/13/2018    HGB 15.5 09/12/2020    HGB 13.9 07/13/2018    HCT 44.8 09/12/2020    HCT 41.6 07/13/2018     09/12/2020     07/13/2018     BMP:   Lab Results   Component Value Date     09/12/2020     02/20/2019    POTASSIUM 3.2 (L) 09/12/2020    POTASSIUM 4.1 02/20/2019    CHLORIDE 94 09/12/2020    CHLORIDE 104 02/20/2019    CO2 31 09/12/2020    CO2 33 (H) 02/20/2019    BUN 9 09/12/2020    BUN 9 02/20/2019    CR 0.94 09/12/2020    CR 0.98 02/20/2019     (H) 09/12/2020     (H) 02/20/2019     COAGS: No results found for: PTT, INR, FIBR  POC: No results found for: BGM, HCG, HCGS  HEPATIC:   Lab Results   Component Value Date    ALBUMIN 4.8 09/12/2020    PROTTOTAL 8.6 09/12/2020    ALT 40 09/12/2020    AST 57 (H) 09/12/2020    ALKPHOS 70 09/12/2020    BILITOTAL 0.9 09/12/2020     OTHER:   Lab Results   Component Value Date    JEAN-PIERRE 10.1 09/12/2020    AMYLASE 98 09/12/2020    TSH 1.76 02/20/2019    SED 5 07/14/2011       Anesthesia Plan    ASA Status:  2      Anesthesia Type: General.     - Airway: LMA   Induction: Intravenous, Propofol.   Maintenance: Balanced.        Consents    Anesthesia Plan(s) and associated risks, benefits, and realistic alternatives discussed. Questions answered and patient/representative(s) expressed understanding.     - Discussed with:  Patient         Postoperative Care    Pain management: Multi-modal analgesia.   PONV prophylaxis: Ondansetron (or other 5HT-3), Dexamethasone or Solumedrol     Comments:                Harika Kwok

## 2021-03-18 LAB — COPATH REPORT: NORMAL

## 2021-03-23 ENCOUNTER — TELEPHONE (OUTPATIENT)
Dept: FAMILY MEDICINE | Facility: CLINIC | Age: 41
End: 2021-03-23

## 2021-03-23 DIAGNOSIS — Z12.11 SCREENING FOR COLON CANCER: Primary | ICD-10-CM

## 2021-03-23 NOTE — TELEPHONE ENCOUNTER
BELOW NOTE IS AN ERROR.  IS NOT FOR THIS PT.    Order was placed in wrong chart per  office.  KPavelRN

## 2021-03-23 NOTE — TELEPHONE ENCOUNTER
----- Message from URSULA No CNP sent at 3/20/2021 10:30 AM CDT -----  Regarding: RE: Missing FIT test order.  Could you call patient and inquire if he did a FIT test and document if he has family history of colon cancer as he is young to start screening now.  Thanks.  KRISHNA Mullins  ----- Message -----  From: Stone Vera  Sent: 3/19/2021   3:27 PM CDT  To: URSULA No CNP  Subject: Missing FIT test order.                          Amrik Mcpherson,    Please review patient chart and place a future order for FIT test ([id number]) if needed. We received sample cartridge in lab without an order.     Thanks.   Stone Vera   [facility]   [phone]

## 2021-04-09 ENCOUNTER — HOSPITAL ENCOUNTER (OUTPATIENT)
Dept: MRI IMAGING | Facility: CLINIC | Age: 41
End: 2021-04-09
Attending: NURSE PRACTITIONER
Payer: COMMERCIAL

## 2021-04-09 ENCOUNTER — MYC MEDICAL ADVICE (OUTPATIENT)
Dept: PSYCHIATRY | Facility: CLINIC | Age: 41
End: 2021-04-09

## 2021-04-09 DIAGNOSIS — M50.30 DDD (DEGENERATIVE DISC DISEASE), CERVICAL: ICD-10-CM

## 2021-04-09 DIAGNOSIS — M25.511 RIGHT SHOULDER PAIN, UNSPECIFIED CHRONICITY: ICD-10-CM

## 2021-04-09 DIAGNOSIS — F31.81 BIPOLAR II DISORDER (H): ICD-10-CM

## 2021-04-09 DIAGNOSIS — M54.2 NECK PAIN: ICD-10-CM

## 2021-04-09 PROCEDURE — 73221 MRI JOINT UPR EXTREM W/O DYE: CPT | Mod: 26 | Performed by: RADIOLOGY

## 2021-04-09 PROCEDURE — 72141 MRI NECK SPINE W/O DYE: CPT

## 2021-04-09 PROCEDURE — 73221 MRI JOINT UPR EXTREM W/O DYE: CPT | Mod: RT

## 2021-04-09 RX ORDER — GABAPENTIN 300 MG/1
300 CAPSULE ORAL
Qty: 180 CAPSULE | Refills: 0 | Status: SHIPPED | OUTPATIENT
Start: 2021-04-09 | End: 2021-04-13

## 2021-04-09 NOTE — TELEPHONE ENCOUNTER
Reason for Call:  Medication or medication refill:    Do you use a [facility]?  Name of the pharmacy and phone number for the current request:   [facility]    Name of the medication requested   gabapentin (NEURONTIN) 300 MG capsule Long-Term     Other request: Pt advised he has been requesting this medication since Wednesday 4/7/21 and he is out and needs it refilled. He advised he has called the pharmacy multiple times    Can we leave a detailed message on this number? YES    Phone number patient can be reached at: Home number on file [phone] (home)    Best Time: asap    Call taken on 4/9/2021 at 9:20 AM by Kiera Rangel

## 2021-04-09 NOTE — TELEPHONE ENCOUNTER
Returned call to pt and informed him his Rx was sent to the pharmacy electronically. Pt appreciative.

## 2021-04-09 NOTE — TELEPHONE ENCOUNTER
Date of Last Office Visit: 12/8/20  Date of Next Office Visit: 4/13/21  No shows since last visit: 0  Cancellations since last visit: 0    Medication requested: gabapentin 300 mg Date last ordered: 12/8/20 Qty: 180 Refills: 3     [place] of [place] ?: Yes  Medication last filled date: 3/12/21 Qty filled: 180  Other [place] on [place] ?: Yes, lorazepam, hydrocodone and eszopiclone   If yes, is this a new medication?: Appears so.    Lapse in medication adherence greater than 5 days?: No  If yes, call patient and gather details: N/A  Medication refill request verified as identical to current order?: Yes  Result of Last DAM, VPA, Li+ Level, CBC, or Carbamazepine Level (at or since last visit): N/A    []Medication refilled per  Medication Refill in Ambulatory Care  policy.  [x]Medication unable to be refilled by RN due to criteria not met as indicated below:    []Eligibility - not seen in the last year   []Supervision - no future appointment   []Compliance - no shows, cancellations or lapse in therapy   []Verification - order discrepancy   []Controlled medication   [x]Medication not included in policy   []90-day supply request   []Other

## 2021-04-12 DIAGNOSIS — M25.511 RIGHT SHOULDER PAIN, UNSPECIFIED CHRONICITY: Primary | ICD-10-CM

## 2021-04-12 RX ORDER — CYCLOBENZAPRINE HCL 10 MG
10 TABLET ORAL 3 TIMES DAILY PRN
Qty: 20 TABLET | Refills: 0 | Status: SHIPPED | OUTPATIENT
Start: 2021-04-12 | End: 2021-07-27

## 2021-04-13 ENCOUNTER — VIRTUAL VISIT (OUTPATIENT)
Dept: PSYCHIATRY | Facility: CLINIC | Age: 41
End: 2021-04-13
Payer: COMMERCIAL

## 2021-04-13 DIAGNOSIS — F31.81 BIPOLAR II DISORDER (H): ICD-10-CM

## 2021-04-13 DIAGNOSIS — F33.1 MODERATE EPISODE OF RECURRENT MAJOR DEPRESSIVE DISORDER (H): Primary | ICD-10-CM

## 2021-04-13 DIAGNOSIS — F41.1 GENERALIZED ANXIETY DISORDER: ICD-10-CM

## 2021-04-13 PROCEDURE — 99214 OFFICE O/P EST MOD 30 MIN: CPT | Mod: GT | Performed by: NURSE PRACTITIONER

## 2021-04-13 RX ORDER — BUPROPION HYDROCHLORIDE 100 MG/1
100 TABLET, EXTENDED RELEASE ORAL 2 TIMES DAILY
Qty: 180 TABLET | Refills: 1 | Status: SHIPPED | OUTPATIENT
Start: 2021-04-13 | End: 2021-10-12

## 2021-04-13 RX ORDER — GABAPENTIN 300 MG/1
300 CAPSULE ORAL
Qty: 180 CAPSULE | Refills: 3 | Status: SHIPPED | OUTPATIENT
Start: 2021-04-13 | End: 2021-08-25

## 2021-04-13 RX ORDER — ESCITALOPRAM OXALATE 10 MG/1
10 TABLET ORAL DAILY
Qty: 90 TABLET | Refills: 1 | Status: SHIPPED | OUTPATIENT
Start: 2021-04-13 | End: 2021-08-25

## 2021-04-13 ASSESSMENT — PATIENT HEALTH QUESTIONNAIRE - PHQ9
5. POOR APPETITE OR OVEREATING: NEARLY EVERY DAY
SUM OF ALL RESPONSES TO PHQ QUESTIONS 1-9: 15

## 2021-04-13 ASSESSMENT — ANXIETY QUESTIONNAIRES
3. WORRYING TOO MUCH ABOUT DIFFERENT THINGS: SEVERAL DAYS
5. BEING SO RESTLESS THAT IT IS HARD TO SIT STILL: NEARLY EVERY DAY
6. BECOMING EASILY ANNOYED OR IRRITABLE: NEARLY EVERY DAY
1. FEELING NERVOUS, ANXIOUS, OR ON EDGE: MORE THAN HALF THE DAYS
2. NOT BEING ABLE TO STOP OR CONTROL WORRYING: SEVERAL DAYS
IF YOU CHECKED OFF ANY PROBLEMS ON THIS QUESTIONNAIRE, HOW DIFFICULT HAVE THESE PROBLEMS MADE IT FOR YOU TO DO YOUR WORK, TAKE CARE OF THINGS AT HOME, OR GET ALONG WITH OTHER PEOPLE: SOMEWHAT DIFFICULT
7. FEELING AFRAID AS IF SOMETHING AWFUL MIGHT HAPPEN: NOT AT ALL
GAD7 TOTAL SCORE: 13

## 2021-04-13 NOTE — PATIENT INSTRUCTIONS
1.  Continue Lexapro 10 mg daily    2.  Continue Wellbutrin  mg twice daily    3.  Continue Lunesta 2 mg at bedtime as needed-no refill today    4.  Continue gabapentin 300 mg 6 times daily    5.  Continue talk therapy    6.  Discontinue lorazepam-not taking      Continue all other medications as reviewed per electronic medical record today.     Safety plan reviewed. To the Emergency Department as needed or call after hours crisis line at [phone] or [phone]. [place] Crisis Text Line. Text [place] to 344709 or Suicide LifeLine Chat: suicideiCharts.org/chat/    To schedule individual or family therapy, call [facility] at [phone].    Schedule an appointment with me in 3 months or sooner as needed. Call [facility] at [phone] to schedule.    Follow up with primary care provider as planned or for acute medical concerns.    Call the psychiatric nurse line with medication questions or concerns at [phone].    [employer]t may be used to communicate with your provider, but this is not intended to be used for emergencies.    Crisis Resources:    National Suicide Prevention Lifeline: 674.799.2114 (TTY: 315.456.6059). Call anytime for help.  (www.suicidepreventionlifeline.org)  National [place] on Mental Illness (www.audra.org): 735.119.7478 or 835-409-0935.   Mental Health Association (www.mentalhealth.org): 492.753.5020 or 424-354-1063.  [place] Crisis Text Line: Text [place] to 839577  Suicide LifeLine Chat: suicideiCharts.org/chat

## 2021-04-13 NOTE — PROGRESS NOTES
"Ventura is a 40 year old who is being evaluated via a billable video visit.      How would you like to obtain your AVS? [id number]  If the video visit is dropped, the invitation should be resent by: Text to cell phone: [phone]- would like to do visit through [employer]  Will anyone else be joining your video visit? No      Video Start Time: 8:29 AM        Outpatient Psychiatric Progress Note    Name: Timmy Fitzpatrick   : 1980                    Primary Care Provider: [facility]   Therapist: Yes    PHQ-9 scores:  PHQ-9 SCORE 9/15/2020 2020 2021   PHQ-9 Total Score - - -   PHQ-9 Total Score MyChart - - -   PHQ-9 Total Score 14 20 15   Some encounter information is confidential and restricted. Go to Review Flowsheets activity to see all data.       JUAREZ-7 scores:  JUAREZ-7 SCORE 9/15/2020 2020 2021   Total Score - - -   Total Score - - -   Total Score 14 14 13   Some encounter information is confidential and restricted. Go to Review Flowsheets activity to see all data.       Patient Identification:    Patient is a 40 year old year old,   White American male  who presents for return visit with me.  Patient is currently unemployed. Patient attended the session alone. Patient prefers to be called: \" Ventura\".    Interim History:    I last saw Timmy Fitzpatrick for outpatient psychiatry Return Visit on 2020.     During that appointment, he reported getting highly anxious when he is not able to get his medication refills in a timely manner.  He admits that he has requested early refills of gabapentin at one point after we reviewed the guidelines regarding refills.  Medications were sent to the pharmacy today with refills on them to decrease his anxiety.  He continues to struggle with depressive symptoms including a motivation and anhedonia.  Today he denied any thoughts to want to end his life or to harm other people.  No changes in his medications will be made " today but he did mention to me that eventually he would like to decrease the number of gabapentin he takes during the day.  He continues with talk therapy with Liana .     Current medications include: buPROPion (WELLBUTRIN SR) 100 MG 12 hr tablet, Take 1 tablet (100 mg) by mouth 2 times daily  escitalopram (LEXAPRO) 10 MG tablet, Take 1 tablet (10 mg) by mouth daily  gabapentin (NEURONTIN) 300 MG capsule, Take 1 capsule (300 mg) by mouth 6 times daily Refill 28 days after last fill  Multiple Vitamins-Minerals (MENS MULTIPLE VITAMIN/LYCOPENE PO), Take 1 tablet by mouth daily.  nicotine polacrilex (NICORETTE) 4 MG gum, Place 4 mg inside cheek as needed for smoking cessation  cyclobenzaprine (FLEXERIL) 10 MG tablet, Take 1 tablet (10 mg) by mouth 3 times daily as needed for muscle spasms (Patient not taking: Reported on 4/13/2021)  eszopiclone (LUNESTA) 2 MG tablet, Take 1 tablet (2 mg) by mouth nightly as needed for sleep (Patient not taking: Reported on 3/12/2021)  HYDROcodone-acetaminophen (NORCO) 5-325 MG tablet, Take 1-2 tablets by mouth every 6 hours as needed for pain (Patient not taking: Reported on 4/13/2021)  LORazepam (ATIVAN) 1 MG tablet, Take one tablet by mouth one hour before procedure. (Patient not taking: Reported on 4/13/2021)  medical cannabis (Patient's own supply), See Admin Instructions (The purpose of this order is to document that the patient reports taking medical cannabis.  This is not a prescription, and is not used to certify that the patient has a qualifying medical condition.)  naproxen (NAPROSYN) 500 MG tablet, Take 1 tablet (500 mg) by mouth 2 times daily (with meals) (Patient not taking: Reported on 3/12/2021)  sildenafil (REVATIO) 20 MG tablet, TAKE 5 TABLETS BY MOUTH DAILY AS NEEDED, 30 MINUTES TO 4 HOURS BEFORE SEX. (DO NOT USE WITH NITROGLYCERIN, TERAZOSIN OR DOXAZOSIN (Patient not taking: Reported on 4/13/2021)    No current facility-administered medications on file prior to  visit.        The [place] Prescription Monitoring Program has been reviewed and there are no concerns about diversionary activity for controlled substances at this time.      I was able to review most recent Primary Care Provider, specialty provider, and therapy visit notes that I have access to.     Today, patient reports that the gabapentin helps with anxiety, sleep and back pain.  He is having no SI urges.  His depression continues.  He had discord with his wife.  He is home schooling his children.  It is challenging to have a change in his routine.    His physical limitations make him more depressed.  He gets his first COVID vaccine tomorrow.       has a past medical history of Anxiety, Cervicalgia, Chemical dependency (H), Depression, and Migraine headache. He also has no past medical history of Complication of anesthesia, Diabetes (H), PONV (postoperative nausea and vomiting), Sleep apnea, or Uncomplicated asthma.    Social history updates:    Vacations are planned for the summer.      Substance use updates:    Oral suspension cannabis use  Tobacco use: No    Vital Signs:   There were no vitals taken for this visit.    Labs:    Most recent laboratory results reviewed and no new labs.     Review of Systems:  10 systems (general, cardiovascular, respiratory, eyes, ENT, endocrine, GI, , M/S, neurological) were reviewed. Most pertinent finding(s) is/are: He reports no chest pain, no shortness of breath, no skin rashes. The remaining systems are all unremarkable.    Mental Status Examination:  Appearance:  awake, alert and casually dressed  Attitude:  cooperative   Eye Contact:  adequate  Gait and Station: No assistive Devices used and No dizziness or falls  Psychomotor Behavior:  intact station, gait and muscle tone  Oriented to:  time, person, and place  Attention Span and Concentration:  Normal  Speech:   clear, coherent and Speaks: English  Mood:  anxious and depressed  Affect:  mood  congruent  Associations:  no loose associations  Thought Process:  goal oriented  Thought Content:  no evidence of suicidal ideation or homicidal ideation, no auditory hallucinations present and no visual hallucinations present  Recent and Remote Memory:  intact Not formally assessed. No amnesia.  Fund of Knowledge: appropriate  Insight:  good  Judgment:  intact  Impulse Control:  intact    Suicide Risk Assessment:  Today Timmy Fitzpatrick reports that he is having no thoughts to want to end his life or to harm other people. In addition, there are notable risk factors for self-harm, including anxiety, withdrawing and mood change. However, risk is mitigated by commitment to family, history of seeking help when needed, future oriented, denies suicidal intent or plan and denies homicidal ideation, intent, or plan. Therefore, based on all available evidence including the factors cited above, Timmy Fitzpatrick does not appear to be at imminent risk for self-harm, does not meet criteria for a 72-hr hold, and therefore remains appropriate for ongoing outpatient level of care.  A thorough assessment of risk factors related to suicide and self-harm have been reviewed and are noted above. The patient convincingly denies suicidality on several occasions. Local community safety resources printed and reviewed for patient to use if needed. There was no deceit detected, and the patient presented in a manner that was believable.     DSM5 Diagnosis:  296.32 (F33.1) Major Depressive Disorder, Recurrent Episode, Moderate _ and With melancholic features  300.02 (F41.1) Generalized Anxiety Disorder    Medical comorbidities include:   Patient Active Problem List    Diagnosis Date Noted     COPD (chronic obstructive pulmonary disease) (H) 03/12/2021     Priority: Medium     Umbilical hernia without obstruction and without gangrene 03/01/2021     Priority: Medium     Added automatically from request for surgery [id number]        Generalized anxiety disorder 03/26/2019     Priority: Medium     Bipolar II disorder (H) 11/28/2018     Priority: Medium     Anxiety 09/18/2017     Priority: Medium     Tear of medial meniscus of knee 09/08/2016     Priority: Medium     Chemical dependency (H) 11/25/2015     Priority: Medium     Alcoholic, sober since 2006 09/13/2012     Priority: Medium     Major depression in partial remission (H) 06/30/2011     Priority: Medium     Migraine headache 06/30/2011     Priority: Medium     (Problem list name updated by automated process. Provider to review and confirm.)       CARDIOVASCULAR SCREENING; LDL GOAL LESS THAN 160 10/31/2010     Priority: Medium     Cervicalgia 03/24/2010     Priority: Medium     Nonallopathic lesion of thoracic region 03/24/2010     Priority: Medium     Problem list name updated by automated process. Provider to review       Nonallopathic lesion of lumbar region 03/24/2010     Priority: Medium     Problem list name updated by automated process. Provider to review         Assessment:    Timmy Fitzpatrick is adjusting to having his wife home more as her work schedule has changed.  It can be disrupting to him when his routine is changed.  Ventura continues to homeschool her children.  At this point he desires no changes in his medications and will continue the Lexapro and Wellbutrin as prescribed.  No refills were needed for the Lunesta today and he takes that as needed for insomnia.  Ventura inform me he does not need to take the lorazepam anymore and that was discontinued.  He continues with talk therapy to learn skills to manage life stressors..    Medication side effects and alternatives were reviewed. Health promotion activities recommended and reviewed today. All questions addressed. Education and counseling completed regarding risks and benefits of medications and psychotherapy options.    Treatment Plan:        1.  Continue Lexapro 10 mg daily    2.  Continue Wellbutrin  mg twice  daily    3.  Continue Lunesta 2 mg at bedtime as needed-no refill today    4.  Continue gabapentin 300 mg 6 times daily    5.  Continue talk therapy    6.  Discontinue lorazepam-not taking      Continue all other medications as reviewed per electronic medical record today.     Safety plan reviewed. To the Emergency Department as needed or call after hours crisis line at [phone] or [phone]. [place] Crisis Text Line. Text [place] to 386987 or Suicide LifeLine Chat: suicide"[email or web address].org/chat/    To schedule individual or family therapy, call [facility] at [phone].    Schedule an appointment with me in 3 months or sooner as needed. Call [facility] at [phone] to schedule.    Follow up with primary care provider as planned or for acute medical concerns.    Call the psychiatric nurse line with medication questions or concerns at [phone].    [employer] may be used to communicate with your provider, but this is not intended to be used for emergencies.    Crisis Resources:    National Suicide Prevention Lifeline: 154.518.2247 (TTY: 667.259.6630). Call anytime for help.  (www.suicidepreventionlifeline.org)  National [place] on [place] Illness (www.audra.org): 474.396.5479 or 182-151-9602.   Mental Health Association (www.mentalhealth.org): 772.965.6880 or 705-459-5608.  [place] Crisis Text Line: Text [place] to 626040  Suicide LifeLine Chat: suicide"[email or web address].org/chat    Administrative Billing:   Time spent with patient was 30 minutes and greater than 50% of time or 20 minutes was spent in counseling and coordination of care regarding above diagnoses and treatment plan.    Patient Status:  Patient will continue to be seen for ongoing consultation and stabilization.    Signed:   EMILIA Wagner-BC   Psychiatry

## 2021-04-14 ENCOUNTER — OFFICE VISIT (OUTPATIENT)
Dept: FAMILY MEDICINE | Facility: CLINIC | Age: 41
End: 2021-04-14
Payer: COMMERCIAL

## 2021-04-14 PROCEDURE — 91301 PR COVID VAC MODERNA 100 MCG/0.5 ML IM: CPT

## 2021-04-14 PROCEDURE — 0011A PR COVID VAC MODERNA 100 MCG/0.5 ML IM: CPT

## 2021-04-14 ASSESSMENT — ANXIETY QUESTIONNAIRES: GAD7 TOTAL SCORE: 13

## 2021-04-28 ENCOUNTER — HOSPITAL ENCOUNTER (OUTPATIENT)
Dept: PHYSICAL THERAPY | Facility: CLINIC | Age: 41
Setting detail: THERAPIES SERIES
End: 2021-04-28
Attending: NURSE PRACTITIONER
Payer: COMMERCIAL

## 2021-04-28 DIAGNOSIS — M25.511 RIGHT SHOULDER PAIN, UNSPECIFIED CHRONICITY: ICD-10-CM

## 2021-04-28 PROCEDURE — 97140 MANUAL THERAPY 1/> REGIONS: CPT | Mod: GP | Performed by: PHYSICAL THERAPIST

## 2021-04-28 PROCEDURE — 97110 THERAPEUTIC EXERCISES: CPT | Mod: GP | Performed by: PHYSICAL THERAPIST

## 2021-04-28 PROCEDURE — 97161 PT EVAL LOW COMPLEX 20 MIN: CPT | Mod: GP | Performed by: PHYSICAL THERAPIST

## 2021-04-28 NOTE — PROGRESS NOTES
Timmy DAYRON Gonzalezrandall  1980 Physical Therapy Initial Evaluation  04/28/21 1100   General Information   Type of Visit Initial OP Ortho PT Evaluation   Start of Care Date 04/28/21   Referring Physician Vida Miranda   Patient/Family Goals Statement Throw without pain   Orders Evaluate and Treat   Date of Order 04/12/21   Medical Diagnosis Right shoulder pain, unspecified chronicity   Surgical/Medical history reviewed Yes   Precautions/Limitations no known precautions/limitations   Body Part(s)   Body Part(s) Shoulder;Cervical Spine   Presentation and Etiology   Pertinent history of current problem (include personal factors and/or comorbidities that impact the POC) Used to play a lot of disc golf in 2016 and hurt shoulder. Things got a little better. Then was throwing a football about 6 weeks ago and hurt shoulder. Had to belt arm to side so wouldn't use it. Gets pain in the side and back of shoulder. Has had numbness and tingling into both arms especially at night. Bringing arm out to side and above shoulder height will hurt. Hurts to throw. Sleeping has been painful on the right side. / Comorbidities - Cervicalgia, Migraine headaches, Chemical dependency, COPD, Depression, Bipolar II disorder, Generalized anxiety disorder       Impairments A. Pain;D. Decreased ROM;E. Decreased flexibility;K. Numbness;L. Tingling   Functional Limitations perform activities of daily living;perform desired leisure / sports activities   Symptom Location Right posterior shoulder   How/Where did it occur During recreation/sports   Onset date of current episode/exacerbation 03/17/21   Chronicity Recurrent   Pain rating (0-10 point scale) Best (/10);Worst (/10)   Best (/10) 0   Worst (/10) 6   Pain quality A. Sharp;F. Stabbing   Frequency of pain/symptoms C. With activity   Pain/symptoms exacerbated by C. Lifting;D. Carrying;G. Certain positions;H. Overhead reach   Pain/symptoms eased by A. Sitting;B. Walking;C. Rest;E. Changing positions    Progression of symptoms since onset: Worsened   Current / Previous Interventions   Diagnostic Tests: MRI   MRI Results Results   MRI results Impression: 1. No substantial change in comparison to 1/15/2018 with moderate grade articular sided tear of the mid and posterior fibers of the supraspinatus at the footprint, with extension into the anterior junctional fibers of the infraspinatus. 2. On a background of tendinosis, low-grade intrasubstance tear of the subscapularis at the footprint. 3. 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 Homemaker   Fall Risk Screen   Fall screen completed by PT   Have you fallen 2 or more times in the past year? No   Have you fallen and had an injury in the past year? No   Is patient a fall risk? No   Abuse Screen (yes response referral indicated)   Feels Unsafe at Home or Work/School no   Feels Threatened by Someone no   Does Anyone Try to Keep You From Having Contact with Others or Doing Things Outside Your Home? no   Physical Signs of Abuse Present no   Cervical Spine   Spurling Test Negative   Cervical Distraction Test Negative   UE Neural Tension UE Neural Tension Tests   ULTT II (Median and Musculocutaneous and Axillary) Negative   ULTT III (Radial) Negative   ULTT IV (Ulnar) Negative   Shoulder Objective Findings   Side (if bilateral, select both right and left) Right   Posture Slightly forward head and shoulder seated posture   Pec Minor (supine) Flexibility Moderately restricted B   Neer's Test Positive   [place]-Roberto Carlos Test Negative   [place]'s Test Positive   Crossover Test Negative   Palpation Tenderness to palpation over infraspinatus and teres minor lateral muscle bellies   Right Shoulder Flexion AROM 180 / Left - 178   Right Shoulder Abduction AROM 176 / Left - 180   Right Shoulder ER PROM 100 / Left - 110   Right Shoulder IR AROM T4 / Left - T5   Right Shoulder Flexion Strength 4-/5 with  pain   Right Shoulder Abduction Strength 4/5   Right Shoulder ER Strength 4-/5 with pain   Right Shoulder IR Strength 5/5   Planned Therapy Interventions   Planned Therapy Interventions joint mobilization;manual therapy;neuromuscular re-education;ROM;strengthening;stretching   Planned Modality Interventions   Planned Modality Interventions Cryotherapy;Hot packs;Electrical stimulation;TENS;Ultrasound;Iontophoresis   Clinical Impression   Criteria for Skilled Therapeutic Interventions Met yes, treatment indicated   PT Diagnosis Right rotator cuff irritation   Influenced by the following impairments Pain, Strength deficits   Functional limitations due to impairments Difficulty sleeping, throwing   Clinical Presentation Stable/Uncomplicated   Clinical Presentation Rationale Several comorbidities impacting PT / 1-2 body system   Clinical Decision Making (Complexity) Low complexity   Therapy Frequency 1 time/week   Predicted Duration of Therapy Intervention (days/wks) 8 weeks   Risk & Benefits of therapy have been explained Yes   Patient, Family & other staff in agreement with plan of care Yes   Education Assessment   Preferred Learning Style Listening;Reading;Demonstration;Pictures/video   Barriers to Learning No barriers   ORTHO GOALS   PT Ortho Eval Goals 1;2;3;4   Ortho Goal 1   Goal Identifier HEP   Goal Description Pt will be independent in HEP in order to achieve and maintain long term treatment goals.   Target Date 05/28/21   Ortho Goal 2   Goal Identifier Sleeping   Goal Description Pt will be able to sleep through the night with only waking up 1-2 times due to shoulder pain.   Target Date 06/23/21   Ortho Goal 3   Goal Identifier [place]   Goal Description Pt will be able to pull back his bow with a minimal increase in pain 1-2/10.   Target Date 06/23/21   Ortho Goal 4   Goal Identifier Throwing   Goal Description Pt will be able to throw a ball with a minimal increase in pain 1-2/10.   Target Date 06/23/21   Total  Evaluation Time   PT Eval, Low Complexity Minutes (69090) 20     Jefferson Pérez, PT, DPT

## 2021-05-04 DIAGNOSIS — N52.9 ERECTILE DYSFUNCTION, UNSPECIFIED ERECTILE DYSFUNCTION TYPE: ICD-10-CM

## 2021-05-05 ENCOUNTER — HOSPITAL ENCOUNTER (OUTPATIENT)
Dept: PHYSICAL THERAPY | Facility: CLINIC | Age: 41
Setting detail: THERAPIES SERIES
End: 2021-05-05
Attending: NURSE PRACTITIONER
Payer: COMMERCIAL

## 2021-05-05 DIAGNOSIS — F41.1 GENERALIZED ANXIETY DISORDER: ICD-10-CM

## 2021-05-05 PROCEDURE — 97110 THERAPEUTIC EXERCISES: CPT | Mod: GP | Performed by: PHYSICAL THERAPIST

## 2021-05-05 PROCEDURE — 97161 PT EVAL LOW COMPLEX 20 MIN: CPT | Mod: GP | Performed by: PHYSICAL THERAPIST

## 2021-05-05 PROCEDURE — 97140 MANUAL THERAPY 1/> REGIONS: CPT | Mod: GP | Performed by: PHYSICAL THERAPIST

## 2021-05-05 NOTE — TELEPHONE ENCOUNTER
Requested Prescriptions   Pending Prescriptions Disp Refills     gabapentin (NEURONTIN) 300 MG capsule 180 capsule 3     Sig: Take 1 capsule (300 mg) by mouth 6 times daily Refill 28 days after last fill       There is no refill protocol information for this order

## 2021-05-05 NOTE — PROGRESS NOTES
Timmy DAYRON Amari  1980 Physical Therapy Initial Evaluation  05/05/21 1300   General Information   Type of Visit Initial OP Ortho PT Evaluation   Start of Care Date 05/05/21   Referring Physician Vida Miranda   Patient/Family Goals Statement Play with his kids without pain   Orders Evaluate and Treat   Date of Order 03/12/21   Certification Required? No   Medical Diagnosis Bilateral low back pain without sciatica, unspecified chronicity   Surgical/Medical history reviewed Yes   Precautions/Limitations no known precautions/limitations   Body Part(s)   Body Part(s) Lumbar Spine/SI   Presentation and Etiology   Pertinent history of current problem (include personal factors and/or comorbidities that impact the POC) Has had back pain since 2018. No injury where pain started. Getting pain in right hip and right low back. Occasionally gets pain down left leg as well. Pain will go all the way down to the feet. Bending or quick movements will make pain worse. Heat helps with pain a little bit. Standing is usually better than sitting. / Comorbidities - Cervicalgia, Migraine headaches, Chemical dependency, COPD, Depression, Bipolar II disorder, Generalized anxiety disorder     Impairments A. Pain;B. Decreased WB tolerance;D. Decreased ROM;E. Decreased flexibility;F. Decreased strength and endurance;K. Numbness;L. Tingling   Functional Limitations perform activities of daily living;perform desired leisure / sports activities   Symptom Location Lumbar spine and LEs   How/Where did it occur From insidious onset   Onset date of current episode/exacerbation 05/01/21  (Most recent flare-up)   Chronicity Recurrent   Pain rating (0-10 point scale) Best (/10);Worst (/10)   Best (/10) 2   Worst (/10) 7   Pain quality A. Sharp;E. Shooting;F. Stabbing   Frequency of pain/symptoms A. Constant   Pain/symptoms exacerbated by A. Sitting;B. Walking;C. Lifting;D. Carrying;G. Certain positions;I. Bending   Pain/symptoms eased by C.  Rest;E. Changing positions;G. Heat;J. Braces/supports   Progression of symptoms since onset: Improved   Current / Previous Interventions   Diagnostic Tests: MRI   MRI Results Results   MRI results IMPRESSION:  1. Worsening of the L4-L5 right posterior paramedian extruded disc herniation since the previous exam, causing more impression on the thecal sac. 2. L5-S1 moderate degenerative disc disease is unchanged. 3. L2-L3 mild degenerative disc disease is unchanged.   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 Homemaker   Fall Risk Screen   Fall screen completed by PT   Have you fallen 2 or more times in the past year? No   Have you fallen and had an injury in the past year? No   Is patient a fall risk? No   Abuse Screen (yes response referral indicated)   Feels Unsafe at Home or Work/School no   Feels Threatened by Someone no   Does Anyone Try to Keep You From Having Contact with Others or Doing Things Outside Your Home? no   Physical Signs of Abuse Present no   Lumbar Spine/SI Objective Findings   Gait/Locomotion Reduced gait speed and heel strike. Reduced lumbar rotation and unloads right LE during gait.   Flexion ROM Fingtertips to knee joint   Extension ROM 20 degrees with pain in right low back   Right Side Bending ROM Fingertips to knee joint without increase in pain   Left Side Bending ROM Fingertips to knee joint without increase in pain   Repeated Extension-Standing ROM Pain in right low back   Repeated Flexion-Standing ROM Pain in right low back   Pelvic Screen Negative for SI provocation tests   Hip Screen Negative hip scour   Hip Flexion (L2) Strength 5/5 B   Hip Abduction Strength 5/5 B   Hip Extension Strength 5/5 B   Knee Extension (L3) Strength 5/5 B   Ankle Dorsiflexion (L4) Strength 5/5 B   Great Toe Extension (L5) Strength 5/5 B   Ankle Plantar Flexion (S1) Strength 5/5 B    Hamstring Flexibility Mildly restricted B   Piriformis Flexibility Moderately restricted B   SLR Positive on right   Slump Test Positive on right   Segmental Mobility Restricted L3-L5   Patellar Tendon Reflexes  2+ B   Palpation Hypertonicity of piriformis on right and lumbar paraspinals on right   Planned Therapy Interventions   Planned Therapy Interventions joint mobilization;manual therapy;neuromuscular re-education;ROM;strengthening;stretching   Planned Modality Interventions   Planned Modality Interventions Cryotherapy;Hot packs;Electrical stimulation;TENS;Traction   Clinical Impression   Criteria for Skilled Therapeutic Interventions Met yes, treatment indicated   PT Diagnosis Low back pain with possible radicular involvement   Influenced by the following impairments Pain, Strength and flexibility deficits   Functional limitations due to impairments Difficulty sleeping, bending forward   Clinical Presentation Stable/Uncomplicated   Clinical Presentation Rationale Several comorbidities impacting PT / 1 body system   Clinical Decision Making (Complexity) Low complexity   Therapy Frequency 1 time/week   Predicted Duration of Therapy Intervention (days/wks) 8 weeks   Risk & Benefits of therapy have been explained Yes   Patient, Family & other staff in agreement with plan of care Yes   Education Assessment   Preferred Learning Style Listening;Reading;Demonstration;Pictures/video   Barriers to Learning No barriers   ORTHO GOALS   PT Ortho Eval Goals 1;2;3;4   Ortho Goal 1   Goal Identifier HEP   Goal Description Pt will be independent in HEP in order to achieve and maintain long term treatment goals.   Target Date 06/05/21   Ortho Goal 2   Goal Identifier Bending forward   Goal Description Pt will be able to pick items up from the floor with a minimal increase in pain 1-2/10.   Target Date 06/30/21   Ortho Goal 3   Goal Identifier Sports   Goal Description Pt will be able to play basketball and baseball with his  children with a minimal increase in pain 1-2/10.   Target Date 06/30/21   Ortho Goal 4   Goal Identifier Sleeping   Goal Description Pt will be able to sleep through the night with only waking up 1-2 times due to back pain.   Target Date 06/30/21   Total Evaluation Time   PT Breanneal, Low Complexity Minutes ([id number]) 20     Jefferson Pérez, PT, DPT

## 2021-05-07 RX ORDER — GABAPENTIN 300 MG/1
300 CAPSULE ORAL
Qty: 180 CAPSULE | Refills: 3 | OUTPATIENT
Start: 2021-05-07

## 2021-05-07 RX ORDER — SILDENAFIL CITRATE 20 MG/1
TABLET ORAL
Qty: 60 TABLET | Refills: 0 | Status: SHIPPED | OUTPATIENT
Start: 2021-05-07 | End: 2021-07-02

## 2021-05-12 ENCOUNTER — IMMUNIZATION (OUTPATIENT)
Dept: FAMILY MEDICINE | Facility: CLINIC | Age: 41
End: 2021-05-12
Attending: FAMILY MEDICINE
Payer: COMMERCIAL

## 2021-05-12 PROCEDURE — 0012A PR COVID VAC MODERNA 100 MCG/0.5 ML IM: CPT

## 2021-05-12 PROCEDURE — 91301 PR COVID VAC MODERNA 100 MCG/0.5 ML IM: CPT

## 2021-06-22 ENCOUNTER — HOSPITAL ENCOUNTER (EMERGENCY)
Facility: CLINIC | Age: 41
Discharge: HOME | End: 2021-06-22
Attending: EMERGENCY MEDICINE | Admitting: EMERGENCY MEDICINE
Payer: COMMERCIAL

## 2021-06-22 VITALS
WEIGHT: 170 LBS | HEART RATE: 92 BPM | TEMPERATURE: 98.2 F | RESPIRATION RATE: 18 BRPM | OXYGEN SATURATION: 98 % | BODY MASS INDEX: 24.39 KG/M2

## 2021-06-22 DIAGNOSIS — M54.41 ACUTE RIGHT-SIDED LOW BACK PAIN WITH RIGHT-SIDED SCIATICA: ICD-10-CM

## 2021-06-22 PROCEDURE — 250N000013 HC RX MED GY IP 250 OP 250 PS 637: Performed by: EMERGENCY MEDICINE

## 2021-06-22 PROCEDURE — 99283 EMERGENCY DEPT VISIT LOW MDM: CPT | Performed by: EMERGENCY MEDICINE

## 2021-06-22 PROCEDURE — 99284 EMERGENCY DEPT VISIT MOD MDM: CPT | Performed by: EMERGENCY MEDICINE

## 2021-06-22 RX ORDER — OXYCODONE HYDROCHLORIDE 5 MG/1
5 TABLET ORAL EVERY 6 HOURS PRN
Qty: 12 TABLET | Refills: 0 | Status: SHIPPED | OUTPATIENT
Start: 2021-06-22 | End: 2021-10-12

## 2021-06-22 RX ORDER — IBUPROFEN 400 MG/1
400 TABLET, FILM COATED ORAL ONCE
Status: COMPLETED | OUTPATIENT
Start: 2021-06-22 | End: 2021-06-22

## 2021-06-22 RX ORDER — OXYCODONE HCL 5 MG/5 ML
2.5 SOLUTION, ORAL ORAL EVERY 6 HOURS PRN
Qty: 30 ML | Refills: 0 | Status: SHIPPED | OUTPATIENT
Start: 2021-06-22 | End: 2021-06-22 | Stop reason: ALTCHOICE

## 2021-06-22 RX ORDER — OXYCODONE HYDROCHLORIDE 5 MG/1
5 TABLET ORAL EVERY 4 HOURS PRN
Status: DISCONTINUED | OUTPATIENT
Start: 2021-06-22 | End: 2021-06-22 | Stop reason: HOSPADM

## 2021-06-22 RX ORDER — PREDNISONE 20 MG/1
TABLET ORAL
Qty: 10 TABLET | Refills: 0 | Status: SHIPPED | OUTPATIENT
Start: 2021-06-22 | End: 2021-10-12

## 2021-06-22 RX ADMIN — OXYCODONE HYDROCHLORIDE 5 MG: 5 TABLET ORAL at 16:15

## 2021-06-22 RX ADMIN — IBUPROFEN 400 MG: 400 TABLET, FILM COATED ORAL at 16:15

## 2021-06-22 ASSESSMENT — ENCOUNTER SYMPTOMS
BACK PAIN: 1
CHILLS: 0
NECK PAIN: 0
FEVER: 0

## 2021-06-22 NOTE — DISCHARGE INSTRUCTIONS
Her examination the emergency department was reassuring.  You likely have disc herniation which is putting pressure on the nerve which is causing your symptoms.  I recommend that you take 600 mg of ibuprofen 3 times per day and 650 mg of acetaminophen 3 times per day for pain control.  You can take Roxicodone as prescribed for breakthrough pain.  Be certain not to drive a vehicle while taking narcotic pain medication.  You also take 40 mg of prednisone once per day for 5 days.  If your anxiety worsens while on prednisone, discontinue its use.    Keep your follow-up appointment with spine surgery at [facility].  I would contact them to update them on your recent emergency department visit and see if they can get you an earlier appointment.    If you having incontinence of stool, difficulty emptying your bladder, muscle weakness in your lower extremities or sensory loss below the waist, you will need to return to the emergency department immediately for further evaluation and treatment.

## 2021-06-22 NOTE — ED PROVIDER NOTES
History     Chief Complaint   Patient presents with     Back Pain     chronic low back pain radiating down right leg to foot      HPI  Timmy Fitzpatrick is a 40 year old male with history of low back pain with 4 days of worsening low back pain with radicular symptoms on right.  Patient has had low back pain for years and has been evaluated by orthopedic surgery previous with recommendations for surgical management.  Patient initially resistant to surgery and has tried physical therapy as well as injections.  Orthopedic follow-up and physical therapies have become inconsistent during the pandemic.  He does have a appointment with spine surgery at [facility] this next month.  His back pain is in his lumbar area and is having pain shooting down his right leg at times.  He has had mild radicular symptoms on the right but typically they have been on his left side.  No known falls, injuries, or traumatic events.  Is able to walk.  No reported weakness or sensory loss.  No change in bowel or bladder function.  Denies any history of IV drug use.  No fevers or chills.    PDMP Review       Value Time User    State PDMP site checked  Yes 6/22/2021  4:26 PM Aquiles Hobson MD            The patient's PMHx, Surgical Hx, Allergies, and Medications were all reviewed with the patient.    Allergies:  Allergies   Allergen Reactions     Cats      Nkda [No Known Drug Allergies]      Seasonal Allergies        Problem List:    Patient Active Problem List    Diagnosis Date Noted     COPD (chronic obstructive pulmonary disease) (H) 03/12/2021     Priority: Medium     Umbilical hernia without obstruction and without gangrene 03/01/2021     Priority: Medium     Added automatically from request for surgery [id number]       Generalized anxiety disorder 03/26/2019     Priority: Medium     Bipolar II disorder (H) 11/28/2018     Priority: Medium     Anxiety 09/18/2017     Priority: Medium     Tear of medial meniscus of knee 09/08/2016      Priority: Medium     Chemical dependency (H) 11/25/2015     Priority: Medium     Alcoholic, sober since 2006 09/13/2012     Priority: Medium     Major depression in partial remission (H) 06/30/2011     Priority: Medium     Migraine headache 06/30/2011     Priority: Medium     (Problem list name updated by automated process. Provider to review and confirm.)       CARDIOVASCULAR SCREENING; LDL GOAL LESS THAN 160 10/31/2010     Priority: Medium     Cervicalgia 03/24/2010     Priority: Medium     Nonallopathic lesion of thoracic region 03/24/2010     Priority: Medium     Problem list name updated by automated process. Provider to review       Nonallopathic lesion of lumbar region 03/24/2010     Priority: Medium     Problem list name updated by automated process. Provider to review          Past Medical History:    Past Medical History:   Diagnosis Date     Anxiety      Cervicalgia      Chemical dependency (H)      Depression      Migraine headache        Past Surgical History:    Past Surgical History:   Procedure Laterality Date     HERNIA REPAIR       HERNIORRHAPHY UMBILICAL Bilateral 3/16/2021    Procedure: Open umbilical hernia repair and bilateral vasectomy;  Surgeon: Stanley Woods MD;  Location: [facility] OR     SURGICAL HISTORY OF -       deviated septum     VASECTOMY Bilateral 3/16/2021    Procedure: VASECTOMY;  Surgeon: Stanley Woods MD;  Location: [facility] OR       Family History:    Family History   Problem Relation Age of Onset     Cancer Paternal Grandfather      Depression Mother      Anxiety Disorder Mother      Depression Father      Unknown/Adopted Father      Dementia Maternal Grandfather      Substance Abuse Paternal Grandmother        Social History:  Marital Status:   [2]  Social History     Tobacco Use     Smoking status: Former Smoker     Packs/day: 0.00     Types: Dip, chew, snus or snuff     Smokeless tobacco: Former User     Types: Chew     Quit date: 10/1/2019   Substance Use Topics      Alcohol use: No     Comment: sober since April 2016     Drug use: Yes     Types: Marijuana     Comment: Daily        Medications:    oxyCODONE (ROXICODONE) 5 MG tablet  predniSONE (DELTASONE) 20 MG tablet  buPROPion (WELLBUTRIN SR) 100 MG 12 hr tablet  cyclobenzaprine (FLEXERIL) 10 MG tablet  escitalopram (LEXAPRO) 10 MG tablet  eszopiclone (LUNESTA) 2 MG tablet  gabapentin (NEURONTIN) 300 MG capsule  medical cannabis (Patient's own supply)  Multiple Vitamins-Minerals (MENS MULTIPLE VITAMIN/LYCOPENE PO)  naproxen (NAPROSYN) 500 MG tablet  nicotine polacrilex (NICORETTE) 4 MG gum  sildenafil (REVATIO) 20 MG tablet          Review of Systems   Constitutional: Negative for chills and fever.   Musculoskeletal: Positive for back pain. Negative for neck pain.   Skin: Negative for rash.   All other systems reviewed and are negative.      Physical Exam   Pulse: 92  Temp: 98.2  F (36.8  C)  Resp: 18  Weight: 77.1 kg (170 lb)  SpO2: 98 %    Physical Exam  GEN: Awake, alert, and cooperative.  Lying on cart recumbent position.  Appears acutely distressed secondary to pain.  HENT: MMM. External ears and nose normal bilaterally.  EYES: EOM intact. Conjunctiva clear. No discharge.   NECK: Symmetric, freely mobile.   CV : Regular rate and rhythm.  Brisk capillary refill.  PULM: Normal effort.  Speaking full sentences.  BACK: no midline spinal tenderness to palpation. No paraspinal tenderness to palpation. Straight leg positive on right.   NEURO: Mental status: Alert, awake. Oriented to self, date, and place. Normal speech and language. GCS 15  Cranial Nerves: II-XII grossly intact  Motor: RLE: 5/5 hip flexion. 5/5 knee flex/ext. 5/5 ankle plantar-/dorsiflexion. 5/5 EHL.   LLE: 5/5 hip flexion. 5/5 knee flex/ext. 5/5 ankle plantar-/dorsiflexion. 5/5 EHL   Sensory: Sensation intact in all 4 extremities   Coordination:  Steady gait  EXT: No gross deformity. Warm and well perfused.  INT: Warm. No diaphoresis. Normal color.        ED  Course        Procedures           Critical Care time:  none               No results found for this or any previous visit (from the past 24 hour(s)).    Medications   ibuprofen (ADVIL/MOTRIN) tablet 400 mg (400 mg Oral Given 6/22/21 1615)       Assessments & Plan (with Medical Decision Making)   40 year old male with past medical history significant for chronic low back pain with 4 days of acutely worsening back pain with no known injury or inciting event.  Patient does have known disc herniation of L4-L5 which was noted on MRI of lumbar spine in February of last year.  He does have follow-up with orthopedic surgery at [facility] scheduled for this next month.  Exam positive for straight leg raise on the right.  No midline spinal tenderness to palpation or paraspinal tenderness.  Motor and sensory intact.  No bowel or bladder symptoms.  No saddle anesthesia.  Do not see indication for emergent MR imaging in the emergency department.  Will treat pain with ibuprofen, Tylenol, and oxycodone for breakthrough pain.  We did discuss other options such as steroids and patient will would like to try a short course.  He does have anxiety and depression and was aware of possible bad reaction including joann with steroid use.  He says this is kept him from trying it before but would like to trial a course.  If has worsening anxiety will discontinued steroid treatment.  Plan for 600 mg of ibuprofen, 650 mg acetaminophen 3 times per day, a short course of oxycodone (12 tablets) for breakthrough pain, and 40 mg of prednisone for 5 days.  Patient does have a history of substance abuse.  I did review the PDMP I did not see any signs of misuse or recent prescriptions for narcotics.  I feel a short course would be reasonable.  We discussed the risks and benefits of opioid treatment and he was okay with a short course.  I suggested he contacts spine surgery at [facility] to update them on worsening of symptoms and ED visit and see if they  can accommodate an appointment sooner.  ED return precautions discussed.    I have reviewed the nursing notes.        Discharge Medication List as of 6/22/2021  4:17 PM      START taking these medications    Details   oxyCODONE (ROXICODONE) 5 MG tablet Take 1 tablet (5 mg) by mouth every 6 hours as needed for pain, Disp-12 tablet, R-0, E-Prescribe      predniSONE (DELTASONE) 20 MG tablet Take two tablets (= 40mg) each day for 5 (five) days, Disp-10 tablet, R-0, E-Prescribe             Final diagnoses:   Acute right-sided low back pain with right-sided sciatica - acute on chronic     Aquiles Hobson MD    6/22/2021   [facility] EMERGENCY DEPT    Disclaimer: This note consists of words and symbols derived from keyboarding and dictation using voice recognition software.  As a result, there may be errors that have gone undetected.  Please consider this when interpreting information found in this note.             Aquiles Hobson MD  06/22/21 1953

## 2021-07-02 DIAGNOSIS — N52.9 ERECTILE DYSFUNCTION, UNSPECIFIED ERECTILE DYSFUNCTION TYPE: ICD-10-CM

## 2021-07-02 RX ORDER — SILDENAFIL CITRATE 20 MG/1
TABLET ORAL
Qty: 60 TABLET | Refills: 0 | Status: SHIPPED | OUTPATIENT
Start: 2021-07-02 | End: 2021-08-26

## 2021-07-02 NOTE — TELEPHONE ENCOUNTER
Routing refill request to provider for review/approval because:  Last ordered by kimo TUTTLE RN, BSN

## 2021-07-08 DIAGNOSIS — F51.05 INSOMNIA DUE TO OTHER MENTAL DISORDER: ICD-10-CM

## 2021-07-08 DIAGNOSIS — F99 INSOMNIA DUE TO OTHER MENTAL DISORDER: ICD-10-CM

## 2021-07-08 RX ORDER — ESZOPICLONE 2 MG/1
2 TABLET, FILM COATED ORAL
Qty: 15 TABLET | Refills: 0 | Status: SHIPPED | OUTPATIENT
Start: 2021-07-08 | End: 2021-07-13

## 2021-07-08 NOTE — TELEPHONE ENCOUNTER
Eszopiclone 2mg tabs     Last Written Prescription Date:  03.10.2021  Last Fill Quantity: 15,   # refills: 0   Last Office Visit:04.13.2021  Future Office visit:   07.13.2021    Routing refill request to provider for review/approval because:  Blood pressure out of range   Drug not on the [facility], UMP or  Health refill protocol or controlled substance

## 2021-07-09 NOTE — PROGRESS NOTES
"    Outpatient Psychiatric Progress Note    Name: Timmy Fitzpatrick   : 1980                    Primary Care Provider: Gissel VERGARA   Therapist: WILL Doe      PHQ-9 scores:  PHQ-9 SCORE 2020   PHQ-9 Total Score - - -   PHQ-9 Total Score MyChart - - -   PHQ-9 Total Score 20 15 15   Some encounter information is confidential and restricted. Go to Review Flowsheets activity to see all data.       JUAREZ-7 scores:  JUAREZ-7 SCORE 2020   Total Score - - -   Total Score - - -   Total Score 14 13 13   Some encounter information is confidential and restricted. Go to Review Flowsheets activity to see all data.       Patient Identification:    Patient is a 40 year old year old,   White American male  who presents for return visit with me.  Patient is currently unemployed. Patient attended the session alone. Patient prefers to be called: \" Ventura\".    Interim History:    I last saw Timmy Fitzpatrick for outpatient psychiatry Return Visit on 2021.     During that appointment, he reported adjusting to having his wife home more as her work schedule has changed.  It can be disrupting to him when his routine is changed.  Ventura continues to homeschool her children.  At this point he desires no changes in his medications and will continue the Lexapro and Wellbutrin as prescribed.  No refills were needed for the Lunesta today and he takes that as needed for insomnia.  Ventura inform me he does not need to take the lorazepam anymore and that was discontinued.  He continues with talk therapy to learn skills to manage life stressors.. .     Current medications include: buPROPion (WELLBUTRIN SR) 100 MG 12 hr tablet, Take 1 tablet (100 mg) by mouth 2 times daily  escitalopram (LEXAPRO) 10 MG tablet, Take 1 tablet (10 mg) by mouth daily  eszopiclone (LUNESTA) 2 MG tablet, Take 1 tablet (2 mg) by mouth nightly as needed for sleep  gabapentin (NEURONTIN) 300 MG capsule, Take " 1 capsule (300 mg) by mouth 6 times daily Refill 28 days after last fill  medical cannabis (Patient's own supply), See Admin Instructions (The purpose of this order is to document that the patient reports taking medical cannabis.  This is not a prescription, and is not used to certify that the patient has a qualifying medical condition.)  Multiple Vitamins-Minerals (MENS MULTIPLE VITAMIN/LYCOPENE PO), Take 1 tablet by mouth daily.  naproxen (NAPROSYN) 500 MG tablet, Take 1 tablet (500 mg) by mouth 2 times daily (with meals)  nicotine polacrilex (NICORETTE) 4 MG gum, Place 4 mg inside cheek as needed for smoking cessation  sildenafil (REVATIO) 20 MG tablet, TAKE 5 TABLETS BY MOUTH DAILY AS NEEDED, 30 MINUTES TO 4 HOURS BEFORE SEX. (DO NOT USE WITH NITROGLYCERIN, TERAZOSIN OR DOXAZOSIN  cyclobenzaprine (FLEXERIL) 10 MG tablet, Take 1 tablet (10 mg) by mouth 3 times daily as needed for muscle spasms (Patient not taking: Reported on 4/13/2021)  oxyCODONE (ROXICODONE) 5 MG tablet, Take 1 tablet (5 mg) by mouth every 6 hours as needed for pain (Patient not taking: Reported on 7/13/2021)  predniSONE (DELTASONE) 20 MG tablet, Take two tablets (= 40mg) each day for 5 (five) days (Patient not taking: Reported on 7/13/2021)    No current facility-administered medications on file prior to visit.       The [place] Prescription Monitoring Program has been reviewed and there are no concerns about diversionary activity for controlled substances at this time.      I was able to review most recent Primary Care Provider, specialty provider, and therapy visit notes that I have access to.     Today, patient reports that he has had ups and downs and things are t he same.  Physical darryl has been a struggle.  Hiis scores.  He has had back pain and went to the ED.   He met with a surgeon last week and has surgery for relief od nerve pain.  Nothing is scheduled as of y et.  Most days he prepares food for the  kids and does minor chores  around the house.   It is hard to have his wife around more.      He has been trying to structure his days.  He has had thoughts to end his life  Due to physical limitations with pent up energy he cannot get out.  He has no plans.  He has individual therapy.  Lunesta is helpful in getting sleep.  Overall, he feels like his mental state is better.       has a past medical history of Anxiety, Cervicalgia, Chemical dependency (H), Depression, and Migraine headache. He also has no past medical history of Complication of anesthesia, Diabetes (H), PONV (postoperative nausea and vomiting), Sleep apnea, or Uncomplicated asthma.    Social history updates:    Saloni lives with his 2 children and wife.  He is unemployed.    Substance use updates:    Medical cannabis - tincture and vaping  Tobacco use: Yes nicotine patti - 6 to 8 pieces daily  Ready to quit?  No  Nicotine Replacement Therapy tried: Nicotine gum     Vital Signs:   There were no vitals taken for this visit.    Labs:    Most recent laboratory results reviewed and no new labs.     Review of Systems:  10 systems (general, cardiovascular, respiratory, eyes, ENT, endocrine, GI, , M/S, neurological) were reviewed. Most pertinent finding(s) is/are: Chronic neck and back pain, occasional headache pain, no chest pain, no shortness of breath, no skin rashes observed. The remaining systems are all unremarkable.    Mental Status Examination:  Appearance:  awake, alert, mild distress and casually dressed  Attitude:  cooperative   Eye Contact:  adequate  Gait and Station: No assistive Devices used and No dizziness or falls  Psychomotor Behavior:  intact station, gait and muscle tone  Oriented to:  time, person, and place  Attention Span and Concentration:  Normal  Speech:   clear, coherent and Speaks: English  Mood:  anxious, depressed and better  Affect:  mood congruent  Associations:  no loose associations  Thought Process:  tangental  Thought Content:  passive suicidal  ideation present, no auditory hallucinations present and no visual hallucinations present  Recent and Remote Memory:  intact Not formally assessed. No amnesia.  Fund of Knowledge: appropriate  Insight:  good  Judgment:  intact  Impulse Control:  intact    Suicide Risk Assessment:  Today Timmy Fitzpatrick reports ongoing fleeting thoughts of not wanting to live but no thoughts to harm other people. In addition, there are notable risk factors for self-harm, including anxiety, comorbid medical condition of Chronic pain, suicidal ideation and withdrawing. However, risk is mitigated by commitment to family, history of seeking help when needed, future oriented, denies suicidal intent or plan and denies homicidal ideation, intent, or plan. Therefore, based on all available evidence including the factors cited above, Timmy Fitzpatrick does not appear to be at imminent risk for self-harm, does not meet criteria for a 72-hr hold, and therefore remains appropriate for ongoing outpatient level of care.  A thorough assessment of risk factors related to suicide and self-harm have been reviewed and are noted above. The patient convincingly denies suicidality on several occasions. Local community safety resources printed and reviewed for patient to use if needed. There was no deceit detected, and the patient presented in a manner that was believable.     DSM5 Diagnosis:  296.32 (F33.1) Major Depressive Disorder, Recurrent Episode, Moderate _ and With melancholic features  300.02 (F41.1) Generalized Anxiety Disorder  780.52 (G47.00) Insomnia Disorder   With ohter medical comorbidity  Recurrent      Medical comorbidities include:   Patient Active Problem List    Diagnosis Date Noted     COPD (chronic obstructive pulmonary disease) (H) 03/12/2021     Priority: Medium     Umbilical hernia without obstruction and without gangrene 03/01/2021     Priority: Medium     Added automatically from request for surgery [id number]        Generalized anxiety disorder 03/26/2019     Priority: Medium     Bipolar II disorder (H) 11/28/2018     Priority: Medium     Anxiety 09/18/2017     Priority: Medium     Tear of medial meniscus of knee 09/08/2016     Priority: Medium     Chemical dependency (H) 11/25/2015     Priority: Medium     Alcoholic, sober since 2006 09/13/2012     Priority: Medium     Major depression in partial remission (H) 06/30/2011     Priority: Medium     Migraine headache 06/30/2011     Priority: Medium     (Problem list name updated by automated process. Provider to review and confirm.)       CARDIOVASCULAR SCREENING; LDL GOAL LESS THAN 160 10/31/2010     Priority: Medium     Cervicalgia 03/24/2010     Priority: Medium     Nonallopathic lesion of thoracic region 03/24/2010     Priority: Medium     Problem list name updated by automated process. Provider to review       Nonallopathic lesion of lumbar region 03/24/2010     Priority: Medium     Problem list name updated by automated process. Provider to review         Assessment:    Timmy Fitzpatrick reported today that he has ongoing sleep disruption and attributes this mostly to pain symptoms that have worsened recently.  Surgery is pending to relieve pressure from a pinched nerve in his back.  Because of his sleep disturbance I added hydroxyzine 50 mg at bedtime to help him sleep in addition to the Lunesta 2 mg at bedtime as needed.  He reports feeling more refreshed and energized when he is able to sleep through the night.  To process his life stressors and physical limitations he is continuing individual talk therapy as well as couples therapy with his wife to improve communications.  Gabapentin is continued at 300 mg 6 times daily for anxiety.  Wellbutrin and Lexapro will continue at their current doses to help assist lifting his depression and negative outlook on life..    Medication side effects and alternatives were reviewed. Health promotion activities recommended and  reviewed today. All questions addressed. Education and counseling completed regarding risks and benefits of medications and psychotherapy options.    Treatment Plan:        1.  Continue Lexapro 10 mg daily    2.  Continue Wellbutrin  mg twice daily    3.  Continue Lunesta 2 mg at bedtime as needed-30-day supply sent    4.  Continue gabapentin 300 mg 6 times daily    5.  Continue talk therapy    6.   Add hydroxyzine 50 mg at bedtime to help you sleep       Continue all other medications as reviewed per electronic medical record today.     Safety plan reviewed. To the Emergency Department as needed or call after hours crisis line at [phone] or [phone]. [place] Crisis Text Line. Text [place] to 888695 or Suicide LifeLine Chat: [email or web address]/chat/    To schedule individual or family therapy, call [facility] at [phone].    Schedule an appointment with me in 3 months or sooner as needed. Call [facility] at [phone] to schedule.    Follow up with primary care provider as planned or for acute medical concerns.    Call the psychiatric nurse line with medication questions or concerns at [phone].    Blue Securityhart may be used to communicate with your provider, but this is not intended to be used for emergencies.    Crisis Resources:    National Suicide Prevention Lifeline: 105.962.7544 (TTY: 742.975.1713). Call anytime for help.  (www.suicidepreventionlifeline.org)  National [place] on Mental Illness (www.audra.org): 812.132.8608 or 306-214-5324.   Mental Health Association (www.mentalhealth.org): 462.902.7944 or 823-054-3916.  [place] Crisis Text Line: Text [place] to 529173  Suicide LifeLine Chat: [email or web address]/chat    Administrative Billing:   Time spent with patient was 30 minutes and greater than 50% of time or 20 minutes was spent in counseling and coordination of care regarding above diagnoses and treatment plan.    Patient  Status:  Patient will continue to be seen for ongoing consultation and stabilization.    Signed:   GIL WagnerP-BC   Psychiatry

## 2021-07-13 ENCOUNTER — VIRTUAL VISIT (OUTPATIENT)
Dept: PSYCHIATRY | Facility: CLINIC | Age: 41
End: 2021-07-13
Payer: COMMERCIAL

## 2021-07-13 DIAGNOSIS — F51.05 INSOMNIA DUE TO OTHER MENTAL DISORDER: ICD-10-CM

## 2021-07-13 DIAGNOSIS — F99 INSOMNIA DUE TO OTHER MENTAL DISORDER: ICD-10-CM

## 2021-07-13 PROCEDURE — 99214 OFFICE O/P EST MOD 30 MIN: CPT | Mod: 95 | Performed by: NURSE PRACTITIONER

## 2021-07-13 RX ORDER — HYDROXYZINE PAMOATE 50 MG/1
50 CAPSULE ORAL AT BEDTIME
Qty: 30 CAPSULE | Refills: 3 | Status: SHIPPED | OUTPATIENT
Start: 2021-07-13 | End: 2021-10-12

## 2021-07-13 RX ORDER — ESZOPICLONE 2 MG/1
2 TABLET, FILM COATED ORAL
Qty: 30 TABLET | Refills: 0 | Status: SHIPPED | OUTPATIENT
Start: 2021-07-13 | End: 2021-10-12

## 2021-07-13 ASSESSMENT — PATIENT HEALTH QUESTIONNAIRE - PHQ9
SUM OF ALL RESPONSES TO PHQ QUESTIONS 1-9: 15
5. POOR APPETITE OR OVEREATING: NEARLY EVERY DAY

## 2021-07-13 ASSESSMENT — ANXIETY QUESTIONNAIRES
5. BEING SO RESTLESS THAT IT IS HARD TO SIT STILL: NEARLY EVERY DAY
2. NOT BEING ABLE TO STOP OR CONTROL WORRYING: SEVERAL DAYS
3. WORRYING TOO MUCH ABOUT DIFFERENT THINGS: SEVERAL DAYS
7. FEELING AFRAID AS IF SOMETHING AWFUL MIGHT HAPPEN: NOT AT ALL
1. FEELING NERVOUS, ANXIOUS, OR ON EDGE: MORE THAN HALF THE DAYS
GAD7 TOTAL SCORE: 13
6. BECOMING EASILY ANNOYED OR IRRITABLE: NEARLY EVERY DAY

## 2021-07-13 NOTE — PATIENT INSTRUCTIONS
1.  Continue Lexapro 10 mg daily    2.  Continue Wellbutrin  mg twice daily    3.  Continue Lunesta 2 mg at bedtime as needed-30-day supply sent    4.  Continue gabapentin 300 mg 6 times daily    5.  Continue talk therapy    6.   Add hydroxyzine 50 mg at bedtime to help you sleep       Continue all other medications as reviewed per electronic medical record today.     Safety plan reviewed. To the Emergency Department as needed or call after hours crisis line at [phone] or [phone]. [place] Crisis Text Line. Text [place] to 678657 or Suicide LifeLine Chat: [email or web address]/chat/    To schedule individual or family therapy, call [facility] at [phone].    Schedule an appointment with me in 3 months or sooner as needed. Call [facility] at [phone] to schedule.    Follow up with primary care provider as planned or for acute medical concerns.    Call the psychiatric nurse line with medication questions or concerns at [phone].    HiMom may be used to communicate with your provider, but this is not intended to be used for emergencies.    Crisis Resources:    National Suicide Prevention Lifeline: 403.277.7352 (TTY: 787.909.6980). Call anytime for help.  (www.suicidepreventionlifeline.org)  National [place] on Mental Illness (www.audra.org): 630.980.2558 or 155-940-9108.   Mental Health Association (www.mentalhealth.org): 312.304.2559 or 396-833-6981.  [place] Crisis Text Line: Text [place] to 739073  Suicide LifeLine Chat: [email or web address]/chat

## 2021-07-14 ASSESSMENT — ANXIETY QUESTIONNAIRES: GAD7 TOTAL SCORE: 13

## 2021-07-26 ENCOUNTER — HOSPITAL ENCOUNTER (OUTPATIENT)
Dept: GENERAL RADIOLOGY | Facility: CLINIC | Age: 41
End: 2021-07-26
Attending: ORTHOPAEDIC SURGERY
Payer: COMMERCIAL

## 2021-07-26 ENCOUNTER — HOSPITAL ENCOUNTER (OUTPATIENT)
Dept: MRI IMAGING | Facility: CLINIC | Age: 41
End: 2021-07-26
Attending: ORTHOPAEDIC SURGERY
Payer: COMMERCIAL

## 2021-07-26 DIAGNOSIS — M51.369 LUMBAR DEGENERATIVE DISC DISEASE: ICD-10-CM

## 2021-07-26 PROCEDURE — 72148 MRI LUMBAR SPINE W/O DYE: CPT

## 2021-07-26 PROCEDURE — 72100 X-RAY EXAM L-S SPINE 2/3 VWS: CPT

## 2021-07-27 ENCOUNTER — OFFICE VISIT (OUTPATIENT)
Dept: FAMILY MEDICINE | Facility: CLINIC | Age: 41
End: 2021-07-27
Payer: COMMERCIAL

## 2021-07-27 VITALS
DIASTOLIC BLOOD PRESSURE: 60 MMHG | WEIGHT: 164 LBS | TEMPERATURE: 98.3 F | OXYGEN SATURATION: 98 % | HEART RATE: 70 BPM | SYSTOLIC BLOOD PRESSURE: 122 MMHG | BODY MASS INDEX: 23.48 KG/M2 | RESPIRATION RATE: 16 BRPM | HEIGHT: 70 IN

## 2021-07-27 DIAGNOSIS — M51.369 DDD (DEGENERATIVE DISC DISEASE), LUMBAR: ICD-10-CM

## 2021-07-27 DIAGNOSIS — Z01.818 PREOP GENERAL PHYSICAL EXAM: Primary | ICD-10-CM

## 2021-07-27 LAB
ANION GAP SERPL CALCULATED.3IONS-SCNC: 4 MMOL/L (ref 3–14)
BUN SERPL-MCNC: 7 MG/DL (ref 7–30)
CALCIUM SERPL-MCNC: 9.1 MG/DL (ref 8.5–10.1)
CHLORIDE BLD-SCNC: 104 MMOL/L (ref 94–109)
CO2 SERPL-SCNC: 29 MMOL/L (ref 20–32)
CREAT SERPL-MCNC: 0.98 MG/DL (ref 0.66–1.25)
ERYTHROCYTE [DISTWIDTH] IN BLOOD BY AUTOMATED COUNT: 12.5 % (ref 10–15)
GFR SERPL CREATININE-BSD FRML MDRD: >90 ML/MIN/1.73M2
GLUCOSE BLD-MCNC: 93 MG/DL (ref 70–99)
HCT VFR BLD AUTO: 38.7 % (ref 40–53)
HGB BLD-MCNC: 13.1 G/DL (ref 13.3–17.7)
MCH RBC QN AUTO: 29.3 PG (ref 26.5–33)
MCHC RBC AUTO-ENTMCNC: 33.9 G/DL (ref 31.5–36.5)
MCV RBC AUTO: 87 FL (ref 78–100)
PLATELET # BLD AUTO: 267 10E3/UL (ref 150–450)
POTASSIUM BLD-SCNC: 4.1 MMOL/L (ref 3.4–5.3)
RBC # BLD AUTO: 4.47 10E6/UL (ref 4.4–5.9)
SODIUM SERPL-SCNC: 137 MMOL/L (ref 133–144)
WBC # BLD AUTO: 5 10E3/UL (ref 4–11)

## 2021-07-27 PROCEDURE — 80048 BASIC METABOLIC PNL TOTAL CA: CPT | Performed by: NURSE PRACTITIONER

## 2021-07-27 PROCEDURE — 36415 COLL VENOUS BLD VENIPUNCTURE: CPT | Performed by: NURSE PRACTITIONER

## 2021-07-27 PROCEDURE — 99214 OFFICE O/P EST MOD 30 MIN: CPT | Performed by: NURSE PRACTITIONER

## 2021-07-27 PROCEDURE — 85027 COMPLETE CBC AUTOMATED: CPT | Performed by: NURSE PRACTITIONER

## 2021-07-27 RX ORDER — CYCLOBENZAPRINE HCL 10 MG
10 TABLET ORAL 3 TIMES DAILY PRN
Qty: 20 TABLET | Refills: 0 | Status: SHIPPED | OUTPATIENT
Start: 2021-07-27 | End: 2021-10-14

## 2021-07-27 ASSESSMENT — MIFFLIN-ST. JEOR: SCORE: 1659.12

## 2021-07-27 NOTE — PROGRESS NOTES
[facility]  [address]  [facility] [address]  Phone: [phone]  Primary Provider: [facility]  Pre-op Performing Provider: ELVIS HARRIS      PREOPERATIVE EVALUATION:  Today's date: 7/27/2021    Timmy Fitzpatrick is a 40 year old male who presents for a preoperative evaluation.    Surgical Information:  Surgery/Procedure: Posterior Right Lumber L4-5 Decompression  Surgery Location: [facility]     Surgeon: Timmy Dixon  Surgery Date: 8/2/21  Time of Surgery:TBD  Where patient plans to recover: At home with family  Fax number for surgical facility: Note does not need to be faxed, will be available electronically in Epic.    Type of Anesthesia Anticipated: General    Assessment & Plan     The proposed surgical procedure is considered INTERMEDIATE risk.    Preop general physical exam    - CBC with platelets; Future  - Basic metabolic panel  (Ca, Cl, CO2, Creat, Gluc, K, Na, BUN); Future  - CBC with platelets  - Basic metabolic panel  (Ca, Cl, CO2, Creat, Gluc, K, Na, BUN)    DDD (degenerative disc disease), lumbar    - cyclobenzaprine (FLEXERIL) 10 MG tablet; Take 1 tablet (10 mg) by mouth 3 times daily as needed for muscle spasms  - CBC with platelets; Future  - Basic metabolic panel  (Ca, Cl, CO2, Creat, Gluc, K, Na, BUN); Future  - CBC with platelets  - Basic metabolic panel  (Ca, Cl, CO2, Creat, Gluc, K, Na, BUN)         Risks and Recommendations:  The patient has the following additional risks and recommendations for perioperative complications:   - Consult Hospitalist / IM to assist with post-op medical management    Medication Instructions:  Patient is to take all scheduled medications on the day of surgery    RECOMMENDATION:  APPROVAL GIVEN to proceed with proposed procedure, without further diagnostic evaluation.                      Subjective     HPI related to upcoming procedure: has had worsening low back pain with radiation into right  leg, scheduled for decompression of L4-5    Preop Questions 7/27/2021   1. Have you ever had a heart attack or stroke? No   2. Have you ever had surgery on your heart or blood vessels, such as a stent placement, a coronary artery bypass, or surgery on an artery in your head, neck, heart, or legs? No   3. Do you have chest pain with activity? No   4. Do you have a history of  heart failure? No   5. Do you currently have a cold, bronchitis or symptoms of other infection? No   6. Do you have a cough, shortness of breath, or wheezing? No   7. Do you or anyone in your family have previous history of blood clots? No   8. Do you or does anyone in your family have a serious bleeding problem such as prolonged bleeding following surgeries or cuts? No   9. Have you ever had problems with anemia or been told to take iron pills? No   10. Have you had any abnormal blood loss such as black, tarry or bloody stools? No   11. Have you ever had a blood transfusion? No   12. Are you willing to have a blood transfusion if it is medically needed before, during, or after your surgery? Yes   13. Have you or any of your relatives ever had problems with anesthesia? No   14. Do you have sleep apnea, excessive snoring or daytime drowsiness? No   15. Do you have any artifical heart valves or other implanted medical devices like a pacemaker, defibrillator, or continuous glucose monitor? No   16. Do you have artificial joints? No   17. Are you allergic to latex? No       Health Care Directive:  Patient does not have a Health Care Directive or Living Will: Discussed advance care planning with patient; information given to patient to review.    Preoperative Review of :   reviewed - controlled substances prescribed by other outside provider(s).      Status of Chronic Conditions:  See problem list for active medical problems.  Problems all longstanding and stable, except as noted/documented.  See ROS for pertinent symptoms related to these  conditions.      Review of Systems  CONSTITUTIONAL: NEGATIVE for fever, chills, change in weight  INTEGUMENTARY/SKIN: NEGATIVE for worrisome rashes, moles or lesions  EYES: NEGATIVE for vision changes or irritation  ENT/MOUTH: NEGATIVE for ear, mouth and throat problems  RESP: NEGATIVE for significant cough or SOB  CV: NEGATIVE for chest pain, palpitations or peripheral edema  GI: NEGATIVE for nausea, abdominal pain, heartburn, or change in bowel habits  : NEGATIVE for frequency, dysuria, or hematuria  MUSCULOSKELETAL: NEGATIVE for significant arthralgias or myalgia  NEURO: NEGATIVE for weakness, dizziness or paresthesias  ENDOCRINE: NEGATIVE for temperature intolerance, skin/hair changes  HEME: NEGATIVE for bleeding problems  PSYCHIATRIC: NEGATIVE for changes in mood or affect    Patient Active Problem List    Diagnosis Date Noted     COPD (chronic obstructive pulmonary disease) (H) 03/12/2021     Priority: Medium     Umbilical hernia without obstruction and without gangrene 03/01/2021     Priority: Medium     Added automatically from request for surgery [id number]       Generalized anxiety disorder 03/26/2019     Priority: Medium     Bipolar II disorder (H) 11/28/2018     Priority: Medium     Anxiety 09/18/2017     Priority: Medium     Tear of medial meniscus of knee 09/08/2016     Priority: Medium     Chemical dependency (H) 11/25/2015     Priority: Medium     Alcoholic, sober since 2006 09/13/2012     Priority: Medium     Major depression in partial remission (H) 06/30/2011     Priority: Medium     Migraine headache 06/30/2011     Priority: Medium     (Problem list name updated by automated process. Provider to review and confirm.)       CARDIOVASCULAR SCREENING; LDL GOAL LESS THAN 160 10/31/2010     Priority: Medium     Cervicalgia 03/24/2010     Priority: Medium     Nonallopathic lesion of thoracic region 03/24/2010     Priority: Medium     Problem list name updated by automated process. Provider to review        Nonallopathic lesion of lumbar region 03/24/2010     Priority: Medium     Problem list name updated by automated process. Provider to review        Past Medical History:   Diagnosis Date     Anxiety      Cervicalgia      Chemical dependency (H)      Depression      Migraine headache      Past Surgical History:   Procedure Laterality Date     HERNIA REPAIR       HERNIORRHAPHY UMBILICAL Bilateral 3/16/2021    Procedure: Open umbilical hernia repair and bilateral vasectomy;  Surgeon: Stanley Woods MD;  Location: [facility]     SURGICAL HISTORY OF -       deviated septum     VASECTOMY Bilateral 3/16/2021    Procedure: VASECTOMY;  Surgeon: Stanley Woods MD;  Location: [facility] OR     Current Outpatient Medications   Medication Sig Dispense Refill     buPROPion (WELLBUTRIN SR) 100 MG 12 hr tablet Take 1 tablet (100 mg) by mouth 2 times daily 180 tablet 1     cyclobenzaprine (FLEXERIL) 10 MG tablet Take 1 tablet (10 mg) by mouth 3 times daily as needed for muscle spasms 20 tablet 0     escitalopram (LEXAPRO) 10 MG tablet Take 1 tablet (10 mg) by mouth daily 90 tablet 1     eszopiclone (LUNESTA) 2 MG tablet Take 1 tablet (2 mg) by mouth nightly as needed for sleep 30 tablet 0     gabapentin (NEURONTIN) 300 MG capsule Take 1 capsule (300 mg) by mouth 6 times daily Refill 28 days after last fill 180 capsule 3     medical cannabis (Patient's own supply) See Admin Instructions (The purpose of this order is to document that the patient reports taking medical cannabis.  This is not a prescription, and is not used to certify that the patient has a qualifying medical condition.)       Multiple Vitamins-Minerals (MENS MULTIPLE VITAMIN/LYCOPENE PO) Take 1 tablet by mouth daily.       nicotine polacrilex (NICORETTE) 4 MG gum Place 4 mg inside cheek as needed for smoking cessation       sildenafil (REVATIO) 20 MG tablet TAKE 5 TABLETS BY MOUTH DAILY AS NEEDED, 30 MINUTES TO 4 HOURS BEFORE SEX. (DO NOT USE WITH NITROGLYCERIN, TERAZOSIN OR  "DOXAZOSIN 60 tablet 0     hydrOXYzine (VISTARIL) 50 MG capsule Take 1 capsule (50 mg) by mouth At Bedtime (Patient not taking: Reported on 7/27/2021) 30 capsule 3     naproxen (NAPROSYN) 500 MG tablet Take 1 tablet (500 mg) by mouth 2 times daily (with meals) (Patient not taking: Reported on 7/27/2021) 30 tablet 0     oxyCODONE (ROXICODONE) 5 MG tablet Take 1 tablet (5 mg) by mouth every 6 hours as needed for pain (Patient not taking: Reported on 7/13/2021) 12 tablet 0     predniSONE (DELTASONE) 20 MG tablet Take two tablets (= 40mg) each day for 5 (five) days (Patient not taking: Reported on 7/13/2021) 10 tablet 0       Allergies   Allergen Reactions     Cats      Nkda [No Known Drug Allergies]      Seasonal Allergies         Social History     Tobacco Use     Smoking status: Former Smoker     Packs/day: 0.00     Types: Dip, chew, snus or snuff     Smokeless tobacco: Former User     Types: Chew     Quit date: 10/1/2019   Substance Use Topics     Alcohol use: No     Comment: sober since April 2016     Family History   Problem Relation Age of Onset     Cancer Paternal Grandfather      Depression Mother      Anxiety Disorder Mother      Depression Father      Unknown/Adopted Father      Dementia Maternal Grandfather      Substance Abuse Paternal Grandmother      History   Drug Use     Types: Marijuana     Comment: Daily         Objective     /60   Pulse 70   Temp 98.3  F (36.8  C)   Resp 16   Ht 1.784 m (5' 10.25\")   Wt 74.4 kg (164 lb)   SpO2 98%   BMI 23.36 kg/m      Physical Exam    GENERAL APPEARANCE: healthy, alert and no distress     EYES: EOMI,  PERRL     HENT: ear canals ceruminous and TM's normal and nose and mouth without ulcers or lesions     NECK: no adenopathy, no asymmetry, masses, or scars and thyroid normal to palpation     RESP: lungs clear to auscultation - no rales, rhonchi or wheezes     CV: regular rates and rhythm, normal S1 S2, no S3 or S4 and no murmur, click or rub     ABDOMEN:  " soft, nontender, no HSM or masses and bowel sounds normal     MS: extremities normal- no gross deformities noted, no evidence of inflammation in joints, FROM in all extremities.     SKIN: no suspicious lesions or rashes     NEURO: Normal strength and tone, sensory exam grossly normal, mentation intact and speech normal     PSYCH: mentation appears normal. and affect normal/bright     LYMPHATICS: No cervical adenopathy    Recent Labs   Lab Test 09/12/20  1907   HGB 15.5         POTASSIUM 3.2*   CR 0.94        Diagnostics:  Labs pending at this time.  Results will be reviewed when available.   No EKG required, no history of coronary heart disease, significant arrhythmia, peripheral arterial disease or other structural heart disease.    Revised Cardiac Risk Index (RCRI):  The patient has the following serious cardiovascular risks for perioperative complications:   - No serious cardiac risks = 0 points     RCRI Interpretation: 0 points: Class I (very low risk - 0.4% complication rate)           Signed Electronically by: URSULA No CNP  Copy of this evaluation report is provided to requesting physician.

## 2021-07-27 NOTE — PATIENT INSTRUCTIONS

## 2021-08-25 ENCOUNTER — MYC MEDICAL ADVICE (OUTPATIENT)
Dept: PSYCHIATRY | Facility: CLINIC | Age: 41
End: 2021-08-25

## 2021-08-25 DIAGNOSIS — F41.1 GENERALIZED ANXIETY DISORDER: ICD-10-CM

## 2021-08-25 DIAGNOSIS — F33.1 MODERATE EPISODE OF RECURRENT MAJOR DEPRESSIVE DISORDER (H): ICD-10-CM

## 2021-08-25 RX ORDER — ESCITALOPRAM OXALATE 10 MG/1
10 TABLET ORAL DAILY
Qty: 90 TABLET | Refills: 1 | Status: SHIPPED | OUTPATIENT
Start: 2021-08-25 | End: 2021-10-12

## 2021-08-25 RX ORDER — GABAPENTIN 300 MG/1
300 CAPSULE ORAL
Qty: 180 CAPSULE | Refills: 3 | Status: SHIPPED | OUTPATIENT
Start: 2021-08-25 | End: 2021-10-12

## 2021-08-25 NOTE — TELEPHONE ENCOUNTER
Date of Last Office Visit: 07/13/2021  Date of Next Office Visit: 10/12/2021  No shows since last visit: 0  Cancellations since last visit: 0    Medication requested: Gabapentin 300mg Date last ordered: 04/13/2021 Qty: 180 Refills: 3    Medication requested: Lexapro 10mg Date last ordered: 04/13/2021 Qty: 90 Refills: 1     Review of [place] ?: n/a    Lapse in medication adherence greater than 5 days?: no  If yes, call patient and gather details: n/a  Medication refill request verified as identical to current order?: yes  Result of Last DAM, VPA, Li+ Level, CBC, or Carbamazepine Level (at or since last visit): N/A    []Medication refilled per  Medication Refill in Ambulatory Care  policy.  [x]Medication unable to be refilled by RN due to criteria not met as indicated below:    []Eligibility - not seen in the last year   []Supervision - no future appointment   []Compliance - no shows, cancellations or lapse in therapy   []Verification - order discrepancy   []Controlled medication   [x]Medication not included in policy   []90-day supply request   []Other

## 2021-08-26 DIAGNOSIS — N52.9 ERECTILE DYSFUNCTION, UNSPECIFIED ERECTILE DYSFUNCTION TYPE: ICD-10-CM

## 2021-08-26 RX ORDER — SILDENAFIL CITRATE 20 MG/1
TABLET ORAL
Qty: 60 TABLET | Refills: 0 | Status: SHIPPED | OUTPATIENT
Start: 2021-08-26 | End: 2021-10-13

## 2021-08-26 NOTE — TELEPHONE ENCOUNTER
"Prescription approved per [facility] Refill Protocol.    Requested Prescriptions   Pending Prescriptions Disp Refills     sildenafil (REVATIO) 20 MG tablet [Pharmacy Med Name: SILDENAFIL CITRATE 20MG TABS]  0     Sig: TAKE 5 TABLETS BY MOUTH DAILY AS NEEDED, 30 MINUTES TO 4 HOURS BEFORE SEX (DO NOT USE WITH NITROGLYCERIN, TERAZOSIN, OR DOXAZOSIN)       Erectile Dysfuction Protocol Passed - 8/26/2021 12:41 PM        Passed - Absence of nitrates on medication list        Passed - Absence of Alpha Blockers on Med list        Passed - Recent (12 mo) or future (30 days) visit within the authorizing provider's specialty     Patient has had an office visit with the authorizing provider or a provider within the authorizing providers department within the previous 12 mos or has a future within next 30 days. See \"Patient Info\" tab in inbasket, or \"Choose Columns\" in Meds & Orders section of the refill encounter.              Passed - Medication is active on med list        Passed - Patient is age 18 or older             "

## 2021-09-04 ENCOUNTER — HEALTH MAINTENANCE LETTER (OUTPATIENT)
Age: 41
End: 2021-09-04

## 2021-09-17 ENCOUNTER — DOCUMENTATION ONLY (OUTPATIENT)
Dept: PHYSICAL THERAPY | Facility: CLINIC | Age: 41
End: 2021-09-17

## 2021-09-17 NOTE — PROGRESS NOTES
Outpatient Physical Therapy Discharge Note     Patient: Timmy Fitzpatrick  : 1980    Beginning/End Dates of Reporting Period:  2021 to 2021 (Total of 2 visits)    Referring Provider: Vida Miranda    Diagnosis: Right shoulder pain, unspecified chronicity     Client Self Report:  (From date of last visit)  No issues with HEP, no  pain with exercises.      Objective Measurements: (From date of last visit)  Palpation -     Hypmobility of glenohum-  eral joint mobilizations  grades 1-3 to reduce  pain         Treatment Has Consisted Of:  Stretching and strengthening exercise education / Joint mobilizations / STM    Goals:  Goals had not been met at time of last visit.    Plan:  Discharge from therapy.    Discharge:    Reason for Discharge: Patient has failed to schedule further appointments.    Equipment Issued: None    Discharge Plan: Patient to continue home program.    Jefferson Pérez, PT, DPT

## 2021-10-11 DIAGNOSIS — N52.9 ERECTILE DYSFUNCTION, UNSPECIFIED ERECTILE DYSFUNCTION TYPE: ICD-10-CM

## 2021-10-12 ENCOUNTER — VIRTUAL VISIT (OUTPATIENT)
Dept: PSYCHIATRY | Facility: CLINIC | Age: 41
End: 2021-10-12
Payer: COMMERCIAL

## 2021-10-12 DIAGNOSIS — F99 INSOMNIA DUE TO OTHER MENTAL DISORDER: ICD-10-CM

## 2021-10-12 DIAGNOSIS — F41.1 GENERALIZED ANXIETY DISORDER: ICD-10-CM

## 2021-10-12 DIAGNOSIS — F33.1 MODERATE EPISODE OF RECURRENT MAJOR DEPRESSIVE DISORDER (H): ICD-10-CM

## 2021-10-12 DIAGNOSIS — F51.05 INSOMNIA DUE TO OTHER MENTAL DISORDER: ICD-10-CM

## 2021-10-12 PROCEDURE — 99214 OFFICE O/P EST MOD 30 MIN: CPT | Mod: 95 | Performed by: NURSE PRACTITIONER

## 2021-10-12 RX ORDER — ESCITALOPRAM OXALATE 10 MG/1
10 TABLET ORAL DAILY
Qty: 90 TABLET | Refills: 1 | Status: SHIPPED | OUTPATIENT
Start: 2021-10-12 | End: 2022-02-18

## 2021-10-12 RX ORDER — ESZOPICLONE 2 MG/1
2 TABLET, FILM COATED ORAL
Qty: 30 TABLET | Refills: 0 | Status: SHIPPED | OUTPATIENT
Start: 2021-10-12 | End: 2022-02-10

## 2021-10-12 RX ORDER — GABAPENTIN 300 MG/1
300 CAPSULE ORAL
Qty: 180 CAPSULE | Refills: 3 | Status: SHIPPED | OUTPATIENT
Start: 2021-10-12 | End: 2022-02-18

## 2021-10-12 RX ORDER — BUPROPION HYDROCHLORIDE 100 MG/1
100 TABLET, EXTENDED RELEASE ORAL 2 TIMES DAILY
Qty: 180 TABLET | Refills: 1 | Status: SHIPPED | OUTPATIENT
Start: 2021-10-12 | End: 2022-02-18

## 2021-10-12 NOTE — PATIENT INSTRUCTIONS
1.  Continue gabapentin 300 mg 6 times daily    2.  Continue Lunesta 2 mg at bedtime-take sparingly    3.  Continue Wellbutrin  mg twice daily    4.  Continue Lexapro 10 mg daily    5.  Hydroxyzine discontinued-not taking    6.  Continue talk therapy with Liana        Continue all other medications as reviewed per electronic medical record today.     Safety plan reviewed. To the Emergency Department as needed or call after hours crisis line at [phone] or [phone]. [place] Crisis Text Line. Text [place] to 870332 or Suicide LifeLine Chat: [employer]org/chat/    To schedule individual or family therapy, call [facility] at [phone].    Schedule an appointment with me in January or sooner as needed. Call [facility] at [phone] to schedule.    Follow up with primary care provider as planned or for acute medical concerns.    Call the psychiatric nurse line with medication questions or concerns at [phone].    [employer] may be used to communicate with your provider, but this is not intended to be used for emergencies.    Crisis Resources:    National Suicide Prevention Lifeline: 340.311.8209 (TTY: 600.679.2285). Call anytime for help.  (www.suicidepreventionlifeline.org)  National [place] on Mental Illness (www.audra.org): 900.526.6560 or 848-813-2898.   Mental Health Association (www.mentalhealth.org): 728.790.3019 or 305-726-1341.  [place] Crisis Text Line: Text [place] to 015125  Suicide LifeLine Chat: [employer].org/chat

## 2021-10-13 RX ORDER — SILDENAFIL CITRATE 20 MG/1
TABLET ORAL
Qty: 60 TABLET | Refills: 0 | Status: SHIPPED | OUTPATIENT
Start: 2021-10-13 | End: 2021-12-22

## 2021-10-14 DIAGNOSIS — M51.369 DDD (DEGENERATIVE DISC DISEASE), LUMBAR: ICD-10-CM

## 2021-10-14 RX ORDER — CYCLOBENZAPRINE HCL 10 MG
10 TABLET ORAL 3 TIMES DAILY PRN
Qty: 20 TABLET | Refills: 0 | Status: SHIPPED | OUTPATIENT
Start: 2021-10-14 | End: 2021-12-20

## 2021-12-01 ENCOUNTER — MYC MEDICAL ADVICE (OUTPATIENT)
Dept: PSYCHIATRY | Facility: CLINIC | Age: 41
End: 2021-12-01
Payer: COMMERCIAL

## 2021-12-20 ENCOUNTER — MYC REFILL (OUTPATIENT)
Dept: FAMILY MEDICINE | Facility: CLINIC | Age: 41
End: 2021-12-20
Payer: COMMERCIAL

## 2021-12-20 ENCOUNTER — MYC MEDICAL ADVICE (OUTPATIENT)
Dept: FAMILY MEDICINE | Facility: CLINIC | Age: 41
End: 2021-12-20
Payer: COMMERCIAL

## 2021-12-20 DIAGNOSIS — M51.369 DDD (DEGENERATIVE DISC DISEASE), LUMBAR: ICD-10-CM

## 2021-12-20 NOTE — TELEPHONE ENCOUNTER
INA Miranda does not have any openings tomorrow 12/21.  L.M. for pt to call back to see if this can wait until 12/22?

## 2021-12-20 NOTE — TELEPHONE ENCOUNTER
Made pt appt for Weds 2/22 @ 2pm with INA Miranda.  JOSELUIS. for pt to call back to confirm and also sent pt My Chart message to confirm appt.

## 2021-12-21 RX ORDER — CYCLOBENZAPRINE HCL 10 MG
10 TABLET ORAL 3 TIMES DAILY PRN
Qty: 20 TABLET | Refills: 0 | Status: SHIPPED | OUTPATIENT
Start: 2021-12-21 | End: 2022-02-17

## 2021-12-22 ENCOUNTER — OFFICE VISIT (OUTPATIENT)
Dept: FAMILY MEDICINE | Facility: CLINIC | Age: 41
End: 2021-12-22
Payer: COMMERCIAL

## 2021-12-22 VITALS
DIASTOLIC BLOOD PRESSURE: 60 MMHG | RESPIRATION RATE: 16 BRPM | TEMPERATURE: 98 F | WEIGHT: 165 LBS | OXYGEN SATURATION: 96 % | HEIGHT: 70 IN | SYSTOLIC BLOOD PRESSURE: 108 MMHG | HEART RATE: 80 BPM | BODY MASS INDEX: 23.62 KG/M2

## 2021-12-22 DIAGNOSIS — Z13.220 SCREENING FOR LIPOID DISORDERS: ICD-10-CM

## 2021-12-22 DIAGNOSIS — M50.30 DDD (DEGENERATIVE DISC DISEASE), CERVICAL: ICD-10-CM

## 2021-12-22 DIAGNOSIS — Z11.59 ENCOUNTER FOR HEPATITIS C SCREENING TEST FOR LOW RISK PATIENT: ICD-10-CM

## 2021-12-22 DIAGNOSIS — Z13.1 SCREENING FOR DIABETES MELLITUS: ICD-10-CM

## 2021-12-22 DIAGNOSIS — N52.9 ERECTILE DYSFUNCTION, UNSPECIFIED ERECTILE DYSFUNCTION TYPE: ICD-10-CM

## 2021-12-22 DIAGNOSIS — Z00.00 ROUTINE GENERAL MEDICAL EXAMINATION AT A HEALTH CARE FACILITY: Primary | ICD-10-CM

## 2021-12-22 LAB
ANION GAP SERPL CALCULATED.3IONS-SCNC: 5 MMOL/L (ref 3–14)
BUN SERPL-MCNC: 6 MG/DL (ref 7–30)
CALCIUM SERPL-MCNC: 9.1 MG/DL (ref 8.5–10.1)
CHLORIDE BLD-SCNC: 105 MMOL/L (ref 94–109)
CHOLEST SERPL-MCNC: 230 MG/DL
CO2 SERPL-SCNC: 28 MMOL/L (ref 20–32)
CREAT SERPL-MCNC: 0.88 MG/DL (ref 0.66–1.25)
FASTING STATUS PATIENT QL REPORTED: YES
GFR SERPL CREATININE-BSD FRML MDRD: >90 ML/MIN/1.73M2
GLUCOSE BLD-MCNC: 81 MG/DL (ref 70–99)
HDLC SERPL-MCNC: 74 MG/DL
LDLC SERPL CALC-MCNC: 131 MG/DL
NONHDLC SERPL-MCNC: 156 MG/DL
POTASSIUM BLD-SCNC: 3.5 MMOL/L (ref 3.4–5.3)
SODIUM SERPL-SCNC: 138 MMOL/L (ref 133–144)
TRIGL SERPL-MCNC: 125 MG/DL

## 2021-12-22 PROCEDURE — 86803 HEPATITIS C AB TEST: CPT | Performed by: NURSE PRACTITIONER

## 2021-12-22 PROCEDURE — 80048 BASIC METABOLIC PNL TOTAL CA: CPT | Performed by: NURSE PRACTITIONER

## 2021-12-22 PROCEDURE — 80061 LIPID PANEL: CPT | Performed by: NURSE PRACTITIONER

## 2021-12-22 PROCEDURE — 99213 OFFICE O/P EST LOW 20 MIN: CPT | Mod: 25 | Performed by: NURSE PRACTITIONER

## 2021-12-22 PROCEDURE — 36415 COLL VENOUS BLD VENIPUNCTURE: CPT | Performed by: NURSE PRACTITIONER

## 2021-12-22 PROCEDURE — 99396 PREV VISIT EST AGE 40-64: CPT | Performed by: NURSE PRACTITIONER

## 2021-12-22 RX ORDER — SILDENAFIL CITRATE 20 MG/1
TABLET ORAL
Qty: 60 TABLET | Refills: 0 | Status: SHIPPED | OUTPATIENT
Start: 2021-12-22 | End: 2022-05-05

## 2021-12-22 RX ORDER — HYDROXYZINE PAMOATE 50 MG/1
CAPSULE ORAL
COMMUNITY
Start: 2021-12-17 | End: 2022-02-18

## 2021-12-22 ASSESSMENT — MIFFLIN-ST. JEOR: SCORE: 1654.69

## 2021-12-22 NOTE — PATIENT INSTRUCTIONS
Will be notified of pending labs.  Follow up with orthopedic specialist.    Preventive Health Recommendations  Male Ages 40 to 49    Yearly exam:             See your health care provider every year in order to  o   Review health changes.   o   Discuss preventive care.    o   Review your medicines if your doctor has prescribed any.    You should be tested each year for STDs (sexually transmitted diseases) if you re at risk.     Have a cholesterol test every 5 years.     Have a colonoscopy (test for colon cancer) if someone in your family has had colon cancer or polyps before age 50.     After age 45, have a diabetes test (fasting glucose). If you are at risk for diabetes, you should have this test every 3 years.      Talk with your health care provider about whether or not a prostate cancer screening test (PSA) is right for you.    Shots: Get a flu shot each year. Get a tetanus shot every 10 years.     Nutrition:    Eat at least 5 servings of fruits and vegetables daily.     Eat whole-grain bread, whole-wheat pasta and brown rice instead of white grains and rice.     Get adequate Calcium and Vitamin D.     Lifestyle    Exercise for at least 150 minutes a week (30 minutes a day, 5 days a week). This will help you control your weight and prevent disease.     Limit alcohol to one drink per day.     No smoking.     Wear sunscreen to prevent skin cancer.     See your dentist every six months for an exam and cleaning.

## 2021-12-22 NOTE — PROGRESS NOTES
SUBJECTIVE:   CC: Timmy Fitzpatrick is an 41 year old male who presents for preventative health visit.       Patient has been advised of split billing requirements and indicates understanding: Yes  HPI      Back pain       Where is your back pain located? (Select all that apply) upper back right    How would you describe your back pain?  dull ache and gnawing    Where does your back pain spread? the right shoulder and the right neck    Since your last clinic visit for back pain, how has your pain changed? gradually worsening    Does your back pain interfere with your job? Not applicable    Since your last visit, have you tried any new treatment? No    No injury or strain to explain pain.  States he feels weakness in his right arm, particularly his hand grasp ever since he had a massage with cupping done 8 days ago. No bruising. No tingling or shooting pain.  See most recent cervical MRI below:    FINDINGS: Alignment is significant for reversal of normal cervical  lordosis and multilevel trace grade 1 spondylolisthesis. Bone marrow  demonstrates degenerative endplate change throughout the cervical  spine from C3-C4 through C6-C7. Marrow edema is also present  surrounding the left C3-C4 facet joint. No abnormal cord signal. No  convincing cord signal abnormality. Cord evaluation is mildly limited  due to motion artifact. No appreciable extraspinal abnormality.     Level by level as follows (worsened compared to the prior exam):     C2-C3: Mild disc height loss. Disc osteophyte complex. Moderate  bilateral facet arthropathy. No right foraminal stenosis. Mild left  foraminal stenosis. No spinal canal stenosis.      C3-C4: Mild disc height loss. Disc osteophyte complex, asymmetric to  the left with left foraminal uncovertebral osteophyte. Mild right  facet arthropathy. Severe left facet arthropathy with marrow edema  suggestive of active arthropathy. No right foraminal stenosis. Severe  left foraminal stenosis. Mild  spinal canal stenosis.      C4-C5: Moderate disc height loss. Disc osteophyte complex with right  central component. Mild bilateral facet arthropathy. Moderate right  foraminal stenosis. No left foraminal stenosis. Mild spinal canal  stenosis with slight flattening of the right anterior cervical cord.      C5-C6: Moderate disc height loss. Disc osteophyte complex with left  central component. Mild bilateral facet arthropathy. No right  foraminal stenosis. Mild left foraminal stenosis. Mild spinal canal  stenosis with slight flattening of the left anterior cervical cord.      C6-C7: Moderate disc height loss. Disc osteophyte complex with broad  central component. Mild bilateral facet arthropathy. Mild right  foraminal stenosis. Moderate to severe left foraminal stenosis. Mild  spinal canal stenosis.     C7-T1: Disc height maintained. No herniation. Mild right facet  arthropathy. Moderate left facet arthropathy. No foraminal or spinal  canal stenosis.     T1-T2. Incompletely characterized. Mild disc height loss. Degenerative  disc disease. No high-grade stenoses.                                                                      IMPRESSION: Multilevel degenerative change as detailed.      KENDRA POLANCO MD    Today's PHQ-2 Score:   PHQ-2 ( 1999 [employer]) 5/21/2019   Q1: Little interest or pleasure in doing things 1   Q2: Feeling down, depressed or hopeless 1   PHQ-2 Score 2   PHQ-2 Total Score (12-17 Years)- Positive if 3 or more points; Administer PHQ-A if positive 2       Abuse: Current or Past(Physical, Sexual or Emotional)- No  Do you feel safe in your environment? Yes    Have you ever done Advance Care Planning? (For example, a Health Directive, POLST, or a discussion with a medical provider or your loved ones about your wishes): No, advance care planning information given to patient to review.  Patient plans to discuss their wishes with loved ones or provider.      Social History     Tobacco Use     Smoking  status: Former Smoker     Packs/day: 0.00     Types: Dip, chew, snus or snuff     Smokeless tobacco: Former User     Types: Chew     Quit date: 10/1/2019   Substance Use Topics     Alcohol use: No     Comment: sober since April 2016     If you drink alcohol do you typically have >3 drinks per day or >7 drinks per week? no      No flowsheet data found.    Last PSA: No results found for: PSA    Reviewed orders with patient. Reviewed health maintenance and updated orders accordingly - Yes  BP Readings from Last 3 Encounters:   12/22/21 108/60   07/27/21 122/60   03/16/21 122/78    Wt Readings from Last 3 Encounters:   12/22/21 74.8 kg (165 lb)   07/27/21 74.4 kg (164 lb)   06/22/21 77.1 kg (170 lb)                  Patient Active Problem List   Diagnosis     Cervicalgia     Nonallopathic lesion of thoracic region     Nonallopathic lesion of lumbar region     CARDIOVASCULAR SCREENING; LDL GOAL LESS THAN 160     Major depression in partial remission (H)     Migraine headache     Alcoholic, sober since 2006     Chemical dependency (H)     Anxiety     Tear of medial meniscus of knee     Bipolar II disorder (H)     Generalized anxiety disorder     Umbilical hernia without obstruction and without gangrene     COPD (chronic obstructive pulmonary disease) (H)     Past Surgical History:   Procedure Laterality Date     HERNIA REPAIR       HERNIORRHAPHY UMBILICAL Bilateral 03/16/2021    Procedure: Open umbilical hernia repair and bilateral vasectomy;  Surgeon: Stanley Woods MD;  Location: [facility]     MICRODISCECTOMY LUMBAR  08/2021    Lumbar     SURGICAL HISTORY OF -       deviated septum     VASECTOMY Bilateral 03/16/2021    Procedure: VASECTOMY;  Surgeon: Stanley Woods MD;  Location: [facility]       Social History     Tobacco Use     Smoking status: Former Smoker     Packs/day: 0.00     Types: Dip, chew, snus or snuff     Smokeless tobacco: Former User     Types: Chew     Quit date: 10/1/2019   Substance Use Topics     Alcohol use:  No     Comment: sober since April 2016     Family History   Problem Relation Age of Onset     Cancer Paternal Grandfather      Depression Mother      Anxiety Disorder Mother      Depression Father      Unknown/Adopted Father      Dementia Maternal Grandfather      Substance Abuse Paternal Grandmother          Current Outpatient Medications   Medication Sig Dispense Refill     buPROPion (WELLBUTRIN SR) 100 MG 12 hr tablet Take 1 tablet (100 mg) by mouth 2 times daily 180 tablet 1     cyclobenzaprine (FLEXERIL) 10 MG tablet Take 1 tablet (10 mg) by mouth 3 times daily as needed for muscle spasms 20 tablet 0     escitalopram (LEXAPRO) 10 MG tablet Take 1 tablet (10 mg) by mouth daily 90 tablet 1     eszopiclone (LUNESTA) 2 MG tablet Take 1 tablet (2 mg) by mouth nightly as needed for sleep 30 tablet 0     gabapentin (NEURONTIN) 300 MG capsule Take 1 capsule (300 mg) by mouth 6 times daily Refill 28 days after last fill 180 capsule 3     hydrOXYzine (VISTARIL) 50 MG capsule        medical cannabis (Patient's own supply) See Admin Instructions (The purpose of this order is to document that the patient reports taking medical cannabis.  This is not a prescription, and is not used to certify that the patient has a qualifying medical condition.)       Multiple Vitamins-Minerals (MENS MULTIPLE VITAMIN/LYCOPENE PO) Take 1 tablet by mouth daily.       nicotine polacrilex (NICORETTE) 4 MG gum Place 4 mg inside cheek as needed for smoking cessation       sildenafil (REVATIO) 20 MG tablet Take 1 tablet prior to intercourse as needed. 60 tablet 0     Allergies   Allergen Reactions     Cats      Nkda [No Known Drug Allergies]      Seasonal Allergies      Recent Labs   Lab Test 07/27/21  1141 09/12/20  1907 02/20/19  0730 07/13/18  1422   ALT  --  40 32 32   CR 0.98 0.94 0.98 0.83   GFRESTIMATED >90 >90 >90 >90   GFRESTBLACK  --  >90 >90 >90   POTASSIUM 4.1 3.2* 4.1 4.1   TSH  --   --  1.76 1.38        Reviewed and updated as needed  "this visit by clinical staff  Tobacco   Meds  Problems  Med Hx  Surg Hx  Fam Hx  Soc Hx       Reviewed and updated as needed this visit by Provider               Past Medical History:   Diagnosis Date     Anxiety      Cervicalgia      Chemical dependency (H)      Depression      Migraine headache       Past Surgical History:   Procedure Laterality Date     HERNIA REPAIR       HERNIORRHAPHY UMBILICAL Bilateral 03/16/2021    Procedure: Open umbilical hernia repair and bilateral vasectomy;  Surgeon: Stanley Woods MD;  Location: [facility]     MICRODISCECTOMY LUMBAR  08/2021    Lumbar     SURGICAL HISTORY OF -       deviated septum     VASECTOMY Bilateral 03/16/2021    Procedure: VASECTOMY;  Surgeon: Stanley Woods MD;  Location: [facility]       Review of Systems  CONSTITUTIONAL: NEGATIVE for fever, chills, change in weight  INTEGUMENTARY/SKIN: NEGATIVE for worrisome rashes, moles or lesions  EYES: NEGATIVE for vision changes or irritation  ENT: NEGATIVE for ear, mouth and throat problems  RESP: NEGATIVE for significant cough or SOB  CV: NEGATIVE for chest pain, palpitations or peripheral edema  GI: NEGATIVE for nausea, abdominal pain, heartburn, or change in bowel habits   male: negative for dysuria, hematuria, decreased urinary stream, erectile dysfunction, urethral discharge  MUSCULOSKELETAL: NEGATIVE for significant arthralgias or myalgia POSITIVE for ongoing low back pain, neck pain and right arm weakness  NEURO: NEGATIVE for weakness, dizziness or paresthesias  PSYCHIATRIC: NEGATIVE for changes in mood or affect    OBJECTIVE:   /60   Pulse 80   Temp 98  F (36.7  C)   Resp 16   Ht 1.778 m (5' 10\")   Wt 74.8 kg (165 lb)   SpO2 96%   BMI 23.68 kg/m      Physical Exam  GENERAL: healthy, alert and no distress  EYES: Eyes grossly normal to inspection, PERRL and conjunctivae and sclerae normal  HENT: ear canals and TM's normal, nose and mouth without ulcers or lesions  NECK: no adenopathy, no asymmetry, " "masses, or scars and thyroid normal to palpation  RESP: lungs clear to auscultation - no rales, rhonchi or wheezes  CV: regular rate and rhythm, normal S1 S2, no S3 or S4, no murmur, click or rub, no peripheral edema and peripheral pulses strong  ABDOMEN: soft, nontender, no hepatosplenomegaly, no masses and bowel sounds normal  MS: no gross musculoskeletal defects noted, no edema, FROM of neck, some noted weakness in right arm   SKIN: no suspicious lesions or rashes  NEURO: Normal strength and tone, mentation intact and speech normal  PSYCH: mentation appears normal, affect normal/bright    Diagnostic Test Results:  Labs reviewed in Epic  No results found for this or any previous visit (from the past 24 hour(s)).    ASSESSMENT/PLAN:       ICD-10-CM    1. Routine general medical examination at a health care facility  Z00.00    2. Erectile dysfunction, unspecified erectile dysfunction type  N52.9 sildenafil (REVATIO) 20 MG tablet   3. DDD (degenerative disc disease), cervical  M50.30 Orthopedic  Referral   4. Encounter for hepatitis C screening test for low risk patient  Z11.59 Hepatitis C antibody     Hepatitis C antibody   5. Screening for lipoid disorders  Z13.220 Lipid panel reflex to direct LDL Fasting     Lipid panel reflex to direct LDL Fasting   6. Screening for diabetes mellitus  Z13.1 Basic metabolic panel  (Ca, Cl, CO2, Creat, Gluc, K, Na, BUN)     Basic metabolic panel  (Ca, Cl, CO2, Creat, Gluc, K, Na, BUN)     Will obtain screening labs.  To orthopedics for further evaluation of right arm weakness with last MRI in 4/2016 with multilevel cervical DDD.  Discussed red flags and when to see emergent care.      COUNSELING:   Reviewed preventive health counseling, as reflected in patient instructions       Regular exercise       Healthy diet/nutrition    Estimated body mass index is 23.68 kg/m  as calculated from the following:    Height as of this encounter: 1.778 m (5' 10\").    Weight as of " this encounter: 74.8 kg (165 lb).         He reports that he has quit smoking. His smoking use included dip, chew, snus or snuff. He smoked 0.00 packs per day. He quit smokeless tobacco use about 2 years ago.  His smokeless tobacco use included chew.      Counseling Resources:  ATP IV Guidelines  Pooled Cohorts Equation Calculator  FRAX Risk Assessment  ICSI Preventive Guidelines  Dietary Guidelines for Americans, 2010  USDA's MyPlate  ASA Prophylaxis  Lung CA Screening    URSULA No [facility]

## 2021-12-23 LAB — HCV AB SERPL QL IA: NONREACTIVE

## 2022-02-09 DIAGNOSIS — F51.05 INSOMNIA DUE TO OTHER MENTAL DISORDER: ICD-10-CM

## 2022-02-09 DIAGNOSIS — F99 INSOMNIA DUE TO OTHER MENTAL DISORDER: ICD-10-CM

## 2022-02-10 RX ORDER — ESZOPICLONE 2 MG/1
2 TABLET, FILM COATED ORAL
Qty: 30 TABLET | Refills: 0 | Status: SHIPPED | OUTPATIENT
Start: 2022-02-10 | End: 2022-02-18

## 2022-02-17 ENCOUNTER — OFFICE VISIT (OUTPATIENT)
Dept: FAMILY MEDICINE | Facility: CLINIC | Age: 42
End: 2022-02-17
Payer: COMMERCIAL

## 2022-02-17 VITALS
DIASTOLIC BLOOD PRESSURE: 68 MMHG | HEIGHT: 70 IN | WEIGHT: 161.4 LBS | BODY MASS INDEX: 23.11 KG/M2 | HEART RATE: 66 BPM | OXYGEN SATURATION: 98 % | RESPIRATION RATE: 16 BRPM | SYSTOLIC BLOOD PRESSURE: 106 MMHG | TEMPERATURE: 98.2 F

## 2022-02-17 DIAGNOSIS — M51.369 DDD (DEGENERATIVE DISC DISEASE), LUMBAR: ICD-10-CM

## 2022-02-17 DIAGNOSIS — M54.50 ACUTE RIGHT-SIDED LOW BACK PAIN WITHOUT SCIATICA: Primary | ICD-10-CM

## 2022-02-17 PROBLEM — M51.16 LUMBAR DISC HERNIATION WITH RADICULOPATHY: Status: ACTIVE | Noted: 2020-03-13

## 2022-02-17 PROBLEM — M51.379 DEGENERATION OF LUMBAR OR LUMBOSACRAL INTERVERTEBRAL DISC: Status: ACTIVE | Noted: 2020-03-13

## 2022-02-17 PROCEDURE — 99214 OFFICE O/P EST MOD 30 MIN: CPT | Performed by: FAMILY MEDICINE

## 2022-02-17 RX ORDER — METHYLPREDNISOLONE 4 MG
TABLET, DOSE PACK ORAL
Qty: 21 TABLET | Refills: 0 | Status: SHIPPED | OUTPATIENT
Start: 2022-02-17 | End: 2022-07-25

## 2022-02-17 RX ORDER — CYCLOBENZAPRINE HCL 10 MG
10 TABLET ORAL 3 TIMES DAILY PRN
Qty: 20 TABLET | Refills: 0 | Status: SHIPPED | OUTPATIENT
Start: 2022-02-17 | End: 2022-07-25

## 2022-02-17 ASSESSMENT — ANXIETY QUESTIONNAIRES
GAD7 TOTAL SCORE: 10
7. FEELING AFRAID AS IF SOMETHING AWFUL MIGHT HAPPEN: NOT AT ALL
4. TROUBLE RELAXING: NEARLY EVERY DAY
2. NOT BEING ABLE TO STOP OR CONTROL WORRYING: NOT AT ALL
GAD7 TOTAL SCORE: 10
1. FEELING NERVOUS, ANXIOUS, OR ON EDGE: SEVERAL DAYS
7. FEELING AFRAID AS IF SOMETHING AWFUL MIGHT HAPPEN: NOT AT ALL
5. BEING SO RESTLESS THAT IT IS HARD TO SIT STILL: NEARLY EVERY DAY
3. WORRYING TOO MUCH ABOUT DIFFERENT THINGS: SEVERAL DAYS
6. BECOMING EASILY ANNOYED OR IRRITABLE: MORE THAN HALF THE DAYS
GAD7 TOTAL SCORE: 10

## 2022-02-17 ASSESSMENT — PATIENT HEALTH QUESTIONNAIRE - PHQ9
10. IF YOU CHECKED OFF ANY PROBLEMS, HOW DIFFICULT HAVE THESE PROBLEMS MADE IT FOR YOU TO DO YOUR WORK, TAKE CARE OF THINGS AT HOME, OR GET ALONG WITH OTHER PEOPLE: SOMEWHAT DIFFICULT
SUM OF ALL RESPONSES TO PHQ QUESTIONS 1-9: 8
10. IF YOU CHECKED OFF ANY PROBLEMS, HOW DIFFICULT HAVE THESE PROBLEMS MADE IT FOR YOU TO DO YOUR WORK, TAKE CARE OF THINGS AT HOME, OR GET ALONG WITH OTHER PEOPLE: SOMEWHAT DIFFICULT
SUM OF ALL RESPONSES TO PHQ QUESTIONS 1-9: 8

## 2022-02-17 ASSESSMENT — PAIN SCALES - GENERAL: PAINLEVEL: MODERATE PAIN (4)

## 2022-02-17 NOTE — PROGRESS NOTES
Assessment & Plan     DDD (degenerative disc disease), lumbar     - cyclobenzaprine (FLEXERIL) 10 MG tablet; Take 1 tablet (10 mg) by mouth 3 times daily as needed for muscle spasms    Acute right-sided low back pain without sciatica   h/o disc herniation and surgery  No red flags at this time  Recommend steroids and muscle relaxant  If not improving or if new symptoms (disucssed with patient what symptoms would warrant call/recheck) would do PT vs advanced imaging (not indicated at this time).    - methylPREDNISolone (MEDROL DOSEPAK) 4 MG tablet therapy pack; Follow Package Directions  - cyclobenzaprine (FLEXERIL) 10 MG tablet; Take 1 tablet (10 mg) by mouth 3 times daily as needed for muscle spasms                 No follow-ups on file.    Thao Benz MD  [facility]    Olga Whitehead is a 41 year old who presents for the following health issues     History of Present Illness       Back Pain:  He presents for follow up of back pain. Patient's back pain is a recurring problem.  Location of back pain:  Right lower back and right middle of back  Description of back pain: dull ache, shooting and stabbing  Back pain spreads: right buttocks and right thigh    Since patient first noticed back pain, pain is: always present, but gets better and worse  Does back pain interfere with his job:  Yes      He eats 2-3 servings of fruits and vegetables daily.He consumes 1 sweetened beverage(s) daily.He exercises with enough effort to increase his heart rate 60 or more minutes per day.  He exercises with enough effort to increase his heart rate 7 days per week. He is missing 1 dose(s) of medications per week.       Pain History:  When did you first notice your pain? - 3 days for the acute episode  Have you seen this provider for your pain in the past?   NO - had discectomy lumbar 8/2021 which resolved pain  Had flare of pain in November or so and did a steroid pack and flexeril which resolved  "pain.    Was lifting a door a few days ago and felt like he tweaked his back, now feels like it's \"locking up\".     Where in your body do you have pain? Lower back  Are you seeing anyone else for your pain? Yes - Usually [facility] Miranda    PHQ-9 SCORE 7/13/2021 2/17/2022 2/17/2022   PHQ-9 Total Score - - -   PHQ-9 Total Score MyChart - - 8 (Mild depression)   PHQ-9 Total Score 15 8 8   Some encounter information is confidential and restricted. Go to Review Flowsheets activity to see all data.       JUAREZ-7 SCORE 4/13/2021 7/13/2021 2/17/2022   Total Score - - -   Total Score - - 10 (moderate anxiety)   Total Score 13 13 10   Some encounter information is confidential and restricted. Go to Review Flowsheets activity to see all data.       PHQ-9 SCORE 7/13/2021 2/17/2022 2/17/2022   PHQ-9 Total Score - - -   PHQ-9 Total Score MyChart - - 8 (Mild depression)   PHQ-9 Total Score 15 8 8   Some encounter information is confidential and restricted. Go to Review Flowsheets activity to see all data.     JUAREZ-7 SCORE 4/13/2021 7/13/2021 2/17/2022   Total Score - - -   Total Score - - 10 (moderate anxiety)   Total Score 13 13 10   Some encounter information is confidential and restricted. Go to Review Flowsheets activity to see all data.     PEG Score 2/17/2022   PEG Total Score 4.67       Chronic Pain Follow Up:    Location of pain: right low back with radiation into buttocks  Analgesia/pain control:    - Recent changes:  Worse x 3 days       PDMP Review       Value Time User    State PDMP site checked  Yes 6/22/2021  4:26 PM Aquiles Hobson MD        Last CSA Agreement:   CSA -- Patient Level:    CSA: None found at the patient level.       Last UDS: 2/18/2019            Review of Systems   Constitutional, HEENT, cardiovascular, pulmonary, gi and gu systems are negative, except as otherwise noted.      Objective    /68 (BP Location: Right arm, Patient Position: Sitting, Cuff Size: Adult Large)   Pulse 66   Temp 98.2  F " "(36.8  C) (Tympanic)   Resp 16   Ht 1.772 m (5' 9.75\")   Wt 73.2 kg (161 lb 6.4 oz)   SpO2 98%   BMI 23.32 kg/m    Body mass index is 23.32 kg/m .  Physical Exam   GENERAL APPEARANCE: healthy, alert and no distress  Comprehensive back pain exam:  Tenderness of right SI joint and piriformis, limited in flexion due to pain, Lower extremity strength functional and equal on both sides, Lower extremity reflexes within normal limits bilaterally, Lower extremity sensation normal and equal on both sides and Straight leg raise negative bilaterally                Answers for HPI/ROS submitted by the patient on 2/17/2022  If you checked off any problems, how difficult have these problems made it for you to do your work, take care of things at home, or get along with other people?: Somewhat difficult  PHQ9 TOTAL SCORE: 8  JUAREZ 7 TOTAL SCORE: 10      "

## 2022-02-18 ENCOUNTER — VIRTUAL VISIT (OUTPATIENT)
Dept: BEHAVIORAL HEALTH | Facility: CLINIC | Age: 42
End: 2022-02-18
Payer: COMMERCIAL

## 2022-02-18 ENCOUNTER — VIRTUAL VISIT (OUTPATIENT)
Dept: PSYCHIATRY | Facility: CLINIC | Age: 42
End: 2022-02-18
Payer: COMMERCIAL

## 2022-02-18 DIAGNOSIS — F41.1 GENERALIZED ANXIETY DISORDER: ICD-10-CM

## 2022-02-18 DIAGNOSIS — F33.41 RECURRENT MAJOR DEPRESSIVE DISORDER, IN PARTIAL REMISSION (H): Primary | ICD-10-CM

## 2022-02-18 DIAGNOSIS — F51.05 INSOMNIA DUE TO OTHER MENTAL DISORDER: ICD-10-CM

## 2022-02-18 DIAGNOSIS — F33.1 MODERATE EPISODE OF RECURRENT MAJOR DEPRESSIVE DISORDER (H): Primary | ICD-10-CM

## 2022-02-18 DIAGNOSIS — F99 INSOMNIA DUE TO OTHER MENTAL DISORDER: ICD-10-CM

## 2022-02-18 PROCEDURE — 99214 OFFICE O/P EST MOD 30 MIN: CPT | Mod: 95 | Performed by: NURSE PRACTITIONER

## 2022-02-18 PROCEDURE — 90832 PSYTX W PT 30 MINUTES: CPT | Mod: 95 | Performed by: COUNSELOR

## 2022-02-18 RX ORDER — GABAPENTIN 300 MG/1
300 CAPSULE ORAL
Qty: 180 CAPSULE | Refills: 3 | Status: SHIPPED | OUTPATIENT
Start: 2022-02-18 | End: 2022-05-09

## 2022-02-18 RX ORDER — ESCITALOPRAM OXALATE 10 MG/1
10 TABLET ORAL DAILY
Qty: 90 TABLET | Refills: 1 | Status: SHIPPED | OUTPATIENT
Start: 2022-02-18 | End: 2022-09-16

## 2022-02-18 RX ORDER — BUPROPION HYDROCHLORIDE 100 MG/1
100 TABLET, EXTENDED RELEASE ORAL 2 TIMES DAILY
Qty: 180 TABLET | Refills: 1 | Status: SHIPPED | OUTPATIENT
Start: 2022-02-18 | End: 2022-10-19 | Stop reason: DRUGHIGH

## 2022-02-18 RX ORDER — ESZOPICLONE 2 MG/1
2 TABLET, FILM COATED ORAL
Qty: 30 TABLET | Refills: 0 | Status: SHIPPED | OUTPATIENT
Start: 2022-02-18 | End: 2022-05-06

## 2022-02-18 RX ORDER — HYDROXYZINE PAMOATE 50 MG/1
50 CAPSULE ORAL
Qty: 90 CAPSULE | Refills: 1 | Status: SHIPPED | OUTPATIENT
Start: 2022-02-18 | End: 2022-08-29

## 2022-02-18 ASSESSMENT — ANXIETY QUESTIONNAIRES: GAD7 TOTAL SCORE: 10

## 2022-02-18 ASSESSMENT — PATIENT HEALTH QUESTIONNAIRE - PHQ9: SUM OF ALL RESPONSES TO PHQ QUESTIONS 1-9: 8

## 2022-02-18 NOTE — PROGRESS NOTES
[facility] Collaborative Care Psychiatry Service  February 18, 2022    Behavioral Health Clinician Progress Note    Patient Name: Timmy Fitzpatrick      Telemedicine Visit: The patient's condition can be safely assessed and treated via synchronous audio and visual telemedicine encounter.      Reason for Telemedicine Visit: Services only offered telehealth    Originating Site (Patient Location): Patient's home    Distant Site (Provider Location): Provider Remote Setting- Home Office    Consent:  The patient/guardian has verbally consented to: the potential risks and benefits of telemedicine (video visit) versus in person care; bill my insurance or make self-payment for services provided; and responsibility for payment of non-covered services.     Mode of Communication:  Video Conference via [employer]    As the provider I attest to compliance with applicable laws and regulations related to telemedicine.         Service Type:  Individual      Service Location:   Face to Face in Home / Community     Session Start Time: 721am  Session End Time: 740am      Session Length: 16 - 37      Attendees: Patient    Visit Activities (Refresh list every visit): [facility] [place] Only    Diagnostic Assessment Date: 6/15/2020 Мария Maxwell  Treatment Plan Review Date: N/A  See Flowsheets for today's PHQ-9 and JUAREZ-7 results  Previous PHQ-9:   PHQ-9 SCORE 7/13/2021 2/17/2022 2/17/2022   PHQ-9 Total Score - - -   PHQ-9 Total Score MyChart - - 8 (Mild depression)   PHQ-9 Total Score 15 8 8   Some encounter information is confidential and restricted. Go to Review Flowsheets activity to see all data.     Previous JUAREZ-7:   JUAREZ-7 SCORE 4/13/2021 7/13/2021 2/17/2022   Total Score - - -   Total Score - - 10 (moderate anxiety)   Total Score 13 13 10   Some encounter information is confidential and restricted. Go to Review Flowsheets activity to see all data.         DATA  Extended Session (60+ minutes): No  Interactive Complexity: No  Crisis:  "No  [facility] Patient: No    Treatment Objective(s) Addressed in This Session:  Target Behavior(s): anxiety and pain    Anxiety: will experience a reduction in anxiety, will develop more effective coping skills to manage anxiety symptoms, will develop healthy cognitive patterns and beliefs and will increase ability to function adaptively    Current Stressors / Issues:  Worse/Better/Same: Pt says that things have been going \"good and moving in the right direction.\" Pt has back pain an met with doctor yesterday. Pt says that they feel like their medication is helping.   Side Effects:Pt denies  Mood: \"good, I do weekly therapy.\"  Appetite: unremarkable  Sleep: struggle with it some nights, but is better  Pregnancy:  Suicidality: over the last week haven't had any issues, before couldn't give a date and had low SI, 2-3 out of 10, no more than a thought, able to talk with wife about it   Self-harm: haven't had any action or strong thought about it, 2 or a three strength on Sunday, will put on a pod cast and music, write   Substance Use: Chew nicotine gum, stopped chewing tobacco over a year, scription for medical cannabis     Caffeine: coffee daily before 3pm, 1-2 times a week a soda  Therapist: DBT a couple years ago, continue with skill use, in therapy with Liana outside of [place]- still helpful and like it, would rather use skills than add another pill  Interventions: encourage to continue to use skills   Medication Questions/Requests: concerns about going back to primary, don't feel like they want to and are ready to, will need refills in the future         Progress on Treatment Objective(s) / Homework:  Satisfactory progress - ACTION (Actively working towards change); Intervened by reinforcing change plan / affirming steps taken    Motivational Interviewing    MI Intervention: Expressed Empathy/Understanding, Supported Autonomy, Collaboration, Evocation, Permission to raise concern or advise, Open-ended questions, " Reflections: simple and complex, Change talk (evoked) and Reframe     Change Talk Expressed by the Patient: Reasons to change Committment to change Taking steps    Provider Response to Change Talk: E - Evoked more info from patient about behavior change, A - Affirmed patient's thoughts, decisions, or attempts at behavior change, R - Reflected patient's change talk and S - Summarized patient's change talk statements      Situation        Automatic Thoughts  Cognitive Distortions      Feelings        Behavior        Questioning Thoughts            Also provided psychoeducation about behavioral health condition, symptoms, and treatment options    Care Plan review completed: No    Medication Review:  No changes to current psychiatric medication(s)    Medication Compliance:  Yes    Changes in Health Issues:   None reported    Chemical Use Review:   Substance Use: Chemical use reviewed, no active concerns identified      Tobacco Use: No current tobacco use.   Pt is using nicotine gum.     Assessment: Current Emotional / Mental Status (status of significant symptoms):  Risk status (Self / Other harm or suicidal ideation)  Patient has had a history of suicidal ideation: see above  Patient denies current fears or concerns for personal safety.  Patient reports the following current or recent suicidal ideation or behaviors: one thought rarely.  Patient denies current or recent homicidal ideation or behaviors.  Patient denies current or recent self injurious behavior or ideation.  Patient denies other safety concerns.  A safety and risk management plan has not been developed at this time, however patient was encouraged to call [place] [phone] should there be a change in any of these risk factors.    Appearance:   Appropriate   Eye Contact:   Good   Psychomotor Behavior: Normal   Attitude:   Cooperative   Orientation:   All  Speech   Rate / Production: Normal    Volume:  Normal   Mood:    Normal  Affect:    Appropriate    Thought Content:  Clear   Thought Form:  Coherent  Logical   Insight:    Good     Diagnoses:  1. Recurrent major depressive disorder, in partial remission (H)    2. Generalized anxiety disorder        Collateral Reports Completed:  Communicated with:   Communicated with NEGRITA Wagner [facility]      Plan: (Homework, other):  Patient was given information about behavioral services and instructed to schedule a follow up appointment with the [facility] in conjunction with next [facility] appointment.  He was also given information about mental health symptoms and treatment options .  CD Recommendations: No indications of CD issues.         Carmelina Obrien, LILY  February 18, 2022

## 2022-02-18 NOTE — PROGRESS NOTES
"Ventura is a 41 year old who is being evaluated via a billable video visit.      How would you like to obtain your AVS? MyChart  If the video visit is dropped, the invitation should be resent by: Text to cell phone: [phone]  Will anyone else be joining your video visit? No      Video Start Time: 7:55 AM  Video-Visit Details    Type of service:  Video Visit    Video End Time:8:20 AM    Originating Location (pt. Location): Home    Distant Location (provider location):  [facility] MENTAL [facility] & ADDICTION [facility]     Platform used for Video Visit: [facility]         Outpatient Psychiatric Progress Note    Name: Timmy Fitzpatrick   : 1980                    Primary Care Provider: [facility]   Therapist: Yes    PHQ-9 scores:  PHQ-9 SCORE 2021   PHQ-9 Total Score - - -   PHQ-9 Total Score Kojo - - 8 (Mild depression)   PHQ-9 Total Score 15 8 8   Some encounter information is confidential and restricted. Go to Review Flowsheets activity to see all data.       JUAREZ-7 scores:  JUAREZ-7 SCORE 2021   Total Score - - -   Total Score - - 10 (moderate anxiety)   Total Score 13 13 10   Some encounter information is confidential and restricted. Go to Review FlowsTrenStar activity to see all data.       Patient Identification:    Patient is a 41 year old year old,   Choose not to Answer Choose not to answer male  who presents for return visit with me.  Patient is currently unemployed. Patient attended the session alone. Patient prefers to be called: \" Ventura\".    Current medications include: buPROPion (WELLBUTRIN SR) 100 MG 12 hr tablet, Take 1 tablet (100 mg) by mouth 2 times daily  cyclobenzaprine (FLEXERIL) 10 MG tablet, Take 1 tablet (10 mg) by mouth 3 times daily as needed for muscle spasms  escitalopram (LEXAPRO) 10 MG tablet, Take 1 tablet (10 mg) by mouth daily  eszopiclone (LUNESTA) 2 MG tablet, Take 1 tablet (2 mg) by mouth " nightly as needed for sleep  gabapentin (NEURONTIN) 300 MG capsule, Take 1 capsule (300 mg) by mouth 6 times daily Refill 28 days after last fill  hydrOXYzine (VISTARIL) 50 MG capsule,   medical cannabis (Patient's own supply), See Admin Instructions (The purpose of this order is to document that the patient reports taking medical cannabis.  This is not a prescription, and is not used to certify that the patient has a qualifying medical condition.)  methylPREDNISolone (MEDROL DOSEPAK) 4 MG tablet therapy pack, Follow Package Directions  Multiple Vitamins-Minerals (MENS MULTIPLE VITAMIN/LYCOPENE PO), Take 1 tablet by mouth daily.  nicotine polacrilex (NICORETTE) 4 MG gum, Place 4 mg inside cheek as needed for smoking cessation  sildenafil (REVATIO) 20 MG tablet, Take 1 tablet prior to intercourse as needed.    No current facility-administered medications on file prior to visit.       The [place] Prescription Monitoring Program has been reviewed and there are no concerns about diversionary activity for controlled substances at this time.      I was able to review most recent Primary Care Provider, specialty provider, and therapy visit notes that I have access to.     Today, patient reports that the kids are back in school.  His wife is also in therapy.  His back pain contiues to exacerbate.  His sleep fluctuates according to back  Pain level.  He takes Lunesta once weekly.   Anxiety is manageable.  He has a new puppy to manage and he keeps up the house.  Ventura denies any anger outbursts.  He mostly stays at home.     has a past medical history of Anxiety, Cervicalgia, Chemical dependency (H), Depression, and Migraine headache.    He has no past medical history of Complication of anesthesia, Diabetes (H), PONV (postoperative nausea and vomiting), Sleep apnea, or Uncomplicated asthma.    Social history updates:    Ventura and his wife live together with their 2 children.  He denies any financial stress.    Substance use  updates:    He denies alcohol use  Tobacco use: No    Vital Signs:   There were no vitals taken for this visit.    Labs:    Most recent laboratory results reviewed and no new labs.     Mental Status Examination:  Appearance:  awake, alert and casually dressed  Attitude:  cooperative   Eye Contact:  adequate  Gait and Station: No assistive Devices used and No dizziness or falls  Psychomotor Behavior:  intact station, gait and muscle tone  Oriented to:  time, person, and place  Attention Span and Concentration:  Normal  Speech:   clear, coherent and Speaks: English  Mood:  anxious and depressed  Affect:  appropriate and in normal range  Associations:  no loose associations  Thought Process:  goal oriented  Thought Content:  no evidence of suicidal ideation or homicidal ideation, no auditory hallucinations present and no visual hallucinations present  Recent and Remote Memory:  intact Not formally assessed. No amnesia.  Fund of Knowledge: appropriate  Insight:  good  Judgment:  intact  Impulse Control:  intact    Suicide Risk Assessment:  Today Timmy Fitzpatrick reports that he is having no thoughts to want to end his life or harm other people. In addition, there are notable risk factors for self-harm, including anxiety, comorbid medical condition of Chronic pain and mood change. However, risk is mitigated by commitment to family, history of seeking help when needed, future oriented, denies suicidal intent or plan and denies homicidal ideation, intent, or plan. Therefore, based on all available evidence including the factors cited above, Timmy Fitzpatrick does not appear to be at imminent risk for self-harm, does not meet criteria for a 72-hr hold, and therefore remains appropriate for ongoing outpatient level of care.  A thorough assessment of risk factors related to suicide and self-harm have been reviewed and are noted above. The patient convincingly denies suicidality on several occasions. Local community  safety resources printed and reviewed for patient to use if needed. There was no deceit detected, and the patient presented in a manner that was believable.     DSM5 Diagnosis:  296.32 (F33.1) Major Depressive Disorder, Recurrent Episode, Moderate _ and With mixed features  300.02 (F41.1) Generalized Anxiety Disorder  780.52 (G47.00) Insomnia Disorder   With non-sleep disorder mental comorbidity  Recurrent      Medical comorbidities include:   Patient Active Problem List    Diagnosis Date Noted     COPD (chronic obstructive pulmonary disease) (H) 03/12/2021     Priority: Medium     Umbilical hernia without obstruction and without gangrene 03/01/2021     Priority: Medium     Added automatically from request for surgery [id number]       Degeneration of lumbar or lumbosacral intervertebral disc 03/13/2020     Priority: Medium     Formatting of this note might be different from the original.  Added automatically from request for surgery [id number]       Lumbar disc herniation with radiculopathy 03/13/2020     Priority: Medium     Formatting of this note might be different from the original.  Added automatically from request for surgery [id number]       Generalized anxiety disorder 03/26/2019     Priority: Medium     Bipolar II disorder (H) 11/28/2018     Priority: Medium     Anxiety 09/18/2017     Priority: Medium     Tear of medial meniscus of knee 09/08/2016     Priority: Medium     Chemical dependency (H) 11/25/2015     Priority: Medium     Alcoholic, sober since 2006 09/13/2012     Priority: Medium     Major depression in partial remission (H) 06/30/2011     Priority: Medium     Migraine headache 06/30/2011     Priority: Medium     (Problem list name updated by automated process. Provider to review and confirm.)       CARDIOVASCULAR SCREENING; LDL GOAL LESS THAN 160 10/31/2010     Priority: Medium     Cervicalgia 03/24/2010     Priority: Medium     Nonallopathic lesion of thoracic region 03/24/2010     Priority: Medium      Problem list name updated by automated process. Provider to review       Nonallopathic lesion of lumbar region 03/24/2010     Priority: Medium     Problem list name updated by automated process. Provider to review         Assessment:    Timmy Fitzpatrick reports ongoing depression that seems to be related to his somatic issues that limit his abilities to engage in activities he enjoys.  His anxiety level is chronic and generalized in nature.  He still is able to care for his 2 young children.  Him and his wife are engaged in talk therapies to continue to work on their relationship..  No changes will be made in his medications for now and we can talk about long-term goals for him at our next meeting with regards to medication management.    Medication side effects and alternatives were reviewed. Health promotion activities recommended and reviewed today. All questions addressed. Education and counseling completed regarding risks and benefits of medications and psychotherapy options.    Treatment Plan:        Gabapentin 300 mg 6 times daily    Lexapro 10 mg daily    Lunesta 2 mg daily as needed-take sparingly    Wellbutrin  mg twice daily    Hydroxyzine 50 mg at bedtime to help you sleep    Continue all talk therapies to learn ways to manage life stressors        Continue all other medications as reviewed per electronic medical record today.     Safety plan reviewed. To the Emergency Department as needed or call after hours crisis line at [phone] or [phone]. [place] Crisis Text [place]. Text [place] to 982491 or Suicide LifeLine Chat: suicidepreventionlifeline.org/chat/    To schedule individual or family therapy, call [facility] at [phone].    Schedule an appointment with me in 3 months or sooner as needed. Call [facility] at [phone] to schedule.    Follow up with primary care provider as planned or for acute medical concerns.    Call the psychiatric nurse line  with medication questions or concerns at [phone].    MyChart may be used to communicate with your provider, but this is not intended to be used for emergencies.    Crisis Resources:    National Suicide Prevention Lifeline: 247.904.4285 (TTY: 871.726.8265). Call anytime for help.  (www.suicidepreventionlifeline.org)  National [place] on [place] Illness (www.audra.org): 486.412.2161 or 420-588-9746.   Mental Health Association (www.mentalhealth.org): 168.823.1341 or 581-025-9134.  [place] Crisis Text [place]: Text [place] to 111360  Suicide LifeLine Chat: suicidepreAccella Learningline.org/chat    Administrative Billing:   Time spent with patient was 30 minutes and greater than 50% of time or 20 minutes was spent in counseling and coordination of care regarding above diagnoses and treatment plan.    Patient Status:  Patient will continue to be seen for ongoing consultation and stabilization.    Signed:   EMILIA Wagner-BC   Psychiatry

## 2022-04-01 NOTE — PATIENT INSTRUCTIONS
Gabapentin 300 mg 6 times daily    Lexapro 10 mg daily    Lunesta 2 mg daily as needed-take sparingly    Wellbutrin  mg twice daily    Hydroxyzine 50 mg at bedtime to help you sleep    Continue all talk therapies to learn ways to manage life stressors        Continue all other medications as reviewed per electronic medical record today.     Safety plan reviewed. To the Emergency Department as needed or call after hours crisis line at [phone] or [phone]. [place] Crisis [place]. Text [place] to 823879 or Suicide LifeLine Chat: suicideMyDream Interactive.org/chat/    To schedule individual or family therapy, call [facility] at [phone].    Schedule an appointment with me in 3 months or sooner as needed. Call [facility] at [phone] to schedule.    Follow up with primary care provider as planned or for acute medical concerns.    Call the psychiatric nurse line with medication questions or concerns at [phone].    [employer] may be used to communicate with your provider, but this is not intended to be used for emergencies.    Crisis Resources:    National Suicide Prevention Lifeline: 586.296.3665 (TTY: 867.427.7445). Call anytime for help.  (www.suicidepreventionlifeline.org)  National [place] on Mental Illness (www.audra.org): 558.344.9471 or 445-164-6304.   Mental Health Association (www.mentalhealth.org): 542.310.1731 or 251-519-3338.  [place] Crisis [place]: Text [place] to 409964  Suicide LifeLine Chat: suicideMyDream Interactive.org/chat

## 2022-05-05 DIAGNOSIS — N52.9 ERECTILE DYSFUNCTION, UNSPECIFIED ERECTILE DYSFUNCTION TYPE: ICD-10-CM

## 2022-05-05 RX ORDER — SILDENAFIL CITRATE 20 MG/1
TABLET ORAL
Qty: 60 TABLET | Refills: 0 | Status: SHIPPED | OUTPATIENT
Start: 2022-05-05 | End: 2022-07-18

## 2022-05-06 ENCOUNTER — TELEPHONE (OUTPATIENT)
Dept: BEHAVIORAL HEALTH | Facility: CLINIC | Age: 42
End: 2022-05-06
Payer: COMMERCIAL

## 2022-05-06 DIAGNOSIS — F99 INSOMNIA DUE TO OTHER MENTAL DISORDER: ICD-10-CM

## 2022-05-06 DIAGNOSIS — F51.05 INSOMNIA DUE TO OTHER MENTAL DISORDER: ICD-10-CM

## 2022-05-06 RX ORDER — ESZOPICLONE 2 MG/1
2 TABLET, FILM COATED ORAL
Qty: 30 TABLET | Refills: 0 | Status: SHIPPED | OUTPATIENT
Start: 2022-05-06 | End: 2022-09-16

## 2022-05-06 NOTE — TELEPHONE ENCOUNTER
Pending Prescriptions:                       Disp   Refills    eszopiclone (LUNESTA) 2 MG tablet          30 tab*0        Sig: Take 1 tablet (2 mg) by mouth nightly as needed for           sleep    Routing refill request to provider for review/approval because:  Drug not on the [facility] refill protocol     Lisa Winkler RN  [facility]

## 2022-05-06 NOTE — TELEPHONE ENCOUNTER
5/6/22  Flaco from [facility] in [facility] called regarding a medication refill pt is seeking. Flaco asks to be called back today before 1700 at [phone] option #2    Reason for call:  Medication   If this is a refill request, has the caller requested the refill from the pharmacy already? Yes  Will the patient be using a [facility]? Yes  Name of the pharmacy and phone number for the current request: [facility] [place] [address]  [phone] then select option 2.    Name of the medication requested: gabapentin (NEURONTIN) 300 MG capsule    Other request:     Phone number to reach patient:  Home number on file [phone] (home)    Best Time:  ASAP    Can we leave a detailed message on this number?  YES    Travel screening: Not Applicable

## 2022-05-09 NOTE — TELEPHONE ENCOUNTER
Returned call and spoke with Flaco and he said the call was on Friday and that the gabapentin got filled today for patient. It could not be filled over the weekend. The situation has been figured out.

## 2022-07-09 ENCOUNTER — NURSE TRIAGE (OUTPATIENT)
Dept: NURSING | Facility: CLINIC | Age: 42
End: 2022-07-09

## 2022-07-09 ENCOUNTER — E-VISIT (OUTPATIENT)
Dept: FAMILY MEDICINE | Facility: CLINIC | Age: 42
End: 2022-07-09
Payer: COMMERCIAL

## 2022-07-09 DIAGNOSIS — H10.30 ACUTE BACTERIAL CONJUNCTIVITIS, UNSPECIFIED LATERALITY: Primary | ICD-10-CM

## 2022-07-09 DIAGNOSIS — H10.023 PINK EYE, BILATERAL: Primary | ICD-10-CM

## 2022-07-09 PROCEDURE — 99421 OL DIG E/M SVC 5-10 MIN: CPT | Performed by: NURSE PRACTITIONER

## 2022-07-09 RX ORDER — POLYMYXIN B SULFATE AND TRIMETHOPRIM 1; 10000 MG/ML; [USP'U]/ML
1-2 SOLUTION OPHTHALMIC EVERY 4 HOURS
Qty: 5 ML | Refills: 0 | Status: SHIPPED | OUTPATIENT
Start: 2022-07-09 | End: 2022-07-16

## 2022-07-09 NOTE — TELEPHONE ENCOUNTER
"[facility] received Rx for Polytrim abx eye drops from Vida Miranda NP. Pt had e-visit w/ this provider today.   Polytrim eye drop not available -  back order - would take several days to get it.   Can we get Rx changed to a different eyedrop?     Reason for Disposition    [1] Pharmacy calling with prescription questions AND [2] triager unable to answer question    Additional Information    Negative: Drug overdose and triager unable to answer question    Negative: Caller requesting information unrelated to medicine    Negative: Caller requesting a prescription for Strep throat and has a positive culture result    Negative: Rash while taking a medication or within 3 days of stopping it    Negative: Immunization reaction suspected    Negative: [1] Asthma and [2] having symptoms of asthma (cough, wheezing, etc.)    Negative: [1] Influenza symptoms AND [2] anti-viral med prescription request, such as Tamiflu    Negative: [1] Symptom of illness (e.g., headache, abdominal pain, earache, vomiting) AND [2] more than mild    Negative: MORE THAN A DOUBLE DOSE of a prescription or over-the-counter (OTC) drug    Negative: [1] DOUBLE DOSE (an extra dose or lesser amount) of over-the-counter (OTC) drug AND [2] any symptoms (e.g., dizziness, nausea, pain, sleepiness)    Negative: [1] DOUBLE DOSE (an extra dose or lesser amount) of prescription drug AND [2] any symptoms (e.g., dizziness, nausea, pain, sleepiness)    Negative: Took another person's prescription drug    Negative: [1] DOUBLE DOSE (an extra dose or lesser amount) of prescription drug AND [2] NO symptoms (Exception: a double dose of antibiotics)    Negative: Diabetes drug error or overdose (e.g., took wrong type of insulin or took extra dose)    Negative: [1] Request for URGENT new prescription or refill of \"essential\" medication (i.e., likelihood of harm to patient if not taken) AND [2] triager unable to fill per unit policy    Negative: [1] " Prescription not at pharmacy AND [2] was prescribed by PCP recently    Protocols used: MEDICATION QUESTION CALL-A-AH

## 2022-07-10 RX ORDER — TOBRAMYCIN 3 MG/ML
1-2 SOLUTION/ DROPS OPHTHALMIC EVERY 4 HOURS
Qty: 5 ML | Refills: 0 | Status: SHIPPED | OUTPATIENT
Start: 2022-07-10 | End: 2022-07-17

## 2022-07-25 ENCOUNTER — OFFICE VISIT (OUTPATIENT)
Dept: FAMILY MEDICINE | Facility: CLINIC | Age: 42
End: 2022-07-25
Payer: COMMERCIAL

## 2022-07-25 VITALS
HEART RATE: 64 BPM | WEIGHT: 164 LBS | HEIGHT: 69 IN | DIASTOLIC BLOOD PRESSURE: 70 MMHG | SYSTOLIC BLOOD PRESSURE: 110 MMHG | TEMPERATURE: 98.5 F | OXYGEN SATURATION: 99 % | BODY MASS INDEX: 24.29 KG/M2 | RESPIRATION RATE: 16 BRPM

## 2022-07-25 DIAGNOSIS — H00.011 HORDEOLUM EXTERNUM OF RIGHT UPPER EYELID: Primary | ICD-10-CM

## 2022-07-25 PROCEDURE — 99213 OFFICE O/P EST LOW 20 MIN: CPT | Performed by: NURSE PRACTITIONER

## 2022-07-25 ASSESSMENT — PAIN SCALES - GENERAL: PAINLEVEL: NO PAIN (0)

## 2022-07-25 ASSESSMENT — PATIENT HEALTH QUESTIONNAIRE - PHQ9
SUM OF ALL RESPONSES TO PHQ QUESTIONS 1-9: 10
10. IF YOU CHECKED OFF ANY PROBLEMS, HOW DIFFICULT HAVE THESE PROBLEMS MADE IT FOR YOU TO DO YOUR WORK, TAKE CARE OF THINGS AT HOME, OR GET ALONG WITH OTHER PEOPLE: SOMEWHAT DIFFICULT
SUM OF ALL RESPONSES TO PHQ QUESTIONS 1-9: 10

## 2022-07-25 NOTE — PATIENT INSTRUCTIONS
Try to use warm packs to the eye 3-4 times daily for 5 minutes.  Follow-up with eye doctor for evaluation if not improving.  If any signs of infection (redness, swelling, warmth, or drainage) follow-up in clinic for evaluation and antibiotic treatment.

## 2022-07-25 NOTE — PROGRESS NOTES
"  Assessment & Plan     Hordeolum externum of right upper eyelid  No infection seen on exam.  I recommend warm packs to the eye over the next week or 2 and follow-up with ophthalmologist for evaluation if not improving for possible lancing of the stye.  Handout given on Stye and conservative treatment along with signs of infection to report and reviewed with patient.    See Patient Instructions    Return in about 2 weeks (around 8/8/2022), or if symptoms worsen or fail to improve.    Sabrina Perez NP  [facility]    Olga Whitehead is a 41 year old, presenting for the following health issues:  Eye Problem      HPI     Eye(s) Problem  Onset/Duration:   Description:   Location: YES- right eye lid (laterally)  Pain: No  Redness: YES  Accompanying Signs & Symptoms:  Discharge/mattering: No  Swelling: YES  Visual changes: No  Fever: No  Nasal Congestion: No  Bothered by bright lights: No  History:  Trauma: No  Foreign body exposure: No  Wearing contacts: No  Precipitating or alleviating factors: None  Therapies tried and outcome: eye drops     Started at the beginning of July.  Has not worsening.  Was treated with antibiotic eye drops in virtual visit.  No improvement.      Review of Systems   CONSTITUTIONAL: NEGATIVE for fever, chills, change in weight  EYES: POSITIVE for redness and mild swelling of outside right eyelid  RESP: NEGATIVE for significant cough or SOB  CV: NEGATIVE for chest pain, palpitations or peripheral edema  PSYCHIATRIC: NEGATIVE for changes in mood or affect  ROS otherwise negative      Objective    /70   Pulse 64   Temp 98.5  F (36.9  C) (Tympanic)   Resp 16   Ht 1.753 m (5' 9\")   Wt 74.4 kg (164 lb)   SpO2 99%   BMI 24.22 kg/m    Body mass index is 24.22 kg/m .  Physical Exam   GENERAL: healthy, alert and no distress  EYES: External stye on lateral upper right eyelid with some inflammation and redness only directly around the stye.  No signs or " symptoms of infection.  PSYCH: mentation appears normal, affect normal/bright

## 2022-07-29 ENCOUNTER — MYC MEDICAL ADVICE (OUTPATIENT)
Dept: FAMILY MEDICINE | Facility: CLINIC | Age: 42
End: 2022-07-29

## 2022-07-29 DIAGNOSIS — F41.1 GENERALIZED ANXIETY DISORDER: ICD-10-CM

## 2022-07-29 NOTE — TELEPHONE ENCOUNTER
See my chart message regarding back pain.     Last visit 2/2022 for back pain.     Messaged back to be seen.     Isela Muñoz RN on 7/29/2022 at 7:30 AM

## 2022-07-30 RX ORDER — GABAPENTIN 300 MG/1
300 CAPSULE ORAL
Qty: 180 CAPSULE | Refills: 3 | Status: SHIPPED | OUTPATIENT
Start: 2022-07-30 | End: 2022-09-16

## 2022-08-03 ENCOUNTER — OFFICE VISIT (OUTPATIENT)
Dept: FAMILY MEDICINE | Facility: CLINIC | Age: 42
End: 2022-08-03
Payer: COMMERCIAL

## 2022-08-03 VITALS
RESPIRATION RATE: 16 BRPM | BODY MASS INDEX: 23.19 KG/M2 | OXYGEN SATURATION: 97 % | HEIGHT: 70 IN | SYSTOLIC BLOOD PRESSURE: 120 MMHG | WEIGHT: 162 LBS | HEART RATE: 73 BPM | DIASTOLIC BLOOD PRESSURE: 68 MMHG | TEMPERATURE: 97.3 F

## 2022-08-03 DIAGNOSIS — M62.830 BACK MUSCLE SPASM: ICD-10-CM

## 2022-08-03 DIAGNOSIS — M51.369 DDD (DEGENERATIVE DISC DISEASE), LUMBAR: Primary | ICD-10-CM

## 2022-08-03 PROCEDURE — 99213 OFFICE O/P EST LOW 20 MIN: CPT | Performed by: FAMILY MEDICINE

## 2022-08-03 RX ORDER — CYCLOBENZAPRINE HCL 5 MG
5-10 TABLET ORAL 3 TIMES DAILY PRN
Qty: 30 TABLET | Refills: 1 | Status: SHIPPED | OUTPATIENT
Start: 2022-08-03 | End: 2023-04-18

## 2022-08-03 RX ORDER — METHYLPREDNISOLONE 4 MG
TABLET, DOSE PACK ORAL
Qty: 21 TABLET | Refills: 0 | Status: SHIPPED | OUTPATIENT
Start: 2022-08-03 | End: 2023-03-02

## 2022-08-03 ASSESSMENT — PAIN SCALES - GENERAL: PAINLEVEL: MODERATE PAIN (4)

## 2022-08-03 NOTE — PATIENT INSTRUCTIONS
If you do decide that you want a physical therapy referral within the next year feel free to just [facility] without needing a new appointment.

## 2022-08-03 NOTE — PROGRESS NOTES
Assessment & Plan     DDD (degenerative disc disease), lumbar  Back muscle spasm  Acute flare but has not resolve in >8 days , reasonabel to treat as below since that is what has worked for him  - methylPREDNISolone (MEDROL DOSEPAK) 4 MG tablet therapy pack  Dispense: 21 tablet; Refill: 0  - cyclobenzaprine (FLEXERIL) 5 MG tablet  Dispense: 30 tablet; Refill: 1    Patient Instructions   If you do decide that you want a physical therapy referral within the next year feel free to just ask me for one in [facility] without needing a new appointment.      Return if symptoms worsen or fail to improve.    Zhane Harrell MD  [facility]        Olga Whitehead is a 41 year old accompanied by his son and daughter, presenting for the following health issues:  Back Pain      HPI     Pain History:  When did you first notice your pain? - Acute Pain   Have you seen anyone else for your pain? No  Where in your body do you have pain? Back Pain  Onset/Duration: 8 days   Description:   Location of pain: low back bilateral  Character of pain: sharp and stabbing  Pain radiation: radiates into the right leg, radiates into the right foot and radiates into the left buttocks  New numbness or weakness in legs, not attributed to pain: no   Intensity: moderate  Progression of Symptoms: worsening  History:   Specific cause: none  Pain interferes with job:  no   History of back problems: recurrent self limited episodes of low back pain in the past  Any previous MRI or X-rays: Yes- at [place].  Date last year   Sees a specialist for back pain: No  Alleviating factors:   Improved by: cold, massage and muscle relaxants    Precipitating factors:  Worsened by: Sitting  Therapies tried and outcome: muscle relaxants    Accompanying Signs & Symptoms:  Risk of Fracture: None  Risk of Cauda Equina: None  Risk of Infection: None  Risk of Cancer: None  Risk of Ankylosing Spondylitis: Onset at age <35, male, AND morning back  "stiffness No    Review of Systems         Objective    /68   Pulse 73   Temp 97.3  F (36.3  C)   Resp 16   Ht 1.778 m (5' 10\")   Wt 73.5 kg (162 lb)   SpO2 97%   BMI 23.24 kg/m    Body mass index is 23.24 kg/m .  Physical Exam   GENERAL: healthy, alert and no distress  RESP: normal respiratory effort, speaking in complete sentences  MS: no gross musculoskeletal defects, sitting comfortably but when getting up/walking, slightly bent forward for comfort  PSYCH: mentation appears normal, affect normal/bright                .  ..  "

## 2022-09-16 ENCOUNTER — VIRTUAL VISIT (OUTPATIENT)
Dept: PSYCHIATRY | Facility: CLINIC | Age: 42
End: 2022-09-16
Payer: COMMERCIAL

## 2022-09-16 ENCOUNTER — VIRTUAL VISIT (OUTPATIENT)
Dept: BEHAVIORAL HEALTH | Facility: CLINIC | Age: 42
End: 2022-09-16
Payer: COMMERCIAL

## 2022-09-16 DIAGNOSIS — F33.1 MODERATE EPISODE OF RECURRENT MAJOR DEPRESSIVE DISORDER (H): Primary | ICD-10-CM

## 2022-09-16 DIAGNOSIS — F41.1 GENERALIZED ANXIETY DISORDER: ICD-10-CM

## 2022-09-16 DIAGNOSIS — F51.05 INSOMNIA DUE TO OTHER MENTAL DISORDER: ICD-10-CM

## 2022-09-16 DIAGNOSIS — F99 INSOMNIA DUE TO OTHER MENTAL DISORDER: ICD-10-CM

## 2022-09-16 DIAGNOSIS — F33.41 RECURRENT MAJOR DEPRESSIVE DISORDER, IN PARTIAL REMISSION (H): Primary | ICD-10-CM

## 2022-09-16 PROCEDURE — 90832 PSYTX W PT 30 MINUTES: CPT | Mod: 95 | Performed by: COUNSELOR

## 2022-09-16 PROCEDURE — 99214 OFFICE O/P EST MOD 30 MIN: CPT | Mod: GT | Performed by: NURSE PRACTITIONER

## 2022-09-16 RX ORDER — BUPROPION HYDROCHLORIDE 150 MG/1
150 TABLET, EXTENDED RELEASE ORAL 2 TIMES DAILY
Qty: 60 TABLET | Refills: 3 | Status: SHIPPED | OUTPATIENT
Start: 2022-09-16 | End: 2022-12-08

## 2022-09-16 RX ORDER — ESCITALOPRAM OXALATE 10 MG/1
10 TABLET ORAL DAILY
Qty: 90 TABLET | Refills: 1 | Status: SHIPPED | OUTPATIENT
Start: 2022-09-16 | End: 2023-03-02

## 2022-09-16 RX ORDER — ESZOPICLONE 2 MG/1
2 TABLET, FILM COATED ORAL
Qty: 30 TABLET | Refills: 0 | Status: SHIPPED | OUTPATIENT
Start: 2022-09-16 | End: 2023-01-11

## 2022-09-16 RX ORDER — HYDROXYZINE PAMOATE 50 MG/1
50 CAPSULE ORAL
Qty: 30 CAPSULE | Refills: 3 | Status: SHIPPED | OUTPATIENT
Start: 2022-09-16 | End: 2022-12-08

## 2022-09-16 RX ORDER — GABAPENTIN 300 MG/1
600 CAPSULE ORAL 3 TIMES DAILY
Qty: 180 CAPSULE | Refills: 3 | Status: SHIPPED | OUTPATIENT
Start: 2022-09-16 | End: 2022-12-08

## 2022-09-16 ASSESSMENT — PATIENT HEALTH QUESTIONNAIRE - PHQ9
SUM OF ALL RESPONSES TO PHQ QUESTIONS 1-9: 13
10. IF YOU CHECKED OFF ANY PROBLEMS, HOW DIFFICULT HAVE THESE PROBLEMS MADE IT FOR YOU TO DO YOUR WORK, TAKE CARE OF THINGS AT HOME, OR GET ALONG WITH OTHER PEOPLE: SOMEWHAT DIFFICULT
SUM OF ALL RESPONSES TO PHQ QUESTIONS 1-9: 13

## 2022-09-16 NOTE — PROGRESS NOTES
"Ventura is a 42 year old who is being evaluated via a billable video visit.      How would you like to obtain your AVS? MyChart  If the video visit is dropped, the invitation should be resent by: Text to cell phone: [phone]  Will anyone else be joining your video visit? No    Dionna Watt VF    Video-Visit Details    Video Start Time: 10:48 AM    Type of service:  Video Visit    Video End Time:11:15 AM    Originating Location (pt. Location): Home    Distant Location (provider location):  [facility] MENTAL [facility] & ADDICTION [facility]     Platform used for Video Visit: [facility]              Outpatient Psychiatric Progress Note    Name: Timmy Fitzpatrick   : 1980                    Primary Care Provider: [facility]   Therapist: Yes    PHQ-9 scores:  PHQ-9 SCORE 2022   PHQ-9 Total Score - - -   PHQ-9 Total Score [place] 8 (Mild depression) 10 (Moderate depression) 13 (Moderate depression)   PHQ-9 Total Score 8 10 13   Some encounter information is confidential and restricted. Go to Review Flowsheets activity to see all data.       JUAREZ-7 scores:  JUAREZ-7 SCORE 2021   Total Score - - -   Total Score - - 10 (moderate anxiety)   Total Score 13 13 10   Some encounter information is confidential and restricted. Go to Review Flowsheets activity to see all data.       Patient Identification:    Patient is a 42 year old year old,   Choose not to Answer Choose not to answer male  who presents for return visit with me.  Patient is currently unemployed. Patient attended the session alone. Patient prefers to be called: \" Ventura\".    Current medications include: buPROPion (WELLBUTRIN SR) 100 MG 12 hr tablet, Take 1 tablet (100 mg) by mouth 2 times daily  cyclobenzaprine (FLEXERIL) 5 MG tablet, Take 1-2 tablets (5-10 mg) by mouth 3 times daily as needed for muscle spasms  escitalopram (LEXAPRO) 10 MG tablet, Take 1 tablet (10 mg) by " mouth daily  eszopiclone (LUNESTA) 2 MG tablet, Take 1 tablet (2 mg) by mouth nightly as needed for sleep  gabapentin (NEURONTIN) 300 MG capsule, Take 1 capsule (300 mg) by mouth 6 times daily Refill 28 days after last fill  hydrOXYzine (VISTARIL) 50 MG capsule, Take 1 capsule (50 mg) by mouth nightly as needed for anxiety (insomnia)  medical cannabis (Patient's own supply), See Admin Instructions (The purpose of this order is to document that the patient reports taking medical cannabis.  This is not a prescription, and is not used to certify that the patient has a qualifying medical condition.)  methylPREDNISolone (MEDROL DOSEPAK) 4 MG tablet therapy pack, Follow Package Directions  Multiple Vitamins-Minerals (MENS MULTIPLE VITAMIN/LYCOPENE PO), Take 1 tablet by mouth daily.  sildenafil (REVATIO) 20 MG tablet, TAKE ONE TABLET BY MOUTH AS DIRECTED PRIOR TO INTERCOURSE AS NEEDED    No current facility-administered medications on file prior to visit.       The [place] Prescription Monitoring Program has been reviewed and there are no concerns about diversionary activity for controlled substances at this time.      I was able to review most recent Primary Care Provider, specialty provider, and therapy visit notes that I have access to.     Today, patient reports the he is going to start trauma therapy.  He reports thought distortions.  Unwanted images appear in his mind.    Denies violent images.  He tells me that he would never hurt others.  2 weeks ago he cut himself/  Wife is on the call list 24 hour shifts two days a week.  Kids back to school.  He reports no issues wiith his medications.  He asked about adderall.  He reports lack of focus and other people notice that he is disorganized/  His back pain is better. He is trying to do more things that he enjoys       has a past medical history of Anxiety, Cervicalgia, Chemical dependency (H), Depression, and Migraine headache.    He has no past medical history of  Complication of anesthesia, Diabetes (H), PONV (postoperative nausea and vomiting), Sleep apnea, or Uncomplicated asthma.    Social history updates:    Ventura lives with his wife and children.  He is unemployed.    Substance use updates:    No alcohol use reported  Tobacco use: No    Vital Signs:   There were no vitals taken for this visit.    Labs:    Most recent laboratory results reviewed and no new labs.     Mental Status Examination:  Appearance: awake, alert and casual dress  Attitude: cooperative  Eye Contact:  adequate  Gait and Station: No assistive Devices used and No dizziness or falls  Psychomotor Behavior:  intact station, gait and muscle tone  Oriented to:  time, person, and place  Attention Span and Concentration:  Normal  Speech:   clear, coherent and Speaks: English  Mood:  anxious and depressed  Affect:  mood congruent  Associations:  no loose associations  Thought Process:  goal oriented  Thought Content:  passive suicidal ideation present, no auditory hallucinations present and visual hallucinations present  Recent and Remote Memory:  intact Not formally assessed. No amnesia.  Fund of Knowledge: appropriate  Insight:  fair  Judgment:  intact  Impulse Control:  fair    Suicide Risk Assessment:  Today Timmy Fitzpatrick reports no thoughts to harm themself or others. In addition, there are notable risk factors for self-harm, including anxiety, psychosis, withdrawing and mood change. However, risk is mitigated by commitment to family, history of seeking help when needed, future oriented, denies suicidal intent or plan and denies homicidal ideation, intent, or plan. Therefore, based on all available evidence including the factors cited above, Timmy Fitzpatrick does not appear to be at imminent risk for self-harm, does not meet criteria for a 72-hr hold, and therefore remains appropriate for ongoing outpatient level of care.  A thorough assessment of risk factors related to suicide and self-harm have  been reviewed and are noted above. The patient convincingly denies suicidality on several occasions. Local community safety resources printed and reviewed for patient to use if needed. There was no deceit detected, and the patient presented in a manner that was believable.     DSM5 Diagnosis:  296.32 (F33.1) Major Depressive Disorder, Recurrent Episode, Moderate _ and With mixed features  300.02 (F41.1) Generalized Anxiety Disorder  780.52 (G47.00) Insomnia Disorder   With non-sleep disorder mental comorbidity  Recurrent      Medical comorbidities include:   Patient Active Problem List    Diagnosis Date Noted     Umbilical hernia without obstruction and without gangrene 03/01/2021     Priority: Medium     Added automatically from request for surgery [id number]       Degeneration of lumbar or lumbosacral intervertebral disc 03/13/2020     Priority: Medium     Formatting of this note might be different from the original.  Added automatically from request for surgery [id number]       Lumbar disc herniation with radiculopathy 03/13/2020     Priority: Medium     Formatting of this note might be different from the original.  Added automatically from request for surgery [id number]       Generalized anxiety disorder 03/26/2019     Priority: Medium     Bipolar II disorder (H) 11/28/2018     Priority: Medium     Anxiety 09/18/2017     Priority: Medium     Tear of medial meniscus of knee 09/08/2016     Priority: Medium     Chemical dependency (H) 11/25/2015     Priority: Medium     Alcoholic, sober since 2006 09/13/2012     Priority: Medium     Major depression in partial remission (H) 06/30/2011     Priority: Medium     Migraine headache 06/30/2011     Priority: Medium     (Problem list name updated by automated process. Provider to review and confirm.)       CARDIOVASCULAR SCREENING; LDL GOAL LESS THAN 160 10/31/2010     Priority: Medium     Cervicalgia 03/24/2010     Priority: Medium     Nonallopathic lesion of thoracic region  03/24/2010     Priority: Medium     Problem list name updated by automated process. Provider to review       Nonallopathic lesion of lumbar region 03/24/2010     Priority: Medium     Problem list name updated by automated process. Provider to review         Assessment:    Timmy Fitzpatrick reported in today experiencing some disturbing images and his brain of a violent nature.  He tells me he would not act on those but did inflicted cut wound to himself recently.  He did not require stitches.  Ventura is getting ready to start trauma therapy.  His wife is supportive.  He manages the household with her children.  Pain symptoms are manageable..  He asked about starting Adderall for attentional difficulties.  I explained to him the process of being evaluated by a psychologist to confirm a diagnosis such as that.  He did agree to an increase in his Wellbutrin dose to help manage attentional difficulties.  However, medications may be playing a role in why he is having difficulties focusing and concentrating.  He does not want any other changes in his medications today.      Medication side effects and alternatives were reviewed. Health promotion activities recommended and reviewed today. All questions addressed. Education and counseling completed regarding risks and benefits of medications and psychotherapy options.    Treatment Plan:        1.  Lexapro 10 mg daily    2.  Gabapentin 600 mg 3 times daily    3.  Hydroxyzine 50 mg at bedtime    4.  Lunesta 2 mg at bedtime as needed    5.  Wellbutrin SR increased to 150 mg twice daily        Continue all other medications as reviewed per electronic medical record today.     Safety plan reviewed. To the Emergency Department as needed or call after hours crisis line at [phone] or [phone]. [place] Crisis Text [place]. Text [place] to 247477 or Suicide LifeLine Chat: suicidepreventionlifeline.org/chat/    To schedule individual or family therapy, call [facility] at [phone]    Schedule an appointment with me in 6 weeks or sooner as needed. Call [facility] at [phone] to schedule.    Follow up with primary care provider as planned or for acute medical concerns.    Call the psychiatric nurse line with medication questions or concerns at [phone]    MyChart may be used to communicate with your provider, but this is not intended to be used for emergencies.    Crisis Resources:    National Suicide Prevention Lifeline: 880.326.5887 (TTY: 110.137.9929). Call anytime for help.  (www.suicidepreventionlifeline.org)  National [place] on Mental Illness (www.audra.org): 841.113.8989 or 050-421-7976.   Mental Health Association (www.mentalhealth.org): 433.257.5185 or 065-815-0981.  [place] Crisis [place]: Text [place] to 857547  Suicide LifeLine Chat: suicideMolecule Softwareline.org/chat    Administrative Billing:   Time spent with patient includes counseling and coordination of care regarding above diagnoses and treatment plan.    Patient Status:  Patient will continue to be seen for ongoing consultation and stabilization.    Signed:   EMILIA Wagner-BC   Psychiatry

## 2022-09-16 NOTE — PROGRESS NOTES
[facility]  September 16, 2022      Behavioral Health Clinician Progress Note    Patient Name: Timmy Fitzpatrick           Service Type:  Individual      Service Location:   MyChart / Email (patient reached)     Session Start Time: 1016am  Session End Time: 1040am      Session Length: 16 - 37      Attendees: Patient     Service Modality:  Video Visit:      Provider verified identity through the following two step process.  Patient provided:  Patient photo, Patient is known previously to provider and Patient was verified at admission/transfer    Telemedicine Visit: The patient's condition can be safely assessed and treated via synchronous audio and visual telemedicine encounter.      Reason for Telemedicine Visit: Services only offered telehealth    Originating Site (Patient Location): Patient's home    Distant Site (Provider Location): Provider Remote Setting- Home Office    Consent:  The patient/guardian has verbally consented to: the potential risks and benefits of telemedicine (video visit) versus in person care; bill my insurance or make self-payment for services provided; and responsibility for payment of non-covered services.     Patient would like the video invitation sent by:  My Chart    Mode of Communication:  Video Conference via [facility]    As the provider I attest to compliance with applicable laws and regulations related to telemedicine.    Visit Activities (Refresh list every visit): [facility] Only    Diagnostic Assessment Date: 6/15/2020 Мария Maxwell  Treatment Plan Review Date: N/A, will be developed next meeting- pt will be meeting with 2 therapists soon    See Flowsheets for today's PHQ-9 and JUAREZ-7 results  Previous PHQ-9:   PHQ-9 SCORE 2/17/2022 7/25/2022 9/16/2022   PHQ-9 Total Score - - -   PHQ-9 Total Score [place] 8 (Mild depression) 10 (Moderate depression) 13 (Moderate depression)   PHQ-9 Total Score 8 10 13   Some encounter information  "is confidential and restricted. Go to Review Flowsheets activity to see all data.     Previous JUAREZ-7:   JUAREZ-7 SCORE 4/13/2021 7/13/2021 2/17/2022   Total Score - - -   Total Score - - 10 (moderate anxiety)   Total Score 13 13 10   Some encounter information is confidential and restricted. Go to Review Flowsheets activity to see all data.       KLAUDIA LEVEL:  KLAUDIA Score (Last Two) 7/25/2018 10/28/2019   KLAUDIA Raw Score 27 36   Activation Score 47.4 75.5   KLAUDIA Level 2 4       DATA  Extended Session (60+ minutes): No  Interactive Complexity: No  Crisis: No  [facility] Patient: No    Treatment Objective(s) Addressed in This Session:  Target Behavior(s): trauma triggers, stressors, continue therapy and using coping skills    Anxiety: will experience a reduction in anxiety, will develop more effective coping skills to manage anxiety symptoms, will develop healthy cognitive patterns and beliefs and will increase ability to function adaptively  PTSD Symptom Management    Current Stressors / Issues:   update: Pt says that things are still going well and they are in the works to do research and will be starting a trauma therapy soon. Pt reports that their mood swings are being from a normal mood and then something triggers them and they will be irritable. Takes about 15 min to calm down.   Stressors: ended school last summer and started school again and finishing up week 2 for the kids   Side Effects: none  Mood: \"mostly improving and still have mood swings I wish I didn't\"  Appetite: poor, trying to eat more- trying to eat lunch   Sleep: improved, 75% of the time are able to stay asleep     Impulsive spending/spending sprees: none  Suicidality: had a few thoughts during stressful time and never made a plan, pt reports it was more thoughts related to wish I wasn't here- more passive, was a 2-3 rating on a 1-10 scale  Self-harm: Pt says that twice over the last few times, 1 incident a couple weeks ago- cut self to release energy, " superficial cut   Substance Use: no more nicotine gum or nicotine anything   Caffeine: coffee before 3pm, intake has decreased   Therapist: still meeting with therapist weekly and then with trauma therapist and alternate   Interventions: [facility] encourages pt to keep track and record thoughts/reoccuring thoguhts they are having that come up after meetings with trauma therapist and any dysregulation they are having that they are not able to cope with   Medication Questions/Requests: talk with Marjan about Adderall since their wife and therapist say they should and they forgot last meeting       Progress on Treatment Objective(s) / Homework:  Satisfactory progress - ACTION (Actively working towards change); Intervened by reinforcing change plan / affirming steps taken    Motivational Interviewing    MI Intervention: Co-Developed Goal: reduce anxiety, stress and improve PTSD symptoms, Expressed Empathy/Understanding, Supported Autonomy, Collaboration, Evocation, Permission to raise concern or advise, Open-ended questions, Reflections: simple and complex, Change talk (evoked) and Reframe     Change Talk Expressed by the Patient: Need to change Committment to change Taking steps    Provider Response to Change Talk: E - Evoked more info from patient about behavior change, A - Affirmed patient's thoughts, decisions, or attempts at behavior change, R - Reflected patient's change talk and S - Summarized patient's change talk statements    Also provided psychoeducation about behavioral health condition, symptoms, and treatment options    Care Plan review completed: No    Medication Review:  No changes to current psychiatric medication(s)    Medication Compliance:  Yes    Changes in Health Issues:   None reported    Chemical Use Review:   Substance Use: Chemical use reviewed, no active concerns identified      Tobacco Use: No current tobacco use.      Assessment: Current Emotional / Mental Status (status of significant  symptoms):  Risk status (Self / Other harm or suicidal ideation)  Patient has had a history of suicidal ideation: passive fleeting thoughts and self-injurious behavior: superficial cuts  Patient denies current fears or concerns for personal safety.  Patient reports the following current or recent suicidal ideation or behaviors: passive fleeting intrusive thoughts since last meeting a couple of time, did not act on them and it was during a stressful situation so it was more did not want to be here, no plan or intent.  Patient denies current or recent homicidal ideation or behaviors.  Patient reports current or recent self injurious behavior or ideation including did make a superficial cut to release emotions and energy.  Patient denies other safety concerns.  A safety and risk management plan has not been developed at this time, however patient was encouraged to call [place] [phone] should there be a change in any of these risk factors. Pt meets with therapist regularly and has many medical providers on their care team who continue to monitor as assess risk and safety at each meeting. Pt denies any recent thought or plan or intent.     Appearance:   Appropriate   Eye Contact:   Good   Psychomotor Behavior: Normal   Attitude:   Cooperative  Interested Friendly Pleasant  Orientation:   All  Speech   Rate / Production: Normal    Volume:  Normal   Mood:    Anxious   Affect:    Worrisome   Thought Content:  Clear   Thought Form:  Coherent  Logical   Insight:    Good     Diagnoses:  1. Recurrent major depressive disorder, in partial remission (H)    2. Generalized anxiety disorder        Collateral Reports Completed:  Communicated with:   Communicated with NEGRITA Wagner [facility]    Plan: (Homework, other):  Patient was given information about behavioral services and encouraged to schedule a follow up appointment with the clinic [facility] as needed.  He was also given information about mental health  symptoms and treatment options  and [facility] encourages pt to track reocurring thoughts and to inform therapists of these so they know if the should move foward in trauma therapy or spend more time processing a situation or though.  CD Recommendations: No indications of CD issues.     QUYEN Gibbs, [facility] 09/16/2022

## 2022-10-19 NOTE — PATIENT INSTRUCTIONS
1.  Lexapro 10 mg daily  2.  Gabapentin 600 mg 3 times daily  3.  Hydroxyzine 50 mg at bedtime  4.  Lunesta 2 mg at bedtime as needed  5.  Wellbutrin SR increased to 150 mg twice daily    Continue all other medications as reviewed per electronic medical record today.   Safety plan reviewed. To the Emergency Department as needed or call after hours crisis line at [phone] or [phone]. [place] Crisis Text [place]. Text [place] to 065407 or Suicide LifeLine Chat: suicidemakeena.org/chat/  To schedule individual or family therapy, call [facility] at [phone]  Schedule an appointment with me in 6 weeks or sooner as needed. Call [facility] at [phone] to schedule.  Follow up with primary care provider as planned or for acute medical concerns.  Call the psychiatric nurse line with medication questions or concerns at [phone]  MyChart may be used to communicate with your provider, but this is not intended to be used for emergencies.    Crisis Resources:    National Suicide Prevention Lifeline: 188.692.4866 (TTY: 943.840.4281). Call anytime for help.  (www.suicidepreventionlifeline.org)  National [place] on Mental Illness (www.audra.org): 788.358.9586 or 510-660-7304.   Mental Health Association (www.mentalhealth.org): 413.784.2439 or 004-850-5786.  [place] Crisis Text [place]: Text [place] to 718137  Suicide LifeLine Chat: suicideSIZESEEKERline.org/chat

## 2022-10-22 ENCOUNTER — HEALTH MAINTENANCE LETTER (OUTPATIENT)
Age: 42
End: 2022-10-22

## 2022-12-08 ENCOUNTER — VIRTUAL VISIT (OUTPATIENT)
Dept: PSYCHIATRY | Facility: CLINIC | Age: 42
End: 2022-12-08
Payer: COMMERCIAL

## 2022-12-08 DIAGNOSIS — F99 INSOMNIA DUE TO OTHER MENTAL DISORDER: ICD-10-CM

## 2022-12-08 DIAGNOSIS — F33.1 MODERATE EPISODE OF RECURRENT MAJOR DEPRESSIVE DISORDER (H): Primary | ICD-10-CM

## 2022-12-08 DIAGNOSIS — F51.05 INSOMNIA DUE TO OTHER MENTAL DISORDER: ICD-10-CM

## 2022-12-08 DIAGNOSIS — F41.1 GENERALIZED ANXIETY DISORDER: ICD-10-CM

## 2022-12-08 PROCEDURE — 99214 OFFICE O/P EST MOD 30 MIN: CPT | Mod: 95 | Performed by: NURSE PRACTITIONER

## 2022-12-08 RX ORDER — GABAPENTIN 300 MG/1
600 CAPSULE ORAL 3 TIMES DAILY
Qty: 180 CAPSULE | Refills: 3 | Status: SHIPPED | OUTPATIENT
Start: 2022-12-08 | End: 2023-03-02

## 2022-12-08 RX ORDER — BUPROPION HYDROCHLORIDE 150 MG/1
150 TABLET, EXTENDED RELEASE ORAL 2 TIMES DAILY
Qty: 60 TABLET | Refills: 3 | Status: SHIPPED | OUTPATIENT
Start: 2022-12-08 | End: 2023-03-02

## 2022-12-08 RX ORDER — HYDROXYZINE PAMOATE 50 MG/1
50 CAPSULE ORAL
Qty: 30 CAPSULE | Refills: 3 | Status: SHIPPED | OUTPATIENT
Start: 2022-12-08 | End: 2023-03-02

## 2022-12-08 NOTE — PROGRESS NOTES
"Ventura is a 42 year old who is being evaluated via a billable video visit.      How would you like to obtain your AVS? MyChart  If the video visit is dropped, the invitation should be resent by: Text to cell phone: [phone]  Will anyone else be joining your video visit? No        Video-Visit Details    Video Start Time: 9:17 AM    Type of service:  Video Visit    Video End Time:9:45 AM    Originating Location (pt. Location): Home        Distant Location (provider location):  Off-site    Platform used for Video Visit: [facility]              Outpatient Psychiatric Progress Note    Name: Timmy Fitzpatrick   : 1980                    Primary Care Provider: [facility]   Therapist: Yes    PHQ-9 scores:  PHQ-9 SCORE 2022   PHQ-9 Total Score - - -   PHQ-9 Total Score [facility] 8 (Mild depression) 10 (Moderate depression) 13 (Moderate depression)   PHQ-9 Total Score 8 10 13   Some encounter information is confidential and restricted. Go to Review Flowsheets activity to see all data.       JUAREZ-7 scores:  JUAREZ-7 SCORE 2021   Total Score - - -   Total Score - - 10 (moderate anxiety)   Total Score 13 13 10   Some encounter information is confidential and restricted. Go to Review Flowsheets activity to see all data.       Patient Identification:    Patient is a 42 year old year old,   Choose not to Answer Choose not to answer male  who presents for return visit with me.  Patient is currently unemployed. Patient attended the session alone. Patient prefers to be called: \" Ventura\".    Current medications include: buPROPion (WELLBUTRIN SR) 150 MG 12 hr tablet, Take 1 tablet (150 mg) by mouth 2 times daily  cyclobenzaprine (FLEXERIL) 5 MG tablet, Take 1-2 tablets (5-10 mg) by mouth 3 times daily as needed for muscle spasms  escitalopram (LEXAPRO) 10 MG tablet, Take 1 tablet (10 mg) by mouth daily  eszopiclone (LUNESTA) 2 MG tablet, Take 1 tablet (2 mg) " "by mouth nightly as needed for sleep  gabapentin (NEURONTIN) 300 MG capsule, Take 2 capsules (600 mg) by mouth 3 times daily Refill 28 days after last fill  hydrOXYzine (VISTARIL) 50 MG capsule, Take 1 capsule (50 mg) by mouth nightly as needed for anxiety (insomnia)  medical cannabis (Patient's own supply), See Admin Instructions (The purpose of this order is to document that the patient reports taking medical cannabis.  This is not a prescription, and is not used to certify that the patient has a qualifying medical condition.)  Multiple Vitamins-Minerals (MENS MULTIPLE VITAMIN/LYCOPENE PO), Take 1 tablet by mouth daily.  sildenafil (REVATIO) 20 MG tablet, TAKE ONE TABLET BY MOUTH AS DIRECTED PRIOR TO INTERCOURSE AS NEEDED  methylPREDNISolone (MEDROL DOSEPAK) 4 MG tablet therapy pack, Follow Package Directions (Patient not taking: Reported on 12/8/2022)    No current facility-administered medications on file prior to visit.       The [place] Prescription Monitoring Program has been reviewed and there are no concerns about diversionary activity for controlled substances at this time.      I was able to review most recent Primary Care Provider, specialty provider, and therapy visit notes that I have access to.     Today, patient reports that he had difficulties hearing me so we changed to a phone interview instead.  Ventura reports that \"things are the same\".  He takes his children to and from their charter school.  His wife continues to do 24-hour shifts as a nurse anesthetist for [employer].  For the past 2 months he reports feeling better.  He has come to the conclusion that there are situational things that come about to increase depression and anxiety within him.  Yesterday he told me he had a \"breakdown\" after thinking about his relationship with his mother and how she had treated his children.  This increases his depression and anxiety symptoms.  Ventura feels like his irritability is less.  He is going to start a " new type of therapy to manage his trauma history.  Every other week he also sees Liana, his regular therapist.  Ventura tells me he is working on having an increase in his self-worth, something he has been battling with since childhood.  There are some days he does not feel good about himself but has been trying to get out and walk more.  While he still has fleeting thoughts of not wanting to live he tells me he has no intent or plans developed to act on those thoughts.  Sleep has been better over the past year and he attributes this to starting the hydroxyzine.  2 days ago he stopped drinking alcohol as he noticed he was starting to increase his use.  He has not started ADHD testing at this point.  Recently he was snowboarding and had reinjured his back.     has a past medical history of Anxiety, Cervicalgia, Chemical dependency (H), Depression, and Migraine headache.    He has no past medical history of Complication of anesthesia, Diabetes (H), PONV (postoperative nausea and vomiting), Sleep apnea, or Uncomplicated asthma.    Social history updates:    Ventura lives with his wife and children.  He is unemployed.    Substance use updates:    Stopped use of alcohol 2 days ago.  Regular use of medical cannabis and a tincture form.  Tobacco use: No    Vital Signs:   There were no vitals taken for this visit.    Labs:    Most recent laboratory results reviewed and no new labs.     Mental Status Examination:  Appearance: awake, alert and casual dress  Attitude: cooperative  Eye Contact:  adequate  Gait and Station: No assistive Devices used and No dizziness or falls  Psychomotor Behavior:  Unable to assess  Oriented to:  time, person, and place  Attention Span and Concentration:  Normal  Speech:   clear, coherent and Speaks: English  Mood:  anxious, depressed and better  Affect:  mood congruent  Associations:  no loose associations  Thought Process:  goal oriented  Thought Content:  no evidence of suicidal ideation or  homicidal ideation, no auditory hallucinations present and no visual hallucinations present  Recent and Remote Memory:  intact Not formally assessed. No amnesia.  Fund of Knowledge: appropriate  Insight:  good  Judgment:  intact  Impulse Control:  intact    Suicide Risk Assessment:  Today Timmy Fitzpatrick reports no thoughts to harm themself or others. In addition, there are notable risk factors for self-harm, including anxiety, withdrawing and mood change. However, risk is mitigated by commitment to family, history of seeking help when needed, future oriented, denies suicidal intent or plan and denies homicidal ideation, intent, or plan. Therefore, based on all available evidence including the factors cited above, Timmy Fitzpatrick does not appear to be at imminent risk for self-harm, does not meet criteria for a 72-hr hold, and therefore remains appropriate for ongoing outpatient level of care.  A thorough assessment of risk factors related to suicide and self-harm have been reviewed and are noted above. The patient convincingly denies suicidality on several occasions. Local community safety resources printed and reviewed for patient to use if needed. There was no deceit detected, and the patient presented in a manner that was believable.     DSM5 Diagnosis:  296.32 (F33.1) Major Depressive Disorder, Recurrent Episode, Moderate _ and With melancholic features  300.02 (F41.1) Generalized Anxiety Disorder  780.52 (G47.00) Insomnia Disorder   With non-sleep disorder mental comorbidity  Episodic      Medical comorbidities include:   Patient Active Problem List    Diagnosis Date Noted     Umbilical hernia without obstruction and without gangrene 03/01/2021     Priority: Medium     Added automatically from request for surgery [id number]       Degeneration of lumbar or lumbosacral intervertebral disc 03/13/2020     Priority: Medium     Formatting of this note might be different from the original.  Added automatically  "from request for surgery [id number]       Lumbar disc herniation with radiculopathy 03/13/2020     Priority: Medium     Formatting of this note might be different from the original.  Added automatically from request for surgery [id number]       Generalized anxiety disorder 03/26/2019     Priority: Medium     Bipolar II disorder (H) 11/28/2018     Priority: Medium     Anxiety 09/18/2017     Priority: Medium     Tear of medial meniscus of knee 09/08/2016     Priority: Medium     Chemical dependency (H) 11/25/2015     Priority: Medium     Alcoholic, sober since 2006 09/13/2012     Priority: Medium     Major depression in partial remission (H) 06/30/2011     Priority: Medium     Migraine headache 06/30/2011     Priority: Medium     (Problem list name updated by automated process. Provider to review and confirm.)       CARDIOVASCULAR SCREENING; LDL GOAL LESS THAN 160 10/31/2010     Priority: Medium     Cervicalgia 03/24/2010     Priority: Medium     Nonallopathic lesion of thoracic region 03/24/2010     Priority: Medium     Problem list name updated by automated process. Provider to review       Nonallopathic lesion of lumbar region 03/24/2010     Priority: Medium     Problem list name updated by automated process. Provider to review         Assessment:    Timmy Fitzpatrick reported in today that he feels better able to manage his symptoms of depression and anxiety over the past 2 months.  He did have a \"breakdown\" yesterday when he thought about his relationship with his mother, but he is working with therapist to be able to manage his emotions regarding past trauma.  At this point he feels his medications are working well for him.  Since starting the hydroxyzine he reports sleeping better and the use of Lunesta is rare.  For his depression Lexapro and Wellbutrin will continue as prescribed.  While he does have fleeting thoughts of not wanting to live, he voiced no intent or plan specific to those thoughts to act on.  " "Gabapentin is continuing at 600 mg 3 times daily and no misuse is evident.  He has not scheduled himself for ADHD testing at this point but does have concerns regarding limited attention and focus to tasks at hand.  He feels a lot of his mental health symptoms are \"situational\"..    Medication side effects and alternatives were reviewed. Health promotion activities recommended and reviewed today. All questions addressed. Education and counseling completed regarding risks and benefits of medications and psychotherapy options.    Treatment Plan:        1.  Gabapentin 600 mg 3 times daily    2.  Wellbutrin  mg twice daily    3.  Lexapro 10 mg daily    4.  Hydroxyzine 50 mg at bedtime    5.  Lunesta 2 mg at bedtime as needed-no refills ordered today    6.  Continue talk therapy to process trauma history and learn ways to manage life stressors        Continue all other medications as reviewed per electronic medical record today.     Safety plan reviewed. To the Emergency Department as needed or call after hours crisis line at [phone] or [phone]. [place] Crisis Text [place]. Text [place] to 005781 or Suicide LifeLine Chat: suicidepreventionlifeline.org/chat/    To schedule individual or family therapy, call [facility] at [phone]    Schedule an appointment with me in 3 months or sooner as needed. Call [facility] at [phone] to schedule.    Follow up with primary care provider as planned or for acute medical concerns.    Call the psychiatric nurse line with medication questions or concerns at [phone]    MyChart may be used to communicate with your provider, but this is not intended to be used for emergencies.    Crisis Resources:    National Suicide Prevention Lifeline: 312.642.8439 (TTY: 332.126.8016). Call anytime for help.  (www.suicidepreventionlifeline.org)  National [place] on Mental Illness (www.audra.org): 760.411.8004 or 356-171-1174.   Mental Health " Association (www.mentalhealth.org): 881-947-0578 or 434-416-0161.  [place] Crisis Text [place]: Text [place] to 024513  Suicide LifeLine Chat: suicidepreventionInspace Technologiesline.org/chat    Administrative Billing:   Time spent with patient includes counseling and coordination of care regarding above diagnoses and treatment plan.    Patient Status:  Patient will continue to be seen for ongoing consultation and stabilization.    Signed:   EMILIA Wagner-BC   Psychiatry

## 2022-12-08 NOTE — PATIENT INSTRUCTIONS
1.  Gabapentin 600 mg 3 times daily  2.  Wellbutrin  mg twice daily  3.  Lexapro 10 mg daily  4.  Hydroxyzine 50 mg at bedtime  5.  Lunesta 2 mg at bedtime as needed-no refills ordered today  6.  Continue talk therapy to process trauma history and learn ways to manage life stressors    Continue all other medications as reviewed per electronic medical record today.   Safety plan reviewed. To the Emergency Department as needed or call after hours crisis line at [phone] or [phone]. [place] Crisis Text [place]. Text [place] to 477404 or Suicide LifeLine Chat: AGELON ?.org/chat/  To schedule individual or family therapy, call [facility] at [phone]  Schedule an appointment with me in 3 months or sooner as needed. Call [facility] at [phone] to schedule.  Follow up with primary care provider as planned or for acute medical concerns.  Call the psychiatric nurse line with medication questions or concerns at [phone]  MyChart may be used to communicate with your provider, but this is not intended to be used for emergencies.    Crisis Resources:    National Suicide Prevention Lifeline: 152.454.5315 (TTY: 474.466.5847). Call anytime for help.  (www.suicidepreventionlifeline.org)  National [place] on Mental Illness (www.audra.org): 210.427.6403 or 344-936-2178.   Mental Health Association (www.mentalhealth.org): 599.542.7777 or 122-792-8253.  [place] Crisis [place]: Text [place] to 451134  Suicide LifeLine Chat: suicideScintella Solutions.org/chat

## 2023-01-04 DIAGNOSIS — N52.9 ERECTILE DYSFUNCTION, UNSPECIFIED ERECTILE DYSFUNCTION TYPE: ICD-10-CM

## 2023-01-04 RX ORDER — SILDENAFIL CITRATE 20 MG/1
TABLET ORAL
Qty: 60 TABLET | Refills: 0 | Status: SHIPPED | OUTPATIENT
Start: 2023-01-04 | End: 2023-04-18

## 2023-01-09 DIAGNOSIS — F51.05 INSOMNIA DUE TO OTHER MENTAL DISORDER: ICD-10-CM

## 2023-01-09 DIAGNOSIS — F99 INSOMNIA DUE TO OTHER MENTAL DISORDER: ICD-10-CM

## 2023-01-09 NOTE — TELEPHONE ENCOUNTER
Routing to ordering provider for consideration, not on refill protocol.           Natividad Jenkins     RN MSN

## 2023-01-10 NOTE — TELEPHONE ENCOUNTER
Date of Last Office Visit: 12/8/2022  Date of Next Office Visit: None scheduled. Routing to [facility] team  No shows since last visit: 0  Cancellations since last visit: 0    Medication requested: eszopiclone (LUNESTA) 2 MG tablet Date last ordered: 9/16/2022 Qty: 30 Refills: 0     Review of [place] ?: Yes  Medication last filled date: 11/9/2022 Qty filled: 15  Other controlled substance on [place] ?: Yes, Gabapentin  If yes, is this a new medication?: No    Lapse in medication adherence greater than 5 days?: No.PRN  If yes, call patient and gather details: NA  Medication refill request verified as identical to current order?: Yes  Result of Last DAM, VPA, Li+ Level, CBC, or Carbamazepine Level (at or since last visit): N/A    Last visit treatment plan:   Treatment Plan:          1.  Gabapentin 600 mg 3 times daily    2.  Wellbutrin  mg twice daily    3.  Lexapro 10 mg daily    4.  Hydroxyzine 50 mg at bedtime    5.  Lunesta 2 mg at bedtime as needed-no refills ordered today    6.  Continue talk therapy to process trauma history and learn ways to manage life stressors         Continue all other medications as reviewed per electronic medical record today.     Safety plan reviewed. To the Emergency Department as needed or call after hours crisis line at [phone] or [phone]. [place] Crisis [place]. [place] to 045514 or Suicide LifeLine Chat: suicidepreventionlifeline.org/chat/    To schedule individual or family therapy, call [facility] at [phone]    Schedule an appointment with me in 3 months or sooner as needed.     []Medication refilled per  Medication Refill in Ambulatory Care  policy.  [x]Medication unable to be refilled by RN due to criteria not met as indicated below:    []Eligibility - not seen in the last year   []Supervision - no future appointment   []Compliance - no shows, cancellations or lapse in therapy   []Verification - order discrepancy   [x]Controlled  medication   []Medication not included in policy   []90-day supply request   []Other

## 2023-01-11 RX ORDER — ESZOPICLONE 2 MG/1
2 TABLET, FILM COATED ORAL
Qty: 30 TABLET | Refills: 0 | Status: SHIPPED | OUTPATIENT
Start: 2023-01-11 | End: 2023-03-02

## 2023-01-21 NOTE — PROGRESS NOTES
"    Outpatient Psychiatric Progress Note    Name: Timmy Fitzpatrick   : 1980                    Primary Care Provider: [facility]   Therapist: Yes    PHQ-9 scores:  PHQ-9 SCORE 2019 2019 10/28/2019   PHQ-9 Total Score - - -   PHQ-9 Total Score MyChart 17 (Moderately severe depression) - -   PHQ-9 Total Score 17 14 11   Some encounter information is confidential and restricted. Go to Review Flowsheets activity to see all data.       JUAREZ-7 scores:  JUAREZ-7 SCORE 2019 2019 10/28/2019   Total Score - - -   Total Score 14 (moderate anxiety) - -   Total Score 14 12 12   Some encounter information is confidential and restricted. Go to Review Flowsheets activity to see all data.       Patient Identification:    Patient is a 39 year old year old,   White American male  who presents for return visit with me.  Patient is currently employed full time. Patient attended the session alone. Patient prefers to be called: \"At\".    Interim History:    I last saw Timmy Fitzpatrick for outpatient psychiatry Return Visit on 2019.     During that appointment, we discussed changes in his mood including feeling more depressed upon returning from a trip to [place].  It was at that time we decided to start the Wellbutrin at an SR version in the morning only and continue his Lexapro.  He continues with DBT and individual therapies on a regular basis.  He takes the Lunesta at bedtime as needed for sleep.  He is preparing for life transition as his wife graduated from nurse anesthetist school and will be taking her boards soon.  He is planning on quitting his job at that time to be a stay-at-home parent.     Current medications include: buPROPion (WELLBUTRIN SR) 100 MG 12 hr tablet, Take one tab every other day for two weeks then stop  Multiple Vitamins-Minerals (MENS MULTIPLE VITAMIN/LYCOPENE PO), Take 1 tablet by mouth daily.  naproxen (NAPROSYN) 500 MG tablet, Take 1 tablet " Called and left message for patient to return to the ER. Called daughter's phone and received error messages. Called spouse's number which was same as patient's number.  (500 mg) by mouth 2 times daily (with meals)  sildenafil (REVATIO) 20 MG tablet, TAKE 5 TABLETS BY MOUTH DAILY AS NEEDED, 30 MINUTES TO 4 HOURS BEFORE SEX. (DO NOT USE WITH NITROGLYCERIN, TERAZOSIN OR DOXAZOSIN    No current facility-administered medications on file prior to visit.        The [place] Prescription Monitoring Program has been reviewed and there are no concerns about diversionary activity for controlled substances at this time.      I was able to review most recent Primary Care Provider, specialty provider, and therapy visit notes that I have access to.     Today, patient reports that he has been getting evening headaches 5 out of 7 days of the week.  He has been trying to drink more water.  His neck pain has been affecting his abilities to sleep through the night.  He also has ongoing back pain and has had to leave work early.  Ventura tells me he likes his job but he is going to be done in 2 weeks.  He recently came to peace with being a stay-at-home parent but wonders how the winter months will go for him since his depression can take on a seasonal component.  Over the next 6 months he plans to continue with DBT.  He will also see a therapist through the [facility] in the near future.  Overall he tells me that the past 2 weeks he has seen an improvement in his mood.  He denies any suicide thoughts.     has a past medical history of Anxiety, Cervicalgia, Chemical dependency (H), Depression, and Migraine headache.    Social history updates:    Ventura will be stopping his job in 2 weeks to stay at home to care for there are 2 small children ages 7 and 4 years.  His wife passed her boards and will not be working in the [place] area as a nurse anesthetist for the [employer].  He plans on doing a lot of yard work and house maintenance once he is home more.    Substance use updates:    Denies alcohol use  Tobacco use: No    Vital Signs:   /86 (BP Location: Right arm, Patient Position:  "Sitting, Cuff Size: Adult Regular)   Pulse 80   Temp 98.1  F (36.7  C) (Oral)   Resp 12   Ht 1.778 m (5' 10\")   Wt 80.3 kg (177 lb)   SpO2 99%   BMI 25.40 kg/m      Labs:  Most recent laboratory results reviewed and no new labs.     Review of Systems:  10 systems (general, cardiovascular, respiratory, eyes, ENT, endocrine, GI, , M/S, neurological) were reviewed. Most pertinent finding(s) is/are: No chest pain, daily headaches new onset, no rashes. The remaining systems are all unremarkable.    Mental Status Examination:  Appearance:  casually dressed  Attitude:  cooperative   Eye Contact:  good  Gait and Station: Normal  Psychomotor Behavior:  intact station, gait and muscle tone  Oriented to:  time, person, and place  Attention Span and Concentration:  Fair  Speech:   clear, coherent and Speaks: English  Mood:  anxious and sad   Affect:  restricted range  Associations:  no loose associations  Thought Process:  logical and goal oriented  Thought Content:  no evidence of suicidal ideation or homicidal ideation, no auditory hallucinations present and no visual hallucinations present  Recent and Remote Memory:  fair Not formally assessed. No amnesia.  Fund of Knowledge: appropriate  Insight:  fair  Judgment:  fair  Impulse Control:  fair    Suicide Risk Assessment:  Today Timmy Fitzpatrick reports no thoughts to end his life or to harm other people. In addition, there are notable risk factors for self-harm, including anxiety, substance abuse and mood change. However, risk is mitigated by commitment to family, history of seeking help when needed, future oriented, denies suicidal intent or plan and denies homicidal ideation, intent, or plan. Therefore, based on all available evidence including the factors cited above, Timmy Fitzpatrick does not appear to be at imminent risk for self-harm, does not meet criteria for a 72-hr hold, and therefore remains appropriate for ongoing outpatient level of care.  A " thorough assessment of risk factors related to suicide and self-harm have been reviewed and are noted above. The patient convincingly denies suicidality on several occasions. Local community safety resources printed and reviewed for patient to use if needed. There was no deceit detected, and the patient presented in a manner that was believable.     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) Insomnia Disorder   With non-sleep disorder mental comorbidity  Episodic      Medical comorbidities include:   Patient Active Problem List    Diagnosis Date Noted     Generalized anxiety disorder 03/26/2019     Priority: Medium     Bipolar II disorder (H) 11/28/2018     Priority: Medium     Anxiety 09/18/2017     Priority: Medium     Tear of medial meniscus of knee 09/08/2016     Priority: Medium     Chemical dependency (H) 11/25/2015     Priority: Medium     Alcoholic, sober since 2006 09/13/2012     Priority: Medium     Major depression in partial remission (H) 06/30/2011     Priority: Medium     Migraine headache 06/30/2011     Priority: Medium     (Problem list name updated by automated process. Provider to review and confirm.)       CARDIOVASCULAR SCREENING; LDL GOAL LESS THAN 160 10/31/2010     Priority: Medium     Cervicalgia 03/24/2010     Priority: Medium     Nonallopathic lesion of thoracic region 03/24/2010     Priority: Medium     Problem list name updated by automated process. Provider to review       Nonallopathic lesion of lumbar region 03/24/2010     Priority: Medium     Problem list name updated by automated process. Provider to review         Assessment:    Timmy Fitzpatrick has been experiencing headaches 5 out of 7 days of the week.  We opted to stop the Wellbutrin as he now is telling me his mood has stabilized now that he has come to terms with being a stay at home parent in 2 weeks.  His therapies will continue.  Apparently, in the past he had a prescription for quetiapine to  take at bedtime as needed for depression and sleep.  He has not had that recently filled so I did that today.  He will continue with the Lunesta as needed for sleep as well as his Lexapro for his depression and anxiety.  Ventura tells me that once she gets into an established routine he anticipates that his mood symptoms will further stabilize.  His wife passed her boards which is a relief for him with regards to their future.  Medication side effects and alternatives were reviewed. Health promotion activities recommended and reviewed today. All questions addressed. Education and counseling completed regarding risks and benefits of medications and psychotherapy options.    Treatment Plan:    1. Wellbutrin  mg every other day for two weeks then stop.  3.  Continue Lexapro 20 mg daily for depression and anxiety   4. Continue DBT and individual therapy  5.  Continue Lunesta 2 mg at bedtime as needed for sleep   6. Quetiapine 1/2 to 1 tab at  Bedtime as needed for depression and sleep        Continue all other medications as reviewed per electronic medical record today.     Safety plan reviewed. To the Emergency Department as needed or call after hours crisis line at [phone] or [phone]. [place] Crisis Text Line. Text [place] to 805912 or Suicide LifeLine Chat: suicidepreventionlifeline.org/chat/    To schedule individual or family therapy, call [facility] at [phone].     Schedule an appointment with me  as needed. Call [facility] at [phone] to schedule.    Follow up with primary care provider as planned or for acute medical concerns.    Call the psychiatric nurse line with medication questions or concerns at [phone].    appirishart may be used to communicate with your provider, but this is not intended to be used for emergencies.    Crisis Resources:    National Suicide Prevention Lifeline: 771.343.6161 (TTY: 134.477.3210). Call anytime for help.   (www.suicidepreventionlifeline.org)  National [place] on Mental Illness (www.audra.org): 968-761-7057 or 685-851-1729.   Mental Health Association (www.mentalhealth.org): 638.630.7591 or 600-970-1723.  [place] Crisis Text Line: Text [place] to 809787  Suicide LifeLine Chat: [employer]a Systems.org/chat    Administrative Billing:   Time spent with patient was 30 minutes and greater than 50% of time or 20 minutes was spent in counseling and coordination of care regarding above diagnoses and treatment plan.    Patient Status:  Patient will continue to be seen for ongoing consultation and stabilization.    Signed:   EMILIA Wagner-BC   Psychiatry

## 2023-03-02 ENCOUNTER — VIRTUAL VISIT (OUTPATIENT)
Dept: PSYCHIATRY | Facility: CLINIC | Age: 43
End: 2023-03-02
Payer: COMMERCIAL

## 2023-03-02 DIAGNOSIS — F99 INSOMNIA DUE TO OTHER MENTAL DISORDER: ICD-10-CM

## 2023-03-02 DIAGNOSIS — F41.1 GENERALIZED ANXIETY DISORDER: ICD-10-CM

## 2023-03-02 DIAGNOSIS — F51.05 INSOMNIA DUE TO OTHER MENTAL DISORDER: ICD-10-CM

## 2023-03-02 DIAGNOSIS — F33.1 MODERATE EPISODE OF RECURRENT MAJOR DEPRESSIVE DISORDER (H): Primary | ICD-10-CM

## 2023-03-02 PROCEDURE — 99214 OFFICE O/P EST MOD 30 MIN: CPT | Mod: VID | Performed by: NURSE PRACTITIONER

## 2023-03-02 RX ORDER — BUPROPION HYDROCHLORIDE 150 MG/1
150 TABLET, EXTENDED RELEASE ORAL 2 TIMES DAILY
Qty: 60 TABLET | Refills: 3 | Status: SHIPPED | OUTPATIENT
Start: 2023-03-02 | End: 2023-09-12

## 2023-03-02 RX ORDER — ESZOPICLONE 2 MG/1
2 TABLET, FILM COATED ORAL
Qty: 30 TABLET | Refills: 0 | Status: SHIPPED | OUTPATIENT
Start: 2023-03-02 | End: 2023-06-01

## 2023-03-02 RX ORDER — GABAPENTIN 300 MG/1
600 CAPSULE ORAL 3 TIMES DAILY
Qty: 180 CAPSULE | Refills: 3 | Status: SHIPPED | OUTPATIENT
Start: 2023-03-02 | End: 2023-07-25

## 2023-03-02 RX ORDER — HYDROXYZINE PAMOATE 50 MG/1
50 CAPSULE ORAL
Qty: 30 CAPSULE | Refills: 3 | Status: SHIPPED | OUTPATIENT
Start: 2023-03-02 | End: 2023-07-18

## 2023-03-02 RX ORDER — ESCITALOPRAM OXALATE 10 MG/1
10 TABLET ORAL DAILY
Qty: 90 TABLET | Refills: 1 | Status: SHIPPED | OUTPATIENT
Start: 2023-03-02 | End: 2023-09-12

## 2023-03-02 ASSESSMENT — ANXIETY QUESTIONNAIRES
6. BECOMING EASILY ANNOYED OR IRRITABLE: NEARLY EVERY DAY
GAD7 TOTAL SCORE: 19
1. FEELING NERVOUS, ANXIOUS, OR ON EDGE: NEARLY EVERY DAY
7. FEELING AFRAID AS IF SOMETHING AWFUL MIGHT HAPPEN: SEVERAL DAYS
7. FEELING AFRAID AS IF SOMETHING AWFUL MIGHT HAPPEN: SEVERAL DAYS
2. NOT BEING ABLE TO STOP OR CONTROL WORRYING: NEARLY EVERY DAY
3. WORRYING TOO MUCH ABOUT DIFFERENT THINGS: NEARLY EVERY DAY
5. BEING SO RESTLESS THAT IT IS HARD TO SIT STILL: NEARLY EVERY DAY
GAD7 TOTAL SCORE: 19
GAD7 TOTAL SCORE: 19
4. TROUBLE RELAXING: NEARLY EVERY DAY

## 2023-03-02 ASSESSMENT — PATIENT HEALTH QUESTIONNAIRE - PHQ9
SUM OF ALL RESPONSES TO PHQ QUESTIONS 1-9: 17
10. IF YOU CHECKED OFF ANY PROBLEMS, HOW DIFFICULT HAVE THESE PROBLEMS MADE IT FOR YOU TO DO YOUR WORK, TAKE CARE OF THINGS AT HOME, OR GET ALONG WITH OTHER PEOPLE: VERY DIFFICULT
SUM OF ALL RESPONSES TO PHQ QUESTIONS 1-9: 17

## 2023-03-02 NOTE — PATIENT INSTRUCTIONS
1.  Gabapentin 600 mg 3 times daily  2.  Wellbutrin  mg twice daily  3.  Lunesta 2 mg at bedtime as needed  4.  Hydroxyzine 50 mg at bedtime as needed  5.  Escitalopram 10 mg daily  6.  Consider adding atomoxetine for attentional difficulties  7.  Continue talk therapies to learn skills to manage life stress    Continue all other medications as reviewed per electronic medical record today.   Safety plan reviewed. To the Emergency Department as needed or call after hours crisis line at [phone] or [phone]. [place] Crisis Text Line. Text [place] to 179600 or Suicide LifeLine Chat: [employer].org/chat/  To schedule individual or family therapy, call [facility] at [phone]  Schedule an appointment with me in 3 months or sooner as needed. Call [facility] at [phone] to schedule.  Follow up with primary care provider as planned or for acute medical concerns.  Call the psychiatric nurse line with medication questions or concerns at [phone]  MyChart may be used to communicate with your provider, but this is not intended to be used for emergencies.    Crisis Resources:    National Suicide Prevention Lifeline: 549.474.6313 (TTY: 270.366.4973). Call anytime for help.  (www.suicidepreventionlifeline.org)  National [place] on Mental Illness (www.audra.org): 580.752.1040 or 584-706-8686.   Mental Health Association (www.mentalhealth.org): 105.672.1133 or 555-324-4060.  [place] Crisis Text Line: Text [place] to 508305  Suicide LifeLine Chat: suicide[employer]en.org/chat

## 2023-03-02 NOTE — PROGRESS NOTES
"Video-Visit Details    Type of service:  Video Visit    Video Start Time (time video started): 10:15 AM    Video End Time (time video stopped): 10:45 AM    Originating Location (pt. Location): Home        Distant Location (provider location):  Off-site    Mode of Communication:  Video Conference via [facility] Psychiatric Progress Note    Name: Timmy Fitzpatrick   : 1980                    Primary Care Provider: [facility] [place] [facility]   Therapist: Yes    PHQ-9 scores:  PHQ-9 SCORE 2022   PHQ-9 Total Score - - -   PHQ-9 Total Score MyChart 8 (Mild depression) 10 (Moderate depression) 13 (Moderate depression)   PHQ-9 Total Score 8 10 13   Some encounter information is confidential and restricted. Go to Review Flowsheets activity to see all data.       JUAREZ-7 scores:  JUAREZ-7 SCORE 2021   Total Score - - -   Total Score - - 10 (moderate anxiety)   Total Score 13 13 10   Some encounter information is confidential and restricted. Go to Review Flowsheets activity to see all data.       Patient Identification:    Patient is a 42 year old year old,   Choose not to Answer Choose not to answer male  who presents for return visit with me.  Patient is currently unemployed. Patient attended the session alone. Patient prefers to be called: \" Ventura\".    Current medications include: buPROPion (WELLBUTRIN SR) 150 MG 12 hr tablet, Take 1 tablet (150 mg) by mouth 2 times daily  cyclobenzaprine (FLEXERIL) 5 MG tablet, Take 1-2 tablets (5-10 mg) by mouth 3 times daily as needed for muscle spasms  escitalopram (LEXAPRO) 10 MG tablet, Take 1 tablet (10 mg) by mouth daily  eszopiclone (LUNESTA) 2 MG tablet, Take 1 tablet (2 mg) by mouth nightly as needed for sleep  gabapentin (NEURONTIN) 300 MG capsule, Take 2 capsules (600 mg) by mouth 3 times daily Refill 28 days after last fill  hydrOXYzine (VISTARIL) 50 MG capsule, Take 1 capsule (50 " mg) by mouth nightly as needed for anxiety (insomnia)  medical cannabis (Patient's own supply), See Admin Instructions (The purpose of this order is to document that the patient reports taking medical cannabis.  This is not a prescription, and is not used to certify that the patient has a qualifying medical condition.)  methylPREDNISolone (MEDROL DOSEPAK) 4 MG tablet therapy pack, Follow Package Directions (Patient not taking: Reported on 12/8/2022)  Multiple Vitamins-Minerals (MENS MULTIPLE VITAMIN/LYCOPENE PO), Take 1 tablet by mouth daily.  sildenafil (REVATIO) 20 MG tablet, TAKE ONE TABLET BY MOUTH AS DIRECTED PRIOR TO INTERCOURSE AS NEEDED    No current facility-administered medications on file prior to visit.       The [place] Prescription Monitoring Program has been reviewed and there are no concerns about diversionary activity for controlled substances at this time.      I was able to review most recent Primary Care Provider, specialty provider, and therapy visit notes that I have access to.     Today, patient reports he is worried about his daughter.  She feels like she does not belong here and she feels alone.  She is receiving therapy weekly.  He is upset about this.  He is communicating with her teacher.  No suicidal ideation because.  He has feelings like he is not human.  He is concerned that his daughter feels like an alien. This has triggered trauma within him.  He does not identify with people.  He is coming into a different awareness of himself.  He has a history of isolation.  He has been more depressed over the past two months.   Anxiety is heightened.  He is trying to dive into dealing with his trauma through therapy.   No sleep concerns since  Hydroxyzine.  He wants to taper off of the gabapentin in the future.  He went on a vacation in [place] but it was stressful.  He has trouble getting simple things done.   He feels overwhelmed and then shuts down.  Then he paces.    It is diffcult to  start a new routine.  He struggles with how he thinks society wants him to be as a man.       has a past medical history of Anxiety, Cervicalgia, Chemical dependency (H), Depression, and Migraine headache.    He has no past medical history of Complication of anesthesia, Diabetes (H), PONV (postoperative nausea and vomiting), Sleep apnea, or Uncomplicated asthma.    Social history updates:    Ventura lives with his wife and 2 children.  He is unemployed.    Substance use updates:    No alcohol use reported  Tobacco use: No    Vital Signs:   There were no vitals taken for this visit.    Labs:    Most recent laboratory results reviewed and no new labs.     Mental Status Examination:  Appearance: awake, alert, casual dress and moderate distress  Attitude: cooperative  Eye Contact:  adequate  Gait and Station: No assistive Devices used and No dizziness or falls  Psychomotor Behavior:  intact station, gait and muscle tone  Oriented to:  time, person, and place  Attention Span and Concentration:  Normal  Speech:   clear, coherent and Speaks: English  Mood:  anxious and depressed  Affect:  mood congruent  Associations:  no loose associations  Thought Process:  tangental  Thought Content:  no evidence of suicidal ideation or homicidal ideation, no auditory hallucinations present and no visual hallucinations present  Recent and Remote Memory:  intact Not formally assessed. No amnesia.  Fund of Knowledge: appropriate  Insight:  fair  Judgment:  intact  Impulse Control:  intact    Suicide Risk Assessment:  Today Timmy Fitzpatrick reports no thoughts to harm themself or others. In addition, there are notable risk factors for self-harm, including anxiety and mood change. However, risk is mitigated by commitment to family, history of seeking help when needed, future oriented, denies suicidal intent or plan and denies homicidal ideation, intent, or plan. Therefore, based on all available evidence including the factors cited above,  Timmy Fitzpatrick does not appear to be at imminent risk for self-harm, does not meet criteria for a 72-hr hold, and therefore remains appropriate for ongoing outpatient level of care.  A thorough assessment of risk factors related to suicide and self-harm have been reviewed and are noted above. The patient convincingly denies suicidality on several occasions. Local community safety resources printed and reviewed for patient to use if needed. There was no deceit detected, and the patient presented in a manner that was believable.     DSM5 Diagnosis:  296.32 (F33.1) Major Depressive Disorder, Recurrent Episode, Moderate _ and With mixed features  300.02 (F41.1) Generalized Anxiety Disorder  309.81 (F43.10) Posttraumatic Stress Disorder (includes Posttraumatic Stress Disorder for Children 6 Years and Younger)  With dissociative symptoms  780.52 (G47.00) Insomnia Disorder   With non-sleep disorder mental comorbidity  Episodic      Medical comorbidities include:   Patient Active Problem List    Diagnosis Date Noted     Umbilical hernia without obstruction and without gangrene 03/01/2021     Priority: Medium     Added automatically from request for surgery [id number]       Degeneration of lumbar or lumbosacral intervertebral disc 03/13/2020     Priority: Medium     Formatting of this note might be different from the original.  Added automatically from request for surgery [id number]       Lumbar disc herniation with radiculopathy 03/13/2020     Priority: Medium     Formatting of this note might be different from the original.  Added automatically from request for surgery [id number]       Generalized anxiety disorder 03/26/2019     Priority: Medium     Bipolar II disorder (H) 11/28/2018     Priority: Medium     Anxiety 09/18/2017     Priority: Medium     Tear of medial meniscus of knee 09/08/2016     Priority: Medium     Chemical dependency (H) 11/25/2015     Priority: Medium     Alcoholic, sober since 2006 09/13/2012     Priority:  "Medium     Major depression in partial remission (H) 06/30/2011     Priority: Medium     Migraine headache 06/30/2011     Priority: Medium     (Problem list name updated by automated process. Provider to review and confirm.)       CARDIOVASCULAR SCREENING; LDL GOAL LESS THAN 160 10/31/2010     Priority: Medium     Cervicalgia 03/24/2010     Priority: Medium     Nonallopathic lesion of thoracic region 03/24/2010     Priority: Medium     Problem list name updated by automated process. Provider to review       Nonallopathic lesion of lumbar region 03/24/2010     Priority: Medium     Problem list name updated by automated process. Provider to review         Assessment:    Timmy Fitzpatrick reports heightened depression and anxiety as he found that his daughter, who is 7 years of age, seems to be exhibiting mental health symptoms similar to his.  This has triggered trauma memories from childhood for him and he is currently in therapy to manage this.  He also is concerned about ADHD symptoms but is wondering if the testing will be complete enough to determine if he needs medication or not.  I asked him to consider adding atomoxetine in the future to help him with his attentional concerns.  Often he has difficulties getting motivated and starting projects and then gets overwhelmed and \"shuts down.  He eventually would like to decrease the dose of gabapentin he is taking but not while he is going through this and engaging in trauma therapy.  Wellbutrin SR will continue at 150 mg twice daily.  The Lexapro will also continue at 10 mg daily.  Since taking the hydroxyzine, he is sleeping better and has not needed to take the Lunesta as often.  At no time did he endorse any suicide thinking.  I reached out to the ADHD evaluator to inquire more information about how Ventura will be tested for him to determine if he wants to continue with this.  2 years ago he was tested for ADHD but was diagnosed with a mood disorder " instead..    Medication side effects and alternatives were reviewed. Health promotion activities recommended and reviewed today. All questions addressed. Education and counseling completed regarding risks and benefits of medications and psychotherapy options.    Treatment Plan:        1.  Gabapentin 600 mg 3 times daily    2.  Wellbutrin  mg twice daily    3.  Lunesta 2 mg at bedtime as needed    4.  Hydroxyzine 50 mg at bedtime as needed    5.  Escitalopram 10 mg daily    6.  Consider adding atomoxetine for attentional difficulties    7.  Continue talk therapies to learn skills to manage life stress        Continue all other medications as reviewed per electronic medical record today.     Safety plan reviewed. To the Emergency Department as needed or call after hours crisis line at [phone] or [phone]. [place] Crisis Text Line. Text [place] to 974647 or Suicide LifeLine Chat: FarmDrop.org/chat/    To schedule individual or family therapy, call [facility] at [phone]    Schedule an appointment with me in 3 months or sooner as needed. Call [facility] at [phone] to schedule.    Follow up with primary care provider as planned or for acute medical concerns.    Call the psychiatric nurse line with medication questions or concerns at [phone]    MyChart may be used to communicate with your provider, but this is not intended to be used for emergencies.    Crisis Resources:    National Suicide Prevention Lifeline: 376.507.4113 (TTY: 704.620.8034). Call anytime for help.  (www.suicidepreventionlifeline.org)  National [employer]ness (www.audra.org): 668.990.2798 or 948-590-0496.   Mental Health Association (www.mentalhealth.org): 599.272.3913 or 071-574-2341.  [place] Crisis Text Line: Text [place] to 743704  Suicide LifeLine Chat: suicideIntercasting.org/chat    Administrative Billing:   Time spent with patient includes counseling and  coordination of care regarding above diagnoses and treatment plan.    Patient Status:  Patient will continue to be seen for ongoing consultation and stabilization.    Signed:   EMILIA Wagner-BC   Psychiatry         Answers for HPI/ROS submitted by the patient on 3/2/2023  If you checked off any problems, how difficult have these problems made it for you to do your work, take care of things at home, or get along with other people?: Very difficult  PHQ9 TOTAL SCORE: 17  JUAREZ 7 TOTAL SCORE: 19

## 2023-03-30 ENCOUNTER — HOSPITAL ENCOUNTER (OUTPATIENT)
Dept: BEHAVIORAL HEALTH | Facility: CLINIC | Age: 43
Discharge: HOME | End: 2023-03-30
Attending: FAMILY MEDICINE | Admitting: FAMILY MEDICINE
Payer: COMMERCIAL

## 2023-03-30 VITALS — BODY MASS INDEX: 22.9 KG/M2 | HEIGHT: 70 IN | WEIGHT: 160 LBS

## 2023-03-30 PROCEDURE — H0001 ALCOHOL AND/OR DRUG ASSESS: HCPCS | Mod: GT,95

## 2023-03-30 ASSESSMENT — COLUMBIA-SUICIDE SEVERITY RATING SCALE - C-SSRS
TOTAL  NUMBER OF ABORTED OR SELF INTERRUPTED ATTEMPTS LIFETIME: NO
6. HAVE YOU EVER DONE ANYTHING, STARTED TO DO ANYTHING, OR PREPARED TO DO ANYTHING TO END YOUR LIFE?: NO
3. HAVE YOU BEEN THINKING ABOUT HOW YOU MIGHT KILL YOURSELF?: NO
1. IN THE PAST MONTH, HAVE YOU WISHED YOU WERE DEAD OR WISHED YOU COULD GO TO SLEEP AND NOT WAKE UP?: YES
4. HAVE YOU HAD THESE THOUGHTS AND HAD SOME INTENTION OF ACTING ON THEM?: NO
2. HAVE YOU ACTUALLY HAD ANY THOUGHTS OF KILLING YOURSELF?: YES
2. HAVE YOU ACTUALLY HAD ANY THOUGHTS OF KILLING YOURSELF?: YES
4. HAVE YOU HAD THESE THOUGHTS AND HAD SOME INTENTION OF ACTING ON THEM?: NO
2. HAVE YOU ACTUALLY HAD ANY THOUGHTS OF KILLING YOURSELF?: SEE ABOVE
ATTEMPT LIFETIME: NO
TOTAL  NUMBER OF INTERRUPTED ATTEMPTS LIFETIME: NO
5. HAVE YOU STARTED TO WORK OUT OR WORKED OUT THE DETAILS OF HOW TO KILL YOURSELF? DO YOU INTEND TO CARRY OUT THIS PLAN?: NO
REASONS FOR IDEATION PAST MONTH: COMPLETELY TO END OR STOP THE PAIN (YOU COULDN'T GO ON LIVING WITH THE PAIN OR HOW YOU WERE FEELING)
1. HAVE YOU WISHED YOU WERE DEAD OR WISHED YOU COULD GO TO SLEEP AND NOT WAKE UP?: YES
5. HAVE YOU STARTED TO WORK OUT OR WORKED OUT THE DETAILS OF HOW TO KILL YOURSELF? DO YOU INTEND TO CARRY OUT THIS PLAN?: NO
REASONS FOR IDEATION LIFETIME: COMPLETELY TO END OR STOP THE PAIN (YOU COULDN'T GO ON LIVING WITH THE PAIN OR HOW YOU WERE FEELING)
2. HAVE YOU ACTUALLY HAD ANY THOUGHTS OF KILLING YOURSELF?: SEE ABOVE

## 2023-03-30 ASSESSMENT — PATIENT HEALTH QUESTIONNAIRE - PHQ9
SUM OF ALL RESPONSES TO PHQ QUESTIONS 1-9: 12
10. IF YOU CHECKED OFF ANY PROBLEMS, HOW DIFFICULT HAVE THESE PROBLEMS MADE IT FOR YOU TO DO YOUR WORK, TAKE CARE OF THINGS AT HOME, OR GET ALONG WITH OTHER PEOPLE: SOMEWHAT DIFFICULT
SUM OF ALL RESPONSES TO PHQ QUESTIONS 1-9: 12

## 2023-03-30 ASSESSMENT — ANXIETY QUESTIONNAIRES
3. WORRYING TOO MUCH ABOUT DIFFERENT THINGS: NEARLY EVERY DAY
GAD7 TOTAL SCORE: 19
1. FEELING NERVOUS, ANXIOUS, OR ON EDGE: MORE THAN HALF THE DAYS
5. BEING SO RESTLESS THAT IT IS HARD TO SIT STILL: NEARLY EVERY DAY
GAD7 TOTAL SCORE: 19
IF YOU CHECKED OFF ANY PROBLEMS ON THIS QUESTIONNAIRE, HOW DIFFICULT HAVE THESE PROBLEMS MADE IT FOR YOU TO DO YOUR WORK, TAKE CARE OF THINGS AT HOME, OR GET ALONG WITH OTHER PEOPLE: SOMEWHAT DIFFICULT
4. TROUBLE RELAXING: NEARLY EVERY DAY
7. FEELING AFRAID AS IF SOMETHING AWFUL MIGHT HAPPEN: MORE THAN HALF THE DAYS
8. IF YOU CHECKED OFF ANY PROBLEMS, HOW DIFFICULT HAVE THESE MADE IT FOR YOU TO DO YOUR WORK, TAKE CARE OF THINGS AT HOME, OR GET ALONG WITH OTHER PEOPLE?: SOMEWHAT DIFFICULT
6. BECOMING EASILY ANNOYED OR IRRITABLE: NEARLY EVERY DAY
7. FEELING AFRAID AS IF SOMETHING AWFUL MIGHT HAPPEN: MORE THAN HALF THE DAYS
GAD7 TOTAL SCORE: 19
2. NOT BEING ABLE TO STOP OR CONTROL WORRYING: NEARLY EVERY DAY

## 2023-03-30 ASSESSMENT — PAIN SCALES - GENERAL: PAINLEVEL: MILD PAIN (2)

## 2023-03-30 NOTE — PROGRESS NOTES
[facility]  Provider Name:  HARIS Campbell/[facility]     Telephone: [phone]      PATIENT'S NAME: Timmy Fitzpatrick  PREFERRED NAME: [place]  PRONOUNS: he/him/his    MRN: [id number]  : 1980  ADDRESS:   [address] [address]  E-MAIL: [email or web address]   ACCT. NUMBER:  [id number]  DATE OF SERVICE: 2023  START TIME: 10:30 am  END TIME: 11:30 am  PREFERRED PHONE: [phone]   May we leave a program related message: Yes  SERVICE MODALITY:  Video Visit:        Provider verified identity through the following two step process.  Patient provided:  Patient  (1980) and Patient's last 4 digits of N ([phone])    Telemedicine Visit: The patient's condition can be safely assessed and treated via synchronous audio and visual telemedicine encounter.      Reason for Telemedicine Visit: Services only offered telehealth    Originating Site (Patient Location): Patient's home    Distant Site (Provider Location): Provider Remote Setting    Consent:  The patient/guardian has verbally consented to: the potential risks and benefits of telemedicine (video visit) versus in person care; bill my insurance or make self-payment for services provided; and responsibility for payment of non-covered services.     Patient would like the video invitation sent by:  My Chart    Mode of Communication:  Video Conference via [employer]    EMERGENCY CONTACT:   Ruby Fitzpatrick (wife)  Tel #: [phone]    UNIVERSAL ADULT SUBSTANCE USE DISORDER DIAGNOSTIC ASSESSMENT    Identifying Information:  The patient is a 42 year old, /White male of  descent.  The patient was referred for an assessment by self.  The patient attended the session alone.     Chief Complaint:   The reason for seeking services at this time is:  The patient reported the reason for participating in the substance use disorder assessment today on 3/30/2023 was due to the  patient's own awareness he needed help and due to pressure from his wife for him to get help.  The patient reported he had been wanting and attempting to quit his use of alcohol over the past 4-5 months, but after having a few days without drinking alcohol, he returns to drinking beer.  The patient reported his use of alcohol had been contributing to having relationship conflict with his wife and he also acknowledged having concerns about his use of alcohol escalating to his heavier use of alcohol during his early 20's.  The patient was requesting a referral to enter a virtual or hybrid Intensive Outpatient program at one of the [facility] for substance use disorder treatment.  The patient first had a concern about having substance abuse issues during his mid-20's.  The patient reported he had attempted to stop his use of alcohol by attending substance use disorder treatment programs in the past.  The patient reported participating in 14 prior substance use disorder treatment programs, with the last substance use disorder treatment program being residential treatment at [facility] in [place], [place] in 4/2016.  The patient reported completing that treatment program and he reported having around 6-months of sobriety following that treatment.  The patient reported having 1 inpatient detoxification admission in 1999.  The patient is not currently receiving any substance use disorder treatment services.  The patient denied having any recent attendance at 12-step or other recovery support group meetings.  The patient does not appear to be in severe withdrawal, an imminent safety risk to self or others, or requiring immediate medical attention and may proceed with the assessment interview.    Social/Family History:  The patient reported growing up in [place], [place].  The patient reported being raised by both of his biological parents in the same family home.  The patient denied experiencing or witnessing any  verbal, physical or sexual abuse when he was growing up in the family home.  The patient reported overall his childhood was a combination of happy and challenging.  The patient reported he had some issues with feeling out of place and not belonging when he was a child.  The patient reported feeling supported by none of his family members when he was growing up.  The patient reported being raised in the [place] [employer]  The patient described his current relationships with his family of origin as being nonexistent .      The patient describes his cultural background as being a /White male of  descent.  Cultural influences and impact on patient's life structure, values, norms, and healthcare: The patient denied cultural concerns had an impact on life structure, values, norms, or healthcare.  Contextual influences on patient's health include: Family Factors: family history includes Substance Abuse in his paternal aunt, paternal grandmother, and paternal uncle.  The patient identified his preferred language to be English.  The patient reported he does not need the assistance of an  or other support involved in therapy.  The patient reported he is not currently involved in community aliyah activities.  The patient felt his spirituality would likely have some impact in his recovery.    The patient reported experiencing significant delays in developmental tasks, such as having undiagnosed symptoms of ADHD.  The patient's highest education level was some college.  The patient identified the following learning problems: attention and concentration.  The patient reports he is able to understand written materials.    The patient reported the following relationship history: The patient reported being  1 time and he is still  to his wife.  The patient identified as being heterosexual and he reported being  to his wife for the past 15 years.  The patient reported his wife doesn't  have any substance abuse issues and she is very supportive of the patient abstaining from alcohol and from all other non-prescribed mood altering chemicals.  The patient reported having 2 children, 1 son age 10 and 1 daughter age 7.     The patient's current living/housing situation involves staying in own home/apartment.  The patient reported living with his wife and their 2 children and he reported his housing is stable.  The patient denied having any concerns regarding his immediate living environment and/or neighborhood, but he had been unable to maintain abstinence from alcohol while living there.  The patient identified his wife and his therapist as being his primary support network at this time.  The patient identified the quality of his relationships with his support network as being good overall, but somewhat strained due to the patient's substance abuse.  The patient would like the following people involved in treatment services if recommended: None at this time.     The patient reported engaging in the following recreational/leisure activities: doing projects around the home, snowboarding, being outdoors in general and spending time with his children.  The patient reported engaging in the following recreation/leisure activities while using alcohol or other non-prescribed mood altering chemicals: The patient's use of alcohol had been done independently of his social/recreational/leisure activities.  The patient reported the following people are supportive of his recovery: his wife and his therapist.  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  The patient reported his income is obtained through spouse.  The patient reported having financial stressors at this time, including money being tight at this time.  Cultural and socioeconomic factors do affect the patient's access to services.    The patient reported the following substance related arrests or legal issues: The patient reported  having a remote history of 2 DWI's, DOMINIQUE revocation charges and other moving violation charges, but he has not had any legal charges in the past 15-years.  The patient denied having any history of being on court probation.    Patient's Strengths and Limitations:  The patient identified the following strengths or resources that will help him succeed in treatment: commitment to health and well being, family support and motivation.  Things that may interfere with the patient's success in treatment include: lack of a sober peer support network, financial stressors and mental health concerns.     Assessments:  The following assessments were completed by patient for this visit:  PHQ9:   PHQ-9 SCORE 7/13/2021 2/17/2022 2/17/2022 7/25/2022 9/16/2022 3/2/2023 3/30/2023   PHQ-9 Total Score - - - - - - -   PHQ-9 Total Score MyChart - - 8 (Mild depression) 10 (Moderate depression) 13 (Moderate depression) 17 (Moderately severe depression) 12 (Moderate depression)   PHQ-9 Total Score 15 8 8 10 13 17 12     GAD7:   JUAREZ-7 SCORE 9/15/2020 12/8/2020 4/13/2021 7/13/2021 2/17/2022 3/2/2023 3/30/2023   Total Score - - - - - - -   Total Score - - - - 10 (moderate anxiety) 19 (severe anxiety) 19 (severe anxiety)   Total Score 14 14 13 13 10 19 19     PROMIS 10-Global Health (all questions and answers displayed):   PROMIS 10 2/18/2022 9/16/2022 3/2/2023 3/30/2023   In general, would you say your health is: Very good Good Good Good   In general, would you say your quality of life is: Very good Good Fair Good   In general, how would you rate your physical health? Good Good Good Good   In general, how would you rate your mental health, including your mood and your ability to think? Good Good Poor Fair   In general, how would you rate your satisfaction with your social activities and relationships? Fair Fair Poor Fair   In general, please rate how well you carry out your usual social activities and roles Fair Fair Poor Good   To what extent  are you able to carry out your everyday physical activities such as walking, climbing stairs, carrying groceries, or moving a chair? Mostly Completely Completely Completely   How often have you been bothered by emotional problems such as feeling anxious, depressed or irritable? Sometimes Often Often Often   How would you rate your fatigue on average? Mild Mild Mild Mild   How would you rate your pain on average?   0 = No Pain  to  10 = Worst Imaginable Pain 4 4 4 3   In general, would you say your health is: 4 3 3 3   In general, would you say your quality of life is: 4 3 2 3   In general, how would you rate your physical health? 3 3 3 3   In general, how would you rate your mental health, including your mood and your ability to think? 3 3 1 2   In general, how would you rate your satisfaction with your social activities and relationships? 2 2 1 2   In general, please rate how well you carry out your usual social activities and roles. (This includes activities at home, at work and in your community, and responsibilities as a parent, child, spouse, employee, friend, etc.) 2 2 1 3   To what extent are you able to carry out your everyday physical activities such as walking, climbing stairs, carrying groceries, or moving a chair? 4 5 5 5   In the past 7 days, how often have you been bothered by emotional problems such as feeling anxious, depressed, or irritable? 3 4 4 4   In the past 7 days, how would you rate your fatigue on average? 2 2 2 2   In the past 7 days, how would you rate your pain on average, where 0 means no pain, and 10 means worst imaginable pain? 4 4 4 3   Global Mental Health Score 12 10 6 9   Global Physical Health Score 14 15 15 16   PROMIS TOTAL - SUBSCORES 26 25 21 25   Some recent data might be hidden     [place] Suicide Severity Rating Scale (Lifetime/Recent)  [place] Suicide Severity Rating (Lifetime/Recent) 2/18/2019 6/15/2020 3/30/2023   Wish to be Dead (Lifetime) - Yes -   Comments - 3  aborted attempts -   Non-Specific Active Suicidal Thoughts (Lifetime) - Yes -   Non-Specific Active Suicidal Thought Description (Lifetime) - y -   Q1 Wished to be Dead (Past Month) yes - -   Q2 Suicidal Thoughts (Past Month) yes - -   Q3 Suicidal Thought Method yes - -   Q4 Suicidal Intent without Specific Plan no - -   Q5 Suicide Intent with Specific Plan no - -   Q6 Suicide Behavior (Lifetime) no - -   RETIRED: 1. Wish to be Dead (Recent) - Yes -   RETIRED: Wish to be Dead Description (Recent) - passive, fleeting -   RETIRED: 2. Non-Specific Active Suicidal Thoughts (Recent) - Yes -   Non-Specific Active Suicidal Thought Description (Recent) - passive, fleeting -   3. Active Suicidal Ideation with any Methods (Not Plan) Without Intent to Act (Lifetime) - Yes -   RETIRED: 3. Active Suicidal Ideation with any Methods (Not Plan) Without Intent to Act (Recent) - Yes -   RETIRE: 4. Active Suicidal Ideation with Some Intent to Act, Without Specific Plan (Lifetime) - Yes -   4. Active Suicidal Ideation with Some Intent to Act, Without Specific Plan (Recent) - No -   RETIRE: 5. Active Suicidal Ideation with Specific Plan and Intent (Lifetime) - Yes -   RETIRED: 5. Active Suicidal Ideation with Specific Plan and Intent (Recent) - No -   Actual Attempt (Lifetime) - Yes -   Actual Attempt Description (Lifetime) - 3 aborted, 2 with gun in mouth -   Total Number of Actual Attempts (Lifetime) - 3 -   Actual Attempt (Past 3 Months) - No -   Has subject engaged in non-suicidal self-injurious behavior? (Lifetime) - Yes -   Has subject engaged in non-suicidal self-injurious behavior? (Past 3 Months) - No -   Interrupted Attempts (Lifetime) - No -   Interrupted Attempts (Past 3 Months) - No -   Aborted or Self-Interrupted Attempt (Lifetime) - Yes -   Aborted or Self Interrupted Attempt Description (Lifetime) - gun in mouth 2s, stopped self -   Total Number Aborted or Self Interrupted Attempts (Lifetime) - 3 -   Aborted or  Self-Interrupted Attempt (Past 3 Months) - No -   Preparatory Acts or Behavior (Lifetime) - Yes -   Preparatory Acts or Behavior (Past 3 Months) - No -   Most Recent Attempt Date - (No Data) -   Comments - January 2019 -   1. Wish to be Dead (Lifetime) - - 1   Wish to be Dead Description (Lifetime) - - The patient reported having a history of suicide ideation off and on for many years.  The patient reported he had never had a suicide attempt.   1. Wish to be Dead (Past 1 Month) - - 1   Wish to be Dead Description (Past 1 Month) - - The patient reported having passive suicide ideation last week, but he denied having an plan or intent to harm himself.   2. Non-Specific Active Suicidal Thoughts (Lifetime) - - 1   Non-Specific Active Suicidal Thought Description (Lifetime) - - See above   2. Non-Specific Active Suicidal Thoughts (Past 1 Month) - - 1   Non-Specific Active Suicidal Thought Description (Past 1 Month) - - See above   3. Active Suicidal Ideation with any Methods (Not Plan) Without Intent to Act (Lifetime) - - 0   Active Suicidal Ideation with any Methods (Not Plan) Description (Lifetime) - - NA   3. Active Suicidal Ideation with any Methods (Not Plan) Without Intent to Act (Past 1 Month) - - 0   4. Active Suicidal Ideation with Some Intent to Act, Without Specific Plan (Lifetime) - - 0   Active Suicidal Ideation with Some Intent to Act, Without Specific Plan Description (Lifetime) - - NA   4. Active Suicidal Ideation with Some Intent to Act, Without Specific Plan (Past 1 Month) - - 0   5. Active Suicidal Ideation with Specific Plan and Intent (Lifetime) - - 0   Active Suicidal Ideation with Specific Plan and Intent Description (Lifetime) - - NA   5. Active Suicidal Ideation with Specific Plan and Intent (Past 1 Month) - - 0   Most Severe Ideation Rating (Lifetime) - - 4   Description of Most Severe Ideation (Lifetime) - - Years ago   Most Severe Ideation Rating (Past 1 Month) - - 2   Description of Most  Severe Ideation (Past 1 Month) - - See above   Frequency (Lifetime) - - 3   Frequency (Past 1 Month) - - 3   Duration (Lifetime) - - 1   Duration (Past 1 Month) - - 1   Controllability (Lifetime) - - 2   Controllability (Past 1 Month) - - 2   Deterrents (Lifetime) - - 1   Deterrents (Past 1 Month) - - 1   Reasons for Ideation (Lifetime) - - 5   Reasons for Ideation (Past 1 Month) - - 5   Actual Attempt (Lifetime) - - 0   Has subject engaged in non-suicidal self-injurious behavior? (Lifetime) - - 1   Has subject engaged in non-suicidal self-injurious behavior? (Past 3 Months) - - 1   Interrupted Attempts (Lifetime) - - 0   Aborted or Self-Interrupted Attempt (Lifetime) - - 0   Preparatory Acts or Behavior (Lifetime) - - 0   Calculated C-SSRS Risk Score (Lifetime/Recent) - - Low Risk     GAIN-sliding scale:  When was the last time that you had significant problems... 3/30/2023   with feeling very trapped, lonely, sad, blue, depressed or hopeless about the future? Past month   with sleep trouble, such as bad dreams, sleeping restlessly, or falling asleep during the day? Past Month   with feeling very anxious, nervous, tense, scared, panicked or like something bad was going to happen? Past month   with becoming very distressed & upset when something reminded you of the past? Past month   with thinking about ending your life or committing suicide? Past month      When was the last time that you did the following things 2 or more times? 3/30/2023   Lied or conned to get things you wanted or to avoid having to do something? 2 to 12 months ago   Had a hard time paying attention at school, work or home? Past month   Had a hard time listening to instructions at school, work or home? Past month   Were a bully or threatened other people? 1+ years ago   Started physical fights with other people? Never     Personal and Family Medical History:  The patient did report a family history of mental health concerns.  The patient  reported family history includes Anxiety Disorder in his mother; Cancer in his paternal grandfather; Dementia in his maternal grandfather; Depression in his father and mother; Substance Abuse in his paternal aunt, paternal grandmother, and paternal uncle.    The patient reported the following previous mental health diagnoses: The patient reported a history of MDD, JUAREZ, BPAD, PTSD, Borderline personality disorder and symptoms of ADHD.  The patient reported his primary mental health symptoms include: depression, anxiety, sleep problems, symptoms related to past traumatic life events and attentional problems and these do impact his ability to function.  The patient has received mental health services in the past: The patient reported taking his prescribed psychotropic medications as prescribed.  The patient reported he had been working with his current 1:1 mental health therapist since 1/2023.  Psychiatric Hospitalizations: 1 [facility] admission 2018.  The patient denies a history of civil commitment.  Current mental health services/providers include:  The patient reported taking his prescribed psychotropic medications as prescribed.  The patient reported he had been working with his current 1:1 mental health therapist since 1/2023.    The patient has had a physical exam to rule out medical causes for current symptoms.  Date of last physical exam was within the past year. The patient was encouraged to follow up with PCP if symptoms were to develop.  The patient has a [place] Primary Care Provider, who is [facility].  The patient reported the following medical concerns:   Past Medical History:   Diagnosis Date     Anxiety      Bipolar affective disorder (H)      Borderline personality disorder (H)      Cervicalgia      Chemical dependency (H)      Chronic back pain      Depression      JUAREZ (generalized anxiety disorder)      Insomnia      MDD (major depressive disorder)      Migraine headache       PTSD (post-traumatic stress disorder)    The patient reported taking his medications as prescribed and following the recommendations of his healthcare providers.  The patient reported pain concerns including having some back and shoulder pain.  The patient did not feel there was any need for additional help addressing this pain concerns.  The patient is male and is not pregnant.  There are significant appetite / nutritional concerns / weight changes.  The patient reported having some concerns regarding having a poor appetite and mainly eating just 1-meal per day.  The patient does not report having a history of an eating disorder.  The patient does report a history of head injury / trauma / cognitive impairment.  The patient reported having a few head injuries from snowboarding, but he never had a diagnosed concussion.      The patient reported current medications as:   Outpatient Medications Marked as Taking for the 3/30/23 encounter (Hospital Encounter) with Tomi Dang LADC   Medication Sig     buPROPion (WELLBUTRIN SR) 150 MG 12 hr tablet Take 1 tablet (150 mg) by mouth 2 times daily     cyclobenzaprine (FLEXERIL) 5 MG tablet Take 1-2 tablets (5-10 mg) by mouth 3 times daily as needed for muscle spasms     escitalopram (LEXAPRO) 10 MG tablet Take 1 tablet (10 mg) by mouth daily     eszopiclone (LUNESTA) 2 MG tablet Take 1 tablet (2 mg) by mouth nightly as needed for sleep     gabapentin (NEURONTIN) 300 MG capsule Take 2 capsules (600 mg) by mouth 3 times daily Refill 28 days after last fill     hydrOXYzine (VISTARIL) 50 MG capsule Take 1 capsule (50 mg) by mouth nightly as needed for anxiety (insomnia)     medical cannabis (Patient's own supply) See Admin Instructions (The purpose of this order is to document that the patient reports taking medical cannabis.  This is not a prescription, and is not used to certify that the patient has a qualifying medical condition.)     Multiple Vitamins-Minerals  (MENS MULTIPLE VITAMIN/LYCOPENE PO) Take 1 tablet by mouth daily.     sildenafil (REVATIO) 20 MG tablet TAKE ONE TABLET BY MOUTH AS DIRECTED PRIOR TO INTERCOURSE AS NEEDED     Medication Adherence:  The patient reported taking his prescribed medications as prescribed.  The patient reported being able  to self-administer his medications.    Patient Allergies:    Allergies   Allergen Reactions     Cats      Seasonal Allergies      Medical History:    Past Medical History:   Diagnosis Date     Anxiety      Bipolar affective disorder (H)      Borderline personality disorder (H)      Cervicalgia      Chemical dependency (H)      Chronic back pain      Depression      JUAREZ (generalized anxiety disorder)      Insomnia      MDD (major depressive disorder)      Migraine headache      PTSD (post-traumatic stress disorder)      Substance Use:  The patient reported the following biological family members or relatives with chemical health issues: family history includes Substance Abuse in his paternal aunt, paternal grandmother, and paternal uncle.  The patient reported participating in 14 prior substance use disorder treatment programs, with the last substance use disorder treatment program being residential treatment at [facility] in [place], [place] in 4/2016.  The patient reported completing that treatment program and he reported having around 6-months of sobriety following that treatment.  The patient reported having 1 inpatient detoxification admission in 1999.  The patient is not currently receiving any substance use disorder treatment services.  The patient denied having any recent attendance at 12-step or other recovery support group meetings.        Substance Age of first use Pattern and duration of use (include amounts and frequency) Date of last use     Withdrawal potential Route of use   Has used Alcohol 16 The patient reported his heaviest use of alcohol had been during his early 20's, when he reported a pattern of  drinking between a 1/2 liter to 1 liter of hard alcohol on a daily basis.    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     During the past 12-months, he reported a pattern of drinking between 2-4 pints of stronger IPA beer 4-5 times per week.     The patient denied having any memory impairment and/or blackouts due to his use of alcohol within the past 12-months.   3/29/2023    (2 beers) No Oral   Has used Marijuana   15 The patient reported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

## 2023-03-31 ENCOUNTER — TELEPHONE (OUTPATIENT)
Dept: BEHAVIORAL HEALTH | Facility: CLINIC | Age: 43
End: 2023-03-31
Payer: COMMERCIAL

## 2023-04-01 ENCOUNTER — HEALTH MAINTENANCE LETTER (OUTPATIENT)
Age: 43
End: 2023-04-01

## 2023-04-18 DIAGNOSIS — N52.9 ERECTILE DYSFUNCTION, UNSPECIFIED ERECTILE DYSFUNCTION TYPE: ICD-10-CM

## 2023-04-18 DIAGNOSIS — M51.369 DDD (DEGENERATIVE DISC DISEASE), LUMBAR: ICD-10-CM

## 2023-04-18 DIAGNOSIS — M62.830 BACK MUSCLE SPASM: ICD-10-CM

## 2023-04-18 RX ORDER — SILDENAFIL CITRATE 20 MG/1
TABLET ORAL
Qty: 30 TABLET | Refills: 0 | Status: SHIPPED | OUTPATIENT
Start: 2023-04-18 | End: 2023-06-14

## 2023-04-18 RX ORDER — CYCLOBENZAPRINE HCL 5 MG
5-10 TABLET ORAL 3 TIMES DAILY PRN
Qty: 30 TABLET | Refills: 0 | Status: SHIPPED | OUTPATIENT
Start: 2023-04-18

## 2023-04-18 NOTE — TELEPHONE ENCOUNTER
[facility] provider    Patient last saw Dr. Harrell in office.  Was refilled 60 tablets in January.  Please advise. Teresita SANTIAGO RN

## 2023-04-18 NOTE — TELEPHONE ENCOUNTER
Pt was called, appt scheduled for annual preventive on 5/3/2023. Pt is requesting refill to get him to that appt. Pt was advised to fast before the appt in case lipids are checked. Refill request pended. Teresita Will RN

## 2023-05-22 ENCOUNTER — OFFICE VISIT (OUTPATIENT)
Dept: FAMILY MEDICINE | Facility: CLINIC | Age: 43
End: 2023-05-22
Payer: COMMERCIAL

## 2023-05-22 ENCOUNTER — LAB (OUTPATIENT)
Dept: LAB | Facility: CLINIC | Age: 43
End: 2023-05-22
Payer: COMMERCIAL

## 2023-05-22 VITALS
OXYGEN SATURATION: 98 % | RESPIRATION RATE: 16 BRPM | TEMPERATURE: 98.3 F | HEIGHT: 70 IN | BODY MASS INDEX: 22.9 KG/M2 | HEART RATE: 65 BPM | DIASTOLIC BLOOD PRESSURE: 60 MMHG | WEIGHT: 160 LBS | SYSTOLIC BLOOD PRESSURE: 130 MMHG

## 2023-05-22 DIAGNOSIS — F41.1 GENERALIZED ANXIETY DISORDER: ICD-10-CM

## 2023-05-22 DIAGNOSIS — M51.379 DEGENERATION OF LUMBAR OR LUMBOSACRAL INTERVERTEBRAL DISC: ICD-10-CM

## 2023-05-22 DIAGNOSIS — Z13.1 SCREENING FOR DIABETES MELLITUS: ICD-10-CM

## 2023-05-22 DIAGNOSIS — M25.511 CHRONIC RIGHT SHOULDER PAIN: ICD-10-CM

## 2023-05-22 DIAGNOSIS — F31.81 BIPOLAR II DISORDER (H): ICD-10-CM

## 2023-05-22 DIAGNOSIS — Z13.220 SCREENING FOR LIPOID DISORDERS: ICD-10-CM

## 2023-05-22 DIAGNOSIS — Z00.00 ROUTINE GENERAL MEDICAL EXAMINATION AT A HEALTH CARE FACILITY: Primary | ICD-10-CM

## 2023-05-22 DIAGNOSIS — G89.29 CHRONIC RIGHT SHOULDER PAIN: ICD-10-CM

## 2023-05-22 DIAGNOSIS — F19.20 CHEMICAL DEPENDENCY (H): ICD-10-CM

## 2023-05-22 LAB
ALBUMIN SERPL BCG-MCNC: 4.6 G/DL (ref 3.5–5.2)
ALP SERPL-CCNC: 47 U/L (ref 40–129)
ALT SERPL W P-5'-P-CCNC: 52 U/L (ref 10–50)
ANION GAP SERPL CALCULATED.3IONS-SCNC: 11 MMOL/L (ref 7–15)
AST SERPL W P-5'-P-CCNC: 38 U/L (ref 10–50)
BILIRUB SERPL-MCNC: 0.2 MG/DL
BUN SERPL-MCNC: 9.7 MG/DL (ref 6–20)
CALCIUM SERPL-MCNC: 9.5 MG/DL (ref 8.6–10)
CHLORIDE SERPL-SCNC: 106 MMOL/L (ref 98–107)
CHOLEST SERPL-MCNC: 230 MG/DL
CREAT SERPL-MCNC: 0.9 MG/DL (ref 0.67–1.17)
DEPRECATED HCO3 PLAS-SCNC: 27 MMOL/L (ref 22–29)
GFR SERPL CREATININE-BSD FRML MDRD: >90 ML/MIN/1.73M2
GLUCOSE SERPL-MCNC: 88 MG/DL (ref 70–99)
HDLC SERPL-MCNC: 82 MG/DL
LDLC SERPL CALC-MCNC: 140 MG/DL
NONHDLC SERPL-MCNC: 148 MG/DL
POTASSIUM SERPL-SCNC: 4.3 MMOL/L (ref 3.4–5.3)
PROT SERPL-MCNC: 6.8 G/DL (ref 6.4–8.3)
SODIUM SERPL-SCNC: 144 MMOL/L (ref 136–145)
TRIGL SERPL-MCNC: 39 MG/DL

## 2023-05-22 PROCEDURE — 80053 COMPREHEN METABOLIC PANEL: CPT

## 2023-05-22 PROCEDURE — 99213 OFFICE O/P EST LOW 20 MIN: CPT | Mod: 25 | Performed by: NURSE PRACTITIONER

## 2023-05-22 PROCEDURE — 99396 PREV VISIT EST AGE 40-64: CPT | Performed by: NURSE PRACTITIONER

## 2023-05-22 PROCEDURE — 36415 COLL VENOUS BLD VENIPUNCTURE: CPT

## 2023-05-22 PROCEDURE — 80061 LIPID PANEL: CPT

## 2023-05-22 ASSESSMENT — ENCOUNTER SYMPTOMS
SORE THROAT: 0
PARESTHESIAS: 0
EYE PAIN: 0
DYSURIA: 0
PALPITATIONS: 0
JOINT SWELLING: 0
MYALGIAS: 1
CONSTIPATION: 0
FREQUENCY: 0
ABDOMINAL PAIN: 0
ARTHRALGIAS: 1
DIARRHEA: 0
NERVOUS/ANXIOUS: 1
HEMATOCHEZIA: 0
CHILLS: 0
COUGH: 0
WEAKNESS: 0
FEVER: 0
SHORTNESS OF BREATH: 1
DIZZINESS: 0
HEADACHES: 0
HEMATURIA: 0
HEARTBURN: 0
NAUSEA: 1

## 2023-05-22 ASSESSMENT — PATIENT HEALTH QUESTIONNAIRE - PHQ9
SUM OF ALL RESPONSES TO PHQ QUESTIONS 1-9: 14
SUM OF ALL RESPONSES TO PHQ QUESTIONS 1-9: 14
10. IF YOU CHECKED OFF ANY PROBLEMS, HOW DIFFICULT HAVE THESE PROBLEMS MADE IT FOR YOU TO DO YOUR WORK, TAKE CARE OF THINGS AT HOME, OR GET ALONG WITH OTHER PEOPLE: SOMEWHAT DIFFICULT

## 2023-05-22 NOTE — PATIENT INSTRUCTIONS
Continue with present medications and plan of breath.  Will be notified of pending labs.      Preventive Health Recommendations  Male Ages 40 to 49    Yearly exam:             See your health care provider every year in order to  o   Review health changes.   o   Discuss preventive care.    o   Review your medicines if your doctor has prescribed any.  You should be tested each year for STDs (sexually transmitted diseases) if you re at risk.   Have a cholesterol test every 5 years.   Have a colonoscopy (test for colon cancer) if someone in your family has had colon cancer or polyps before age 50.   After age 45, have a diabetes test (fasting glucose). If you are at risk for diabetes, you should have this test every 3 years.    Talk with your health care provider about whether or not a prostate cancer screening test (PSA) is right for you.    Shots: Get a flu shot each year. Get a tetanus shot every 10 years.     Nutrition:  Eat at least 5 servings of fruits and vegetables daily.   Eat whole-grain bread, whole-wheat pasta and brown rice instead of white grains and rice.   Get adequate Calcium and Vitamin D.     Lifestyle  Exercise for at least 150 minutes a week (30 minutes a day, 5 days a week). This will help you control your weight and prevent disease.   Limit alcohol to one drink per day.   No smoking.   Wear sunscreen to prevent skin cancer.   See your dentist every six months for an exam and cleaning.

## 2023-05-22 NOTE — PROGRESS NOTES
SUBJECTIVE:   CC: Ventura is an 42 year old who presents for preventative health visit.       5/22/2023     2:31 PM   Additional Questions   Roomed by    Patient has been advised of split billing requirements and indicates understanding: Yes  Healthy Habits:     Getting at least 3 servings of Calcium per day:  NO    Bi-annual eye exam:  Yes    Dental care twice a year:  Yes    Sleep apnea or symptoms of sleep apnea:  None    Diet:  Vegetarian/vegan, Breakfast skipped and Other    Frequency of exercise:  4-5 days/week    Duration of exercise:  15-30 minutes    Taking medications regularly:  Yes    PHQ-2 Total Score: 2    Additional concerns today:  Yes        Would like to discuss his vasectomy last year   Right shoulder pain x many years     Had vasectomy in 3/2021 and states he never did follow up for sperm check, he's wondering about that.  Right shoulder pain flared up but now he reports it is better.   Recent relapse with alcohol abuse. He has been sober since May 11, 2023 and is in an outpatient treatment program [facility].  Continues with marijuana use which he states [facility] knows about.  Otherwise, he reports he's doing generally well. 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            Social History     Tobacco Use     Smoking status: Former     Packs/day: 0.00     Types: Cigarettes, Dip, chew, snus or snuff     Smokeless tobacco: Former     Types: Chew     Quit date: 10/1/2019   Vaping Use     Vaping status: Former     Substances: THC, CBD     Devices: Refillable tank     Passive vaping exposure: Yes   Substance Use Topics     Alcohol use: No     Comment: sober since April 2016 5/22/2023     2:23 PM   Alcohol Use   Prescreen: >3 drinks/day or >7 drinks/week? No       Last PSA: No results found for: PSA    Reviewed orders with patient. Reviewed health maintenance and updated orders accordingly - Yes  BP Readings from Last 3 Encounters:   05/22/23 130/60   08/03/22 120/68    07/25/22 110/70    Wt Readings from Last 3 Encounters:   05/22/23 72.6 kg (160 lb)   08/03/22 73.5 kg (162 lb)   07/25/22 74.4 kg (164 lb)                  Patient Active Problem List   Diagnosis     Cervicalgia     Nonallopathic lesion of thoracic region     Nonallopathic lesion of lumbar region     CARDIOVASCULAR SCREENING; LDL GOAL LESS THAN 160     Major depression in partial remission (H)     Migraine headache     Alcohol use disorder, severe, dependence (H)     Chemical dependency (H)     Anxiety     Tear of medial meniscus of knee     Bipolar II disorder (H)     Generalized anxiety disorder     Umbilical hernia without obstruction and without gangrene     Degeneration of lumbar or lumbosacral intervertebral disc     Lumbar disc herniation with radiculopathy     Past Surgical History:   Procedure Laterality Date     HERNIA REPAIR       HERNIORRHAPHY UMBILICAL Bilateral 03/16/2021    Procedure: Open umbilical hernia repair and bilateral vasectomy;  Surgeon: Stanley Woods MD;  Location: [facility]     MICRODISCECTOMY LUMBAR  08/2021    Lumbar     SURGICAL HISTORY OF -       deviated septum     VASECTOMY Bilateral 03/16/2021    Procedure: VASECTOMY;  Surgeon: Stanley Woods MD;  Location: [facility] OR       Social History     Tobacco Use     Smoking status: Former     Packs/day: 0.00     Types: Cigarettes, Dip, chew, snus or snuff     Smokeless tobacco: Former     Types: Chew     Quit date: 10/1/2019   Vaping Use     Vaping status: Former     Substances: THC, CBD     Devices: Refillable tank     Passive vaping exposure: Yes   Substance Use Topics     Alcohol use: No     Comment: Sober since 5/11/23     Family History   Problem Relation Age of Onset     Depression Mother      Anxiety Disorder Mother      Depression Father      Dementia Maternal Grandfather      Substance Abuse Paternal Grandmother      Cancer Paternal Grandfather      Substance Abuse Paternal Uncle      Substance Abuse Paternal Aunt          Current  Outpatient Medications   Medication Sig Dispense Refill     buPROPion (WELLBUTRIN SR) 150 MG 12 hr tablet Take 1 tablet (150 mg) by mouth 2 times daily 60 tablet 3     cyclobenzaprine (FLEXERIL) 5 MG tablet Take 1-2 tablets (5-10 mg) by mouth 3 times daily as needed for muscle spasms 30 tablet 0     escitalopram (LEXAPRO) 10 MG tablet Take 1 tablet (10 mg) by mouth daily 90 tablet 1     eszopiclone (LUNESTA) 2 MG tablet Take 1 tablet (2 mg) by mouth nightly as needed for sleep 30 tablet 0     gabapentin (NEURONTIN) 300 MG capsule Take 2 capsules (600 mg) by mouth 3 times daily Refill 28 days after last fill 180 capsule 3     hydrOXYzine (VISTARIL) 50 MG capsule Take 1 capsule (50 mg) by mouth nightly as needed for anxiety (insomnia) 30 capsule 3     medical cannabis (Patient's own supply) See Admin Instructions (The purpose of this order is to document that the patient reports taking medical cannabis.  This is not a prescription, and is not used to certify that the patient has a qualifying medical condition.)       Multiple Vitamins-Minerals (MENS MULTIPLE VITAMIN/LYCOPENE PO) Take 1 tablet by mouth daily.       sildenafil (REVATIO) 20 MG tablet TAKE ONE TABLET BY MOUTH AS DIRECTED PRIOR TO INTERCOURSE AS NEEDED 30 tablet 0     Allergies   Allergen Reactions     Cats      Seasonal Allergies      Recent Labs   Lab Test 12/22/21  1356 07/27/21  1141 09/12/20  1907 02/20/19  0730 07/13/18  1422   *  --   --   --   --    HDL 74  --   --   --   --    TRIG 125  --   --   --   --    ALT  --   --  40 32 32   CR 0.88 0.98 0.94 0.98 0.83   GFRESTIMATED >90 >90 >90 >90 >90   GFRESTBLACK  --   --  >90 >90 >90   POTASSIUM 3.5 4.1 3.2* 4.1 4.1   TSH  --   --   --  1.76 1.38        Reviewed and updated as needed this visit by clinical staff   Tobacco  Allergies  Meds              Reviewed and updated as needed this visit by Provider                 Past Medical History:   Diagnosis Date     Anxiety      Bipolar affective  "disorder (H)      Borderline personality disorder (H)      Cervicalgia      Chemical dependency (H)      Chronic back pain      Depression      JUAREZ (generalized anxiety disorder)      Insomnia      MDD (major depressive disorder)      Migraine headache      PTSD (post-traumatic stress disorder)       Past Surgical History:   Procedure Laterality Date     HERNIA REPAIR       HERNIORRHAPHY UMBILICAL Bilateral 03/16/2021    Procedure: Open umbilical hernia repair and bilateral vasectomy;  Surgeon: Stanley Woods MD;  Location: [facility]     MICRODISCECTOMY LUMBAR  08/2021    Lumbar     SURGICAL HISTORY OF -       deviated septum     VASECTOMY Bilateral 03/16/2021    Procedure: VASECTOMY;  Surgeon: Stanley Woods MD;  Location: [facility] OR       Review of Systems   Constitutional: Negative for chills and fever.   HENT: Negative for congestion, ear pain, hearing loss and sore throat.    Eyes: Negative for pain and visual disturbance.   Respiratory: Positive for shortness of breath. Negative for cough.    Cardiovascular: Negative for chest pain, palpitations and peripheral edema.   Gastrointestinal: Positive for nausea. Negative for abdominal pain, constipation, diarrhea, heartburn and hematochezia.   Genitourinary: Positive for impotence. Negative for dysuria, frequency, genital sores, hematuria, penile discharge and urgency.   Musculoskeletal: Positive for arthralgias and myalgias. Negative for joint swelling.   Skin: Negative for rash.   Neurological: Negative for dizziness, weakness, headaches and paresthesias.   Psychiatric/Behavioral: Positive for mood changes. The patient is nervous/anxious.          OBJECTIVE:   /60   Pulse 65   Temp 98.3  F (36.8  C) (Tympanic)   Resp 16   Ht 1.778 m (5' 10\")   Wt 72.6 kg (160 lb)   SpO2 98%   BMI 22.96 kg/m      Physical Exam  GENERAL: healthy, alert and no distress  EYES: Eyes grossly normal to inspection, PERRL and conjunctivae and sclerae normal  HENT: ear canals and TM's " normal, nose and mouth without ulcers or lesions  NECK: no adenopathy, no asymmetry, masses, or scars and thyroid normal to palpation  RESP: lungs clear to auscultation - no rales, rhonchi or wheezes  CV: regular rate and rhythm, normal S1 S2, no S3 or S4, no murmur, click or rub, no peripheral edema and peripheral pulses strong  ABDOMEN: soft, nontender, no hepatosplenomegaly, no masses and bowel sounds normal  MS: no gross musculoskeletal defects noted, no edema  SKIN: no suspicious lesions or rashes  NEURO: Normal strength and tone, mentation intact and speech normal  PSYCH: mentation appears normal, affect normal/bright    Diagnostic Test Results:  Labs reviewed in Epic  No results found for this or any previous visit (from the past 24 hour(s)).    ASSESSMENT/PLAN:       ICD-10-CM    1. Routine general medical examination at a health care facility  Z00.00       2. Bipolar II disorder (H)  F31.81       3. Generalized anxiety disorder  F41.1       4. Chemical dependency (H)  F19.20       5. Degeneration of lumbar or lumbosacral intervertebral disc  M51.37       6. Chronic right shoulder pain  M25.511     G89.29       7. Screening for lipoid disorders  Z13.220 Lipid panel reflex to direct LDL Fasting      8. Screening for diabetes mellitus  Z13.1 Comprehensive metabolic panel (BMP + Alb, Alk Phos, ALT, AST, Total. Bili, TP)        Mood is reported to be stable at present, continue current medications and therapy/counseling.  Encouraged continued sobriety and wished him success at outpatient treatment at [facility].  Continue to monitor right shoulder pain.  Screening labs pending.  Is UTD on health maintenance.          COUNSELING:   Reviewed preventive health counseling, as reflected in patient instructions       Regular exercise       Healthy diet/nutrition        He reports that he has quit smoking. His smoking use included cigarettes and dip, chew, snus or snuff. He quit smokeless tobacco use about 3 years ago.   His smokeless tobacco use included chew.        Vida Miranda, URSULA CNP  [facility]  Answers for HPI/ROS submitted by the patient on 5/22/2023  If you checked off any problems, how difficult have these problems made it for you to do your work, take care of things at home, or get along with other people?: Somewhat difficult  PHQ9 TOTAL SCORE: 14

## 2023-05-31 DIAGNOSIS — F99 INSOMNIA DUE TO OTHER MENTAL DISORDER: ICD-10-CM

## 2023-05-31 DIAGNOSIS — F51.05 INSOMNIA DUE TO OTHER MENTAL DISORDER: ICD-10-CM

## 2023-05-31 NOTE — TELEPHONE ENCOUNTER
Behavioral Access, please schedule this patient for a future visit with  DAYANARA Mathews . Patient is requesting a refill.     Date of Last Office Visit: 3/2/23  Date of Next Office Visit: None. Scheduling attempting to contact pt  No shows since last visit: 0  Cancellations since last visit: 0    Medication requested:eszopiclone (LUNESTA) 2 MG tablet  Date last ordered: 3/2/23 Qty: 30 Refills: 0     eszopiclone (LUNESTA) 2 MG tablet 30 tablet 0 3/2/2023  No   Sig - Route: Take 1 tablet (2 mg) by mouth nightly as needed for sleep - Oral   Sent to pharmacy as: Eszopiclone 2 MG Oral Tablet (Lunesta)   Class: E-Prescribe       [place] of [place] ?: yes  Medication last filled date: 3/3/23 Qty filled: 30/30  Other controlled substance on [place] ?: yes  If yes, is this a new medication?: no  If yes, name of medication: NA and date filled: NA          Lapse in medication adherence greater than 5 days?: prescribed PRN  If yes, call patient and gather details: NA  Medication refill request verified as identical to current order?: yes  Result of Last DAM, VPA, Li+ Level, CBC, or Carbamazepine Level (at or since last visit): N/A    Last visit treatment plan:      Return in about 3 months (around 6/2/2023) for Medication followup.       1.  Gabapentin 600 mg 3 times daily    2.  Wellbutrin  mg twice daily    3.  Lunesta 2 mg at bedtime as needed    4.  Hydroxyzine 50 mg at bedtime as needed    5.  Escitalopram 10 mg daily    6.  Consider adding atomoxetine for attentional difficulties    7.  Continue talk therapies to learn skills to manage life stress            []Medication refilled per  Medication Refill in Ambulatory Care  policy.  [x]Medication unable to be refilled by RN due to criteria not met as indicated below:    []Eligibility - not seen in the last year   [x]Supervision - no future appointment   []Compliance - no shows, cancellations or lapse in therapy   []Verification - order discrepancy   [x]Controlled  medication   []Medication not included in policy   []90-day supply request   [x]Other:LPN is processing request

## 2023-06-01 RX ORDER — ESZOPICLONE 2 MG/1
2 TABLET, FILM COATED ORAL
Qty: 30 TABLET | Refills: 0 | Status: SHIPPED | OUTPATIENT
Start: 2023-06-01 | End: 2023-12-11

## 2023-06-29 ENCOUNTER — MYC MEDICAL ADVICE (OUTPATIENT)
Dept: FAMILY MEDICINE | Facility: CLINIC | Age: 43
End: 2023-06-29
Payer: COMMERCIAL

## 2023-06-29 DIAGNOSIS — M25.511 CHRONIC RIGHT SHOULDER PAIN: Primary | ICD-10-CM

## 2023-06-29 DIAGNOSIS — G89.29 CHRONIC RIGHT SHOULDER PAIN: Primary | ICD-10-CM

## 2023-06-29 NOTE — TELEPHONE ENCOUNTER
Orders signed for repeat MRI of right shoulder and x ray.  Also placed referral to orthopedics to further evaluate.  KRISHNA Mullins

## 2023-06-29 NOTE — TELEPHONE ENCOUNTER
See my chart message    Requesting MRI and Xray for right shoulder pain    Pended    Not sure if correct    Discussed at 5/22/23 visit    Isela Muñoz RN on 6/29/2023 at 8:57 AM

## 2023-07-08 ENCOUNTER — HOSPITAL ENCOUNTER (OUTPATIENT)
Dept: GENERAL RADIOLOGY | Facility: CLINIC | Age: 43
Discharge: HOME | End: 2023-07-08
Attending: NURSE PRACTITIONER
Payer: COMMERCIAL

## 2023-07-08 ENCOUNTER — HOSPITAL ENCOUNTER (OUTPATIENT)
Dept: MRI IMAGING | Facility: CLINIC | Age: 43
Discharge: HOME | End: 2023-07-08
Attending: NURSE PRACTITIONER
Payer: COMMERCIAL

## 2023-07-08 DIAGNOSIS — G89.29 CHRONIC RIGHT SHOULDER PAIN: ICD-10-CM

## 2023-07-08 DIAGNOSIS — M25.511 CHRONIC RIGHT SHOULDER PAIN: ICD-10-CM

## 2023-07-08 PROCEDURE — 73221 MRI JOINT UPR EXTREM W/O DYE: CPT | Mod: RT

## 2023-07-08 PROCEDURE — 73030 X-RAY EXAM OF SHOULDER: CPT | Mod: RT

## 2023-07-17 DIAGNOSIS — F51.05 INSOMNIA DUE TO OTHER MENTAL DISORDER: ICD-10-CM

## 2023-07-17 DIAGNOSIS — F99 INSOMNIA DUE TO OTHER MENTAL DISORDER: ICD-10-CM

## 2023-07-17 DIAGNOSIS — F41.1 GENERALIZED ANXIETY DISORDER: ICD-10-CM

## 2023-07-17 NOTE — TELEPHONE ENCOUNTER
Date of Last Office Visit: 3/2/23  Date of Next Office Visit: None scheduled  No shows since last visit: 0  Cancellations since last visit: 0    Medication requested: hydrOXYzine (VISTARIL) 50 MG capsule Date last ordered: 3/2/23 Qty: 30 Refills: 3     [place] of [place] ?: NA    Lapse in medication adherence greater than 5 days?: PRN medication  If yes, call patient and gather details: NA  Medication refill request verified as identical to current order?: yes  Result of Last DAM, VPA, Li+ Level, CBC, or Carbamazepine Level (at or since last visit): N/A    Last visit treatment plan:      Treatment Plan:          1.  Gabapentin 600 mg 3 times daily    2.  Wellbutrin  mg twice daily    3.  Lunesta 2 mg at bedtime as needed    4.  Hydroxyzine 50 mg at bedtime as needed    5.  Escitalopram 10 mg daily    6.  Consider adding atomoxetine for attentional difficulties    7.  Continue talk therapies to learn skills to manage life stress         Continue all other medications as reviewed per electronic medical record today.     Safety plan reviewed. To the Emergency Department as needed or call after hours crisis line at [phone] or [phone]. [place] Crisis Text Line. Text [place] to 484179 or Suicide LifeLine Chat: suicidepreventionlifeline.org/chat/    To schedule individual or family therapy, call [facility] at [phone]    Schedule an appointment with me in 3 months or sooner as needed. Call [facility] at [phone] to schedule.    Follow up with primary care provider as planned or for acute medical concerns.    Call the psychiatric nurse line with medication questions or concerns at [phone]    MyChart may be used to communicate with your provider, but this is not intended to be used for emergencies.       []Medication refilled per  Medication Refill in Ambulatory Care  policy.  [x]Medication unable to be refilled by RN due to criteria not met as indicated below:     []Eligibility - not seen in the last year   [x]Supervision - no future appointment   [x]Compliance - no shows, cancellations or lapse in therapy   []Verification - order discrepancy   []Controlled medication   []Medication not included in policy   []90-day supply request   []Other

## 2023-07-18 RX ORDER — HYDROXYZINE PAMOATE 50 MG/1
50 CAPSULE ORAL
Qty: 30 CAPSULE | Refills: 3 | Status: SHIPPED | OUTPATIENT
Start: 2023-07-18 | End: 2023-09-12

## 2023-07-23 DIAGNOSIS — N52.9 ERECTILE DYSFUNCTION, UNSPECIFIED ERECTILE DYSFUNCTION TYPE: ICD-10-CM

## 2023-07-24 RX ORDER — SILDENAFIL CITRATE 20 MG/1
TABLET ORAL
Qty: 30 TABLET | Refills: 0 | Status: SHIPPED | OUTPATIENT
Start: 2023-07-24 | End: 2023-08-29

## 2023-07-25 ENCOUNTER — MYC MEDICAL ADVICE (OUTPATIENT)
Dept: PSYCHIATRY | Facility: CLINIC | Age: 43
End: 2023-07-25
Payer: COMMERCIAL

## 2023-07-25 DIAGNOSIS — F41.1 GENERALIZED ANXIETY DISORDER: ICD-10-CM

## 2023-07-25 NOTE — TELEPHONE ENCOUNTER
Date of Last Office Visit: 3/2/23  Date of Next Office Visit: None, wants someone to call him  No shows since last visit: 0  Cancellations since last visit: 0    Medication requested: gabapentin (NEURONTIN) 300 MG capsule  Date last ordered: 3/2/23 Qty: 180 Refills: 3     [place] of [place] ?: yes  Medication last sold date: 7/5/23 Qty filled: 180  Other [place] on [place] ?: yes  If yes, is this a new medication?: no    Lapse in medication adherence greater than 5 days?: No  If yes, call patient and gather details: na  Medication refill request verified as identical to current order?: yes  Result of Last DAM, VPA, Li+ Level, CBC, or Carbamazepine Level (at or since last visit): N/A    Last visit treatment plan: 1.  Gabapentin 600 mg 3 times daily  2.  Wellbutrin  mg twice daily  3.  Lunesta 2 mg at bedtime as needed  4.  Hydroxyzine 50 mg at bedtime as needed  5.  Escitalopram 10 mg daily  6.  Consider adding atomoxetine for attentional difficulties  7.  Continue talk therapies to learn skills to manage life stress  Schedule an appointment with me in 3 months or sooner as needed.     []Medication refilled per  Medication Refill in Ambulatory Care  policy.  [x]Medication unable to be refilled by RN due to criteria not met as indicated below:    []Eligibility - not seen in the last year   [x]Supervision - no future appointment   []Compliance - no shows, cancellations or lapse in therapy   []Verification - order discrepancy   []Controlled medication   []Medication not included in policy   []90-day supply request   []Other

## 2023-07-27 ENCOUNTER — TRANSFERRED RECORDS (OUTPATIENT)
Dept: HEALTH INFORMATION MANAGEMENT | Facility: CLINIC | Age: 43
End: 2023-07-27
Payer: COMMERCIAL

## 2023-07-27 RX ORDER — GABAPENTIN 300 MG/1
600 CAPSULE ORAL 3 TIMES DAILY
Qty: 180 CAPSULE | Refills: 1 | Status: SHIPPED | OUTPATIENT
Start: 2023-07-27 | End: 2023-09-12

## 2023-08-29 DIAGNOSIS — N52.9 ERECTILE DYSFUNCTION, UNSPECIFIED ERECTILE DYSFUNCTION TYPE: ICD-10-CM

## 2023-08-29 RX ORDER — SILDENAFIL CITRATE 20 MG/1
TABLET ORAL
Qty: 30 TABLET | Refills: 11 | Status: SHIPPED | OUTPATIENT
Start: 2023-08-29 | End: 2024-09-19

## 2023-09-12 ENCOUNTER — VIRTUAL VISIT (OUTPATIENT)
Dept: PSYCHIATRY | Facility: CLINIC | Age: 43
End: 2023-09-12
Payer: COMMERCIAL

## 2023-09-12 VITALS — WEIGHT: 160 LBS | BODY MASS INDEX: 22.96 KG/M2

## 2023-09-12 DIAGNOSIS — F41.1 GENERALIZED ANXIETY DISORDER: ICD-10-CM

## 2023-09-12 DIAGNOSIS — F33.1 MODERATE EPISODE OF RECURRENT MAJOR DEPRESSIVE DISORDER (H): Primary | ICD-10-CM

## 2023-09-12 DIAGNOSIS — F99 INSOMNIA DUE TO OTHER MENTAL DISORDER: ICD-10-CM

## 2023-09-12 DIAGNOSIS — F51.05 INSOMNIA DUE TO OTHER MENTAL DISORDER: ICD-10-CM

## 2023-09-12 PROCEDURE — 99214 OFFICE O/P EST MOD 30 MIN: CPT | Mod: VID | Performed by: NURSE PRACTITIONER

## 2023-09-12 RX ORDER — ESCITALOPRAM OXALATE 10 MG/1
10 TABLET ORAL DAILY
Qty: 90 TABLET | Refills: 1 | Status: SHIPPED | OUTPATIENT
Start: 2023-09-12 | End: 2023-11-03

## 2023-09-12 RX ORDER — HYDROXYZINE PAMOATE 50 MG/1
50 CAPSULE ORAL
Qty: 30 CAPSULE | Refills: 3 | Status: SHIPPED | OUTPATIENT
Start: 2023-09-12 | End: 2023-11-03

## 2023-09-12 RX ORDER — GABAPENTIN 300 MG/1
600 CAPSULE ORAL 3 TIMES DAILY
Qty: 180 CAPSULE | Refills: 1 | Status: SHIPPED | OUTPATIENT
Start: 2023-09-12 | End: 2023-11-03

## 2023-09-12 RX ORDER — BUPROPION HYDROCHLORIDE 150 MG/1
150 TABLET, EXTENDED RELEASE ORAL 2 TIMES DAILY
Qty: 60 TABLET | Refills: 3 | Status: SHIPPED | OUTPATIENT
Start: 2023-09-12 | End: 2024-01-02

## 2023-09-12 ASSESSMENT — ANXIETY QUESTIONNAIRES
1. FEELING NERVOUS, ANXIOUS, OR ON EDGE: MORE THAN HALF THE DAYS
6. BECOMING EASILY ANNOYED OR IRRITABLE: MORE THAN HALF THE DAYS
3. WORRYING TOO MUCH ABOUT DIFFERENT THINGS: SEVERAL DAYS
5. BEING SO RESTLESS THAT IT IS HARD TO SIT STILL: NEARLY EVERY DAY
7. FEELING AFRAID AS IF SOMETHING AWFUL MIGHT HAPPEN: NOT AT ALL
GAD7 TOTAL SCORE: 12
4. TROUBLE RELAXING: NEARLY EVERY DAY
GAD7 TOTAL SCORE: 12
2. NOT BEING ABLE TO STOP OR CONTROL WORRYING: SEVERAL DAYS
IF YOU CHECKED OFF ANY PROBLEMS ON THIS QUESTIONNAIRE, HOW DIFFICULT HAVE THESE PROBLEMS MADE IT FOR YOU TO DO YOUR WORK, TAKE CARE OF THINGS AT HOME, OR GET ALONG WITH OTHER PEOPLE: SOMEWHAT DIFFICULT

## 2023-09-12 ASSESSMENT — PAIN SCALES - GENERAL: PAINLEVEL: NO PAIN (0)

## 2023-09-12 NOTE — PATIENT INSTRUCTIONS
"Patient Education   The [facility]  What to Expect  Here's what to expect in the [facility] Program.   About the program  You'll be meeting with a psychiatric doctor to check your mental health. A psychiatric doctor helps you deal with troubling thoughts and feelings by giving you medicine. They'll make sure you know the plan for your care. You may see them for a long time. When you're feeling better, they may refer you back to seeing your family doctor.   If you have any questions, we'll be glad to talk to you.  About visits  Be open  At your visits, please talk openly about your problems. It may feel hard, but it's the best way for us to help you.  Cancelling visits  If you can't come to your visit, please call us right away at [phone]. If you don't cancel at least 24 hours (1 full day) before your visit, that's \"late cancellation.\"  Not showing up for your visits  Being very late is the same as not showing up. You'll be a \"no show\" if:  You're more than 15 minutes late for a 30-minute (half hour) visit.  You're more than 30 minutes late for a 60-minute (full hour) visit.  If you cancel late or don't show up 2 times within 6 months, we may end your care.  Getting help between visits  If you need help between visits, you can call us Monday to Friday from 8 a.m. to 4:30 p.m. at [phone].  Emergency care  Call 911 or go to the nearest emergency department if your life or someone else's life is in danger.  Call 988 anytime to reach the national Suicide and Crisis hotline.  Medicine refills  To refill your medicine, call your pharmacy. You can also call [facility]'s [facility] at [phone], Monday to Friday, 8 a.m. to 4:30 p.m. It can take 1 to 3 business days to get a refill.   Forms, letters, and tests  You may have papers to fill out, like FMLA, short-term disability, and workability. We can help you with these forms at your visits, but you must have an " appointment. You may need more than 1 visit for this, to be in an intensive therapy program, or both.  Before we can give you medicine for ADHD, we may refer you to get tested for it or confirm it another way.  We may not be able to give you an emotional support animal letter.  We don't do mental health checks ordered by the court.   We don't do mental health testing, but we can refer you to get tested.   Thank you for choosing us for your care.  For informational purposes only. Not to replace the advice of your health care provider. Copyright   2022 [facility] [employer]. All rights reserved. [place] 270814 - 12/22.         Treatment Plan:      1.  Bupropion  mg 2 times daily  2.  Lexapro 10 mg daily daily  3.  Lunesta 2 mg at bedtime as needed for sleep-no refills needed today has 8 tablets left  4.  Gabapentin 600 mg 3 times daily  5.  Hydroxyzine 50 mg at bedtime as needed for insomnia  6.  Please fax a copy of your psychological evaluation to me: [phone]  7.  Continue talk therapy for learning skills to manage life stressors  8.  Finish program requirements with [facility]    Continue all other medications as reviewed per electronic medical record today.   Safety plan reviewed. To the Emergency Department as needed or call after hours crisis line at [phone] or [phone]. [place] Crisis Text Line. Text [place] to 117363 or Suicide LifeLine Chat: suicidepreventionlifeline.org/chat/  To schedule individual or family therapy, call [facility] at [phone]  Schedule an appointment with me in September 26 or sooner as needed. Call [facility] at [phone] to schedule.  Follow up with primary care provider as planned or for acute medical concerns.  Call the psychiatric nurse line with medication questions or concerns at [phone]  MyChart may be used to communicate with your provider, but this is not intended to be used for emergencies.    Crisis Resources:     National Suicide Prevention Lifeline: 622.415.9641 (TTY: 504.827.9513). Call anytime for help.  (www.suicidepreventionlifeline.org)  National [employer] Mental Illness (www.audra.org): 199.640.4491 or 494-785-7433.   Mental Health Association (www.mentalhealth.org): 499.455.5067 or 590-664-1272.  [place] Crisis Text Line: Text [place] to 024918  Suicide LifeLine Chat: suicidepreventionlifeline.org/chat

## 2023-09-12 NOTE — NURSING NOTE
Is the patient currently in the state of [place]? YES    Visit mode:VIDEO    If the visit is dropped, the patient can be reconnected by: VIDEO VISIT: Text to cell phone:   Telephone Information:   Mobile [phone]       Will anyone else be joining the visit? No  (If patient encounters technical issues they should call [phone])    How would you like to obtain your AVS? MyChart    Are changes needed to the allergy or medication list? No    Rooming Documentation: Assigned questionnaire(s) completed .    Reason for visit: RECHECK     Georgette Edwards [facility]      Care team has reviewed attendance agreement with patient. Patient advised that two failed appointments within 6 months may lead to termination of current episode of care.

## 2023-09-12 NOTE — PROGRESS NOTES
"Virtual Visit Details    Type of service:  Video Visit     Originating Location (pt. Location): Home    Distant Location (provider location):  Off-site  Platform used for Video Visit: Telephone        Start time:  12:50 PM  Stop time: 1:15 PM           Outpatient Psychiatric Progress Note    Name: Timmy Fitzpatrick   : 1980                    Primary Care Provider: [facility]   Therapist: Yes    PHQ-9 scores:      3/2/2023    10:08 AM 3/30/2023    10:09 AM 2023     2:20 PM   PHQ-9 SCORE   PHQ-9 Total Score MyChart 17 (Moderately severe depression) 12 (Moderate depression) 14 (Moderate depression)   PHQ-9 Total Score 17 12 14       JUAREZ-7 scores:      2022     9:55 AM 3/2/2023    10:09 AM 3/30/2023    10:10 AM   JUAREZ-7 SCORE   Total Score 10 (moderate anxiety) 19 (severe anxiety) 19 (severe anxiety)   Total Score 10 19 19       Patient Identification:    Patient is a 43 year old year old,   Choose not to Answer Choose not to answer male  who presents for return visit with me.  Patient is currently unemployed. Patient attended the session alone. Patient prefers to be called: \" Ventura\".    Current medications include: buPROPion (WELLBUTRIN SR) 150 MG 12 hr tablet, Take 1 tablet (150 mg) by mouth 2 times daily  cyclobenzaprine (FLEXERIL) 5 MG tablet, Take 1-2 tablets (5-10 mg) by mouth 3 times daily as needed for muscle spasms  escitalopram (LEXAPRO) 10 MG tablet, Take 1 tablet (10 mg) by mouth daily  eszopiclone (LUNESTA) 2 MG tablet, Take 1 tablet (2 mg) by mouth nightly as needed for sleep  gabapentin (NEURONTIN) 300 MG capsule, Take 2 capsules (600 mg) by mouth 3 times daily Refill 28 days after last fill  hydrOXYzine (VISTARIL) 50 MG capsule, Take 1 capsule (50 mg) by mouth nightly as needed for anxiety (insomnia)  medical cannabis (Patient's own supply), See Admin Instructions (The purpose of this order is to document that the patient reports taking medical cannabis.  " This is not a prescription, and is not used to certify that the patient has a qualifying medical condition.)  Multiple Vitamins-Minerals (MENS MULTIPLE VITAMIN/LYCOPENE PO), Take 1 tablet by mouth daily.  sildenafil (REVATIO) 20 MG tablet, TAKE ONE TABLET BY MOUTH DAILY AS DIRECTED PRIOR TO INTERCOURSE AS NEEDED    No current facility-administered medications on file prior to visit.       The [place] Prescription Monitoring Program has been reviewed and there are no concerns about diversionary activity for controlled substances at this time.      I was able to review most recent Primary Care Provider, specialty provider, and therapy visit notes that I have access to.     Today, patient reports in the beginning in May he started outpatient program with [facility].  Now he is attending two days a week.   Alcohol use prompted this program to start with last use in May.  He continues in therapy.  He has not seen his therapist in about a month - he has noticed more depression.  He feels like it hits him like a wave.  He has hopes and expectations that don't morales out and  he gets disappointed.  He has had anxiety that is consistent.  Transporting kids to school.  Started last week.  House repairs pending.  Occasional SI but has been manageable.  He is working on reaching out to people when he feels suicidal.  2 weeks ago he finished testing through [facility] .  Meeting in two weeks to see if he has ADHD.  September 25.  He recognizes that he has trouble limiting his alcohol intake.       has a past medical history of Anxiety, Bipolar affective disorder (H), Borderline personality disorder (H), Cervicalgia, Chemical dependency (H), Chronic back pain, Depression, JUAREZ (generalized anxiety disorder), Insomnia, MDD (major depressive disorder), Migraine headache, and PTSD (post-traumatic stress disorder).    He has no past medical history of Complication of anesthesia, Diabetes (H), PONV (postoperative nausea and vomiting),  Sleep apnea, or Uncomplicated asthma.    Social history updates:    Ventura lives with his wife and 3 children.  He is unemployed.    Substance use updates:    Sober from alcohol since May.  Using medical cannabis products.  Tobacco use: No    Vital Signs:   There were no vitals taken for this visit.    Labs:    Most recent laboratory results reviewed and no new labs.     Mental Status Examination:  Appearance: awake, alert, casual dress, and mild distress  Attitude: cooperative  Eye Contact:  adequate  Gait and Station: No dizziness or falls  Psychomotor Behavior:  no evidence of tardive dyskinesia, dystonia, or tics and intact station, gait and muscle tone  Oriented to:  time, person, and place  Attention Span and Concentration:  Normal  Speech:   vtspeech: clear, coherent and Speaks: English  Mood:  anxious and depressed  Affect:  mood congruent  Associations:  no loose associations  Thought Process:  goal oriented  Thought Content:  passive suicidal ideation present, no auditory hallucinations present, and no visual hallucinations present  Recent and Remote Memory:  intact Not formally assessed. No amnesia.  Fund of Knowledge: appropriate  Insight:  good  Judgment: good  Impulse Control:  fair    Suicide Risk Assessment:  Today Timmy Fitzpatrick reports no thoughts to harm themself or others. In addition, there are notable risk factors for self-harm, including anxiety, withdrawing, and mood change. However, risk is mitigated by commitment to family, history of seeking help when needed, future oriented, denies suicidal intent or plan, and denies homicidal ideation, intent, or plan. Therefore, based on all available evidence including the factors cited above, Timmy Fitzpatrick does not appear to be at imminent risk for self-harm, does not meet criteria for a 72-hr hold, and therefore remains appropriate for ongoing outpatient level of care.  A thorough assessment of risk factors related to suicide and self-harm  have been reviewed and are noted above. The patient convincingly denies suicidality on several occasions. Local community safety resources printed and reviewed for patient to use if needed. There was no deceit detected, and the patient presented in a manner that was believable.     DSM5 Diagnosis:  296.32 (F33.1) Major Depressive Disorder, Recurrent Episode, Moderate _ and With mixed features  300.02 (F41.1) Generalized Anxiety Disorder  780.52 (G47.00) Insomnia Disorder   With non-sleep disorder mental comorbidity  Recurrent      Medical comorbidities include:   Patient Active Problem List    Diagnosis Date Noted    Umbilical hernia without obstruction and without gangrene 03/01/2021     Priority: Medium     Added automatically from request for surgery [id number]        Degeneration of lumbar or lumbosacral intervertebral disc 03/13/2020     Priority: Medium     Formatting of this note might be different from the original.  Added automatically from request for surgery [id number]        Lumbar disc herniation with radiculopathy 03/13/2020     Priority: Medium     Formatting of this note might be different from the original.  Added automatically from request for surgery [id number]        Generalized anxiety disorder 03/26/2019     Priority: Medium    Bipolar II disorder (H) 11/28/2018     Priority: Medium    Anxiety 09/18/2017     Priority: Medium    Tear of medial meniscus of knee 09/08/2016     Priority: Medium    Chemical dependency (H) 11/25/2015     Priority: Medium    Alcohol use disorder, severe, dependence (H) 09/13/2012     Priority: Medium    Major depression in partial remission (H) 06/30/2011     Priority: Medium    Migraine headache 06/30/2011     Priority: Medium     (Problem list name updated by automated process. Provider to review and confirm.)        CARDIOVASCULAR SCREENING; LDL GOAL LESS THAN 160 10/31/2010     Priority: Medium    Cervicalgia 03/24/2010     Priority: Medium    Nonallopathic lesion of  thoracic region 03/24/2010     Priority: Medium     Problem list name updated by automated process. Provider to review        Nonallopathic lesion of lumbar region 03/24/2010     Priority: Medium     Problem list name updated by automated process. Provider to review           Assessment:    Timmy Fitzpatrick inform me that he is completing group therapies through [facility] for alcohol use.  He last used alcohol, he told me in May.  We reviewed medication options to help him should cravings to be problematic for him.  Recently he had psychological testing completed for ADHD.  I asked him to have the clinic fax me a copy of the report so we can review it together in 2 weeks to determine treatment options.  For now he desires no changes in his medications pending the outcome of the assessment.  Lexapro and bupropion will continue to help with depression.  He does admit to some passive suicidal thoughts but is able to reach out to his support group and to his therapist for coping skill development.  Gabapentin continues to help with anxiety and mood dysregulation.  Hydroxyzine and Lunesta are available at bedtime as needed to help him sleep at night.  He informed me he has 8 tablets of Lunesta left and no refills were given to him today on that medication..    Medication side effects and alternatives were reviewed. Health promotion activities recommended and reviewed today. All questions addressed. Education and counseling completed regarding risks and benefits of medications and psychotherapy options.    Treatment Plan:      1.  Bupropion  mg 2 times daily  2.  Lexapro 10 mg daily daily  3.  Lunesta 2 mg at bedtime as needed for sleep-no refills needed today has 8 tablets left  4.  Gabapentin 600 mg 3 times daily  5.  Hydroxyzine 50 mg at bedtime as needed for insomnia  6.  Please fax a copy of your psychological evaluation to me: [phone]  7.  Continue talk therapy for learning skills to manage life  stressors  8.  Finish program requirements with [facility]    Continue all other medications as reviewed per electronic medical record today.   Safety plan reviewed. To the Emergency Department as needed or call after hours crisis line at [phone] or [phone]. [place] Crisis Text Line. Text [place] to 276751 or Suicide LifeLine Chat: suicideForgeRock.org/chat/  To schedule individual or family therapy, call [facility] at [phone]  Schedule an appointment with me in September 26 or sooner as needed. Call [facility] at [phone] to schedule.  Follow up with primary care provider as planned or for acute medical concerns.  Call the psychiatric nurse line with medication questions or concerns at [phone]  MyChart may be used to communicate with your provider, but this is not intended to be used for emergencies.    Crisis Resources:    National Suicide Prevention Lifeline: 885.656.9929 (TTY: 218.386.9405). Call anytime for help.  (www.suicidepreventionCircle Plus Paymentsline.org)  National [place] on Mental Illness (www.audra.org): 188.693.5641 or 128-235-6378.   Mental Health Association (www.mentalhealth.org): 515.578.8419 or 717-547-7585.  [place] Crisis Text [place]: Text [place] to 676545  Suicide LifeLine Chat: suicideForgeRock.org/chat    Administrative Billing:   Time spent with patient includes counseling and coordination of care regarding above diagnoses and treatment plan.    Patient Status:  This is a continuous care patient and medications will be prescribed by the psychiatric provider until further indicated.    Signed:   EMILIA Wagner-BC   Psychiatry     Answers submitted by the patient for this visit:  Patient Health Questionnaire (Submitted on 9/12/2023)  If you checked off any problems, how difficult have these problems made it for you to do your work, take care of things at home, or get along with other people?: Somewhat difficult  PHQ9 TOTAL  SCORE: 12  JUAREZ-7 (Submitted on 9/12/2023)  JUAREZ 7 TOTAL SCORE: 12

## 2023-09-25 ENCOUNTER — TRANSFERRED RECORDS (OUTPATIENT)
Dept: HEALTH INFORMATION MANAGEMENT | Facility: CLINIC | Age: 43
End: 2023-09-25
Payer: COMMERCIAL

## 2023-09-26 ENCOUNTER — VIRTUAL VISIT (OUTPATIENT)
Dept: PSYCHIATRY | Facility: CLINIC | Age: 43
End: 2023-09-26
Payer: COMMERCIAL

## 2023-09-26 DIAGNOSIS — F33.1 MODERATE EPISODE OF RECURRENT MAJOR DEPRESSIVE DISORDER (H): ICD-10-CM

## 2023-09-26 DIAGNOSIS — F99 INSOMNIA DUE TO OTHER MENTAL DISORDER: ICD-10-CM

## 2023-09-26 DIAGNOSIS — F51.05 INSOMNIA DUE TO OTHER MENTAL DISORDER: ICD-10-CM

## 2023-09-26 DIAGNOSIS — F90.0 ATTENTION DEFICIT HYPERACTIVITY DISORDER (ADHD), PREDOMINANTLY INATTENTIVE TYPE: Primary | ICD-10-CM

## 2023-09-26 DIAGNOSIS — F41.1 GENERALIZED ANXIETY DISORDER: ICD-10-CM

## 2023-09-26 PROCEDURE — 99214 OFFICE O/P EST MOD 30 MIN: CPT | Mod: VID | Performed by: NURSE PRACTITIONER

## 2023-09-26 ASSESSMENT — PATIENT HEALTH QUESTIONNAIRE - PHQ9: SUM OF ALL RESPONSES TO PHQ QUESTIONS 1-9: 11

## 2023-09-26 NOTE — PROGRESS NOTES
"         Outpatient Psychiatric Progress Note    Name: Timmy Fitzpatrick   : 1980                    Primary Care Provider: [facility]   Therapist: Yes    PHQ-9 scores:      3/30/2023    10:09 AM 2023     2:20 PM 2023    12:41 PM   PHQ-9 SCORE   PHQ-9 Total Score MyChart 12 (Moderate depression) 14 (Moderate depression) 12 (Moderate depression)   PHQ-9 Total Score 12 14 12       JUAREZ-7 scores:      3/2/2023    10:09 AM 3/30/2023    10:10 AM 2023    12:42 PM   JUAREZ-7 SCORE   Total Score 19 (severe anxiety) 19 (severe anxiety) 12 (moderate anxiety)   Total Score 19 19 12       Patient Identification:    Patient is a 43 year old year old,   Choose not to Answer Choose not to answer male  who presents for return visit with me.  Patient is currently unemployed. Patient attended the session with his wife , who they agreed to have interview with. Patient prefers to be called: \" Ventura\".    Current medications include: buPROPion (WELLBUTRIN SR) 150 MG 12 hr tablet, Take 1 tablet (150 mg) by mouth 2 times daily  cyclobenzaprine (FLEXERIL) 5 MG tablet, Take 1-2 tablets (5-10 mg) by mouth 3 times daily as needed for muscle spasms  escitalopram (LEXAPRO) 10 MG tablet, Take 1 tablet (10 mg) by mouth daily  eszopiclone (LUNESTA) 2 MG tablet, Take 1 tablet (2 mg) by mouth nightly as needed for sleep  gabapentin (NEURONTIN) 300 MG capsule, Take 2 capsules (600 mg) by mouth 3 times daily Refill 28 days after last fill  hydrOXYzine (VISTARIL) 50 MG capsule, Take 1 capsule (50 mg) by mouth nightly as needed for anxiety (insomnia)  medical cannabis (Patient's own supply), See Admin Instructions (The purpose of this order is to document that the patient reports taking medical cannabis.  This is not a prescription, and is not used to certify that the patient has a qualifying medical condition.)  Multiple Vitamins-Minerals (MENS MULTIPLE VITAMIN/LYCOPENE PO), Take 1 tablet by mouth " daily.  sildenafil (REVATIO) 20 MG tablet, TAKE ONE TABLET BY MOUTH DAILY AS DIRECTED PRIOR TO INTERCOURSE AS NEEDED    No current facility-administered medications on file prior to visit.       The [place] Prescription Monitoring Program has been reviewed and there are no concerns about diversionary activity for controlled substances at this time.      I was able to review most recent Primary Care Provider, specialty provider, and therapy visit notes that I have access to.     Today, patient reports He met with his therapist.  Inattention, sitting down to do anything with intent difficult for him as confirmed by his wife.  Hard to sit down for more than 20 minutes.  Leg is always moving.   Him and his wife feels that ADHD was there but undiagnosed as a child.  Difficulty engaging in conversations with people.  Social fear of being accepted .  Discussed concerns over prescribing controlled substances, given his recent treatment for maintaining sobriety.  Him and his wife verbalized understanding of this.     has a past medical history of Anxiety, Bipolar affective disorder (H), Borderline personality disorder (H), Cervicalgia, Chemical dependency (H), Chronic back pain, Depression, JUAREZ (generalized anxiety disorder), Insomnia, MDD (major depressive disorder), Migraine headache, and PTSD (post-traumatic stress disorder).    He has no past medical history of Complication of anesthesia, Diabetes (H), PONV (postoperative nausea and vomiting), Sleep apnea, or Uncomplicated asthma.    Social history updates:    Ventura continues to live with his wife and daughters.  He is unemployed.    Substance use updates:    No alcohol use reported.  Tobacco use: Yes Chewing Tobacco  Ready to quit?  No  Nicotine Replacement Therapy tried: None    Vital Signs:   There were no vitals taken for this visit.    Labs:    Most recent laboratory results reviewed and no new labs.     Mental Status Examination:  Appearance: awake, alert and  casual dress  Attitude: cooperative  Eye Contact:  adequate  Gait and Station: No dizziness or falls  Psychomotor Behavior:  fidgeting  Oriented to:  time, person, and place  Attention Span and Concentration:  Normal  Speech:   vtspeech: clear, coherent and Speaks: English  Mood:  anxious and depressed  Affect:  mood congruent  Associations:  no loose associations  Thought Process:  goal oriented  Thought Content:  no evidence of suicidal ideation or homicidal ideation, no auditory hallucinations present, and no visual hallucinations present  Recent and Remote Memory:  intact Not formally assessed. No amnesia.  Fund of Knowledge: appropriate  Insight:  good  Judgment: good  Impulse Control:  fair    Suicide Risk Assessment:  Today Timmy Fitzpatrick reports no thoughts to harm themself or others. In addition, there are notable risk factors for self-harm, including anxiety and mood change. However, risk is mitigated by commitment to family, history of seeking help when needed, future oriented, denies suicidal intent or plan, and denies homicidal ideation, intent, or plan. Therefore, based on all available evidence including the factors cited above, Timmy Fitzpatrick does not appear to be at imminent risk for self-harm, does not meet criteria for a 72-hr hold, and therefore remains appropriate for ongoing outpatient level of care.  A thorough assessment of risk factors related to suicide and self-harm have been reviewed and are noted above. The patient convincingly denies suicidality on several occasions. Local community safety resources printed and reviewed for patient to use if needed. There was no deceit detected, and the patient presented in a manner that was believable.     DSM5 Diagnosis:  Attention-Deficit/Hyperactivity Disorder  314.00 (F90.0) Predominantly inattentive presentation  296.32 (F33.1) Major Depressive Disorder, Recurrent Episode, Moderate _ and With mixed features  300.02 (F41.1) Generalized  Anxiety Disorder  780.52 (G47.00) Insomnia Disorder   With non-sleep disorder mental comorbidity  Episodic      Medical comorbidities include:   Patient Active Problem List    Diagnosis Date Noted    Umbilical hernia without obstruction and without gangrene 03/01/2021     Priority: Medium     Added automatically from request for surgery [id number]        Degeneration of lumbar or lumbosacral intervertebral disc 03/13/2020     Priority: Medium     Formatting of this note might be different from the original.  Added automatically from request for surgery [id number]        Lumbar disc herniation with radiculopathy 03/13/2020     Priority: Medium     Formatting of this note might be different from the original.  Added automatically from request for surgery [id number]        Generalized anxiety disorder 03/26/2019     Priority: Medium    Bipolar II disorder (H) 11/28/2018     Priority: Medium    Anxiety 09/18/2017     Priority: Medium    Tear of medial meniscus of knee 09/08/2016     Priority: Medium    Chemical dependency (H) 11/25/2015     Priority: Medium    Alcohol use disorder, severe, dependence (H) 09/13/2012     Priority: Medium    Major depression in partial remission (H) 06/30/2011     Priority: Medium    Migraine headache 06/30/2011     Priority: Medium     (Problem list name updated by automated process. Provider to review and confirm.)        CARDIOVASCULAR SCREENING; LDL GOAL LESS THAN 160 10/31/2010     Priority: Medium    Cervicalgia 03/24/2010     Priority: Medium    Nonallopathic lesion of thoracic region 03/24/2010     Priority: Medium     Problem list name updated by automated process. Provider to review        Nonallopathic lesion of lumbar region 03/24/2010     Priority: Medium     Problem list name updated by automated process. Provider to review           Assessment:    Timmy Fitzpatrick met with me today to review his psychological report that I received by fax to the clinic.  This report confirms his  diagnosis of ADHD, inattentive type.  As we reviewed medication options, we talked about side effects to be noted, and I asked him to obtain baseline vital signs at home before and after starting medication.  He chooses to start Adderall XR daily as this is the medication his daughter is on and she is having good results with it.  Depression and anxiety medications continue at their current doses.  He desires to be able to take less medication, but we talked about stabilizing him first on ADHD medication before making any other changes.  He verbalized understanding of this..    Medication side effects and alternatives were reviewed. Health promotion activities recommended and reviewed today. All questions addressed. Education and counseling completed regarding risks and benefits of medications and psychotherapy options.    Treatment Plan:      1.  Begin Adderall XR 10 mg daily  2.  Bupropion  mg 2 times daily  3.  Lexapro 10 mg daily  4.  Lunesta 2 mg at bedtime as needed for insomnia  5.  Gabapentin 600 mg 3 times daily  6.  Hydroxyzine 50 mg at bedtime as needed for anxiety or insomnia  7.  Talk therapy at your earliest convenience to learn skills to manage life stressors    Continue all other medications as reviewed per electronic medical record today.   Safety plan reviewed. To the Emergency Department as needed or call after hours crisis line at [phone] or [phone]. [place] Crisis Text Line. Text [place] to 993484 or Suicide LifeLine Chat: suicidepreventionlifeline.org/chat/  To schedule individual or family therapy, call [facility] at [phone]  Schedule an appointment with me in October 6 or sooner as needed. Call [facility] at [phone] to schedule.  Follow up with primary care provider as planned or for acute medical concerns.  Call the psychiatric nurse line with medication questions or concerns at [phone]  [employer]hart may be used to communicate with  your provider, but this is not intended to be used for emergencies.    Crisis Resources:    National Suicide Prevention Lifeline: 764.782.8962 (TTY: 487.607.8776). Call anytime for help.  (www.suicidepreventionlifeline.org)  National [place] on Mental Illness (www.audra.org): 781.348.8993 or 472-851-2545.   Mental Health Association (www.mentalhealth.org): 582.751.8101 or 820-570-9213.  [place] Crisis Text Line: Text [place] to 493029  Suicide LifeLine Chat: suicidepreDriverSideline.org/chat    Administrative Billing:   Time spent with patient includes counseling and coordination of care regarding above diagnoses and treatment plan.    Patient Status:  This is a continuous care patient and medications will be prescribed by the psychiatric provider until further indicated.    Signed:   EMILIA Wagner-BC   Psychiatry

## 2023-09-26 NOTE — PROGRESS NOTES
Virtual Visit Details    Type of service:  Video Visit     Originating Location (pt. Location): Other in personal vehicle    Distant Location (provider location):  Off-site  Platform used for Video Visit: Bill    Start time: 2:54 PM  End time: 3:25 PM

## 2023-09-26 NOTE — NURSING NOTE
Is the patient currently in the state of [place]? YES    Visit mode:VIDEO    If the visit is dropped, the patient can be reconnected by: VIDEO VISIT: Text to cell phone:   Telephone Information:   Mobile [phone]       Will anyone else be joining the visit? NO  (If patient encounters technical issues they should call [phone])    How would you like to obtain your AVS? MyChart    Are changes needed to the allergy or medication list? No    Reason for visit: RECHECK    Ronal MEYERS

## 2023-09-27 ENCOUNTER — MYC MEDICAL ADVICE (OUTPATIENT)
Dept: PSYCHIATRY | Facility: CLINIC | Age: 43
End: 2023-09-27
Payer: COMMERCIAL

## 2023-09-28 RX ORDER — DEXTROAMPHETAMINE SACCHARATE, AMPHETAMINE ASPARTATE MONOHYDRATE, DEXTROAMPHETAMINE SULFATE AND AMPHETAMINE SULFATE 2.5; 2.5; 2.5; 2.5 MG/1; MG/1; MG/1; MG/1
10 CAPSULE, EXTENDED RELEASE ORAL DAILY
Qty: 15 CAPSULE | Refills: 0 | Status: SHIPPED | OUTPATIENT
Start: 2023-09-28 | End: 2023-10-06

## 2023-09-28 NOTE — PATIENT INSTRUCTIONS
"Patient Education   The Panel Psychiatry Program  What to Expect  Here's what to expect in the Panel Psychiatry Program.   About the program  You'll be meeting with a psychiatric doctor to check your mental health. A psychiatric doctor helps you deal with troubling thoughts and feelings by giving you medicine. They'll make sure you know the plan for your care. You may see them for a long time. When you're feeling better, they may refer you back to seeing your family doctor.   If you have any questions, we'll be glad to talk to you.  About visits  Be open  At your visits, please talk openly about your problems. It may feel hard, but it's the best way for us to help you.  Cancelling visits  If you can't come to your visit, please call us right away at [phone]. If you don't cancel at least 24 hours (1 full day) before your visit, that's \"late cancellation.\"  Not showing up for your visits  Being very late is the same as not showing up. You'll be a \"no show\" if:  You're more than 15 minutes late for a 30-minute (half hour) visit.  You're more than 30 minutes late for a 60-minute (full hour) visit.  If you cancel late or don't show up 2 times within 6 months, we may end your care.  Getting help between visits  If you need help between visits, you can call us Monday to Friday from 8 a.m. to 4:30 p.m. at [phone].  Emergency care  Call 911 or go to the nearest emergency department if your life or someone else's life is in danger.  Call 988 anytime to reach the national Suicide and Crisis hotline.  Medicine refills  To refill your medicine, call your pharmacy. You can also call [facility]'s Behavioral Access at [phone], Monday to Friday, 8 a.m. to 4:30 p.m. It can take 1 to 3 business days to get a refill.   Forms, letters, and tests  You may have papers to fill out, like FMLA, short-term disability, and workability. We can help you with these forms at your visits, but you must have an " appointment. You may need more than 1 visit for this, to be in an intensive therapy program, or both.  Before we can give you medicine for ADHD, we may refer you to get tested for it or confirm it another way.  We may not be able to give you an emotional support animal letter.  We don't do mental health checks ordered by the court.   We don't do mental health testing, but we can refer you to get tested.   Thank you for choosing us for your care.  For informational purposes only. Not to replace the advice of your health care provider. Copyright   2022 [facility]. All rights reserved. [employer] [id number] - 12/22.         Treatment Plan:      1.  Begin Adderall XR 10 mg daily  2.  Bupropion  mg 2 times daily  3.  Lexapro 10 mg daily  4.  Lunesta 2 mg at bedtime as needed for insomnia  5.  Gabapentin 600 mg 3 times daily  6.  Hydroxyzine 50 mg at bedtime as needed for anxiety or insomnia  7.  Talk therapy at your earliest convenience to learn skills to manage life stressors    Continue all other medications as reviewed per electronic medical record today.   Safety plan reviewed. To the Emergency Department as needed or call after hours crisis line at [phone] or [phone]. [place] Crisis Text [place]. Text [place] to 131160 or Suicide LifeLine Chat: suicidepreventionlifeline.org/chat/  To schedule individual or family therapy, call [facility] at [phone]  Schedule an appointment with me in October 6 or sooner as needed. Call [facility] at [phone] to schedule.  Follow up with primary care provider as planned or for acute medical concerns.  Call the psychiatric nurse line with medication questions or concerns at [phone]  MyChart may be used to communicate with your provider, but this is not intended to be used for emergencies.    Crisis Resources:    National Suicide Prevention Lifeline: 515.856.8514 (TTY: 744.260.3192). Call anytime for help.   (www.suicidepreventionlifeline.org)  National [place] on Mental Illness (www.audra.org): 090-726-2339 or 892-136-8123.   Mental Health Association (www.mentalhealth.org): 918.394.7532 or 374-199-0568.  [place] Crisis [place]: Text [place] to 428394  Suicide LifeLine Chat: suicidepreventionAppGate Network Securityline.org/chat

## 2023-10-06 ENCOUNTER — VIRTUAL VISIT (OUTPATIENT)
Dept: PSYCHIATRY | Facility: CLINIC | Age: 43
End: 2023-10-06
Payer: COMMERCIAL

## 2023-10-06 DIAGNOSIS — F99 INSOMNIA DUE TO OTHER MENTAL DISORDER: ICD-10-CM

## 2023-10-06 DIAGNOSIS — F90.0 ATTENTION DEFICIT HYPERACTIVITY DISORDER (ADHD), PREDOMINANTLY INATTENTIVE TYPE: ICD-10-CM

## 2023-10-06 DIAGNOSIS — F41.1 GENERALIZED ANXIETY DISORDER: ICD-10-CM

## 2023-10-06 DIAGNOSIS — F33.1 MODERATE EPISODE OF RECURRENT MAJOR DEPRESSIVE DISORDER (H): Primary | ICD-10-CM

## 2023-10-06 DIAGNOSIS — F51.05 INSOMNIA DUE TO OTHER MENTAL DISORDER: ICD-10-CM

## 2023-10-06 PROCEDURE — 99214 OFFICE O/P EST MOD 30 MIN: CPT | Mod: VID | Performed by: NURSE PRACTITIONER

## 2023-10-06 RX ORDER — DEXTROAMPHETAMINE SACCHARATE, AMPHETAMINE ASPARTATE MONOHYDRATE, DEXTROAMPHETAMINE SULFATE AND AMPHETAMINE SULFATE 3.75; 3.75; 3.75; 3.75 MG/1; MG/1; MG/1; MG/1
15 CAPSULE, EXTENDED RELEASE ORAL DAILY
Qty: 30 CAPSULE | Refills: 0 | Status: SHIPPED | OUTPATIENT
Start: 2023-10-06 | End: 2023-11-03

## 2023-10-06 ASSESSMENT — PAIN SCALES - GENERAL: PAINLEVEL: NO PAIN (0)

## 2023-10-06 NOTE — PROGRESS NOTES
"Virtual Visit Details    Type of service:  Video Visit     Originating Location (pt. Location): Home    Distant Location (provider location):  Off-site  Platform used for Video Visit: Bill      Start Time:  10:18 AM  Stop Time: 10:40 AM           Outpatient Psychiatric Progress Note    Name: Timmy Fitzpatrick   : 1980                    Primary Care Provider:  [facility]   Therapist: yes     PHQ-9 scores:      2023     2:20 PM 2023    12:41 PM 2023     2:37 PM   PHQ-9 SCORE   PHQ-9 Total Score MyChart 14 (Moderate depression) 12 (Moderate depression)    PHQ-9 Total Score 14 12 11       JUAREZ-7 scores:      3/2/2023    10:09 AM 3/30/2023    10:10 AM 2023    12:42 PM   JUAREZ-7 SCORE   Total Score 19 (severe anxiety) 19 (severe anxiety) 12 (moderate anxiety)   Total Score 19 19 12       Patient Identification:    Patient is a 43 year old year old,   Choose not to Answer Choose not to answer male  who presents for return visit with me.  Patient is currently unemployed. Patient attended the session alone. Patient prefers to be called: \" Ventura\".    Current medications include: amphetamine-dextroamphetamine (ADDERALL XR) 10 MG 24 hr capsule, Take 1 capsule (10 mg) by mouth daily  buPROPion (WELLBUTRIN SR) 150 MG 12 hr tablet, Take 1 tablet (150 mg) by mouth 2 times daily  cyclobenzaprine (FLEXERIL) 5 MG tablet, Take 1-2 tablets (5-10 mg) by mouth 3 times daily as needed for muscle spasms  escitalopram (LEXAPRO) 10 MG tablet, Take 1 tablet (10 mg) by mouth daily  eszopiclone (LUNESTA) 2 MG tablet, Take 1 tablet (2 mg) by mouth nightly as needed for sleep  gabapentin (NEURONTIN) 300 MG capsule, Take 2 capsules (600 mg) by mouth 3 times daily Refill 28 days after last fill  hydrOXYzine (VISTARIL) 50 MG capsule, Take 1 capsule (50 mg) by mouth nightly as needed for anxiety (insomnia)  medical cannabis (Patient's own supply), See Admin Instructions (The purpose of this " order is to document that the patient reports taking medical cannabis.  This is not a prescription, and is not used to certify that the patient has a qualifying medical condition.)  Multiple Vitamins-Minerals (MENS MULTIPLE VITAMIN/LYCOPENE PO), Take 1 tablet by mouth daily.  sildenafil (REVATIO) 20 MG tablet, TAKE ONE TABLET BY MOUTH DAILY AS DIRECTED PRIOR TO INTERCOURSE AS NEEDED    No current facility-administered medications on file prior to visit.       The [place] Prescription Monitoring Program has been reviewed and there are no concerns about diversionary activity for controlled substances at this time.      I was able to review most recent Primary Care Provider, specialty provider, and therapy visit notes that I have access to.     Today, patient reports since he started the Adderall, he feels like it is helpful.  It does not last very long.  He struggles in the evening with irritability.  He has to try to stay calm - it is internal and he has not been acting it out.  His son has noticed it - around 7 PM when the kids need to go to bed.  During the day , he feels even keeled.  He has patience now with driving.  Calmer and clearer thoughts.  After 3-4 hours it wears off -  He is able to practice mindfulness better.  He had a harder time engaging in other activities when the kids are home.  Now he is able to multi tasks.  He has no sleep concerns.He took the Lunesta one night.  He lowered his dose of gabapentin one night.       has a past medical history of Anxiety, Bipolar affective disorder (H), Borderline personality disorder (H), Cervicalgia, Chemical dependency (H), Chronic back pain, Depression, JUAREZ (generalized anxiety disorder), Insomnia, MDD (major depressive disorder), Migraine headache, and PTSD (post-traumatic stress disorder).    He has no past medical history of Complication of anesthesia, Diabetes (H), PONV (postoperative nausea and vomiting), Sleep apnea, or Uncomplicated asthma.    Social  history updates:    He is attending programming at [facility] - working through how to stay in group while being on controlled substances.  He meets with the group on Mondays and Fridays.  Elyssa on Tuesday - therapist.      Substance use updates:    Two times he had slight cravings for taking extra Adderall.  Cannabis use - wants to wean off of it.    Tobacco use: No    Vital Signs:   There were no vitals taken for this visit.    Labs:    Most recent laboratory results reviewed and no new labs.     Mental Status Examination:  Appearance: awake, alert and casual dress  Attitude: cooperative  Eye Contact:  adequate  Gait and Station: No dizziness or falls  Psychomotor Behavior:  intact station, gait and muscle tone  Oriented to:  time, person, and place  Attention Span and Concentration:  Normal  Speech:   vtspeech: clear, coherent and Speaks: English  Mood:  anxious, depressed, and better  Affect:  mood congruent  Associations:  no loose associations  Thought Process:  goal oriented  Thought Content:  no evidence of suicidal ideation or homicidal ideation, no auditory hallucinations present, and no visual hallucinations present  Recent and Remote Memory:  intact Not formally assessed. No amnesia.  Fund of Knowledge: appropriate  Insight:  good  Judgment: good  Impulse Control:  good    Suicide Risk Assessment:  Today Timmy Fitzpatrick reports no thoughts to harm themself or others. In addition, there are notable risk factors for self-harm, including anxiety, substance abuse, and mood change. However, risk is mitigated by commitment to family, history of seeking help when needed, future oriented, denies suicidal intent or plan, and denies homicidal ideation, intent, or plan. Therefore, based on all available evidence including the factors cited above, Timmy Fitzpatrick does not appear to be at imminent risk for self-harm, does not meet criteria for a 72-hr hold, and therefore remains appropriate for ongoing  outpatient level of care.  A thorough assessment of risk factors related to suicide and self-harm have been reviewed and are noted above. The patient convincingly denies suicidality on several occasions. Local community safety resources printed and reviewed for patient to use if needed. There was no deceit detected, and the patient presented in a manner that was believable.     DSM5 Diagnosis:  Attention-Deficit/Hyperactivity Disorder  314.00 (F90.0) Predominantly inattentive presentation  296.32 (F33.1) Major Depressive Disorder, Recurrent Episode, Moderate _ and With mixed features  300.02 (F41.1) Generalized Anxiety Disorder  780.52 (G47.00) Insomnia Disorder   With non-sleep disorder mental comorbidity  Episodic      Medical comorbidities include:   Patient Active Problem List    Diagnosis Date Noted    Umbilical hernia without obstruction and without gangrene 03/01/2021     Priority: Medium     Added automatically from request for surgery [id number]        Degeneration of lumbar or lumbosacral intervertebral disc 03/13/2020     Priority: Medium     Formatting of this note might be different from the original.  Added automatically from request for surgery [id number]        Lumbar disc herniation with radiculopathy 03/13/2020     Priority: Medium     Formatting of this note might be different from the original.  Added automatically from request for surgery [id number]        Generalized anxiety disorder 03/26/2019     Priority: Medium    Bipolar II disorder (H) 11/28/2018     Priority: Medium    Anxiety 09/18/2017     Priority: Medium    Tear of medial meniscus of knee 09/08/2016     Priority: Medium    Chemical dependency (H) 11/25/2015     Priority: Medium    Alcohol use disorder, severe, dependence (H) 09/13/2012     Priority: Medium    Major depression in partial remission (H24) 06/30/2011     Priority: Medium    Migraine headache 06/30/2011     Priority: Medium     (Problem list name updated by automated process.  Provider to review and confirm.)        CARDIOVASCULAR SCREENING; LDL GOAL LESS THAN 160 10/31/2010     Priority: Medium    Cervicalgia 03/24/2010     Priority: Medium    Nonallopathic lesion of thoracic region 03/24/2010     Priority: Medium     Problem list name updated by automated process. Provider to review        Nonallopathic lesion of lumbar region 03/24/2010     Priority: Medium     Problem list name updated by automated process. Provider to review           Assessment:    Timmy Fitzpatrick has noticed improvement in his abilities to focus and attend to tasks around the house and managing the children.  While he is pleased with his progress, he does admit to experiencing an increase in irritability towards the end of the evening.  He feels the Adderall could last longer if that at higher dose.  I did increase it to 15 mg daily.  All other medications will remain at the same doses for now.  He did adjust his gabapentin dose 1 day and we will look at changing the dosing of the gabapentin at his next visit if his mood continues to improve.  He is engaged in outpatient programming through [facility] and I asked him to contact them to let them know he is on the stimulants so that I can visit with them if needed to discuss the necessity of him taking this.  He remains sober from alcohol use..    Medication side effects and alternatives were reviewed. Health promotion activities recommended and reviewed today. All questions addressed. Education and counseling completed regarding risks and benefits of medications and psychotherapy options.    Treatment Plan:      1.  Increase Adderall XR to 15 mg daily-30-day supply given   2.  Bupropion  mg 2 times daily  3.  Lexapro 10 mg daily  4.  Lunesta 2 mg at bedtime as needed for insomnia  5.  Gabapentin 600 mg 3 times daily  6.  Hydroxyzine 50 mg at bedtime as needed for anxiety or insomnia  7.  Continue all outpatient programming through [facility] and also  individual therapy    Continue all other medications as reviewed per electronic medical record today.   Safety plan reviewed. To the Emergency Department as needed or call after hours crisis line at [phone] or [phone]. [place] Crisis Text Line. Text [place] to 305205 or Suicide LifeLine Chat: suicideAppointedd.org/chat/  To schedule individual or family therapy, call [facility] at [phone]  Schedule an appointment with me in November 3 or sooner as needed. Call [facility] at [phone] to schedule.  Follow up with primary care provider as planned or for acute medical concerns.  Call the psychiatric nurse line with medication questions or concerns at [phone]  MyChart may be used to communicate with your provider, but this is not intended to be used for emergencies.    Crisis Resources:    National Suicide Prevention Lifeline: 192.396.4578 (TTY: 487.375.4409). Call anytime for help.  (www.suicidepreventionlifeline.org)  National [place] on Mental Illness (www.audra.org): 979.368.7902 or 963-893-5649.   Mental Health Association (www.mentalhealth.org): 231.578.3137 or 125-618-9155.  [place] Crisis Text [place]: Text [place] to 299226  Suicide LifeLine Chat: suicideAppointedd.org/chat    Administrative Billing:   Time spent with patient includes counseling and coordination of care regarding above diagnoses and treatment plan.    Patient Status:  This is a continuous care patient and medications will be prescribed by the psychiatric provider until further indicated.    Signed:   EMILIA Wagner-BC   Psychiatry

## 2023-10-06 NOTE — NURSING NOTE
Is the patient currently in the state of [place]? YES    Visit mode:VIDEO    If the visit is dropped, the patient can be reconnected by: VIDEO VISIT: Text to cell phone:   Telephone Information:   Mobile [phone]       Will anyone else be joining the visit? No  (If patient encounters technical issues they should call [phone])    How would you like to obtain your AVS? MyChart    Are changes needed to the allergy or medication list? No    Rooming Documentation: Assigned questionnaire(s) completed .    Reason for visit: RECHECK     LUIGI Terrazas

## 2023-10-06 NOTE — PATIENT INSTRUCTIONS
"Patient Education   The Panel Psychiatry Program  What to Expect  Here's what to expect in the Panel Psychiatry Program.   About the program  You'll be meeting with a psychiatric doctor to check your mental health. A psychiatric doctor helps you deal with troubling thoughts and feelings by giving you medicine. They'll make sure you know the plan for your care. You may see them for a long time. When you're feeling better, they may refer you back to seeing your family doctor.   If you have any questions, we'll be glad to talk to you.  About visits  Be open  At your visits, please talk openly about your problems. It may feel hard, but it's the best way for us to help you.  Cancelling visits  If you can't come to your visit, please call us right away at [phone]. If you don't cancel at least 24 hours (1 full day) before your visit, that's \"late cancellation.\"  Not showing up for your visits  Being very late is the same as not showing up. You'll be a \"no show\" if:  You're more than 15 minutes late for a 30-minute (half hour) visit.  You're more than 30 minutes late for a 60-minute (full hour) visit.  If you cancel late or don't show up 2 times within 6 months, we may end your care.  Getting help between visits  If you need help between visits, you can call us Monday to Friday from 8 a.m. to 4:30 p.m. at [phone].  Emergency care  Call 911 or go to the nearest emergency department if your life or someone else's life is in danger.  Call 988 anytime to reach the national Suicide and Crisis hotline.  Medicine refills  To refill your medicine, call your pharmacy. You can also call [facility]'s Behavioral Access at [phone], Monday to Friday, 8 a.m. to 4:30 p.m. It can take 1 to 3 business days to get a refill.   Forms, letters, and tests  You may have papers to fill out, like FMLA, short-term disability, and workability. We can help you with these forms at your visits, but you must have an " appointment. You may need more than 1 visit for this, to be in an intensive therapy program, or both.  Before we can give you medicine for ADHD, we may refer you to get tested for it or confirm it another way.  We may not be able to give you an emotional support animal letter.  We don't do mental health checks ordered by the court.   We don't do mental health testing, but we can refer you to get tested.   Thank you for choosing us for your care.  For informational purposes only. Not to replace the advice of your health care provider. Copyright   2022 [facility]. All rights reserved. [employer] 941335 - 12/22.         Treatment Plan:      1.  Increase Adderall XR to 15 mg daily-30-day supply given   2.  Bupropion  mg 2 times daily  3.  Lexapro 10 mg daily  4.  Lunesta 2 mg at bedtime as needed for insomnia  5.  Gabapentin 600 mg 3 times daily  6.  Hydroxyzine 50 mg at bedtime as needed for anxiety or insomnia  7.  Continue all outpatient programming through [employer] [facility] and also individual therapy    Continue all other medications as reviewed per electronic medical record today.   Safety plan reviewed. To the Emergency Department as needed or call after hours crisis line at [phone] or [phone]. [place] Crisis Text [place]. Text [place] to 524340 or Suicide LifeLine Chat: suicidepreventionlifeline.org/chat/  To schedule individual or family therapy, call [facility] at [phone]  Schedule an appointment with me in November 3 or sooner as needed. Call [facility] at [phone] to schedule.  Follow up with primary care provider as planned or for acute medical concerns.  Call the psychiatric nurse line with medication questions or concerns at [phone]  MyChart may be used to communicate with your provider, but this is not intended to be used for emergencies.    Crisis Resources:    National Suicide Prevention Lifeline: 985.339.4267 (TTY: 558.311.7686). Call  anytime for help.  (www.suicidepreventionlifeline.org)  National [place] on Mental Illness (www.audra.org): 813.742.2038 or 391-236-9666.   Mental Health Association (www.mentalhealth.org): 193.875.6638 or 927-595-9745.  [place] Crisis [place]: Text [place] to 380094  Suicide LifeLine Chat: suicidepreventionlifeline.org/chat

## 2023-10-18 ENCOUNTER — TELEPHONE (OUTPATIENT)
Dept: PSYCHIATRY | Facility: CLINIC | Age: 43
End: 2023-10-18
Payer: COMMERCIAL

## 2023-10-18 NOTE — TELEPHONE ENCOUNTER
Reason for call:  Other   Patient called regarding (reason for call): call back  Additional comments: rcvd call from Cindy Frankey from [facility].   # [phone]. They would like to talk to team about clients prescribed Adderall   Per Veronica they will send NYA to [phone].     Phone number to reach patient:  Other phone number:  Veronica @ [phone]    Best Time:  any     Can we leave a detailed message on this number?  YES    Travel screening: Not Applicable

## 2023-10-19 NOTE — TELEPHONE ENCOUNTER
RN attempted to call Cindy Frankey from [facility].   # [phone].  There was no answer.  RN LVM instructing her to please fax this patients NYA to [phone] as well as the [phone].       Awaiting NYA from Cindy Frankey from [facility].    RN postponed this encounter until 10/20/23 with instructions to watch for YNA and to make second attempt to reach Cindy Frankey from [facility].     Karthik Teague RN on 10/19/2023 at 3:23 PM

## 2023-11-03 ENCOUNTER — VIRTUAL VISIT (OUTPATIENT)
Dept: PSYCHIATRY | Facility: CLINIC | Age: 43
End: 2023-11-03
Payer: COMMERCIAL

## 2023-11-03 DIAGNOSIS — F41.1 GENERALIZED ANXIETY DISORDER: ICD-10-CM

## 2023-11-03 DIAGNOSIS — F33.1 MODERATE EPISODE OF RECURRENT MAJOR DEPRESSIVE DISORDER (H): Primary | ICD-10-CM

## 2023-11-03 DIAGNOSIS — F90.0 ATTENTION DEFICIT HYPERACTIVITY DISORDER (ADHD), PREDOMINANTLY INATTENTIVE TYPE: ICD-10-CM

## 2023-11-03 DIAGNOSIS — F10.21 ALCOHOL USE DISORDER, MODERATE, IN EARLY REMISSION (H): ICD-10-CM

## 2023-11-03 DIAGNOSIS — F99 INSOMNIA DUE TO OTHER MENTAL DISORDER: ICD-10-CM

## 2023-11-03 DIAGNOSIS — F51.05 INSOMNIA DUE TO OTHER MENTAL DISORDER: ICD-10-CM

## 2023-11-03 PROCEDURE — 99214 OFFICE O/P EST MOD 30 MIN: CPT | Mod: VID | Performed by: NURSE PRACTITIONER

## 2023-11-03 RX ORDER — ESCITALOPRAM OXALATE 5 MG/1
5 TABLET ORAL DAILY
Qty: 90 TABLET | Refills: 0 | Status: SHIPPED | OUTPATIENT
Start: 2023-11-03 | End: 2024-01-02

## 2023-11-03 RX ORDER — GABAPENTIN 300 MG/1
600 CAPSULE ORAL 3 TIMES DAILY
Qty: 180 CAPSULE | Refills: 1 | Status: SHIPPED | OUTPATIENT
Start: 2023-11-03 | End: 2024-01-02

## 2023-11-03 RX ORDER — DEXTROAMPHETAMINE SACCHARATE, AMPHETAMINE ASPARTATE MONOHYDRATE, DEXTROAMPHETAMINE SULFATE AND AMPHETAMINE SULFATE 3.75; 3.75; 3.75; 3.75 MG/1; MG/1; MG/1; MG/1
15 CAPSULE, EXTENDED RELEASE ORAL DAILY
Qty: 30 CAPSULE | Refills: 0 | Status: SHIPPED | OUTPATIENT
Start: 2023-11-03 | End: 2023-11-29

## 2023-11-03 ASSESSMENT — PATIENT HEALTH QUESTIONNAIRE - PHQ9
SUM OF ALL RESPONSES TO PHQ QUESTIONS 1-9: 7
10. IF YOU CHECKED OFF ANY PROBLEMS, HOW DIFFICULT HAVE THESE PROBLEMS MADE IT FOR YOU TO DO YOUR WORK, TAKE CARE OF THINGS AT HOME, OR GET ALONG WITH OTHER PEOPLE: SOMEWHAT DIFFICULT
SUM OF ALL RESPONSES TO PHQ QUESTIONS 1-9: 7

## 2023-11-03 NOTE — NURSING NOTE
Is the patient currently in the state of [place]? YES    Visit mode:VIDEO    If the visit is dropped, the patient can be reconnected by: VIDEO VISIT: Text to cell phone:   Telephone Information:   Mobile [phone]       Will anyone else be joining the visit? No  (If patient encounters technical issues they should call [phone])    How would you like to obtain your AVS? MyChart    Are changes needed to the allergy or medication list? No    Rooming Documentation: Patient will complete qnrs in mychart.    Reason for visit: LUIGI Mays

## 2023-11-03 NOTE — PATIENT INSTRUCTIONS
"Patient Education   The [facility]  What to Expect  Here's what to expect in the Panel Psych[facility] Program.   About the program  You'll be meeting with a psychiatric doctor to check your mental health. A psychiatric doctor helps you deal with troubling thoughts and feelings by giving you medicine. They'll make sure you know the plan for your care. You may see them for a long time. When you're feeling better, they may refer you back to seeing your family doctor.   If you have any questions, we'll be glad to talk to you.  About visits  Be open  At your visits, please talk openly about your problems. It may feel hard, but it's the best way for us to help you.  Cancelling visits  If you can't come to your visit, please call us right away at [phone]. If you don't cancel at least 24 hours (1 full day) before your visit, that's \"late cancellation.\"  Not showing up for your visits  Being very late is the same as not showing up. You'll be a \"no show\" if:  You're more than 15 minutes late for a 30-minute (half hour) visit.  You're more than 30 minutes late for a 60-minute (full hour) visit.  If you cancel late or don't show up 2 times within 6 months, we may end your care.  Getting help between visits  If you need help between visits, you can call us Monday to Friday from 8 a.m. to 4:30 p.m. at [phone].  Emergency care  Call 911 or go to the nearest emergency department if your life or someone else's life is in danger.  Call 988 anytime to reach the national Suicide and Crisis hotline.  Medicine refills  To refill your medicine, call your pharmacy. You can also call [facility]'s [facility] at [phone], Monday to Friday, 8 a.m. to 4:30 p.m. It can take 1 to 3 business days to get a refill.   Forms, letters, and tests  You may have papers to fill out, like FMLA, short-term disability, and workability. We can help you with these forms at your visits, but you must have an " appointment. You may need more than 1 visit for this, to be in an intensive therapy program, or both.  Before we can give you medicine for ADHD, we may refer you to get tested for it or confirm it another way.  We may not be able to give you an emotional support animal letter.  We don't do mental health checks ordered by the court.   We don't do mental health testing, but we can refer you to get tested.   Thank you for choosing us for your care.  For informational purposes only. Not to replace the advice of your health care provider. Copyright   2022 [facility]. All rights reserved. [employer] [id number] - 12/22.       Treatment Plan:      1.  Decrease Lexapro to 5 mg daily  2.  Continue Adderall XR 15 mg daily  3.  Continue Wellbutrin  mg 2 times daily  4.  Continue Lunesta 2 mg at bedtime as needed for sleep-no refills needed-take sparingly  5.  Continue gabapentin 600 mg 3 times daily with plans to taper off in the future to a lower dose  6.  Hydroxyzine discontinued  7.  Continue talk therapy to learn more skills to manage your ADHD and life stressors    Continue all other medications as reviewed per electronic medical record today.   Safety plan reviewed. To the Emergency Department as needed or call after hours crisis line at [phone] or [phone]. [place] Crisis Text Line. Text [place] to 863068 or Suicide LifeLine Chat: suicidepreventionlifeline.org/chat/  To schedule individual or family therapy, call [facility] at [phone]  Schedule an appointment with me in number or sooner as needed. Call [facility] at [phone] to schedule.  Follow up with primary care provider as planned or for acute medical concerns.  Call the psychiatric nurse line with medication questions or concerns at [phone]  MyChart may be used to communicate with your provider, but this is not intended to be used for emergencies.    Crisis Resources:    National Suicide  Prevention Lifeline: 373.436.3944 (TTY: 544.128.5134). Call anytime for help.  (www.suicidepreventionlifeline.org)  National [place] on Mental Illness (www.audra.org): 433.305.5818 or 409-620-7703.   Mental Health Association (www.mentalhealth.org): 240.898.9080 or 229-139-9173.  [place] Crisis Text [place]: Text [place] to 820263  Suicide LifeLine Chat: suicidepreventionlifeline.org/chat

## 2023-11-03 NOTE — PROGRESS NOTES
"Virtual Visit Details    Type of service:  Video Visit     Originating Location (pt. Location): Home    Distant Location (provider location):  Off-site  Platform used for Video Visit: Bill      Start time: 10:16 AM  Stop time: 10:42 AM            Outpatient Psychiatric Progress Note    Name: Timmy Fitzpatrick   : 1980                    Primary Care Provider:  [facility]   Therapist: Yes    PHQ-9 scores:      2023     2:20 PM 2023    12:41 PM 2023     2:37 PM   PHQ-9 SCORE   PHQ-9 Total Score MyChart 14 (Moderate depression) 12 (Moderate depression)    PHQ-9 Total Score 14 12 11       JUAREZ-7 scores:      3/2/2023    10:09 AM 3/30/2023    10:10 AM 2023    12:42 PM   JUAREZ-7 SCORE   Total Score 19 (severe anxiety) 19 (severe anxiety) 12 (moderate anxiety)   Total Score 19 19 12       Patient Identification:    Patient is a 43 year old year old,   Choose not to Answer Choose not to answer male  who presents for return visit with me.  Patient is currently unemployed. Patient attended the session alone. Patient prefers to be called: \" Ventura\".    Current medications include: amphetamine-dextroamphetamine (ADDERALL XR) 15 MG 24 hr capsule, Take 1 capsule (15 mg) by mouth daily  buPROPion (WELLBUTRIN SR) 150 MG 12 hr tablet, Take 1 tablet (150 mg) by mouth 2 times daily  cyclobenzaprine (FLEXERIL) 5 MG tablet, Take 1-2 tablets (5-10 mg) by mouth 3 times daily as needed for muscle spasms  escitalopram (LEXAPRO) 10 MG tablet, Take 1 tablet (10 mg) by mouth daily  eszopiclone (LUNESTA) 2 MG tablet, Take 1 tablet (2 mg) by mouth nightly as needed for sleep  gabapentin (NEURONTIN) 300 MG capsule, Take 2 capsules (600 mg) by mouth 3 times daily Refill 28 days after last fill  hydrOXYzine (VISTARIL) 50 MG capsule, Take 1 capsule (50 mg) by mouth nightly as needed for anxiety (insomnia)  medical cannabis (Patient's own supply), See Admin Instructions (The purpose of this " order is to document that the patient reports taking medical cannabis.  This is not a prescription, and is not used to certify that the patient has a qualifying medical condition.)  Multiple Vitamins-Minerals (MENS MULTIPLE VITAMIN/LYCOPENE PO), Take 1 tablet by mouth daily.  sildenafil (REVATIO) 20 MG tablet, TAKE ONE TABLET BY MOUTH DAILY AS DIRECTED PRIOR TO INTERCOURSE AS NEEDED    No current facility-administered medications on file prior to visit.       The [place] Prescription Monitoring Program has been reviewed and there are no concerns about diversionary activity for controlled substances at this time.      I was able to review most recent Primary Care Provider, specialty provider, and therapy visit notes that I have access to.     Today, patient reports He has been attending programming through [facility] since May.  There has been communication with them as there is a concern for him taking adderall.  He was told to keep quiet about his medication.   As of Tuesday , he requested a discharge from a program.  He has been sober almost 6 months.   His plan is to connect with his friend Nhan for his sobriety.  No plans for other sober activities.  He feels positive about the direction he is going. No sleep concerns.  He has stopped taking the hydroxyzine.  He has taken a total of two tabs lunesta since starting Adderall.  He feels like his appetite has improved.  With the increase in the dose of Adderall he has felt like he can focus longer.  He is able to give his wife more emotional support.  He is able to communicate clearer to others.   As the evening approaches, it can be more difficult to regulate his emotions.  He takes the Adderall at times when he needs to attend the most.  He feels like his depression has improved.  Yesterday  he felt a little down.       has a past medical history of Anxiety, Bipolar affective disorder (H), Borderline personality disorder (H), Cervicalgia, Chemical dependency (H),  Chronic back pain, Depression, JUAREZ (generalized anxiety disorder), Insomnia, MDD (major depressive disorder), Migraine headache, and PTSD (post-traumatic stress disorder).    He has no past medical history of Complication of anesthesia, Diabetes (H), PONV (postoperative nausea and vomiting), Sleep apnea, or Uncomplicated asthma.    Social history updates:    Ventura lives with his wife and daughters.  He is unemployed.    Substance use updates:    Sober from alcohol since May  Tobacco use: Yes Cigarettes  Ready to quit?  No  Nicotine Replacement Therapy tried: None    Vital Signs:   There were no vitals taken for this visit.    Labs:    Most recent laboratory results reviewed and no new labs.     Mental Status Examination:  Appearance: awake, alert and casual dress  Attitude: cooperative  Eye Contact:  adequate  Gait and Station: No dizziness or falls  Psychomotor Behavior:  intact station, gait and muscle tone  Oriented to:  time, person, and place  Attention Span and Concentration:  Normal  Speech:   vtspeech: clear, coherent and Speaks: English  Mood:  anxious, depressed, and better  Affect:  mood congruent  Associations:  no loose associations  Thought Process:  goal oriented  Thought Content:  no evidence of suicidal ideation or homicidal ideation, no auditory hallucinations present, and no visual hallucinations present  Recent and Remote Memory:  intact Not formally assessed. No amnesia.  Fund of Knowledge: appropriate  Insight:  good  Judgment: good  Impulse Control:  good    Suicide Risk Assessment:  Today Timmy Fitzpatrick reports no thoughts to harm themself or others. In addition, there are notable risk factors for self-harm, including anxiety, substance abuse, and mood change. However, risk is mitigated by commitment to family, history of seeking help when needed, future oriented, denies suicidal intent or plan, and denies homicidal ideation, intent, or plan. Therefore, based on all available evidence  including the factors cited above, Timmy Fitzpatrick does not appear to be at imminent risk for self-harm, does not meet criteria for a 72-hr hold, and therefore remains appropriate for ongoing outpatient level of care.  A thorough assessment of risk factors related to suicide and self-harm have been reviewed and are noted above. The patient convincingly denies suicidality on several occasions. Local community safety resources printed and reviewed for patient to use if needed. There was no deceit detected, and the patient presented in a manner that was believable.     DSM5 Diagnosis:  Attention-Deficit/Hyperactivity Disorder  314.00 (F90.0) Predominantly inattentive presentation  296.32 (F33.1) Major Depressive Disorder, Recurrent Episode, Moderate _ and With mixed features  300.02 (F41.1) Generalized Anxiety Disorder  780.52 (G47.00) Insomnia Disorder   With non-sleep disorder mental comorbidity  Episodic    Substance-Related & Addictive Disorders Alcohol Use Disorder   303.90 (F10.20) Moderate In early remission,     Medical comorbidities include:   Patient Active Problem List    Diagnosis Date Noted    Umbilical hernia without obstruction and without gangrene 03/01/2021     Priority: Medium     Added automatically from request for surgery [id number]      Degeneration of lumbar or lumbosacral intervertebral disc 03/13/2020     Priority: Medium     Formatting of this note might be different from the original.  Added automatically from request for surgery [id number]      Lumbar disc herniation with radiculopathy 03/13/2020     Priority: Medium     Formatting of this note might be different from the original.  Added automatically from request for surgery [id number]      Generalized anxiety disorder 03/26/2019     Priority: Medium    Bipolar II disorder (H) 11/28/2018     Priority: Medium    Anxiety 09/18/2017     Priority: Medium    Tear of medial meniscus of knee 09/08/2016     Priority: Medium    Chemical dependency (H)  11/25/2015     Priority: Medium    Alcohol use disorder, severe, dependence (H) 09/13/2012     Priority: Medium    Major depression in partial remission (H24) 06/30/2011     Priority: Medium    Migraine headache 06/30/2011     Priority: Medium     (Problem list name updated by automated process. Provider to review and confirm.)      CARDIOVASCULAR SCREENING; LDL GOAL LESS THAN 160 10/31/2010     Priority: Medium    Cervicalgia 03/24/2010     Priority: Medium    Nonallopathic lesion of thoracic region 03/24/2010     Priority: Medium     Problem list name updated by automated process. Provider to review      Nonallopathic lesion of lumbar region 03/24/2010     Priority: Medium     Problem list name updated by automated process. Provider to review         Assessment:    Timmy Fitzpatrick finds that taking the Adderall has kept him on task and motivated.  He feels more organized and less distracted.  Eventually he would like to decrease some of his other medications as his depression is also lifting but he still has times when he feels down.  The Lexapro has been decreased to 5 mg daily but he will continue with the Wellbutrin for now.  Lunesta is rarely taken and no refills were needed today.  Gabapentin will continue at its current dose but he is taking it upon himself to occasionally skip a dose as, again he would like to take less medication.  Hydroxyzine has been discontinued as he has not been taking it due to excessive sedation..    Medication side effects and alternatives were reviewed. Health promotion activities recommended and reviewed today. All questions addressed. Education and counseling completed regarding risks and benefits of medications and psychotherapy options.    Treatment Plan:      1.  Decrease Lexapro to 5 mg daily  2.  Continue Adderall XR 15 mg daily  3.  Continue Wellbutrin  mg 2 times daily  4.  Continue Lunesta 2 mg at bedtime as needed for sleep-no refills needed-take sparingly  5.   Continue gabapentin 600 mg 3 times daily with plans to taper off in the future to a lower dose  6.  Hydroxyzine discontinued  7.  Continue talk therapy to learn more skills to manage your ADHD and life stressors    Continue all other medications as reviewed per electronic medical record today.   Safety plan reviewed. To the Emergency Department as needed or call after hours crisis line at [phone] or [phone]. [place] Crisis Text Line. Text [place] to 769370 or Suicide LifeLine Chat: suicideDLC Distributors.org/chat/  To schedule individual or family therapy, call [facility] at [phone]  Schedule an appointment with me in number or sooner as needed. Call [facility] at [phone] to schedule.  Follow up with primary care provider as planned or for acute medical concerns.  Call the psychiatric nurse line with medication questions or concerns at [phone]  MyChart may be used to communicate with your provider, but this is not intended to be used for emergencies.    Crisis Resources:    National Suicide Prevention Lifeline: 742.720.3739 (TTY: 893.934.3629). Call anytime for help.  (www.suicidepreventionlifeline.org)  National [place] on Mental Illness (www.audra.org): 276.300.5333 or 363-869-1464.   Mental Health Association (www.mentalhealth.org): 466.774.3587 or 692-276-5456.  [place] Crisis Text [place]: Text [place] to 076183  Suicide LifeLine Chat: suicideDLC Distributors.org/chat    Administrative Billing:   Time spent with patient includes counseling and coordination of care regarding above diagnoses and treatment plan.    Patient Status:  This is a continuous care patient and medications will be prescribed by the psychiatric provider until further indicated.    Signed:   EMILIA Wagner-BC   Psychiatry

## 2023-11-29 ENCOUNTER — MYC REFILL (OUTPATIENT)
Dept: PSYCHIATRY | Facility: CLINIC | Age: 43
End: 2023-11-29
Payer: COMMERCIAL

## 2023-11-29 DIAGNOSIS — F90.0 ATTENTION DEFICIT HYPERACTIVITY DISORDER (ADHD), PREDOMINANTLY INATTENTIVE TYPE: ICD-10-CM

## 2023-11-29 NOTE — TELEPHONE ENCOUNTER
Patient Comment: This is the first Friday we haven't checked in with an appointment.  Will you send this to the pharmacy on Friday?  Should I request from the pharmacy?  Thank you!  -Ventura Fitzpatrick     Date of Last Office Visit: 11/3/23  Date of Next Office Visit: 1/2/24  No shows since last visit: 0  Cancellations since last visit: 0    Medication requested: amphetamine-dextroamphetamine (ADDERALL XR) 15 MG 24 hr capsule  Date last ordered: 11/3/23 Qty: 30 Refills: 0     [place] of [place] ?: Yes  Medication last sold date: 11/3/23 Qty filled: 30  Other controlled substance on [place] ?: Yes - Gabapentin  If yes, is this a new medication?: No    Lapse in medication adherence greater than 5 days?: No    Medication refill request verified as identical to current order?: Yes  Result of Last DAM, VPA, Li+ Level, CBC, or Carbamazepine Level (at or since last visit): N/A    Last visit treatment plan:   Assessment:     Timmy Fitzpatrick finds that taking the Adderall has kept him on task and motivated.  He feels more organized and less distracted.  Eventually he would like to decrease some of his other medications as his depression is also lifting but he still has times when he feels down.  The Lexapro has been decreased to 5 mg daily but he will continue with the Wellbutrin for now.  Lunesta is rarely taken and no refills were needed today.  Gabapentin will continue at its current dose but he is taking it upon himself to occasionally skip a dose as, again he would like to take less medication.  Hydroxyzine has been discontinued as he has not been taking it due to excessive sedation..     Medication side effects and alternatives were reviewed. Health promotion activities recommended and reviewed today. All questions addressed. Education and counseling completed regarding risks and benefits of medications and psychotherapy options.     Treatment Plan:        1.  Decrease Lexapro to 5 mg daily  2.  Continue Adderall XR 15 mg  daily  3.  Continue Wellbutrin  mg 2 times daily  4.  Continue Lunesta 2 mg at bedtime as needed for sleep-no refills needed-take sparingly  5.  Continue gabapentin 600 mg 3 times daily with plans to taper off in the future to a lower dose  6.  Hydroxyzine discontinued  7.  Continue talk therapy to learn more skills to manage your ADHD and life stressors     Continue all other medications as reviewed per electronic medical record today.   Safety plan reviewed. To the Emergency Department as needed or call after hours crisis line at [phone] or [phone]. [place] Crisis Text [place]. Text [place] to 432184 or Suicide LifeLine Chat: [email or web address] Biotechline.org/chat/  To schedule individual or family therapy, call [facility] at [phone]  Schedule an appointment with me in number or sooner as needed.     []Medication refilled per  Medication Refill in Ambulatory Care  policy.  [x]Medication unable to be refilled by RN due to criteria not met as indicated below:    []Eligibility - not seen in the last year   []Supervision - no future appointment   []Compliance - no shows, cancellations or lapse in therapy   []Verification - order discrepancy   [x]Controlled medication   []Medication not included in policy   []90-day supply request   []Other

## 2023-11-30 RX ORDER — DEXTROAMPHETAMINE SACCHARATE, AMPHETAMINE ASPARTATE MONOHYDRATE, DEXTROAMPHETAMINE SULFATE AND AMPHETAMINE SULFATE 3.75; 3.75; 3.75; 3.75 MG/1; MG/1; MG/1; MG/1
15 CAPSULE, EXTENDED RELEASE ORAL DAILY
Qty: 30 CAPSULE | Refills: 0 | Status: SHIPPED | OUTPATIENT
Start: 2023-11-30 | End: 2023-12-28

## 2023-12-07 ENCOUNTER — HOSPITAL ENCOUNTER (EMERGENCY)
Facility: CLINIC | Age: 43
Discharge: LEFT BEFORE DISCHARGE | End: 2023-12-07
Admitting: EMERGENCY MEDICINE
Payer: COMMERCIAL

## 2023-12-07 ENCOUNTER — APPOINTMENT (OUTPATIENT)
Dept: GENERAL RADIOLOGY | Facility: CLINIC | Age: 43
End: 2023-12-07
Attending: EMERGENCY MEDICINE
Payer: COMMERCIAL

## 2023-12-07 VITALS
BODY MASS INDEX: 22.9 KG/M2 | OXYGEN SATURATION: 100 % | HEIGHT: 70 IN | HEART RATE: 71 BPM | RESPIRATION RATE: 16 BRPM | WEIGHT: 160 LBS | DIASTOLIC BLOOD PRESSURE: 83 MMHG | TEMPERATURE: 97 F | SYSTOLIC BLOOD PRESSURE: 130 MMHG

## 2023-12-07 PROCEDURE — 99281 EMR DPT VST MAYX REQ PHY/QHP: CPT | Performed by: EMERGENCY MEDICINE

## 2023-12-07 PROCEDURE — 73140 X-RAY EXAM OF FINGER(S): CPT | Mod: LT

## 2023-12-07 NOTE — ED TRIAGE NOTES
Crush injury to left ring finger; finger between toilet tank and 2x6. Pain and swelling, unable to get ring off.      Triage Assessment (Adult)       Row Name 12/07/23 0926          Triage Assessment    Airway WDL WDL        Cardiac WDL    Cardiac WDL WDL        Cognitive/Neuro/Behavioral WDL    Cognitive/Neuro/Behavioral WDL WDL

## 2023-12-28 ENCOUNTER — MYC REFILL (OUTPATIENT)
Dept: PSYCHIATRY | Facility: CLINIC | Age: 43
End: 2023-12-28
Payer: COMMERCIAL

## 2023-12-28 DIAGNOSIS — F90.0 ATTENTION DEFICIT HYPERACTIVITY DISORDER (ADHD), PREDOMINANTLY INATTENTIVE TYPE: ICD-10-CM

## 2023-12-29 RX ORDER — DEXTROAMPHETAMINE SACCHARATE, AMPHETAMINE ASPARTATE MONOHYDRATE, DEXTROAMPHETAMINE SULFATE AND AMPHETAMINE SULFATE 3.75; 3.75; 3.75; 3.75 MG/1; MG/1; MG/1; MG/1
15 CAPSULE, EXTENDED RELEASE ORAL DAILY
Qty: 30 CAPSULE | Refills: 0 | Status: SHIPPED | OUTPATIENT
Start: 2023-12-29

## 2023-12-29 NOTE — TELEPHONE ENCOUNTER
Date of Last Office Visit: 11/3/24  Date of Next Office Visit: 1/2/24  No shows since last visit: 0  Cancellations since last visit: 0    Medication requested: amphetamine-dextroamphetamine (ADDERALL XR) 15 MG 24 hr  Date last ordered: 11/30/23 Qty: 30 Refills: 0       [place] of [place] ?: Not a provider delegate-- pharmacy reports last picked up on 12/1/23    Lapse in medication adherence greater than 5 days?: Unknown - RN called to ask if patient had enough to reach next appointment, but no answer.  If yes, call patient and gather details: see above    Medication refill request verified as identical to current order?: yes  Result of Last DAM, VPA, Li+ Level, CBC, or Carbamazepine Level (at or since last visit): N/A    Last visit treatment plan:   Treatment Plan:     1.  Decrease Lexapro to 5 mg daily  2.  Continue Adderall XR 15 mg daily  3.  Continue Wellbutrin  mg 2 times daily  4.  Continue Lunesta 2 mg at bedtime as needed for sleep-no refills needed-take sparingly  5.  Continue gabapentin 600 mg 3 times daily with plans to taper off in the future to a lower dose  6.  Hydroxyzine discontinued  7.  Continue talk therapy to learn more skills to manage your ADHD and life stressors     Continue all other medications as reviewed per electronic medical record today.   Safety plan reviewed. To the Emergency Department as needed or call after hours crisis line at [phone] or [phone]. [place] Crisis Text [place]. Text [place] to 750736 or Suicide LifeLine Chat: suicidepreventionlifeline.org/chat/  To schedule individual or family therapy, call [facility] at [phone]  Schedule an appointment with me in number or sooner as needed. Call [facility] at [phone] to schedule.  Follow up with primary care provider as planned or for acute medical concerns.  Call the psychiatric nurse line with medication questions or concerns at [phone]  MyChart may be used to communicate  with your provider, but this is not intended to be used for emergencies.    []Medication refilled per  Medication Refill in Ambulatory Care  policy.  []Medication unable to be refilled by RN due to criteria not met as indicated below:    []Eligibility - not seen in the last year   []Supervision - no future appointment   []Compliance - no shows, cancellations or lapse in therapy   []Verification - order discrepancy   []Controlled medication   []Medication not included in policy   []90-day supply request   []Other

## 2024-01-02 ENCOUNTER — VIRTUAL VISIT (OUTPATIENT)
Dept: PSYCHIATRY | Facility: CLINIC | Age: 44
End: 2024-01-02
Payer: COMMERCIAL

## 2024-01-02 DIAGNOSIS — F41.1 GENERALIZED ANXIETY DISORDER: ICD-10-CM

## 2024-01-02 DIAGNOSIS — F99 INSOMNIA DUE TO OTHER MENTAL DISORDER: ICD-10-CM

## 2024-01-02 DIAGNOSIS — F33.1 MODERATE EPISODE OF RECURRENT MAJOR DEPRESSIVE DISORDER (H): Primary | ICD-10-CM

## 2024-01-02 DIAGNOSIS — F51.05 INSOMNIA DUE TO OTHER MENTAL DISORDER: ICD-10-CM

## 2024-01-02 DIAGNOSIS — F90.0 ATTENTION DEFICIT HYPERACTIVITY DISORDER (ADHD), PREDOMINANTLY INATTENTIVE TYPE: ICD-10-CM

## 2024-01-02 DIAGNOSIS — F10.21 ALCOHOL USE DISORDER, MODERATE, IN EARLY REMISSION (H): ICD-10-CM

## 2024-01-02 PROCEDURE — 99214 OFFICE O/P EST MOD 30 MIN: CPT | Mod: 95 | Performed by: NURSE PRACTITIONER

## 2024-01-02 RX ORDER — ESCITALOPRAM OXALATE 5 MG/1
5 TABLET ORAL DAILY
Qty: 90 TABLET | Refills: 0 | Status: SHIPPED | OUTPATIENT
Start: 2024-01-02 | End: 2024-06-11

## 2024-01-02 RX ORDER — BUPROPION HYDROCHLORIDE 150 MG/1
150 TABLET, EXTENDED RELEASE ORAL 2 TIMES DAILY
Qty: 60 TABLET | Refills: 3 | Status: SHIPPED | OUTPATIENT
Start: 2024-01-02 | End: 2024-06-11

## 2024-01-02 RX ORDER — GABAPENTIN 300 MG/1
600 CAPSULE ORAL 3 TIMES DAILY
Qty: 180 CAPSULE | Refills: 1 | Status: SHIPPED | OUTPATIENT
Start: 2024-01-02

## 2024-01-02 RX ORDER — DEXTROAMPHETAMINE SACCHARATE, AMPHETAMINE ASPARTATE MONOHYDRATE, DEXTROAMPHETAMINE SULFATE AND AMPHETAMINE SULFATE 3.75; 3.75; 3.75; 3.75 MG/1; MG/1; MG/1; MG/1
15 CAPSULE, EXTENDED RELEASE ORAL DAILY
Qty: 30 CAPSULE | Refills: 0 | Status: SHIPPED | OUTPATIENT
Start: 2024-01-30

## 2024-01-02 NOTE — PATIENT INSTRUCTIONS
"Patient Education   The [facility]  What to Expect  Here's what to expect in the Panel Psychiatry Program.   About the program  You'll be meeting with a psychiatric doctor to check your mental health. A psychiatric doctor helps you deal with troubling thoughts and feelings by giving you medicine. They'll make sure you know the plan for your care. You may see them for a long time. When you're feeling better, they may refer you back to seeing your family doctor.   If you have any questions, we'll be glad to talk to you.  About visits  Be open  At your visits, please talk openly about your problems. It may feel hard, but it's the best way for us to help you.  Cancelling visits  If you can't come to your visit, please call us right away at [phone]. If you don't cancel at least 24 hours (1 full day) before your visit, that's \"late cancellation.\"  Not showing up for your visits  Being very late is the same as not showing up. You'll be a \"no show\" if:  You're more than 15 minutes late for a 30-minute (half hour) visit.  You're more than 30 minutes late for a 60-minute (full hour) visit.  If you cancel late or don't show up 2 times within 6 months, we may end your care.  Getting help between visits  If you need help between visits, you can call us Monday to Friday from 8 a.m. to 4:30 p.m. at [phone].  Emergency care  Call 911 or go to the nearest emergency department if your life or someone else's life is in danger.  Call 988 anytime to reach the national Suicide and Crisis hotline.  Medicine refills  To refill your medicine, call your pharmacy. You can also call [facility]'s [facility] at [phone], Monday to Friday, 8 a.m. to 4:30 p.m. It can take 1 to 3 business days to get a refill.   Forms, letters, and tests  You may have papers to fill out, like FMLA, short-term disability, and workability. We can help you with these forms at your visits, but you must have an " appointment. You may need more than 1 visit for this, to be in an intensive therapy program, or both.  Before we can give you medicine for ADHD, we may refer you to get tested for it or confirm it another way.  We may not be able to give you an emotional support animal letter.  We don't do mental health checks ordered by the court.   We don't do mental health testing, but we can refer you to get tested.   Thank you for choosing us for your care.  For informational purposes only. Not to replace the advice of your health care provider. Copyright   2022 [facility]. All rights reserved. [employer] 301328 - 12/22.    1.  Continue Adderall XR 15 mg daily-next refill January 30  2.  Continue Wellbutrin  mg 2 times daily  3.  Continue Lexapro 5 mg daily  4.  Continue Lunesta 2 mg daily as needed for sleep-no refills today  5.  Continue gabapentin 600 mg 3 times daily  6.  Continue talk therapy    Continue all other medications as reviewed per electronic medical record today.   Safety plan reviewed. To the Emergency Department as needed or call after hours crisis line at [phone] or [phone]. [place] Crisis Text Line. Text [place] to 439805 or Suicide LifeLine Chat: suicidepreventionlifeline.org/chat/  To schedule individual or family therapy, call [facility] at [phone]  Schedule an appointment with me in 2 months or sooner as needed. Call [facility] at [phone] to schedule.  Follow up with primary care provider as planned or for acute medical concerns.  Call the psychiatric nurse line with medication questions or concerns at [phone]  MyChart may be used to communicate with your provider, but this is not intended to be used for emergencies.    Crisis Resources:    National Suicide Prevention Lifeline: 943.667.7338 (TTY: 122.150.3094). Call anytime for help.  (www.suicidepreventionlifeline.org)  National [place] [employer] [place]ness (www.audra.org):  468-444-7142 or 233-170-5282.   Mental Health Association (www.mentalhealth.org): 470.903.3082 or 716-961-7591.  [place] Crisis Text Line: Text [place] to 953338  Suicide LifeLine Chat: suicidepreventionlifeline.org/chat

## 2024-01-02 NOTE — NURSING NOTE
Is the patient currently in the state of [place]? YES    Visit mode:VIDEO    If the visit is dropped, the patient can be reconnected by: VIDEO VISIT: Text to cell phone:   Telephone Information:   Mobile [phone]       Will anyone else be joining the visit? NO  (If patient encounters technical issues they should call [phone])    How would you like to obtain your AVS? MyChart    Are changes needed to the allergy or medication list? Pt stated no changes to allergies and Pt stated no med changes    Reason for visit: RODOLFO MEYERS

## 2024-06-06 ENCOUNTER — TRANSFERRED RECORDS (OUTPATIENT)
Dept: HEALTH INFORMATION MANAGEMENT | Facility: CLINIC | Age: 44
End: 2024-06-06
Payer: COMMERCIAL

## 2024-06-11 ENCOUNTER — OFFICE VISIT (OUTPATIENT)
Dept: FAMILY MEDICINE | Facility: CLINIC | Age: 44
End: 2024-06-11
Payer: COMMERCIAL

## 2024-06-11 VITALS
HEIGHT: 70 IN | TEMPERATURE: 99.3 F | BODY MASS INDEX: 22.59 KG/M2 | RESPIRATION RATE: 18 BRPM | WEIGHT: 157.8 LBS | DIASTOLIC BLOOD PRESSURE: 82 MMHG | HEART RATE: 84 BPM | SYSTOLIC BLOOD PRESSURE: 124 MMHG | OXYGEN SATURATION: 97 %

## 2024-06-11 DIAGNOSIS — G89.29 CHRONIC RIGHT SHOULDER PAIN: ICD-10-CM

## 2024-06-11 DIAGNOSIS — Z01.818 PREOP GENERAL PHYSICAL EXAM: Primary | ICD-10-CM

## 2024-06-11 DIAGNOSIS — F31.81 BIPOLAR II DISORDER (H): ICD-10-CM

## 2024-06-11 DIAGNOSIS — M25.511 CHRONIC RIGHT SHOULDER PAIN: ICD-10-CM

## 2024-06-11 PROCEDURE — 99214 OFFICE O/P EST MOD 30 MIN: CPT | Performed by: NURSE PRACTITIONER

## 2024-06-11 ASSESSMENT — PATIENT HEALTH QUESTIONNAIRE - PHQ9
SUM OF ALL RESPONSES TO PHQ QUESTIONS 1-9: 11
10. IF YOU CHECKED OFF ANY PROBLEMS, HOW DIFFICULT HAVE THESE PROBLEMS MADE IT FOR YOU TO DO YOUR WORK, TAKE CARE OF THINGS AT HOME, OR GET ALONG WITH OTHER PEOPLE: SOMEWHAT DIFFICULT
SUM OF ALL RESPONSES TO PHQ QUESTIONS 1-9: 11

## 2024-06-11 NOTE — PROGRESS NOTES
Preoperative Evaluation  [facility]  [address]  [facility] [address]  Phone: [phone]  Primary Provider: [facility]  Pre-op Performing Provider: URSULA Urena CNP  Jun 11, 2024 6/11/2024   Surgical Information   What procedure is being done? rotator cuff repair   Facility or Hospital where procedure/surgery will be performed: [facility]   Who is doing the procedure / surgery? Dr Tate   Date of surgery / procedure: [id number]   Time of surgery / procedure: ?   Where do you plan to recover after surgery? at home with family     Fax number for surgical facility: [phone]    Assessment & Plan     The proposed surgical procedure is considered INTERMEDIATE risk.    Preop general physical exam      Chronic right shoulder pain      Bipolar II disorder (H)  Has psychiatry and therapy in place, stable      Depression Screening Follow Up                Follow Up Actions Taken  Referred patient back to mental health provider    Discussed the following ways the patient can remain in a safe environment:  be around others         Antiplatelet or Anticoagulation Medication Instructions   - Patient is on no antiplatelet or anticoagulation medications.    Additional Medication Instructions  Take all scheduled medications on the day of surgery EXCEPT for modifications listed below:   - Herbal medications and vitamins: DO NOT TAKE 14 days prior to surgery.   - Psychostimulants: Hold the day of surgery    Recommendation  Approval given to proceed with proposed procedure, without further diagnostic evaluation.        Olga Whitehead is a 43 year old, presenting for the following:  Pre-Op Exam          6/11/2024     3:44 PM   Additional Questions   Roomed by Jackie STUART related to upcoming procedure: long standing right shoulder pain persists despite conservative cares        6/11/2024   Pre-Op Questionnaire   Have you ever  had a heart attack or stroke? No   Have you ever had surgery on your heart or blood vessels, such as a stent placement, a coronary artery bypass, or surgery on an artery in your head, neck, heart, or legs? No   Do you have chest pain with activity? No   Do you have a history of heart failure? No   Do you currently have a cold, bronchitis or symptoms of other infection? No   Do you have a cough, shortness of breath, or wheezing? (!) UNKNOWN    Do you or anyone in your family have previous history of blood clots? No   Do you or does anyone in your family have a serious bleeding problem such as prolonged bleeding following surgeries or cuts? No   Have you ever had problems with anemia or been told to take iron pills? No   Have you had any abnormal blood loss such as black, tarry or bloody stools? No   Have you ever had a blood transfusion? No   Are you willing to have a blood transfusion if it is medically needed before, during, or after your surgery? Yes   Have you or any of your relatives ever had problems with anesthesia? No   Do you have sleep apnea, excessive snoring or daytime drowsiness? No   Do you have any artifical heart valves or other implanted medical devices like a pacemaker, defibrillator, or continuous glucose monitor? No   Do you have artificial joints? No   Are you allergic to latex? No     Health Care Directive  Patient does not have a Health Care Directive or Living Will: Discussed advance care planning with patient; information given to patient to review.    Preoperative Review of    reviewed - no record of controlled substances prescribed.      Status of Chronic Conditions:  DEPRESSION - Patient has a long history of Depression of moderate severity requiring medication for control with recent symptoms being stable..Current symptoms of depression include diminished interest or pleasure in activities, decreased appetite.     Patient Active Problem List    Diagnosis Date Noted    Umbilical  hernia without obstruction and without gangrene 03/01/2021     Priority: Medium     Added automatically from request for surgery [id number]      Degeneration of lumbar or lumbosacral intervertebral disc 03/13/2020     Priority: Medium     Formatting of this note might be different from the original.  Added automatically from request for surgery [id number]      Lumbar disc herniation with radiculopathy 03/13/2020     Priority: Medium     Formatting of this note might be different from the original.  Added automatically from request for surgery [id number]      Generalized anxiety disorder 03/26/2019     Priority: Medium    Bipolar II disorder (H) 11/28/2018     Priority: Medium    Anxiety 09/18/2017     Priority: Medium    Tear of medial meniscus of knee 09/08/2016     Priority: Medium    Chemical dependency (H) 11/25/2015     Priority: Medium    Alcohol use disorder, severe, dependence (H) 09/13/2012     Priority: Medium    Major depression in partial remission (H24) 06/30/2011     Priority: Medium    Migraine headache 06/30/2011     Priority: Medium     (Problem list name updated by automated process. Provider to review and confirm.)      CARDIOVASCULAR SCREENING; LDL GOAL LESS THAN 160 10/31/2010     Priority: Medium    Cervicalgia 03/24/2010     Priority: Medium    Nonallopathic lesion of thoracic region 03/24/2010     Priority: Medium     Problem list name updated by automated process. Provider to review      Nonallopathic lesion of lumbar region 03/24/2010     Priority: Medium     Problem list name updated by automated process. Provider to review        Past Medical History:   Diagnosis Date    Anxiety     Bipolar affective disorder (H)     Borderline personality disorder (H)     Cervicalgia     Chemical dependency (H)     Chronic back pain     Depression     JUAREZ (generalized anxiety disorder)     Insomnia     MDD (major depressive disorder)     Migraine headache     PTSD (post-traumatic stress disorder)      Past Surgical  History:   Procedure Laterality Date    HERNIA REPAIR      HERNIORRHAPHY UMBILICAL Bilateral 03/16/2021    Procedure: Open umbilical hernia repair and bilateral vasectomy;  Surgeon: Stanley Woods MD;  Location: [facility] OR    MICRODISCECTOMY LUMBAR  08/2021    Lumbar    SURGICAL HISTORY OF -       deviated septum    VASECTOMY Bilateral 03/16/2021    Procedure: VASECTOMY;  Surgeon: Stanley Woods MD;  Location: [facility] OR     Current Outpatient Medications   Medication Sig Dispense Refill    amphetamine-dextroamphetamine (ADDERALL XR) 15 MG 24 hr capsule Take 1 capsule (15 mg) by mouth daily 30 capsule 0    amphetamine-dextroamphetamine (ADDERALL XR) 15 MG 24 hr capsule Take 1 capsule (15 mg) by mouth daily 30 capsule 0    eszopiclone (LUNESTA) 2 MG tablet Take 1 tablet (2 mg) by mouth nightly as needed for sleep 30 tablet 0    gabapentin (NEURONTIN) 300 MG capsule Take 2 capsules (600 mg) by mouth 3 times daily Refill 28 days after last fill 180 capsule 1    medical cannabis (Patient's own supply) See Admin Instructions (The purpose of this order is to document that the patient reports taking medical cannabis.  This is not a prescription, and is not used to certify that the patient has a qualifying medical condition.)      sildenafil (REVATIO) 20 MG tablet TAKE ONE TABLET BY MOUTH DAILY AS DIRECTED PRIOR TO INTERCOURSE AS NEEDED 30 tablet 11    buPROPion (WELLBUTRIN SR) 150 MG 12 hr tablet Take 1 tablet (150 mg) by mouth 2 times daily (Patient not taking: Reported on 6/11/2024) 60 tablet 3    cyclobenzaprine (FLEXERIL) 5 MG tablet Take 1-2 tablets (5-10 mg) by mouth 3 times daily as needed for muscle spasms (Patient not taking: Reported on 6/11/2024) 30 tablet 0    escitalopram (LEXAPRO) 5 MG tablet Take 1 tablet (5 mg) by mouth daily 90 tablet 0    Multiple Vitamins-Minerals (MENS MULTIPLE VITAMIN/LYCOPENE PO) Take 1 tablet by mouth daily. (Patient not taking: Reported on 6/11/2024)         Allergies   Allergen Reactions     "Cats     Seasonal Allergies         Social History     Tobacco Use    Smoking status: Former     Types: Cigarettes, Dip, chew, snus or snuff    Smokeless tobacco: Current     Types: Chew     Last attempt to quit: 10/1/2019    Tobacco comments:     Chew less than 1 tin a day and nicotine gum - 9/26/2023   Substance Use Topics    Alcohol use: No     Comment: Sober since 5/11/23       History   Drug Use    Types: Marijuana     Comment: Daily             Review of Systems  Constitutional, HEENT, cardiovascular, pulmonary, gi and gu systems are negative, except as otherwise noted.    Objective    /82   Pulse 84   Temp 99.3  F (37.4  C) (Tympanic)   Resp 18   Ht 1.778 m (5' 10\")   Wt 71.6 kg (157 lb 12.8 oz)   SpO2 97%   BMI 22.64 kg/m     Estimated body mass index is 22.64 kg/m  as calculated from the following:    Height as of this encounter: 1.778 m (5' 10\").    Weight as of this encounter: 71.6 kg (157 lb 12.8 oz).  Physical Exam  GENERAL: alert and no distress  EYES: Eyes grossly normal to inspection, PERRL and conjunctivae and sclerae normal  HENT: normal cephalic/atraumatic, oropharynx clear, and oral mucous membranes moist  NECK: no adenopathy, no asymmetry, masses, or scars  RESP: lungs clear to auscultation - no rales, rhonchi or wheezes  CV: regular rate and rhythm, normal S1 S2, no S3 or S4, no murmur, click or rub, no peripheral edema  ABDOMEN: soft, nontender  MS: no gross musculoskeletal defects noted, no edema  SKIN: no suspicious lesions or rashes  NEURO: Normal strength and tone, mentation intact and speech normal  PSYCH: mentation appears normal, affect normal/bright    No results for input(s): \"HGB\", \"PLT\", \"INR\", \"NA\", \"POTASSIUM\", \"CR\", \"A1C\" in the last 8760 hours.     Diagnostics  No labs were ordered during this visit.   No EKG required, no history of coronary heart disease, significant arrhythmia, peripheral arterial disease or other structural heart disease.    Revised Cardiac Risk " Index (RCRI)  The patient has the following serious cardiovascular risks for perioperative complications:   - No serious cardiac risks = 0 points     RCRI Interpretation: 0 points: Class I (very low risk - 0.4% complication rate)         Signed Electronically by: URSULA Urena CNP  Copy of this evaluation report is provided to requesting physician.

## 2024-06-11 NOTE — PATIENT INSTRUCTIONS

## 2024-07-03 ENCOUNTER — TRANSFERRED RECORDS (OUTPATIENT)
Dept: HEALTH INFORMATION MANAGEMENT | Facility: CLINIC | Age: 44
End: 2024-07-03
Payer: COMMERCIAL

## 2024-08-07 ENCOUNTER — TRANSFERRED RECORDS (OUTPATIENT)
Dept: HEALTH INFORMATION MANAGEMENT | Facility: CLINIC | Age: 44
End: 2024-08-07
Payer: COMMERCIAL

## 2024-08-11 ENCOUNTER — HEALTH MAINTENANCE LETTER (OUTPATIENT)
Age: 44
End: 2024-08-11

## 2024-09-18 ENCOUNTER — TRANSFERRED RECORDS (OUTPATIENT)
Dept: HEALTH INFORMATION MANAGEMENT | Facility: CLINIC | Age: 44
End: 2024-09-18
Payer: COMMERCIAL

## 2024-09-18 DIAGNOSIS — N52.9 ERECTILE DYSFUNCTION, UNSPECIFIED ERECTILE DYSFUNCTION TYPE: ICD-10-CM

## 2024-09-19 RX ORDER — SILDENAFIL CITRATE 20 MG/1
TABLET ORAL
Qty: 30 TABLET | Refills: 11 | Status: SHIPPED | OUTPATIENT
Start: 2024-09-19

## 2024-09-19 NOTE — TELEPHONE ENCOUNTER
Requested Prescriptions   Pending Prescriptions Disp Refills    sildenafil (REVATIO) 20 MG tablet [Pharmacy Med Name: SILDENAFIL CITRATE 20MG TABS] 30 tablet 11     Sig: TAKE ONE TABLET BY MOUTH DAILY AS DIRECTED PRIOR TO INTERCOURSE AS NEEDED       Antihypertensive Agents Failed - 9/18/2024 12:04 AM        Failed - Has GFR on file in past 12 months and most recent value is normal        Passed - Blood pressure under 140/90 in past 12 months     BP Readings from Last 3 Encounters:   06/11/24 124/82   12/07/23 130/83   05/22/23 130/60       No data recorded            Passed - Medication is active on med list        Passed - Recent (12 mo) or future (90 days) visit within the authorizing provider's specialty     The patient must have completed an in-person or virtual visit within the past 12 months or has a future visit scheduled within the next 90 days with the authorizing provider s specialty.  Urgent care and e-visits do not quality as an office visit for this protocol.          Passed - Patient is age 18 or older       Erectile Dysfuction Protocol Passed - 9/18/2024 12:04 AM        Passed - Absence of nitrates on medication list        Passed - Absence of Alpha Blockers on Med list        Passed - Medication is active on med list        Passed - Recent (12 mo) or future (90 days) visit within the authorizing provider's specialty     The patient must have completed an in-person or virtual visit within the past 12 months or has a future visit scheduled within the next 90 days with the authorizing provider s specialty.  Urgent care and e-visits do not quality as an office visit for this protocol.          Passed - Medicatin indicated for associated diagnosis     Medication is associated with one or more of the following diagnoses:   Benign prostatic hyperplasia  Erectile dysfunction  Female sexual arousal disorder  Pulmonary hypertension  Raynaud s  Sexual Dysfunction            Passed - Patient is age 18 or older

## 2024-11-18 ENCOUNTER — PATIENT OUTREACH (OUTPATIENT)
Dept: CARE COORDINATION | Facility: CLINIC | Age: 44
End: 2024-11-18
Payer: COMMERCIAL

## 2024-11-21 ENCOUNTER — TRANSFERRED RECORDS (OUTPATIENT)
Dept: HEALTH INFORMATION MANAGEMENT | Facility: CLINIC | Age: 44
End: 2024-11-21
Payer: COMMERCIAL

## 2024-11-29 NOTE — TELEPHONE ENCOUNTER
Per patient he has been using medical cannabis, morphine, ibuprofen and tylenol for exacerbation of his low back pain.  He used a foam roller on his lower back and pain got worse.  Severe.  He is asking about more pain meds and getting a MRI.  Offered appointment, patient does not think he can drive today and his wife is at work.  He has his kids at home.  Advised may need to return to ED/UC for re-evaluation if pain is severe and he can't come to the clinic during daytime hours.  Appointment scheduled for Monday.  Patient verbalizes understanding.    Magi Bhatt RN    
Reason for call:  Patient reporting a symptom    Symptom or request: Severe back pain and constipation since Tuesday, 5/3/2020, chronic back pain for last year, thinks his pelvis is off center or one side or one hip is higher than the other      Duration (how long have symptoms been present): For about the last year    Have you been treated for this before? Yes    Additional comments: Patient went to ED 2/3/2020 for severe back pain. Patient now also has constipation. Patient has always had chronic back pain, but was stretching with a roller on 2/3/2020 and back pain became severe.    Phone Number patient can be reached at:  Home number on file [phone] (home)    Best Time:  Any    Can we leave a detailed message on this number:  YES    Call taken on 2/7/2020 at 9:42 AM by Luz Marina Puentes    
normal/clear to auscultation bilaterally/no wheezes/no rales/no rhonchi

## 2024-12-17 ENCOUNTER — TELEPHONE (OUTPATIENT)
Dept: FAMILY MEDICINE | Facility: CLINIC | Age: 44
End: 2024-12-17
Payer: COMMERCIAL

## 2024-12-17 NOTE — LETTER
December 17, 2024    To  Timmy Fitzpatrick  [address] [place] [address]    Your team at [facility] cares about your health. We have reviewed your chart and based on our findings; we are making the following recommendations to better manage your health.     You are in particular need of attention regarding the following:     Call or MyChart message your clinic to schedule a colonoscopy, schedule/ a FIT Test, or order a Cologuard test. If you are unsure what type of test you need, please call your clinic and speak to clinic staff.   Colon cancer is now the second leading cause of cancer-related deaths in the [place] for both men and women and there are over 130,000 new cases and 50,000 deaths per year from colon cancer. Colonoscopies can prevent 90-95% of these deaths. Problem lesions can be removed before they ever become cancer. This test is not only looking for cancer, but also getting rid of precancerous lesions.   PREVENTATIVE VISIT: Physical    If you have already completed these items, please contact the clinic via phone or   [facility] so your care team can review and update your records. Thank you for   choosing [facility] for your healthcare needs. For any questions,   concerns, or to schedule an appointment please contact our clinic.    Healthy Regards,      Your [facility] Care Team

## 2025-08-16 ENCOUNTER — HEALTH MAINTENANCE LETTER (OUTPATIENT)
Age: 45
End: 2025-08-16

## (undated) DEVICE — SU PROLENE 0 CT-2 30" 8412H

## (undated) DEVICE — STOCKING SLEEVE COMPRESSION CALF MED

## (undated) DEVICE — SOL WATER IRRIG 1000ML BOTTLE 07139-09

## (undated) DEVICE — CLIP APPLIER 09 3/8" SM LIGACLIP MCS20

## (undated) DEVICE — SUPPORTER ATHLETIC LG LATEX 202636

## (undated) DEVICE — PREP CHLORAPREP 26ML TINTED ORANGE  260815

## (undated) DEVICE — BLADE CLIPPER 4406

## (undated) DEVICE — ADH SKIN CLOSURE PREMIERPRO EXOFIN 1.0ML 3470

## (undated) DEVICE — BLADE KNIFE SURG 15 371115

## (undated) DEVICE — CLIP ETHICON LIGACLIP SM BLUE LT100

## (undated) DEVICE — GOWN XLG DISP 9545

## (undated) DEVICE — GLOVE PROTEXIS W/NEU-THERA 7.5  2D73TE75

## (undated) DEVICE — SOL NACL 0.9% IRRIG 1000ML BOTTLE 07138-09

## (undated) DEVICE — NDL 22GA 1.5"

## (undated) DEVICE — SU VICRYL 3-0 SH 27" UND J416H

## (undated) DEVICE — GLOVE PROTEXIS W/NEU-THERA 8.0  2D73TE80

## (undated) DEVICE — PEN MARKING SKIN W/LABELS 31145884

## (undated) DEVICE — BLADE KNIFE SURG 11 371111

## (undated) DEVICE — PREP SKIN SCRUB TRAY 4461A

## (undated) DEVICE — PACK LAPAROTOMY CUSTOM LAKES

## (undated) DEVICE — DRSG KERLIX FLUFFS X5

## (undated) DEVICE — SU VICRYL 4-0 FS-2 27" J422-H

## (undated) RX ORDER — CEFAZOLIN SODIUM 2 G/100ML
INJECTION, SOLUTION INTRAVENOUS
Status: DISPENSED
Start: 2021-03-16

## (undated) RX ORDER — EPHEDRINE SULFATE 50 MG/ML
INJECTION, SOLUTION INTRAMUSCULAR; INTRAVENOUS; SUBCUTANEOUS
Status: DISPENSED
Start: 2021-03-16

## (undated) RX ORDER — FENTANYL CITRATE 50 UG/ML
INJECTION, SOLUTION INTRAMUSCULAR; INTRAVENOUS
Status: DISPENSED
Start: 2021-03-16

## (undated) RX ORDER — ONDANSETRON 2 MG/ML
INJECTION INTRAMUSCULAR; INTRAVENOUS
Status: DISPENSED
Start: 2021-03-16

## (undated) RX ORDER — BUPIVACAINE HYDROCHLORIDE AND EPINEPHRINE 5; 5 MG/ML; UG/ML
INJECTION, SOLUTION EPIDURAL; INTRACAUDAL; PERINEURAL
Status: DISPENSED
Start: 2021-03-16

## (undated) RX ORDER — BUPIVACAINE HYDROCHLORIDE 5 MG/ML
INJECTION, SOLUTION EPIDURAL; INTRACAUDAL
Status: DISPENSED
Start: 2019-10-02

## (undated) RX ORDER — METHYLPREDNISOLONE ACETATE 40 MG/ML
INJECTION, SUSPENSION INTRA-ARTICULAR; INTRALESIONAL; INTRAMUSCULAR; SOFT TISSUE
Status: DISPENSED
Start: 2019-10-02

## (undated) RX ORDER — DEXAMETHASONE SODIUM PHOSPHATE 10 MG/ML
INJECTION, SOLUTION INTRAMUSCULAR; INTRAVENOUS
Status: DISPENSED
Start: 2019-10-02

## (undated) RX ORDER — LIDOCAINE HYDROCHLORIDE 10 MG/ML
INJECTION, SOLUTION EPIDURAL; INFILTRATION; INTRACAUDAL; PERINEURAL
Status: DISPENSED
Start: 2021-03-16

## (undated) RX ORDER — DEXAMETHASONE SODIUM PHOSPHATE 4 MG/ML
INJECTION, SOLUTION INTRA-ARTICULAR; INTRALESIONAL; INTRAMUSCULAR; INTRAVENOUS; SOFT TISSUE
Status: DISPENSED
Start: 2021-03-16

## (undated) RX ORDER — PROPOFOL 10 MG/ML
INJECTION, EMULSION INTRAVENOUS
Status: DISPENSED
Start: 2021-03-16

## (undated) RX ORDER — GLYCOPYRROLATE 0.2 MG/ML
INJECTION, SOLUTION INTRAMUSCULAR; INTRAVENOUS
Status: DISPENSED
Start: 2021-03-16

## (undated) RX ORDER — KETOROLAC TROMETHAMINE 30 MG/ML
INJECTION, SOLUTION INTRAMUSCULAR; INTRAVENOUS
Status: DISPENSED
Start: 2021-03-16